# Patient Record
Sex: FEMALE | Race: WHITE | NOT HISPANIC OR LATINO | Employment: FULL TIME | URBAN - METROPOLITAN AREA
[De-identification: names, ages, dates, MRNs, and addresses within clinical notes are randomized per-mention and may not be internally consistent; named-entity substitution may affect disease eponyms.]

---

## 2017-01-21 ENCOUNTER — HOSPITAL ENCOUNTER (OUTPATIENT)
Dept: LAB | Facility: HOSPITAL | Age: 37
Discharge: HOME | End: 2017-01-21
Attending: SURGERY | Admitting: SURGERY

## 2017-01-21 LAB
ANION GAP SERPL CALC-SCNC: 12.8 MMOL/L (ref 10–20)
BUN SERPL-MCNC: 16 MG/DL (ref 8–20)
BUN/CREAT SERPL: 26.7 (ref 5.4–26.2)
CALCIUM SERPL-MCNC: 9.1 MG/DL (ref 8.9–10.3)
CHLORIDE SERPL-SCNC: 102 MMOL/L (ref 101–111)
CONV CO2: 25 MMOL/L (ref 22–32)
CREAT UR-MCNC: 0.6 MG/DL (ref 0.4–1)
GLUCOSE SERPL-MCNC: 215 MG/DL (ref 65–99)
POTASSIUM SERPL-SCNC: 3.8 MMOL/L (ref 3.6–5.1)
SODIUM SERPL-SCNC: 136 MMOL/L (ref 136–144)

## 2017-01-24 ENCOUNTER — HOSPITAL ENCOUNTER (OUTPATIENT)
Dept: OTHER | Facility: HOSPITAL | Age: 37
Setting detail: SPECIMEN
Discharge: HOME | End: 2017-01-24
Attending: SURGERY | Admitting: SURGERY

## 2017-08-18 ENCOUNTER — HOSPITAL ENCOUNTER (OUTPATIENT)
Dept: OTHER | Facility: HOSPITAL | Age: 37
Discharge: HOME | End: 2017-08-18
Attending: SURGERY | Admitting: SURGERY

## 2017-08-18 LAB
ANION GAP SERPL CALC-SCNC: 16 MMOL/L (ref 10–20)
BUN SERPL-MCNC: 11 MG/DL (ref 8–20)
BUN/CREAT SERPL: 18.3 (ref 5.4–26.2)
CALCIUM SERPL-MCNC: 9 MG/DL (ref 8.9–10.3)
CHLORIDE SERPL-SCNC: 101 MMOL/L (ref 101–111)
CONV CO2: 23 MMOL/L (ref 22–32)
CREAT UR-MCNC: 0.6 MG/DL (ref 0.4–1)
GLUCOSE SERPL-MCNC: 192 MG/DL (ref 65–99)
POTASSIUM SERPL-SCNC: 4 MMOL/L (ref 3.6–5.1)
SODIUM SERPL-SCNC: 136 MMOL/L (ref 136–144)

## 2017-08-21 ENCOUNTER — HOSPITAL ENCOUNTER (OUTPATIENT)
Dept: OTHER | Facility: HOSPITAL | Age: 37
Setting detail: SPECIMEN
Discharge: HOME | End: 2017-08-21
Attending: SURGERY | Admitting: SURGERY

## 2018-02-08 ENCOUNTER — HOSPITAL ENCOUNTER (OUTPATIENT)
Dept: MAMMOGRAPHY | Facility: HOSPITAL | Age: 38
Discharge: HOME | End: 2018-02-08

## 2018-02-13 ENCOUNTER — HOSPITAL ENCOUNTER (OUTPATIENT)
Dept: OTHER | Facility: HOSPITAL | Age: 38
Setting detail: SPECIMEN
Discharge: HOME | End: 2018-02-13
Attending: SURGERY | Admitting: SURGERY

## 2018-04-04 ENCOUNTER — HOSPITAL ENCOUNTER (OUTPATIENT)
Dept: FAMILY MEDICINE CLINIC | Facility: CLINIC | Age: 38
Setting detail: SPECIMEN
Discharge: HOME | End: 2018-04-04

## 2018-04-04 LAB
AMPICILLIN SUSC ISLT: NORMAL
BACTERIA ISLT: NORMAL
BACTERIA SPEC AEROBE CULT: NORMAL
GRAM STN SPEC: NORMAL
Lab: NORMAL
MICRO REPORT STATUS: NORMAL
SPECIMEN SOURCE: NORMAL
SUSC METH SPEC: NORMAL
VANCOMYCIN SUSC ISLT: NORMAL

## 2018-05-18 ENCOUNTER — HOSPITAL ENCOUNTER (OUTPATIENT)
Dept: FAMILY MEDICINE CLINIC | Facility: CLINIC | Age: 38
Setting detail: SPECIMEN
Discharge: HOME | End: 2018-05-18
Attending: NURSE PRACTITIONER | Admitting: NURSE PRACTITIONER

## 2018-05-18 LAB
ALBUMIN SERPL-MCNC: 3.5 G/DL (ref 3.5–4.8)
ALBUMIN/GLOB SERPL: 1.1 {RATIO} (ref 1–1.7)
ALP SERPL-CCNC: 81 IU/L (ref 32–91)
ALT SERPL-CCNC: 57 IU/L (ref 14–54)
ANION GAP SERPL CALC-SCNC: 16.6 MMOL/L (ref 10–20)
AST SERPL-CCNC: 40 IU/L (ref 15–41)
BILIRUB SERPL-MCNC: 0.5 MG/DL (ref 0.3–1.2)
BUN SERPL-MCNC: 9 MG/DL (ref 8–20)
BUN/CREAT SERPL: 15 (ref 5.4–26.2)
CALCIUM SERPL-MCNC: 9.4 MG/DL (ref 8.9–10.3)
CHLORIDE SERPL-SCNC: 98 MMOL/L (ref 101–111)
CHOLEST SERPL-MCNC: 390 MG/DL
CONV CO2: 24 MMOL/L (ref 22–32)
CONV LDL CHOLESTEROL DIRECT: ABNORMAL MG/DL (ref 0–100)
CONV TOTAL PROTEIN: 6.7 G/DL (ref 6.1–7.9)
CREAT UR-MCNC: 0.6 MG/DL (ref 0.4–1)
GLOBULIN UR ELPH-MCNC: 3.2 G/DL (ref 2.5–3.8)
GLUCOSE SERPL-MCNC: 235 MG/DL (ref 65–99)
HDLC SERPL-MCNC: ABNORMAL MG/DL
LIPID INTERPRETATION: ABNORMAL
POTASSIUM SERPL-SCNC: 3.6 MMOL/L (ref 3.6–5.1)
SODIUM SERPL-SCNC: 135 MMOL/L (ref 136–144)
TRIGL SERPL-MCNC: ABNORMAL MG/DL
VLDLC SERPL CALC-MCNC: ABNORMAL MG/DL

## 2018-10-06 ENCOUNTER — HOSPITAL ENCOUNTER (OUTPATIENT)
Dept: OTHER | Facility: HOSPITAL | Age: 38
Discharge: HOME | End: 2018-10-06
Attending: ANESTHESIOLOGY | Admitting: ANESTHESIOLOGY

## 2018-10-06 LAB
ANION GAP SERPL CALC-SCNC: 16.2 MMOL/L (ref 10–20)
BASOPHILS # BLD AUTO: 0.1 10*3/UL (ref 0–0.2)
BASOPHILS NFR BLD AUTO: 1 % (ref 0–2)
BUN SERPL-MCNC: 12 MG/DL (ref 8–20)
BUN/CREAT SERPL: 20 (ref 5.4–26.2)
CALCIUM SERPL-MCNC: 8.8 MG/DL (ref 8.9–10.3)
CHLORIDE SERPL-SCNC: 99 MMOL/L (ref 101–111)
CONV CO2: 23 MMOL/L (ref 22–32)
CREAT UR-MCNC: 0.6 MG/DL (ref 0.4–1)
DIFFERENTIAL METHOD BLD: (no result)
EOSINOPHIL # BLD AUTO: 0.3 10*3/UL (ref 0–0.3)
EOSINOPHIL # BLD AUTO: 3 % (ref 0–3)
ERYTHROCYTE [DISTWIDTH] IN BLOOD BY AUTOMATED COUNT: 13.2 % (ref 11.5–14.5)
GLUCOSE SERPL-MCNC: 206 MG/DL (ref 65–99)
HCT VFR BLD AUTO: 44.9 % (ref 35–49)
HGB BLD-MCNC: 15.2 G/DL (ref 12–15)
LYMPHOCYTES # BLD AUTO: 2.9 10*3/UL (ref 0.8–4.8)
LYMPHOCYTES NFR BLD AUTO: 28 % (ref 18–42)
MCH RBC QN AUTO: 30.8 PG (ref 26–32)
MCHC RBC AUTO-ENTMCNC: 33.8 G/DL (ref 32–36)
MCV RBC AUTO: 91.2 FL (ref 80–94)
MONOCYTES # BLD AUTO: 0.9 10*3/UL (ref 0.1–1.3)
MONOCYTES NFR BLD AUTO: 9 % (ref 2–11)
NEUTROPHILS # BLD AUTO: 6.2 10*3/UL (ref 2.3–8.6)
NEUTROPHILS NFR BLD AUTO: 59 % (ref 50–75)
NRBC BLD AUTO-RTO: 0 /100{WBCS}
NRBC/RBC NFR BLD MANUAL: 0 10*3/UL
PLATELET # BLD AUTO: 297 10*3/UL (ref 150–450)
PMV BLD AUTO: 10 FL (ref 7.4–10.4)
POTASSIUM SERPL-SCNC: 4.2 MMOL/L (ref 3.6–5.1)
RBC # BLD AUTO: 4.92 10*6/UL (ref 4–5.4)
SODIUM SERPL-SCNC: 134 MMOL/L (ref 136–144)
WBC # BLD AUTO: 10.4 10*3/UL (ref 4.5–11.5)

## 2018-10-09 ENCOUNTER — HOSPITAL ENCOUNTER (OUTPATIENT)
Dept: OTHER | Facility: HOSPITAL | Age: 38
Setting detail: SPECIMEN
Discharge: HOME | End: 2018-10-09
Attending: SURGERY | Admitting: SURGERY

## 2019-04-27 ENCOUNTER — HOSPITAL ENCOUNTER (OUTPATIENT)
Dept: OTHER | Facility: HOSPITAL | Age: 39
Discharge: HOME | End: 2019-04-27
Attending: SURGERY | Admitting: SURGERY

## 2019-04-27 LAB
ANION GAP SERPL CALC-SCNC: 18.2 MMOL/L (ref 10–20)
BASOPHILS # BLD AUTO: 0.1 10*3/UL (ref 0–0.2)
BASOPHILS NFR BLD AUTO: 1 % (ref 0–2)
BUN SERPL-MCNC: 11 MG/DL (ref 8–20)
BUN/CREAT SERPL: 22 (ref 5.4–26.2)
CALCIUM SERPL-MCNC: 8.4 MG/DL (ref 8.9–10.3)
CHLORIDE SERPL-SCNC: 100 MMOL/L (ref 101–111)
CONV CO2: 21 MMOL/L (ref 22–32)
CREAT UR-MCNC: 0.5 MG/DL (ref 0.4–1)
DIFFERENTIAL METHOD BLD: (no result)
EOSINOPHIL # BLD AUTO: 0.2 10*3/UL (ref 0–0.3)
EOSINOPHIL # BLD AUTO: 2 % (ref 0–3)
ERYTHROCYTE [DISTWIDTH] IN BLOOD BY AUTOMATED COUNT: 13.3 % (ref 11.5–14.5)
GLUCOSE SERPL-MCNC: 239 MG/DL (ref 65–99)
HCT VFR BLD AUTO: 43.7 % (ref 35–49)
HGB BLD-MCNC: 14.6 G/DL (ref 12–15)
LYMPHOCYTES # BLD AUTO: 2.5 10*3/UL (ref 0.8–4.8)
LYMPHOCYTES NFR BLD AUTO: 25 % (ref 18–42)
MCH RBC QN AUTO: 30.9 PG (ref 26–32)
MCHC RBC AUTO-ENTMCNC: 33.3 G/DL (ref 32–36)
MCV RBC AUTO: 92.8 FL (ref 80–94)
MONOCYTES # BLD AUTO: 0.9 10*3/UL (ref 0.1–1.3)
MONOCYTES NFR BLD AUTO: 9 % (ref 2–11)
NEUTROPHILS # BLD AUTO: 6.3 10*3/UL (ref 2.3–8.6)
NEUTROPHILS NFR BLD AUTO: 63 % (ref 50–75)
NRBC BLD AUTO-RTO: 0 /100{WBCS}
NRBC/RBC NFR BLD MANUAL: 0 10*3/UL
PLATELET # BLD AUTO: 249 10*3/UL (ref 150–450)
PMV BLD AUTO: 9.4 FL (ref 7.4–10.4)
POTASSIUM SERPL-SCNC: 4.2 MMOL/L (ref 3.6–5.1)
RBC # BLD AUTO: 4.71 10*6/UL (ref 4–5.4)
SODIUM SERPL-SCNC: 135 MMOL/L (ref 136–144)
WBC # BLD AUTO: 9.9 10*3/UL (ref 4.5–11.5)

## 2019-04-30 ENCOUNTER — HOSPITAL ENCOUNTER (OUTPATIENT)
Dept: OTHER | Facility: HOSPITAL | Age: 39
Setting detail: SPECIMEN
Discharge: HOME | End: 2019-04-30
Attending: SURGERY | Admitting: SURGERY

## 2019-05-10 ENCOUNTER — HOSPITAL ENCOUNTER (OUTPATIENT)
Dept: ULTRASOUND IMAGING | Facility: HOSPITAL | Age: 39
Discharge: HOME | End: 2019-05-10
Attending: SURGERY | Admitting: SURGERY

## 2019-06-18 ENCOUNTER — APPOINTMENT (OUTPATIENT)
Dept: PREADMISSION TESTING | Facility: HOSPITAL | Age: 39
End: 2019-06-18

## 2019-06-18 LAB
ANION GAP SERPL CALCULATED.3IONS-SCNC: 13 MMOL/L (ref 10–20)
BASOPHILS # BLD AUTO: 0.1 10*3/MM3 (ref 0–0.2)
BASOPHILS NFR BLD AUTO: 0.7 % (ref 0–1.5)
BUN BLD-MCNC: 13 MG/DL (ref 8–20)
BUN/CREAT SERPL: 26 (ref 5.4–26.2)
CALCIUM SPEC-SCNC: 8.9 MG/DL (ref 8.9–10.3)
CHLORIDE SERPL-SCNC: 100 MMOL/L (ref 101–111)
CO2 SERPL-SCNC: 20 MMOL/L (ref 22–32)
CREAT BLD-MCNC: 0.5 MG/DL (ref 0.4–1)
DEPRECATED RDW RBC AUTO: 40.7 FL (ref 37–54)
EOSINOPHIL # BLD AUTO: 0.3 10*3/MM3 (ref 0–0.4)
EOSINOPHIL NFR BLD AUTO: 2.5 % (ref 0.3–6.2)
ERYTHROCYTE [DISTWIDTH] IN BLOOD BY AUTOMATED COUNT: 12.7 % (ref 12.3–15.4)
GFR SERPL CREATININE-BSD FRML MDRD: 136 ML/MIN/1.73
GLUCOSE BLD-MCNC: 199 MG/DL (ref 65–99)
HCT VFR BLD AUTO: 44.1 % (ref 34–46.6)
HGB BLD-MCNC: 15 G/DL (ref 12–15.9)
LYMPHOCYTES # BLD AUTO: 2.8 10*3/MM3 (ref 0.7–3.1)
LYMPHOCYTES NFR BLD AUTO: 24 % (ref 19.6–45.3)
MCH RBC QN AUTO: 31 PG (ref 26.6–33)
MCHC RBC AUTO-ENTMCNC: 34 G/DL (ref 31.5–35.7)
MCV RBC AUTO: 91.1 FL (ref 79–97)
MONOCYTES # BLD AUTO: 0.9 10*3/MM3 (ref 0.1–0.9)
MONOCYTES NFR BLD AUTO: 7.6 % (ref 5–12)
NEUTROPHILS # BLD AUTO: 7.5 10*3/MM3 (ref 1.7–7)
NEUTROPHILS NFR BLD AUTO: 65.2 % (ref 42.7–76)
NRBC BLD AUTO-RTO: 0 /100 WBC (ref 0–0.2)
PLATELET # BLD AUTO: 321 10*3/MM3 (ref 140–450)
PMV BLD AUTO: 9.3 FL (ref 6–12)
POTASSIUM BLD-SCNC: 3.8 MMOL/L (ref 3.6–5.1)
RBC # BLD AUTO: 4.84 10*6/MM3 (ref 3.77–5.28)
SODIUM BLD-SCNC: 133 MMOL/L (ref 136–144)
WBC NRBC COR # BLD: 11.6 10*3/MM3 (ref 3.4–10.8)

## 2019-06-18 PROCEDURE — 85025 COMPLETE CBC W/AUTO DIFF WBC: CPT | Performed by: SURGERY

## 2019-06-18 PROCEDURE — 80048 BASIC METABOLIC PNL TOTAL CA: CPT | Performed by: SURGERY

## 2019-06-18 PROCEDURE — 36415 COLL VENOUS BLD VENIPUNCTURE: CPT | Performed by: SURGERY

## 2019-06-18 RX ORDER — HYDROXYZINE HYDROCHLORIDE 25 MG/1
25 TABLET, FILM COATED ORAL EVERY 6 HOURS PRN
COMMUNITY
Start: 2018-05-15 | End: 2020-01-02 | Stop reason: SDUPTHER

## 2019-06-18 RX ORDER — EPINEPHRINE 0.3 MG/.3ML
INJECTION SUBCUTANEOUS
COMMUNITY
Start: 2015-03-02 | End: 2019-10-23

## 2019-06-18 RX ORDER — HYDROCODONE BITARTRATE AND ACETAMINOPHEN 5; 325 MG/1; MG/1
TABLET ORAL
Refills: 0 | COMMUNITY
Start: 2019-05-16 | End: 2019-07-22

## 2019-06-18 RX ORDER — BUDESONIDE AND FORMOTEROL FUMARATE DIHYDRATE 80; 4.5 UG/1; UG/1
AEROSOL RESPIRATORY (INHALATION)
COMMUNITY
Start: 2013-10-08 | End: 2019-10-23

## 2019-06-18 RX ORDER — FUROSEMIDE 40 MG/1
40 TABLET ORAL DAILY
COMMUNITY
Start: 2015-11-25 | End: 2020-01-28

## 2019-06-18 RX ORDER — LISINOPRIL 40 MG/1
TABLET ORAL EVERY 24 HOURS
COMMUNITY
Start: 2018-04-23 | End: 2019-07-22 | Stop reason: ALTCHOICE

## 2019-06-18 RX ORDER — ALBUTEROL SULFATE 2.5 MG/3ML
SOLUTION RESPIRATORY (INHALATION)
COMMUNITY
Start: 2014-03-10 | End: 2019-10-23

## 2019-06-18 RX ORDER — DOXYCYCLINE 100 MG/1
CAPSULE ORAL
Refills: 0 | COMMUNITY
Start: 2019-05-28 | End: 2019-07-22

## 2019-06-18 RX ORDER — ALBUTEROL SULFATE 90 UG/1
AEROSOL, METERED RESPIRATORY (INHALATION)
COMMUNITY
Start: 2017-03-24 | End: 2019-10-23

## 2019-06-19 ENCOUNTER — ANESTHESIA (OUTPATIENT)
Dept: PERIOP | Facility: HOSPITAL | Age: 39
End: 2019-06-19

## 2019-06-19 ENCOUNTER — HOSPITAL ENCOUNTER (OUTPATIENT)
Facility: HOSPITAL | Age: 39
Setting detail: HOSPITAL OUTPATIENT SURGERY
Discharge: HOME | End: 2019-06-19
Attending: SURGERY | Admitting: SURGERY

## 2019-06-19 ENCOUNTER — ANESTHESIA EVENT (OUTPATIENT)
Dept: PERIOP | Facility: HOSPITAL | Age: 39
End: 2019-06-19

## 2019-06-19 VITALS
RESPIRATION RATE: 16 BRPM | HEIGHT: 67 IN | SYSTOLIC BLOOD PRESSURE: 154 MMHG | WEIGHT: 286.38 LBS | TEMPERATURE: 97.6 F | HEART RATE: 88 BPM | BODY MASS INDEX: 44.95 KG/M2 | OXYGEN SATURATION: 97 % | DIASTOLIC BLOOD PRESSURE: 95 MMHG

## 2019-06-19 PROBLEM — L73.2 HIDRADENITIS: Status: ACTIVE | Noted: 2019-06-19

## 2019-06-19 LAB — B-HCG UR QL: NEGATIVE

## 2019-06-19 PROCEDURE — 25010000002 MIDAZOLAM PER 1 MG: Performed by: ANESTHESIOLOGY

## 2019-06-19 PROCEDURE — 81025 URINE PREGNANCY TEST: CPT | Performed by: ANESTHESIOLOGY

## 2019-06-19 PROCEDURE — 25010000002 FENTANYL CITRATE (PF) 100 MCG/2ML SOLUTION: Performed by: ANESTHESIOLOGY

## 2019-06-19 PROCEDURE — 25010000002 PROPOFOL 10 MG/ML EMULSION: Performed by: ANESTHESIOLOGY

## 2019-06-19 PROCEDURE — 25010000002 DEXAMETHASONE PER 1 MG: Performed by: ANESTHESIOLOGY

## 2019-06-19 PROCEDURE — 25010000002 ONDANSETRON PER 1 MG: Performed by: ANESTHESIOLOGY

## 2019-06-19 RX ORDER — PROPOFOL 10 MG/ML
VIAL (ML) INTRAVENOUS AS NEEDED
Status: DISCONTINUED | OUTPATIENT
Start: 2019-06-19 | End: 2019-06-19 | Stop reason: SURG

## 2019-06-19 RX ORDER — PROMETHAZINE HYDROCHLORIDE 25 MG/1
25 SUPPOSITORY RECTAL ONCE AS NEEDED
Status: DISCONTINUED | OUTPATIENT
Start: 2019-06-19 | End: 2019-06-19 | Stop reason: HOSPADM

## 2019-06-19 RX ORDER — OXYCODONE AND ACETAMINOPHEN 7.5; 325 MG/1; MG/1
1 TABLET ORAL EVERY 4 HOURS PRN
Qty: 30 TABLET | Refills: 0 | Status: SHIPPED | OUTPATIENT
Start: 2019-06-19 | End: 2019-07-22

## 2019-06-19 RX ORDER — FENTANYL CITRATE 50 UG/ML
50 INJECTION, SOLUTION INTRAMUSCULAR; INTRAVENOUS
Status: DISCONTINUED | OUTPATIENT
Start: 2019-06-19 | End: 2019-06-19 | Stop reason: HOSPADM

## 2019-06-19 RX ORDER — MEPERIDINE HYDROCHLORIDE 25 MG/ML
12.5 INJECTION INTRAMUSCULAR; INTRAVENOUS; SUBCUTANEOUS
Status: DISCONTINUED | OUTPATIENT
Start: 2019-06-19 | End: 2019-06-19 | Stop reason: HOSPADM

## 2019-06-19 RX ORDER — MIDAZOLAM HYDROCHLORIDE 1 MG/ML
INJECTION INTRAMUSCULAR; INTRAVENOUS AS NEEDED
Status: DISCONTINUED | OUTPATIENT
Start: 2019-06-19 | End: 2019-06-19 | Stop reason: SURG

## 2019-06-19 RX ORDER — CEFAZOLIN SODIUM IN 0.9 % NACL 3 G/100 ML
3 INTRAVENOUS SOLUTION, PIGGYBACK (ML) INTRAVENOUS ONCE
Status: COMPLETED | OUTPATIENT
Start: 2019-06-19 | End: 2019-06-19

## 2019-06-19 RX ORDER — SODIUM CHLORIDE 0.9 % (FLUSH) 0.9 %
3-10 SYRINGE (ML) INJECTION AS NEEDED
Status: DISCONTINUED | OUTPATIENT
Start: 2019-06-19 | End: 2019-06-19 | Stop reason: HOSPADM

## 2019-06-19 RX ORDER — DEXAMETHASONE SODIUM PHOSPHATE 4 MG/ML
INJECTION, SOLUTION INTRA-ARTICULAR; INTRALESIONAL; INTRAMUSCULAR; INTRAVENOUS; SOFT TISSUE AS NEEDED
Status: DISCONTINUED | OUTPATIENT
Start: 2019-06-19 | End: 2019-06-19 | Stop reason: SURG

## 2019-06-19 RX ORDER — ONDANSETRON 2 MG/ML
INJECTION INTRAMUSCULAR; INTRAVENOUS AS NEEDED
Status: DISCONTINUED | OUTPATIENT
Start: 2019-06-19 | End: 2019-06-19 | Stop reason: SURG

## 2019-06-19 RX ORDER — SODIUM CHLORIDE, SODIUM LACTATE, POTASSIUM CHLORIDE, CALCIUM CHLORIDE 600; 310; 30; 20 MG/100ML; MG/100ML; MG/100ML; MG/100ML
100 INJECTION, SOLUTION INTRAVENOUS CONTINUOUS
Status: DISCONTINUED | OUTPATIENT
Start: 2019-06-19 | End: 2019-06-19 | Stop reason: HOSPADM

## 2019-06-19 RX ORDER — PROMETHAZINE HYDROCHLORIDE 25 MG/ML
12.5 INJECTION, SOLUTION INTRAMUSCULAR; INTRAVENOUS ONCE AS NEEDED
Status: DISCONTINUED | OUTPATIENT
Start: 2019-06-19 | End: 2019-06-19 | Stop reason: HOSPADM

## 2019-06-19 RX ORDER — ONDANSETRON 2 MG/ML
4 INJECTION INTRAMUSCULAR; INTRAVENOUS ONCE AS NEEDED
Status: DISCONTINUED | OUTPATIENT
Start: 2019-06-19 | End: 2019-06-19 | Stop reason: HOSPADM

## 2019-06-19 RX ORDER — FENTANYL CITRATE 50 UG/ML
INJECTION, SOLUTION INTRAMUSCULAR; INTRAVENOUS AS NEEDED
Status: DISCONTINUED | OUTPATIENT
Start: 2019-06-19 | End: 2019-06-19 | Stop reason: SURG

## 2019-06-19 RX ORDER — PROMETHAZINE HYDROCHLORIDE 25 MG/1
25 TABLET ORAL ONCE AS NEEDED
Status: DISCONTINUED | OUTPATIENT
Start: 2019-06-19 | End: 2019-06-19 | Stop reason: HOSPADM

## 2019-06-19 RX ORDER — ONDANSETRON 4 MG/1
4 TABLET, FILM COATED ORAL DAILY PRN
Qty: 30 TABLET | Refills: 1 | Status: SHIPPED | OUTPATIENT
Start: 2019-06-19 | End: 2019-07-22

## 2019-06-19 RX ORDER — SODIUM CHLORIDE 0.9 % (FLUSH) 0.9 %
3 SYRINGE (ML) INJECTION EVERY 12 HOURS SCHEDULED
Status: DISCONTINUED | OUTPATIENT
Start: 2019-06-19 | End: 2019-06-19 | Stop reason: HOSPADM

## 2019-06-19 RX ADMIN — MEPERIDINE HYDROCHLORIDE 12.5 MG: 25 INJECTION INTRAMUSCULAR; INTRAVENOUS; SUBCUTANEOUS at 10:55

## 2019-06-19 RX ADMIN — ONDANSETRON 4 MG: 2 INJECTION INTRAMUSCULAR; INTRAVENOUS at 09:17

## 2019-06-19 RX ADMIN — CEFAZOLIN 3 G: 1 INJECTION, POWDER, FOR SOLUTION INTRAMUSCULAR; INTRAVENOUS; PARENTERAL at 09:08

## 2019-06-19 RX ADMIN — FENTANYL CITRATE 50 MCG: 50 INJECTION INTRAMUSCULAR; INTRAVENOUS at 10:41

## 2019-06-19 RX ADMIN — SODIUM CHLORIDE, SODIUM LACTATE, POTASSIUM CHLORIDE, AND CALCIUM CHLORIDE 100 ML/HR: .6; .31; .03; .02 INJECTION, SOLUTION INTRAVENOUS at 07:14

## 2019-06-19 RX ADMIN — FENTANYL CITRATE 50 MCG: 50 INJECTION, SOLUTION INTRAMUSCULAR; INTRAVENOUS at 09:27

## 2019-06-19 RX ADMIN — DEXAMETHASONE SODIUM PHOSPHATE 4 MG: 4 INJECTION, SOLUTION INTRAMUSCULAR; INTRAVENOUS at 09:17

## 2019-06-19 RX ADMIN — FENTANYL CITRATE 50 MCG: 50 INJECTION INTRAMUSCULAR; INTRAVENOUS at 10:16

## 2019-06-19 RX ADMIN — FENTANYL CITRATE 100 MCG: 50 INJECTION, SOLUTION INTRAMUSCULAR; INTRAVENOUS at 09:04

## 2019-06-19 RX ADMIN — PROPOFOL 200 MG: 10 INJECTION, EMULSION INTRAVENOUS at 09:11

## 2019-06-19 RX ADMIN — FENTANYL CITRATE 50 MCG: 50 INJECTION, SOLUTION INTRAMUSCULAR; INTRAVENOUS at 09:31

## 2019-06-19 RX ADMIN — MIDAZOLAM 2 MG: 1 INJECTION INTRAMUSCULAR; INTRAVENOUS at 09:04

## 2019-06-19 RX ADMIN — SODIUM CHLORIDE, SODIUM LACTATE, POTASSIUM CHLORIDE, AND CALCIUM CHLORIDE: .6; .31; .03; .02 INJECTION, SOLUTION INTRAVENOUS at 09:00

## 2019-06-19 NOTE — ANESTHESIA PROCEDURE NOTES
Airway  Urgency: elective    Date/Time: 6/19/2019 9:06 AM  Airway not difficult    General Information and Staff    Patient location during procedure: OR  Anesthesiologist: Calvin Resendez MD    Indications and Patient Condition  Indications for airway management: airway protection    Preoxygenated: yes  MILS maintained throughout  Mask difficulty assessment: 0 - not attempted    Final Airway Details  Final airway type: supraglottic airway      Successful airway: LMA  Size 4    Number of attempts at approach: 1

## 2019-06-19 NOTE — ANESTHESIA POSTPROCEDURE EVALUATION
Patient: Bernadine Arriaga    Procedure Summary     Date:  06/19/19 Room / Location:  [facility] OR 06 / [facility] MAIN OR    Anesthesia Start:  0900 Anesthesia Stop:  0945    Procedure:  DEBRIDEMENT OF RECURRENT HIDRADENITIS AND VAC PLACEMENT RIGHT SIDE (Right Buttocks) Diagnosis:  (CHRONIC RIGHT AXILLARY)    Surgeon:  Alix Freire MD Provider:  Calvin Resendez MD    Anesthesia Type:  general ASA Status:  3          Anesthesia Type: general  Last vitals  BP   149/97 (06/19/19 0607)   Temp   97.1 °F (36.2 °C) (06/19/19 0607)   Pulse   88 (06/19/19 0607)   Resp   13 (06/19/19 0607)     SpO2   96 % (06/19/19 0607)     Post Anesthesia Care and Evaluation    Patient location during evaluation: PACU  Patient participation: complete - patient participated  Level of consciousness: awake  Pain management: adequate  Airway patency: patent  Anesthetic complications: No anesthetic complications  PONV Status: none  Cardiovascular status: acceptable  Respiratory status: acceptable  Hydration status: acceptable    Comments: Patient seen and examined postoperatively; vital signs stable; SpO2 greater than or equal to 90%; cardiopulmonary status stable; nausea/vomiting adequately controlled; pain adequately controlled; no apparent anesthesia complications; patient discharged from anesthesia care when discharge criteria were met

## 2019-06-19 NOTE — ANESTHESIA PREPROCEDURE EVALUATION
Anesthesia Evaluation     Patient summary reviewed and Nursing notes reviewed   NPO Solid Status: > 8 hours  NPO Liquid Status: > 8 hours           Airway   Mallampati: II  TM distance: >3 FB  Neck ROM: full  No difficulty expected  Dental - normal exam     Pulmonary - normal exam   (+) asthma,   Cardiovascular - normal exam    (+) hypertension,       Neuro/Psych- negative ROS  GI/Hepatic/Renal/Endo - negative ROS     Musculoskeletal (-) negative ROS    Abdominal  - normal exam   Substance History - negative use     OB/GYN negative ob/gyn ROS         Other                        Anesthesia Plan    ASA 3     general     intravenous induction   Anesthetic plan, all risks, benefits, and alternatives have been provided, discussed and informed consent has been obtained with: patient.

## 2019-06-19 NOTE — ANESTHESIA PROCEDURE NOTES
Airway  Urgency: elective    Airway not difficult    General Information and Staff    Patient location during procedure: OR  Anesthesiologist: Calvin Resendez MD    Indications and Patient Condition  Indications for airway management: airway protection    Preoxygenated: yes  MILS maintained throughout  Mask difficulty assessment: 0 - not attempted    Final Airway Details  Final airway type: supraglottic airway      Successful airway: LMA  Size 4    Number of attempts at approach: 1

## 2019-06-23 ENCOUNTER — HOSPITAL ENCOUNTER (EMERGENCY)
Facility: HOSPITAL | Age: 39
Discharge: HOME | End: 2019-06-23
Attending: EMERGENCY MEDICINE

## 2019-06-23 VITALS
DIASTOLIC BLOOD PRESSURE: 79 MMHG | BODY MASS INDEX: 45.85 KG/M2 | SYSTOLIC BLOOD PRESSURE: 143 MMHG | OXYGEN SATURATION: 99 % | HEART RATE: 81 BPM | WEIGHT: 292.11 LBS | TEMPERATURE: 98.6 F | HEIGHT: 67 IN | RESPIRATION RATE: 20 BRPM

## 2019-06-23 DIAGNOSIS — L73.2 HIDRADENITIS SUPPURATIVA OF RIGHT AXILLA: Primary | ICD-10-CM

## 2019-06-23 LAB
ANION GAP SERPL CALCULATED.3IONS-SCNC: 15 MMOL/L (ref 10–20)
BASOPHILS # BLD AUTO: 0.1 10*3/MM3 (ref 0–0.2)
BASOPHILS NFR BLD AUTO: 0.9 % (ref 0–1.5)
BUN BLD-MCNC: 7 MG/DL (ref 8–20)
BUN/CREAT SERPL: 14 (ref 5.4–26.2)
CALCIUM SPEC-SCNC: 8.4 MG/DL (ref 8.9–10.3)
CHLORIDE SERPL-SCNC: 100 MMOL/L (ref 101–111)
CO2 SERPL-SCNC: 19 MMOL/L (ref 22–32)
CREAT BLD-MCNC: 0.5 MG/DL (ref 0.4–1)
DEPRECATED RDW RBC AUTO: 42 FL (ref 37–54)
EOSINOPHIL # BLD AUTO: 0.3 10*3/MM3 (ref 0–0.4)
EOSINOPHIL NFR BLD AUTO: 2.2 % (ref 0.3–6.2)
ERYTHROCYTE [DISTWIDTH] IN BLOOD BY AUTOMATED COUNT: 12.8 % (ref 12.3–15.4)
GFR SERPL CREATININE-BSD FRML MDRD: 136 ML/MIN/1.73
GLUCOSE BLD-MCNC: 313 MG/DL (ref 65–99)
HCT VFR BLD AUTO: 42.1 % (ref 34–46.6)
HGB BLD-MCNC: 14.6 G/DL (ref 12–15.9)
LYMPHOCYTES # BLD AUTO: 2.9 10*3/MM3 (ref 0.7–3.1)
LYMPHOCYTES NFR BLD AUTO: 23.4 % (ref 19.6–45.3)
MCH RBC QN AUTO: 32.1 PG (ref 26.6–33)
MCHC RBC AUTO-ENTMCNC: 34.6 G/DL (ref 31.5–35.7)
MCV RBC AUTO: 92.8 FL (ref 79–97)
MONOCYTES # BLD AUTO: 1 10*3/MM3 (ref 0.1–0.9)
MONOCYTES NFR BLD AUTO: 8.3 % (ref 5–12)
NEUTROPHILS # BLD AUTO: 8 10*3/MM3 (ref 1.7–7)
NEUTROPHILS NFR BLD AUTO: 65.2 % (ref 42.7–76)
NRBC BLD AUTO-RTO: 0.1 /100 WBC (ref 0–0.2)
PLATELET # BLD AUTO: 305 10*3/MM3 (ref 140–450)
PMV BLD AUTO: 9.4 FL (ref 6–12)
POTASSIUM BLD-SCNC: 4 MMOL/L (ref 3.6–5.1)
RBC # BLD AUTO: 4.54 10*6/MM3 (ref 3.77–5.28)
SODIUM BLD-SCNC: 134 MMOL/L (ref 136–144)
WBC NRBC COR # BLD: 12.2 10*3/MM3 (ref 3.4–10.8)

## 2019-06-23 PROCEDURE — 87040 BLOOD CULTURE FOR BACTERIA: CPT | Performed by: NURSE PRACTITIONER

## 2019-06-23 PROCEDURE — 25010000003 AMPICILLIN-SULBACTAM PER 1.5 G: Performed by: NURSE PRACTITIONER

## 2019-06-23 PROCEDURE — 87147 CULTURE TYPE IMMUNOLOGIC: CPT | Performed by: NURSE PRACTITIONER

## 2019-06-23 PROCEDURE — 99284 EMERGENCY DEPT VISIT MOD MDM: CPT

## 2019-06-23 PROCEDURE — 87070 CULTURE OTHR SPECIMN AEROBIC: CPT | Performed by: NURSE PRACTITIONER

## 2019-06-23 PROCEDURE — 96365 THER/PROPH/DIAG IV INF INIT: CPT

## 2019-06-23 PROCEDURE — 87205 SMEAR GRAM STAIN: CPT | Performed by: NURSE PRACTITIONER

## 2019-06-23 PROCEDURE — 87186 SC STD MICRODIL/AGAR DIL: CPT | Performed by: NURSE PRACTITIONER

## 2019-06-23 PROCEDURE — 80048 BASIC METABOLIC PNL TOTAL CA: CPT | Performed by: NURSE PRACTITIONER

## 2019-06-23 PROCEDURE — 85025 COMPLETE CBC W/AUTO DIFF WBC: CPT | Performed by: NURSE PRACTITIONER

## 2019-06-23 RX ORDER — SODIUM CHLORIDE 0.9 % (FLUSH) 0.9 %
10 SYRINGE (ML) INJECTION AS NEEDED
Status: DISCONTINUED | OUTPATIENT
Start: 2019-06-23 | End: 2019-06-23 | Stop reason: HOSPADM

## 2019-06-23 RX ADMIN — AMPICILLIN SODIUM AND SULBACTAM SODIUM 3 G: 2; 1 INJECTION, POWDER, FOR SOLUTION INTRAMUSCULAR; INTRAVENOUS at 16:09

## 2019-06-23 RX ADMIN — Medication 10 ML: at 15:57

## 2019-06-25 LAB
BACTERIA SPEC AEROBE CULT: ABNORMAL
GRAM STN SPEC: ABNORMAL

## 2019-06-28 LAB
BACTERIA SPEC AEROBE CULT: NORMAL
BACTERIA SPEC AEROBE CULT: NORMAL

## 2019-07-22 ENCOUNTER — OFFICE VISIT (OUTPATIENT)
Dept: FAMILY MEDICINE CLINIC | Facility: CLINIC | Age: 39
End: 2019-07-22

## 2019-07-22 VITALS
OXYGEN SATURATION: 97 % | TEMPERATURE: 99.1 F | SYSTOLIC BLOOD PRESSURE: 156 MMHG | BODY MASS INDEX: 44.32 KG/M2 | WEIGHT: 283 LBS | DIASTOLIC BLOOD PRESSURE: 104 MMHG | HEART RATE: 95 BPM

## 2019-07-22 DIAGNOSIS — E11.65 TYPE 2 DIABETES MELLITUS WITH HYPERGLYCEMIA, WITHOUT LONG-TERM CURRENT USE OF INSULIN (HCC): Primary | ICD-10-CM

## 2019-07-22 DIAGNOSIS — I10 ESSENTIAL HYPERTENSION: ICD-10-CM

## 2019-07-22 PROBLEM — A49.02 METHICILLIN RESISTANT STAPHYLOCOCCUS AUREUS INFECTION: Status: ACTIVE | Noted: 2019-07-22

## 2019-07-22 PROBLEM — J45.909 ASTHMA: Status: ACTIVE | Noted: 2019-07-22

## 2019-07-22 PROBLEM — G47.33 OBSTRUCTIVE SLEEP APNEA: Status: ACTIVE | Noted: 2019-07-22

## 2019-07-22 PROBLEM — Z83.3 FAMILY HISTORY OF DIABETES MELLITUS: Status: ACTIVE | Noted: 2019-07-22

## 2019-07-22 LAB — HBA1C MFR BLD: 9.1 % (ref 3.5–5.6)

## 2019-07-22 PROCEDURE — 83036 HEMOGLOBIN GLYCOSYLATED A1C: CPT | Performed by: FAMILY MEDICINE

## 2019-07-22 PROCEDURE — 36415 COLL VENOUS BLD VENIPUNCTURE: CPT | Performed by: FAMILY MEDICINE

## 2019-07-22 PROCEDURE — 99213 OFFICE O/P EST LOW 20 MIN: CPT | Performed by: FAMILY MEDICINE

## 2019-07-22 RX ORDER — GLIMEPIRIDE 4 MG/1
4 TABLET ORAL 2 TIMES DAILY WITH MEALS
Qty: 180 TABLET | Refills: 3 | Status: SHIPPED | OUTPATIENT
Start: 2019-07-22 | End: 2019-10-22

## 2019-07-22 RX ORDER — BETAMETHASONE DIPROPIONATE 0.5 MG/G
CREAM TOPICAL
Refills: 0 | COMMUNITY
Start: 2019-07-11 | End: 2019-10-23

## 2019-07-22 RX ORDER — CLOTRIMAZOLE AND BETAMETHASONE DIPROPIONATE 10; .64 MG/G; MG/G
1 CREAM TOPICAL 2 TIMES DAILY
Refills: 1 | COMMUNITY
Start: 2019-06-24 | End: 2019-10-23

## 2019-07-22 RX ORDER — FLUOROMETHOLONE 0.1 %
SUSPENSION, DROPS(FINAL DOSAGE FORM)(ML) OPHTHALMIC (EYE) AS NEEDED
Refills: 1 | COMMUNITY
Start: 2019-06-19 | End: 2019-10-23

## 2019-07-22 RX ORDER — CLINDAMYCIN PHOSPHATE 10 UG/ML
LOTION TOPICAL
Refills: 3 | COMMUNITY
Start: 2019-07-10 | End: 2019-10-23

## 2019-07-22 RX ORDER — BETAMETHASONE DIPROPIONATE 0.5 MG/G
LOTION TOPICAL
Refills: 1 | COMMUNITY
Start: 2019-07-10 | End: 2019-10-23

## 2019-07-22 RX ORDER — LOSARTAN POTASSIUM 100 MG/1
100 TABLET ORAL DAILY
Qty: 90 TABLET | Refills: 3 | Status: SHIPPED | OUTPATIENT
Start: 2019-07-22 | End: 2019-10-24 | Stop reason: HOSPADM

## 2019-07-22 NOTE — PROGRESS NOTES
Subjective   Bernadine Arriaga is a 39 y.o. female.     Comes in for follow up on her bs after a recent hospitalizations for hidradenitis  bs have been significantly elevated  She has been noncompliant with f/u  Had gi issues with the metformin  Not been on medications  Sees Dr. Boyd  bs have been elevated      Diabetes   No MedicAlert identification noted. Hypoglycemia symptoms include sweats. Pertinent negatives for hypoglycemia include no confusion, dizziness, hunger, mood changes, pallor, seizures or speech difficulty. Pertinent negatives for diabetes include no blurred vision, no chest pain, no fatigue, no foot paresthesias, no foot ulcerations, no polydipsia, no polyphagia, no polyuria, no weakness and no weight loss. Pertinent negatives for hypoglycemia complications include no blackouts, no hospitalization, no required assistance and no required glucagon injection. Pertinent negatives for diabetic complications include no CVA, heart disease, nephropathy, peripheral neuropathy or retinopathy. Risk factors for coronary artery disease include family history, hypertension, obesity and sedentary lifestyle. Her weight is fluctuating minimally. She has not had a previous visit with a dietitian. She rarely participates in exercise. She sees a podiatrist.Eye exam is current.        The following portions of the patient's history were reviewed and updated as appropriate: allergies, current medications, past family history, past medical history, past social history, past surgical history and problem list.  Past Medical History:   Diagnosis Date   • Asthma    • Hypertension    • Migraine     history   • Sinus disorder      Past Surgical History:   Procedure Laterality Date   • ADENOIDECTOMY     • APPENDECTOMY     •  SECTION     • CHOLECYSTECTOMY     • INCISION AND DRAINAGE OF WOUND     • KNEE ARTHRODESIS     • TONSILLECTOMY     • WOUND DEBRIDEMENT Right 2019    Procedure: DEBRIDEMENT OF RECURRENT  HIDRADENITIS AND VAC PLACEMENT RIGHT SIDE;  Surgeon: Alix Freire MD;  Location: [facility] MAIN OR;  Service: General     History reviewed. No pertinent family history.  Social History     Socioeconomic History   • Marital status:      Spouse name: Not on file   • Number of children: Not on file   • Years of education: Not on file   • Highest education level: Not on file   Tobacco Use   • Smoking status: Never Smoker   Substance and Sexual Activity   • Alcohol use: No     Frequency: Never   • Drug use: No         Current Outpatient Medications:   •  albuterol (PROVENTIL) (2.5 MG/3ML) 0.083% nebulizer solution, ALBUTEROL SULFATE (2.5 MG/3ML) 0.083% NEBU, Disp: , Rfl:   •  albuterol sulfate HFA (VENTOLIN HFA) 108 (90 Base) MCG/ACT inhaler, VENTOLIN  (90 Base) MCG/ACT AERS, Disp: , Rfl:   •  betamethasone dipropionate (DIPROLENE) 0.05 % cream, , Disp: , Rfl: 0  •  betamethasone dipropionate (DIPROLENE) 0.05 % lotion, APPLY A FEW DROPS TO THE SCALP ONCE DAILY FOR UP TO 2 WEEKS AND THEN AS NEEDED FOR FLARES, Disp: , Rfl: 1  •  budesonide-formoterol (SYMBICORT) 80-4.5 MCG/ACT inhaler, SYMBICORT 80-4.5 MCG/ACT AERO, Disp: , Rfl:   •  clindamycin (CLEOCIN T) 1 % lotion, APPLY THIN LAYER 2 TIMES DAILY TO THE FLARED AREAS, Disp: , Rfl: 3  •  clotrimazole-betamethasone (LOTRISONE) 1-0.05 % cream, Apply 1 application topically to the appropriate area as directed 2 (Two) Times a Day. APPLY TO AFFECTED AREA, Disp: , Rfl: 1  •  EPINEPHrine (EPIPEN 2-ANDREA) 0.3 MG/0.3ML solution auto-injector injection, EPIPEN 2-ANDREA 0.3 MG/0.3ML SOAJ, Disp: , Rfl:   •  fluorometholone (FML) 0.1 % ophthalmic suspension, As Needed., Disp: , Rfl: 1  •  furosemide (LASIX) 40 MG tablet, Daily., Disp: , Rfl:   •  glimepiride (AMARYL) 4 MG tablet, Take 1 tablet by mouth 2 (Two) Times a Day With Meals., Disp: 180 tablet, Rfl: 3  •  hydrOXYzine (ATARAX) 25 MG tablet, Every 6 (Six) Hours., Disp: , Rfl:   •  losartan (COZAAR) 100 MG tablet, Take  1 tablet by mouth Daily., Disp: 90 tablet, Rfl: 3    Review of Systems   Constitutional: Negative for diaphoresis, fatigue, fever, unexpected weight gain and unexpected weight loss.   Eyes: Negative for blurred vision.   Respiratory: Negative for cough, chest tightness and shortness of breath.    Cardiovascular: Negative for chest pain, palpitations and leg swelling.   Gastrointestinal: Negative for nausea and vomiting.   Endocrine: Negative for polydipsia, polyphagia and polyuria.   Skin: Negative for pallor.   Neurological: Negative for dizziness, seizures, syncope, speech difficulty, weakness, headache and confusion.     BP (!) 156/104 (BP Location: Right arm, Patient Position: Sitting, Cuff Size: Adult)   Pulse 95   Temp 99.1 °F (37.3 °C) (Oral)   Wt 128 kg (283 lb)   SpO2 97%   BMI 44.32 kg/m²       Objective   Physical Exam   Constitutional: She appears well-developed and well-nourished. No distress. She is morbidly obese.  HENT:   Head: Normocephalic and atraumatic.   Neck: Neck supple. No JVD present.   Cardiovascular: Normal rate, regular rhythm, normal heart sounds and intact distal pulses. Exam reveals no gallop and no friction rub.   No murmur heard.  Pulmonary/Chest: Effort normal and breath sounds normal. No respiratory distress. She has no wheezes. She has no rales.   Musculoskeletal: She exhibits no edema.   Lymphadenopathy:     She has no cervical adenopathy.   Neurological: She is alert.   Skin: Skin is warm and dry.         Assessment/Plan   Problems Addressed this Visit        Cardiovascular and Mediastinum    Hypertension    Relevant Medications    losartan (COZAAR) 100 MG tablet       Endocrine    Type 2 diabetes mellitus with hyperglycemia (CMS/[facility]) - Primary    Relevant Medications    glimepiride (AMARYL) 4 MG tablet    losartan (COZAAR) 100 MG tablet    Other Relevant Orders    Hemoglobin A1c

## 2019-07-22 NOTE — PATIENT INSTRUCTIONS
Keep working to lose weight through healthy eating and exercise.  You need to avoid fried foods and limit pasta, bread, and sweets.    Start checking your blood sugar routinely  Ok to do blood  Test today

## 2019-07-25 RX ORDER — BLOOD SUGAR DIAGNOSTIC
STRIP MISCELLANEOUS
Qty: 100 EACH | Refills: 0 | Status: SHIPPED | OUTPATIENT
Start: 2019-07-25 | End: 2019-10-23

## 2019-10-22 ENCOUNTER — OFFICE VISIT (OUTPATIENT)
Dept: FAMILY MEDICINE CLINIC | Facility: CLINIC | Age: 39
End: 2019-10-22

## 2019-10-22 ENCOUNTER — TELEPHONE (OUTPATIENT)
Dept: FAMILY MEDICINE CLINIC | Facility: CLINIC | Age: 39
End: 2019-10-22

## 2019-10-22 VITALS
HEIGHT: 67 IN | TEMPERATURE: 98.4 F | HEART RATE: 91 BPM | BODY MASS INDEX: 44.73 KG/M2 | OXYGEN SATURATION: 98 % | WEIGHT: 285 LBS | DIASTOLIC BLOOD PRESSURE: 90 MMHG | SYSTOLIC BLOOD PRESSURE: 162 MMHG

## 2019-10-22 DIAGNOSIS — I10 ESSENTIAL HYPERTENSION: Primary | ICD-10-CM

## 2019-10-22 DIAGNOSIS — E78.2 MIXED HYPERLIPIDEMIA: ICD-10-CM

## 2019-10-22 DIAGNOSIS — E11.65 TYPE 2 DIABETES MELLITUS WITH HYPERGLYCEMIA, WITHOUT LONG-TERM CURRENT USE OF INSULIN (HCC): ICD-10-CM

## 2019-10-22 PROCEDURE — 99213 OFFICE O/P EST LOW 20 MIN: CPT | Performed by: FAMILY MEDICINE

## 2019-10-22 RX ORDER — SPIRONOLACTONE 50 MG/1
50 TABLET, FILM COATED ORAL DAILY
COMMUNITY
End: 2020-01-28

## 2019-10-22 RX ORDER — DOXYCYCLINE HYCLATE 100 MG/1
100 CAPSULE ORAL 2 TIMES DAILY
COMMUNITY
End: 2020-03-03

## 2019-10-22 RX ORDER — METOPROLOL SUCCINATE 50 MG/1
50 TABLET, EXTENDED RELEASE ORAL DAILY
Qty: 90 TABLET | Refills: 3 | Status: SHIPPED | OUTPATIENT
Start: 2019-10-22 | End: 2019-10-24 | Stop reason: HOSPADM

## 2019-10-22 RX ORDER — GLIMEPIRIDE 4 MG/1
2 TABLET ORAL 2 TIMES DAILY WITH MEALS
Qty: 180 TABLET | Refills: 3 | Status: SHIPPED
Start: 2019-10-22 | End: 2020-07-26

## 2019-10-22 NOTE — PROGRESS NOTES
Subjective   Bernadine rAriaga is a 39 y.o. female.     Comes in today for follow-up on her blood pressure and diabetes  Occasions were adjusted in her previous visit  BS running below 200  Has already had her flu shot at work  Just finished her shift prior to arrival at office  Wanting to know if she can try saxenda for weight loss         The following portions of the patient's history were reviewed and updated as appropriate: allergies, current medications, past family history, past medical history, past social history, past surgical history and problem list.  Past Medical History:   Diagnosis Date   • Asthma    • Hypertension    • Migraine     history   • Sinus disorder      Past Surgical History:   Procedure Laterality Date   • ADENOIDECTOMY     • APPENDECTOMY     •  SECTION     • CHOLECYSTECTOMY     • INCISION AND DRAINAGE OF WOUND     • KNEE ARTHRODESIS     • TONSILLECTOMY     • WOUND DEBRIDEMENT Right 2019    Procedure: DEBRIDEMENT OF RECURRENT HIDRADENITIS AND VAC PLACEMENT RIGHT SIDE;  Surgeon: Alix Freire MD;  Location: [facility] OR;  Service: General     No family history on file.  Social History     Socioeconomic History   • Marital status:      Spouse name: Not on file   • Number of children: Not on file   • Years of education: Not on file   • Highest education level: Not on file   Tobacco Use   • Smoking status: Never Smoker   Substance and Sexual Activity   • Alcohol use: No     Frequency: Never   • Drug use: No         Current Outpatient Medications:   •  ACCU-CHEK GAYLE PLUS test strip, USE TO TEST ONCE DAILY, Disp: 100 each, Rfl: 0  •  albuterol (PROVENTIL) (2.5 MG/3ML) 0.083% nebulizer solution, ALBUTEROL SULFATE (2.5 MG/3ML) 0.083% NEBU, Disp: , Rfl:   •  albuterol sulfate HFA (VENTOLIN HFA) 108 (90 Base) MCG/ACT inhaler, VENTOLIN  (90 Base) MCG/ACT AERS, Disp: , Rfl:   •  betamethasone dipropionate (DIPROLENE) 0.05 % cream, , Disp: , Rfl: 0  •   "betamethasone dipropionate (DIPROLENE) 0.05 % lotion, APPLY A FEW DROPS TO THE SCALP ONCE DAILY FOR UP TO 2 WEEKS AND THEN AS NEEDED FOR FLARES, Disp: , Rfl: 1  •  budesonide-formoterol (SYMBICORT) 80-4.5 MCG/ACT inhaler, SYMBICORT 80-4.5 MCG/ACT AERO, Disp: , Rfl:   •  clindamycin (CLEOCIN T) 1 % lotion, APPLY THIN LAYER 2 TIMES DAILY TO THE FLARED AREAS, Disp: , Rfl: 3  •  clotrimazole-betamethasone (LOTRISONE) 1-0.05 % cream, Apply 1 application topically to the appropriate area as directed 2 (Two) Times a Day. APPLY TO AFFECTED AREA, Disp: , Rfl: 1  •  EPINEPHrine (EPIPEN 2-ANDREA) 0.3 MG/0.3ML solution auto-injector injection, EPIPEN 2-ANDREA 0.3 MG/0.3ML SOAJ, Disp: , Rfl:   •  fluorometholone (FML) 0.1 % ophthalmic suspension, As Needed., Disp: , Rfl: 1  •  furosemide (LASIX) 40 MG tablet, Daily., Disp: , Rfl:   •  glimepiride (AMARYL) 4 MG tablet, Take 1 tablet by mouth 2 (Two) Times a Day With Meals., Disp: 180 tablet, Rfl: 3  •  hydrOXYzine (ATARAX) 25 MG tablet, Every 6 (Six) Hours., Disp: , Rfl:   •  losartan (COZAAR) 100 MG tablet, Take 1 tablet by mouth Daily., Disp: 90 tablet, Rfl: 3    Review of Systems   Constitutional: Negative for diaphoresis, fatigue, fever, unexpected weight gain and unexpected weight loss.   Respiratory: Negative for cough, chest tightness and shortness of breath.    Cardiovascular: Negative for chest pain, palpitations and leg swelling.   Gastrointestinal: Negative for nausea and vomiting.   Neurological: Negative for dizziness, syncope and headache.     BP (!) 177/119 (BP Location: Left arm, Patient Position: Sitting, Cuff Size: Adult)   Pulse 91   Temp 98.4 °F (36.9 °C) (Oral)   Ht 170.2 cm (67\")   Wt 129 kg (285 lb)   SpO2 98%   BMI 44.64 kg/m²       Objective   Physical Exam   Constitutional: She appears well-developed and well-nourished. No distress. She is morbidly obese.  HENT:   Head: Normocephalic and atraumatic.   Neck: Neck supple. No JVD present. No thyromegaly " present.   Cardiovascular: Normal rate, regular rhythm, normal heart sounds and intact distal pulses. Exam reveals no gallop and no friction rub.   No murmur heard.  Pulmonary/Chest: Effort normal and breath sounds normal. No respiratory distress. She has no wheezes. She has no rales.   Musculoskeletal: She exhibits no edema.    Bernadine had a diabetic foot exam performed today.   During the foot exam she had a monofilament test not performed.  Vascular Status -  Her right foot exhibits normal foot vasculature  and no edema. Her left foot exhibits normal foot vasculature  and no edema.  Skin Integrity  -  Her right foot skin is intact. She has callous right foot and right heel is dry and cracked.  She has no right foot onychomycosis, no right foot ulcer, no ingrown toenail on right foot, no right foot warmth, no right foot blister and no right foot gangrenous changes.Her left foot skin is intact.She has callous left foot and left heel dry and cracked. She has no left foot onychomycosis, no left foot ulcer, no left ingrown toenail, no left foot warmth, no left foot blister and no left foot gangrenous changes..   Foot Structure and Biomechanics -  Her right foot has no hallux valgus, no pes cavus, no claw toes and no hammer toes present. Her left foot has no hallux valgus, no pes cavus, no claw toes and no hammer toes.  Lymphadenopathy:     She has no cervical adenopathy.   Neurological: She is alert.   Skin: Skin is warm and dry.   Psychiatric: She has a normal mood and affect.   Nursing note and vitals reviewed.        Assessment/Plan   Problems Addressed this Visit        Cardiovascular and Mediastinum    Hyperlipidemia    Relevant Orders    Comprehensive Metabolic Panel    Lipid Panel    Hypertension - Primary    Relevant Medications    spironolactone (ALDACTONE) 50 MG tablet    metoprolol succinate XL (TOPROL XL) 50 MG 24 hr tablet    Other Relevant Orders    Comprehensive Metabolic Panel    Lipid Panel        Endocrine    Type 2 diabetes mellitus with hyperglycemia (CMS/[facility])    Relevant Orders    Comprehensive Metabolic Panel    Hemoglobin A1c    Lipid Panel          Will check A1C and other labs to see if there has been improvement  Counseled on importance of dietary compliance along with exercise for weight loss and potential health risks if she does not  bp is remaining elevated at home as well  Will start metoprolol xl 50  Will also start saxenda   Will see her back in the office in a month  Decrease amaryl to 2mg bid

## 2019-10-23 ENCOUNTER — HOSPITAL ENCOUNTER (OUTPATIENT)
Facility: HOSPITAL | Age: 39
Setting detail: OBSERVATION
Discharge: HOME | End: 2019-10-24
Attending: EMERGENCY MEDICINE | Admitting: INTERNAL MEDICINE

## 2019-10-23 ENCOUNTER — APPOINTMENT (OUTPATIENT)
Dept: GENERAL RADIOLOGY | Facility: HOSPITAL | Age: 39
End: 2019-10-23

## 2019-10-23 ENCOUNTER — APPOINTMENT (OUTPATIENT)
Dept: CT IMAGING | Facility: HOSPITAL | Age: 39
End: 2019-10-23

## 2019-10-23 DIAGNOSIS — I10 ACCELERATED HYPERTENSION: ICD-10-CM

## 2019-10-23 DIAGNOSIS — G44.89 OTHER HEADACHE SYNDROME: ICD-10-CM

## 2019-10-23 DIAGNOSIS — I16.0 HYPERTENSIVE URGENCY: Primary | ICD-10-CM

## 2019-10-23 LAB
ALBUMIN SERPL-MCNC: 3.9 G/DL (ref 3.5–5.2)
ALBUMIN/GLOB SERPL: 1 G/DL
ALP SERPL-CCNC: 91 U/L (ref 39–117)
ALT SERPL W P-5'-P-CCNC: 26 U/L (ref 1–33)
ANION GAP SERPL CALCULATED.3IONS-SCNC: 13 MMOL/L (ref 5–15)
APTT PPP: 21.8 SECONDS (ref 24–31)
AST SERPL-CCNC: 21 U/L (ref 1–32)
BASOPHILS # BLD AUTO: 0.1 10*3/MM3 (ref 0–0.2)
BASOPHILS NFR BLD AUTO: 0.9 % (ref 0–1.5)
BILIRUB SERPL-MCNC: 0.5 MG/DL (ref 0.2–1.2)
BILIRUB UR QL STRIP: NEGATIVE
BUN BLD-MCNC: 10 MG/DL (ref 6–20)
BUN/CREAT SERPL: 18.9 (ref 7–25)
CALCIUM SPEC-SCNC: 9.2 MG/DL (ref 8.6–10.5)
CHLORIDE SERPL-SCNC: 96 MMOL/L (ref 98–107)
CLARITY UR: CLEAR
CO2 SERPL-SCNC: 25 MMOL/L (ref 22–29)
COLOR UR: YELLOW
CREAT BLD-MCNC: 0.53 MG/DL (ref 0.57–1)
DEPRECATED RDW RBC AUTO: 43.3 FL (ref 37–54)
EOSINOPHIL # BLD AUTO: 0.2 10*3/MM3 (ref 0–0.4)
EOSINOPHIL NFR BLD AUTO: 1.5 % (ref 0.3–6.2)
ERYTHROCYTE [DISTWIDTH] IN BLOOD BY AUTOMATED COUNT: 13.5 % (ref 12.3–15.4)
GFR SERPL CREATININE-BSD FRML MDRD: 127 ML/MIN/1.73
GLOBULIN UR ELPH-MCNC: 3.8 GM/DL
GLUCOSE BLD-MCNC: 105 MG/DL (ref 65–99)
GLUCOSE UR STRIP-MCNC: NEGATIVE MG/DL
HCT VFR BLD AUTO: 41 % (ref 34–46.6)
HGB BLD-MCNC: 14.6 G/DL (ref 12–15.9)
HGB UR QL STRIP.AUTO: NEGATIVE
INR PPP: 0.97 (ref 0.9–1.1)
KETONES UR QL STRIP: NEGATIVE
LEUKOCYTE ESTERASE UR QL STRIP.AUTO: NEGATIVE
LYMPHOCYTES # BLD AUTO: 2.5 10*3/MM3 (ref 0.7–3.1)
LYMPHOCYTES NFR BLD AUTO: 19 % (ref 19.6–45.3)
MCH RBC QN AUTO: 32.7 PG (ref 26.6–33)
MCHC RBC AUTO-ENTMCNC: 35.7 G/DL (ref 31.5–35.7)
MCV RBC AUTO: 91.6 FL (ref 79–97)
MONOCYTES # BLD AUTO: 1 10*3/MM3 (ref 0.1–0.9)
MONOCYTES NFR BLD AUTO: 7.6 % (ref 5–12)
NEUTROPHILS # BLD AUTO: 9.4 10*3/MM3 (ref 1.7–7)
NEUTROPHILS NFR BLD AUTO: 71 % (ref 42.7–76)
NITRITE UR QL STRIP: NEGATIVE
NRBC BLD AUTO-RTO: 0.1 /100 WBC (ref 0–0.2)
PH UR STRIP.AUTO: 6 [PH] (ref 5–8)
PLATELET # BLD AUTO: 281 10*3/MM3 (ref 140–450)
PMV BLD AUTO: 8.3 FL (ref 6–12)
POTASSIUM BLD-SCNC: 3.5 MMOL/L (ref 3.5–5.2)
PROT SERPL-MCNC: 7.7 G/DL (ref 6–8.5)
PROT UR QL STRIP: NEGATIVE
PROTHROMBIN TIME: 10.3 SECONDS (ref 9.6–11.7)
RBC # BLD AUTO: 4.47 10*6/MM3 (ref 3.77–5.28)
SODIUM BLD-SCNC: 134 MMOL/L (ref 136–145)
SP GR UR STRIP: 1.01 (ref 1–1.03)
TROPONIN T SERPL-MCNC: <0.01 NG/ML (ref 0–0.03)
TSH SERPL DL<=0.05 MIU/L-ACNC: 3.83 UIU/ML (ref 0.27–4.2)
UROBILINOGEN UR QL STRIP: NORMAL
WBC NRBC COR # BLD: 13.2 10*3/MM3 (ref 3.4–10.8)

## 2019-10-23 PROCEDURE — 85610 PROTHROMBIN TIME: CPT | Performed by: EMERGENCY MEDICINE

## 2019-10-23 PROCEDURE — 25010000002 ENOXAPARIN PER 10 MG: Performed by: NURSE PRACTITIONER

## 2019-10-23 PROCEDURE — 70450 CT HEAD/BRAIN W/O DYE: CPT

## 2019-10-23 PROCEDURE — 85730 THROMBOPLASTIN TIME PARTIAL: CPT | Performed by: EMERGENCY MEDICINE

## 2019-10-23 PROCEDURE — 84484 ASSAY OF TROPONIN QUANT: CPT | Performed by: EMERGENCY MEDICINE

## 2019-10-23 PROCEDURE — 71045 X-RAY EXAM CHEST 1 VIEW: CPT

## 2019-10-23 PROCEDURE — 81003 URINALYSIS AUTO W/O SCOPE: CPT | Performed by: EMERGENCY MEDICINE

## 2019-10-23 PROCEDURE — 99285 EMERGENCY DEPT VISIT HI MDM: CPT

## 2019-10-23 PROCEDURE — 99219 PR INITIAL OBSERVATION CARE/DAY 50 MINUTES: CPT | Performed by: NURSE PRACTITIONER

## 2019-10-23 PROCEDURE — 93005 ELECTROCARDIOGRAM TRACING: CPT | Performed by: EMERGENCY MEDICINE

## 2019-10-23 PROCEDURE — 84443 ASSAY THYROID STIM HORMONE: CPT | Performed by: EMERGENCY MEDICINE

## 2019-10-23 PROCEDURE — 96372 THER/PROPH/DIAG INJ SC/IM: CPT

## 2019-10-23 PROCEDURE — G0378 HOSPITAL OBSERVATION PER HR: HCPCS

## 2019-10-23 PROCEDURE — 80053 COMPREHEN METABOLIC PANEL: CPT | Performed by: EMERGENCY MEDICINE

## 2019-10-23 PROCEDURE — 96376 TX/PRO/DX INJ SAME DRUG ADON: CPT

## 2019-10-23 PROCEDURE — 85025 COMPLETE CBC W/AUTO DIFF WBC: CPT | Performed by: EMERGENCY MEDICINE

## 2019-10-23 PROCEDURE — 96374 THER/PROPH/DIAG INJ IV PUSH: CPT

## 2019-10-23 RX ORDER — LABETALOL HYDROCHLORIDE 5 MG/ML
20 INJECTION, SOLUTION INTRAVENOUS ONCE
Status: COMPLETED | OUTPATIENT
Start: 2019-10-23 | End: 2019-10-23

## 2019-10-23 RX ORDER — LABETALOL HYDROCHLORIDE 5 MG/ML
10 INJECTION, SOLUTION INTRAVENOUS ONCE
Status: DISCONTINUED | OUTPATIENT
Start: 2019-10-23 | End: 2019-10-23

## 2019-10-23 RX ORDER — ALBUTEROL SULFATE 2.5 MG/3ML
2.5 SOLUTION RESPIRATORY (INHALATION) EVERY 6 HOURS PRN
COMMUNITY
End: 2020-03-03

## 2019-10-23 RX ORDER — CLINDAMYCIN PHOSPHATE 10 UG/ML
1 LOTION TOPICAL 2 TIMES DAILY PRN
COMMUNITY

## 2019-10-23 RX ORDER — ACETAMINOPHEN 325 MG/1
1000 TABLET ORAL AS NEEDED
COMMUNITY

## 2019-10-23 RX ORDER — ONDANSETRON 4 MG/1
4 TABLET, FILM COATED ORAL EVERY 6 HOURS PRN
Status: DISCONTINUED | OUTPATIENT
Start: 2019-10-23 | End: 2019-10-24 | Stop reason: HOSPADM

## 2019-10-23 RX ORDER — IBUPROFEN 200 MG
200 TABLET ORAL EVERY 6 HOURS PRN
COMMUNITY
End: 2019-10-24 | Stop reason: HOSPADM

## 2019-10-23 RX ORDER — BETAMETHASONE DIPROPIONATE 0.5 MG/G
1 LOTION TOPICAL 2 TIMES DAILY PRN
COMMUNITY

## 2019-10-23 RX ORDER — ALBUTEROL SULFATE 90 UG/1
2 AEROSOL, METERED RESPIRATORY (INHALATION) EVERY 6 HOURS PRN
COMMUNITY
End: 2020-03-03 | Stop reason: SDUPTHER

## 2019-10-23 RX ORDER — ACETAMINOPHEN 650 MG/1
650 SUPPOSITORY RECTAL EVERY 4 HOURS PRN
Status: DISCONTINUED | OUTPATIENT
Start: 2019-10-23 | End: 2019-10-24 | Stop reason: HOSPADM

## 2019-10-23 RX ORDER — ACETAMINOPHEN 160 MG/5ML
650 SOLUTION ORAL EVERY 4 HOURS PRN
Status: DISCONTINUED | OUTPATIENT
Start: 2019-10-23 | End: 2019-10-24 | Stop reason: HOSPADM

## 2019-10-23 RX ORDER — SODIUM CHLORIDE 0.9 % (FLUSH) 0.9 %
10 SYRINGE (ML) INJECTION EVERY 12 HOURS SCHEDULED
Status: DISCONTINUED | OUTPATIENT
Start: 2019-10-23 | End: 2019-10-24 | Stop reason: HOSPADM

## 2019-10-23 RX ORDER — EPINEPHRINE 0.3 MG/.3ML
0.3 INJECTION SUBCUTANEOUS AS NEEDED
COMMUNITY
End: 2019-11-20 | Stop reason: SDUPTHER

## 2019-10-23 RX ORDER — ONDANSETRON 2 MG/ML
4 INJECTION INTRAMUSCULAR; INTRAVENOUS EVERY 6 HOURS PRN
Status: DISCONTINUED | OUTPATIENT
Start: 2019-10-23 | End: 2019-10-24 | Stop reason: HOSPADM

## 2019-10-23 RX ORDER — BUDESONIDE AND FORMOTEROL FUMARATE DIHYDRATE 80; 4.5 UG/1; UG/1
2 AEROSOL RESPIRATORY (INHALATION)
COMMUNITY
End: 2020-03-03 | Stop reason: SDUPTHER

## 2019-10-23 RX ORDER — ACETAMINOPHEN 325 MG/1
650 TABLET ORAL EVERY 4 HOURS PRN
Status: DISCONTINUED | OUTPATIENT
Start: 2019-10-23 | End: 2019-10-24 | Stop reason: HOSPADM

## 2019-10-23 RX ORDER — SODIUM CHLORIDE 0.9 % (FLUSH) 0.9 %
10 SYRINGE (ML) INJECTION AS NEEDED
Status: DISCONTINUED | OUTPATIENT
Start: 2019-10-23 | End: 2019-10-24 | Stop reason: HOSPADM

## 2019-10-23 RX ORDER — LABETALOL HYDROCHLORIDE 5 MG/ML
20 INJECTION, SOLUTION INTRAVENOUS EVERY 4 HOURS PRN
Status: DISCONTINUED | OUTPATIENT
Start: 2019-10-23 | End: 2019-10-24 | Stop reason: HOSPADM

## 2019-10-23 RX ADMIN — LABETALOL 20 MG/4 ML (5 MG/ML) INTRAVENOUS SYRINGE 20 MG: at 22:12

## 2019-10-23 RX ADMIN — Medication 10 ML: at 22:40

## 2019-10-23 RX ADMIN — ACETAMINOPHEN 650 MG: 325 TABLET ORAL at 22:18

## 2019-10-23 RX ADMIN — ENOXAPARIN SODIUM 40 MG: 40 INJECTION SUBCUTANEOUS at 22:11

## 2019-10-23 RX ADMIN — LABETALOL 20 MG/4 ML (5 MG/ML) INTRAVENOUS SYRINGE 20 MG: at 17:59

## 2019-10-23 NOTE — ED NOTES
Pt presents to the ED with c/o elevated blood pressure with headache. Pt reports bp of 200 systolic at home today.      Delmis Leiva RN  10/23/19 3740

## 2019-10-24 VITALS
WEIGHT: 293 LBS | HEIGHT: 67 IN | RESPIRATION RATE: 18 BRPM | OXYGEN SATURATION: 97 % | HEART RATE: 91 BPM | DIASTOLIC BLOOD PRESSURE: 88 MMHG | BODY MASS INDEX: 45.99 KG/M2 | TEMPERATURE: 99.3 F | SYSTOLIC BLOOD PRESSURE: 159 MMHG

## 2019-10-24 LAB
ANION GAP SERPL CALCULATED.3IONS-SCNC: 17 MMOL/L (ref 5–15)
B PERT DNA SPEC QL NAA+PROBE: NOT DETECTED
BASOPHILS # BLD AUTO: 0 10*3/MM3 (ref 0–0.2)
BASOPHILS NFR BLD AUTO: 0.4 % (ref 0–1.5)
BUN BLD-MCNC: 10 MG/DL (ref 6–20)
BUN/CREAT SERPL: 18.5 (ref 7–25)
C PNEUM DNA NPH QL NAA+NON-PROBE: NOT DETECTED
CALCIUM SPEC-SCNC: 8.7 MG/DL (ref 8.6–10.5)
CHLORIDE SERPL-SCNC: 100 MMOL/L (ref 98–107)
CO2 SERPL-SCNC: 19 MMOL/L (ref 22–29)
CREAT BLD-MCNC: 0.54 MG/DL (ref 0.57–1)
DEPRECATED RDW RBC AUTO: 43.8 FL (ref 37–54)
EOSINOPHIL # BLD AUTO: 0.1 10*3/MM3 (ref 0–0.4)
EOSINOPHIL NFR BLD AUTO: 1.4 % (ref 0.3–6.2)
ERYTHROCYTE [DISTWIDTH] IN BLOOD BY AUTOMATED COUNT: 13.5 % (ref 12.3–15.4)
FLUAV H1 2009 PAND RNA NPH QL NAA+PROBE: NOT DETECTED
FLUAV H1 HA GENE NPH QL NAA+PROBE: NOT DETECTED
FLUAV H3 RNA NPH QL NAA+PROBE: NOT DETECTED
FLUAV SUBTYP SPEC NAA+PROBE: NOT DETECTED
FLUBV RNA ISLT QL NAA+PROBE: NOT DETECTED
GFR SERPL CREATININE-BSD FRML MDRD: 124 ML/MIN/1.73
GLUCOSE BLD-MCNC: 196 MG/DL (ref 65–99)
GLUCOSE BLDC GLUCOMTR-MCNC: 153 MG/DL (ref 70–105)
GLUCOSE BLDC GLUCOMTR-MCNC: 217 MG/DL (ref 70–105)
HADV DNA SPEC NAA+PROBE: NOT DETECTED
HCOV 229E RNA SPEC QL NAA+PROBE: NOT DETECTED
HCOV HKU1 RNA SPEC QL NAA+PROBE: NOT DETECTED
HCOV NL63 RNA SPEC QL NAA+PROBE: NOT DETECTED
HCOV OC43 RNA SPEC QL NAA+PROBE: NOT DETECTED
HCT VFR BLD AUTO: 38.5 % (ref 34–46.6)
HGB BLD-MCNC: 13.3 G/DL (ref 12–15.9)
HMPV RNA NPH QL NAA+NON-PROBE: NOT DETECTED
HPIV1 RNA SPEC QL NAA+PROBE: NOT DETECTED
HPIV2 RNA SPEC QL NAA+PROBE: NOT DETECTED
HPIV3 RNA NPH QL NAA+PROBE: NOT DETECTED
HPIV4 P GENE NPH QL NAA+PROBE: NOT DETECTED
LYMPHOCYTES # BLD AUTO: 2 10*3/MM3 (ref 0.7–3.1)
LYMPHOCYTES NFR BLD AUTO: 20.6 % (ref 19.6–45.3)
M PNEUMO IGG SER IA-ACNC: NOT DETECTED
MCH RBC QN AUTO: 32.1 PG (ref 26.6–33)
MCHC RBC AUTO-ENTMCNC: 34.6 G/DL (ref 31.5–35.7)
MCV RBC AUTO: 92.8 FL (ref 79–97)
MONOCYTES # BLD AUTO: 0.9 10*3/MM3 (ref 0.1–0.9)
MONOCYTES NFR BLD AUTO: 9.1 % (ref 5–12)
NEUTROPHILS # BLD AUTO: 6.8 10*3/MM3 (ref 1.7–7)
NEUTROPHILS NFR BLD AUTO: 68.5 % (ref 42.7–76)
NRBC BLD AUTO-RTO: 0.1 /100 WBC (ref 0–0.2)
PLATELET # BLD AUTO: 266 10*3/MM3 (ref 140–450)
PMV BLD AUTO: 8.7 FL (ref 6–12)
POTASSIUM BLD-SCNC: 4 MMOL/L (ref 3.5–5.2)
RBC # BLD AUTO: 4.15 10*6/MM3 (ref 3.77–5.28)
RHINOVIRUS RNA SPEC NAA+PROBE: DETECTED
RSV RNA NPH QL NAA+NON-PROBE: NOT DETECTED
SODIUM BLD-SCNC: 136 MMOL/L (ref 136–145)
TROPONIN T SERPL-MCNC: <0.01 NG/ML (ref 0–0.03)
WBC NRBC COR # BLD: 10 10*3/MM3 (ref 3.4–10.8)

## 2019-10-24 PROCEDURE — 25010000002 ENOXAPARIN PER 10 MG: Performed by: NURSE PRACTITIONER

## 2019-10-24 PROCEDURE — 80048 BASIC METABOLIC PNL TOTAL CA: CPT | Performed by: NURSE PRACTITIONER

## 2019-10-24 PROCEDURE — 85025 COMPLETE CBC W/AUTO DIFF WBC: CPT | Performed by: NURSE PRACTITIONER

## 2019-10-24 PROCEDURE — 94799 UNLISTED PULMONARY SVC/PX: CPT

## 2019-10-24 PROCEDURE — 0099U HC BIOFIRE FILMARRAY RESP PANEL 1: CPT | Performed by: NURSE PRACTITIONER

## 2019-10-24 PROCEDURE — 96372 THER/PROPH/DIAG INJ SC/IM: CPT

## 2019-10-24 PROCEDURE — G0378 HOSPITAL OBSERVATION PER HR: HCPCS

## 2019-10-24 PROCEDURE — 84484 ASSAY OF TROPONIN QUANT: CPT | Performed by: NURSE PRACTITIONER

## 2019-10-24 PROCEDURE — 99217 PR OBSERVATION CARE DISCHARGE MANAGEMENT: CPT | Performed by: INTERNAL MEDICINE

## 2019-10-24 PROCEDURE — 82962 GLUCOSE BLOOD TEST: CPT

## 2019-10-24 PROCEDURE — 94664 DEMO&/EVAL PT USE INHALER: CPT

## 2019-10-24 RX ORDER — SPIRONOLACTONE 25 MG/1
50 TABLET ORAL DAILY
Status: DISCONTINUED | OUTPATIENT
Start: 2019-10-24 | End: 2019-10-24 | Stop reason: HOSPADM

## 2019-10-24 RX ORDER — METOPROLOL SUCCINATE 50 MG/1
50 TABLET, EXTENDED RELEASE ORAL DAILY
Status: DISCONTINUED | OUTPATIENT
Start: 2019-10-24 | End: 2019-10-24 | Stop reason: HOSPADM

## 2019-10-24 RX ORDER — LOSARTAN POTASSIUM 50 MG/1
100 TABLET ORAL DAILY
Status: DISCONTINUED | OUTPATIENT
Start: 2019-10-24 | End: 2019-10-24 | Stop reason: HOSPADM

## 2019-10-24 RX ORDER — HYDROXYZINE HYDROCHLORIDE 25 MG/1
25 TABLET, FILM COATED ORAL EVERY 6 HOURS PRN
Status: DISCONTINUED | OUTPATIENT
Start: 2019-10-24 | End: 2019-10-24 | Stop reason: HOSPADM

## 2019-10-24 RX ORDER — ALBUTEROL SULFATE 2.5 MG/3ML
2.5 SOLUTION RESPIRATORY (INHALATION) EVERY 6 HOURS PRN
Status: DISCONTINUED | OUTPATIENT
Start: 2019-10-24 | End: 2019-10-24 | Stop reason: HOSPADM

## 2019-10-24 RX ORDER — NEBIVOLOL 10 MG/1
10 TABLET ORAL DAILY
Qty: 30 TABLET | Refills: 1 | Status: SHIPPED | OUTPATIENT
Start: 2019-10-24 | End: 2019-10-30

## 2019-10-24 RX ORDER — CLONIDINE HYDROCHLORIDE 0.1 MG/1
0.1 TABLET ORAL 2 TIMES DAILY
Qty: 30 TABLET | Refills: 0 | Status: SHIPPED | OUTPATIENT
Start: 2019-10-24 | End: 2019-11-14 | Stop reason: SDUPTHER

## 2019-10-24 RX ORDER — OLMESARTAN MEDOXOMIL 40 MG/1
40 TABLET ORAL DAILY
Qty: 30 TABLET | Refills: 0 | Status: SHIPPED | OUTPATIENT
Start: 2019-10-24 | End: 2019-11-14 | Stop reason: SDUPTHER

## 2019-10-24 RX ORDER — DOXYCYCLINE 100 MG/1
100 TABLET ORAL EVERY 12 HOURS SCHEDULED
Status: DISCONTINUED | OUTPATIENT
Start: 2019-10-24 | End: 2019-10-24 | Stop reason: HOSPADM

## 2019-10-24 RX ORDER — AMLODIPINE BESYLATE 5 MG/1
10 TABLET ORAL
Status: DISCONTINUED | OUTPATIENT
Start: 2019-10-24 | End: 2019-10-24 | Stop reason: HOSPADM

## 2019-10-24 RX ORDER — GLIPIZIDE 5 MG/1
10 TABLET ORAL
Status: DISCONTINUED | OUTPATIENT
Start: 2019-10-24 | End: 2019-10-24 | Stop reason: HOSPADM

## 2019-10-24 RX ORDER — BUDESONIDE AND FORMOTEROL FUMARATE DIHYDRATE 80; 4.5 UG/1; UG/1
2 AEROSOL RESPIRATORY (INHALATION)
Status: DISCONTINUED | OUTPATIENT
Start: 2019-10-24 | End: 2019-10-24 | Stop reason: HOSPADM

## 2019-10-24 RX ORDER — FUROSEMIDE 40 MG/1
40 TABLET ORAL DAILY
Status: DISCONTINUED | OUTPATIENT
Start: 2019-10-24 | End: 2019-10-24 | Stop reason: HOSPADM

## 2019-10-24 RX ADMIN — LABETALOL 20 MG/4 ML (5 MG/ML) INTRAVENOUS SYRINGE 20 MG: at 02:30

## 2019-10-24 RX ADMIN — ENOXAPARIN SODIUM 40 MG: 40 INJECTION SUBCUTANEOUS at 09:07

## 2019-10-24 RX ADMIN — GLIPIZIDE 10 MG: 5 TABLET ORAL at 09:06

## 2019-10-24 RX ADMIN — FUROSEMIDE 40 MG: 40 TABLET ORAL at 09:06

## 2019-10-24 RX ADMIN — Medication 10 ML: at 09:06

## 2019-10-24 RX ADMIN — AMLODIPINE BESYLATE 10 MG: 5 TABLET ORAL at 14:32

## 2019-10-24 RX ADMIN — ACETAMINOPHEN 650 MG: 325 TABLET ORAL at 02:30

## 2019-10-24 RX ADMIN — DOXYCYCLINE 100 MG: 100 TABLET, FILM COATED ORAL at 09:06

## 2019-10-24 RX ADMIN — BUDESONIDE AND FORMOTEROL FUMARATE DIHYDRATE 2 PUFF: 80; 4.5 AEROSOL RESPIRATORY (INHALATION) at 08:52

## 2019-10-24 RX ADMIN — METOPROLOL SUCCINATE 50 MG: 50 TABLET, EXTENDED RELEASE ORAL at 09:06

## 2019-10-24 RX ADMIN — SPIRONOLACTONE 50 MG: 25 TABLET, FILM COATED ORAL at 09:07

## 2019-10-24 RX ADMIN — LOSARTAN POTASSIUM 100 MG: 50 TABLET ORAL at 09:06

## 2019-10-24 NOTE — ED PROVIDER NOTES
Subjective   39-year-old female had the onset of headache today and stated she felt bad.  States she had dyspnea on exertion and dizziness.  She reported no vertigo.  She called a nurse to take her blood pressure at work and was found to be 240/120.  The patient states that she has had some recent adjustment on her blood pressure medications and has been started on beta-blockers.  She reports that she has had no chest pain but has had some chest tightness with exertion.  She reports that she has not had nausea or diaphoresis.  Reports no focal neurologic defects or lateralizing neurologic signs.  Reports no known change in urine output            Review of Systems   Constitutional: Positive for chills and fever. Negative for fatigue.   HENT: Negative for sore throat.    Eyes: Negative for visual disturbance.   Respiratory: Positive for chest tightness and shortness of breath.    Cardiovascular: Negative for palpitations and leg swelling.   Gastrointestinal: Negative for abdominal pain.   Hematological: Does not bruise/bleed easily.   All other systems reviewed and are negative.      Past Medical History:   Diagnosis Date   • Asthma    • Hypertension    • Migraine     history   • Sinus disorder        Allergies   Allergen Reactions   • Nifedipine Palpitations   • Tetanus Antitoxin Swelling   • Verapamil Palpitations       Past Surgical History:   Procedure Laterality Date   • ADENOIDECTOMY     • APPENDECTOMY     •  SECTION     • CHOLECYSTECTOMY     • INCISION AND DRAINAGE OF WOUND     • KNEE ARTHRODESIS     • TONSILLECTOMY     • WOUND DEBRIDEMENT Right 2019    Procedure: DEBRIDEMENT OF RECURRENT HIDRADENITIS AND VAC PLACEMENT RIGHT SIDE;  Surgeon: Alix Freire MD;  Location: [facility] MAIN OR;  Service: General       No family history on file.    Social History     Socioeconomic History   • Marital status:      Spouse name: Not on file   • Number of children: Not on file   • Years of education:  Not on file   • Highest education level: Not on file   Tobacco Use   • Smoking status: Never Smoker   Substance and Sexual Activity   • Alcohol use: No     Frequency: Never   • Drug use: No           Objective   Physical Exam  Alert [place] Coma Scale 15   HEENT: Pupils equal and reactive to light. Conjunctivae are not injected. normal tympanic membranes. Oropharynx and nares are normal.  No definite papilledema   Neck: Supple. Midline trachea. No JVD. No goiter.   Chest: Clear and equal breath sounds bilaterally regular rate and rhythm without murmur or rub.  Nontender chest wall no S3 or S4   Abdomen: Positive bowel sounds nontender nondistended. No rebound or peritoneal signs. No CVA tenderness.   Extremities no clubbing cyanosis or edema motor sensory exam is normal the full range of motion is intact.  No lateralizing focal deficits   skin: Warm and dry, no rashes or petechia.   Lymphatic: No regional lymphadenopathy. No calf pain, swelling or Jamel's sign    Procedures           ED Course  ED Course as of Oct 23 2149   Wed Oct 23, 2019   2051 Headache resolved as blood pressure decreased [place] Coma Scale 15 NIH 0  [TH]      ED Course User Index  [TH] Neno Rodrigez MD        Labs Reviewed   COMPREHENSIVE METABOLIC PANEL - Abnormal; Notable for the following components:       Result Value    Glucose 105 (*)     Creatinine 0.53 (*)     Sodium 134 (*)     Chloride 96 (*)     All other components within normal limits    Narrative:     GFR Normal >60  Chronic Kidney Disease <60  Kidney Failure <15   APTT - Abnormal; Notable for the following components:    PTT 21.8 (*)     All other components within normal limits   CBC WITH AUTO DIFFERENTIAL - Abnormal; Notable for the following components:    WBC 13.20 (*)     Lymphocyte % 19.0 (*)     Neutrophils, Absolute 9.40 (*)     Monocytes, Absolute 1.00 (*)     All other components within normal limits   PROTIME-INR - Normal   URINALYSIS W/ CULTURE IF INDICATED -  Normal    Narrative:     Urine microscopic not indicated.   TSH - Normal   TROPONIN (IN-HOUSE) - Normal    Narrative:     Troponin T Reference Range:  <= 0.03 ng/mL-   Negative for AMI  >0.03 ng/mL-     Abnormal for myocardial necrosis.  Clinicians would have to utilize clinical acumen, EKG, Troponin and serial changes to determine if it is an Acute Myocardial Infarction or myocardial injury due to an underlying chronic condition.    CBC AND DIFFERENTIAL    Narrative:     The following orders were created for panel order CBC & Differential.  Procedure                               Abnormality         Status                     ---------                               -----------         ------                     CBC Auto Differential[[id number]]        Abnormal            Final result                 Please view results for these tests on the individual orders.     Medications   labetalol (NORMODYNE,TRANDATE) injection 20 mg (not administered)   labetalol (NORMODYNE,TRANDATE) injection 20 mg (20 mg Intravenous Given 10/23/19 1759)     Ct Head Without Contrast    Result Date: 10/23/2019   1. No acute intracranial findings. 2. Mild chronic paranasal sinus disease with previous surgery.  Electronically Signed By-Jose Kulkarni On:10/23/2019 6:44 PM This report was finalized on [id number] by  Jose Kulkarni, .    Xr Chest 1 View    Result Date: 10/23/2019  No acute process.  Electronically Signed By-Spencer Laguerre On:10/23/2019 6:01 PM This report was finalized on [id number] by  Spencer Laugerre .            MDM  Number of Diagnoses or Management Options     Amount and/or Complexity of Data Reviewed  Clinical lab tests: reviewed  Tests in the radiology section of CPT®: reviewed  Review and summarize past medical records: yes  Independent visualization of images, tracings, or specimens: yes    Risk of Complications, Morbidity, and/or Mortality  Presenting problems: high  Diagnostic procedures: high  Management options:  high  General comments: The case was discussed with the hospitalist practitioner.  The patient is given a second dose of labetalol as of a pressure tried to creep back up again.  The patient continued to feel much better and was agreeable to this plan of treatment        Final diagnoses:   Hypertensive urgency   Other headache syndrome   Accelerated hypertension              Neno Rodrigez MD  10/23/19 1981

## 2019-10-24 NOTE — PROGRESS NOTES
Discharge Planning Assessment   [facility]     Patient Name: Bernadine Arriaga  MRN: [id number]  Today's Date: 10/24/2019    Admit Date: 10/23/2019    Discharge Needs Assessment     Row Name 10/24/19 1605       Living Environment    Lives With  spouse;child(yessy), dependent    Name(s) of Who Lives With Patient  Spouse Alcides    Current Living Arrangements  home/apartment/condo    Primary Care Provided by  self    Family Caregiver if Needed  spouse    Able to Return to Prior Arrangements  yes       Resource/Environmental Concerns    Resource/Environmental Concerns  none       Transition Planning    Patient/Family Anticipates Transition to  home with family    Patient/Family Anticipated Services at Transition  none    Transportation Anticipated  family or friend will provide       Discharge Needs Assessment    Readmission Within the Last 30 Days  no previous admission in last 30 days    Concerns to be Addressed  no discharge needs identified    Equipment Currently Used at Home  nebulizer    Anticipated Changes Related to Illness  none    Equipment Needed After Discharge  none    Discharge Coordination/Progress  Home with family.        Discharge Plan     Row Name 10/24/19 1607       Plan    Plan  Home with family.                Expected Discharge Date and Time     Expected Discharge Date Expected Discharge Time    Oct 24, 2019                      [facility] RN

## 2019-10-24 NOTE — CONSULTS
"Diabetes Education  Assessment/Teaching    Patient Name:  Bernadine Arriaga  YOB: 1980  MRN: [id number]  Admit Date:  10/23/2019      Assessment Date:  10/24/2019    Most Recent Value   General Information    Referral From:  A1c   Height  170.2 cm (67\")   Height Method  Stated   Weight  133 kg (293 lb 3.4 oz)   Weight Method  Standing scale   Pregnancy Assessment   Diabetes History   What type of diabetes do you have?  Type 2   Current DM knowledge  fair   Do you test your blood sugar at home?  yes   Frequency of checks  Once a day at various times before meals and at bedtime   Meter type  Accu-chek Anushka   Who performs the test?  patient   Education Preferences   What areas of diabetes would you like to learn about?  testing my blood sugar at home, diabetes complications   Nutrition Information   Assessment Topics   Problem Solving - Assessment  Needs education   Monitoring - Assessment  Needs education   DM Goals   Problem Solving - Goal  Today   Monitoring - Goal  Today            Most Recent Value   DM Education Needs   Meter  Has own   Meter Type  Accuchek   Frequency of Testing  Daily   Medication  Oral   Reducing Risks  A1C testing [On 7/22/2019 A1C was 9.1%.  A1C info sheet given with discussion on A1C target and healthy blood sugar range.]   Healthy Coping  Appropriate   Discharge Plan  Home   Motivation  Engaged   Teaching Method  Discussion, Handouts   Patient Response  Verbalized understanding [ at bedside and involved in discussion.]            Other Comments:  Patient states she is able to afford her meds and supplies for her diabetes.        Electronically signed by:  Debbie House RN  10/24/19 4:13 PM  "

## 2019-10-24 NOTE — H&P
[facility]            Primary Care Provider:  Berenice Jacobson MD    Patient Care Team:  Berenice Jacobson MD as PCP - General  Berenice Jacobson MD as PCP - Family Medicine    CHIEF COMPLAINT:     Chief Complaint   Patient presents with   • Hypertension     Hypertension    HISTORY OF PRESENT ILLNESS:    39-year-old female presents to the ER with a chief complaint of elevated blood pressure with headache and visual disturbance of brightly colored lights.  He had some dizziness and palpitations with report of irregular heart rate and elevated blood pressure 220/120.  Her blood pressure was checked by coworker and was noted to be elevated.  When the patient arrived at the emergency department her blood pressure was 9/124.  When the patient arrived at the emergency department she was noted to be hypertensive.  Patient denies chest pain but did experience some shortness of breath with exertion.  The patient has had upper respiratory symptoms to include runny nose with sinus drainage and increased cough.  She has been taking over-the-counter Tylenol and ibuprofen but denies taking any decongestants.  He did have recent course of steroids.          Past Medical History:   Diagnosis Date   • Asthma    • Hypertension    • Migraine     history   • Sinus disorder        Past Surgical History:   Procedure Laterality Date   • ADENOIDECTOMY     • APPENDECTOMY     •  SECTION     • CHOLECYSTECTOMY     • INCISION AND DRAINAGE OF WOUND     • KNEE ARTHRODESIS     • TONSILLECTOMY     • WOUND DEBRIDEMENT Right 2019    Procedure: DEBRIDEMENT OF RECURRENT HIDRADENITIS AND VAC PLACEMENT RIGHT SIDE;  Surgeon: Alix Freier MD;  Location: [facility];  Service: General       No family history on file.    Social History     Tobacco Use   • Smoking status: Never Smoker   Substance Use Topics   • Alcohol use: No     Frequency: Never   • Drug use: No  "        (Not in a hospital admission)    Allergies:  Nifedipine; Tetanus antitoxin; and Verapamil    Immunization History   Administered Date(s) Administered   • Flu Vaccine Intradermal Quad 18-64YR 10/14/2015   • Pneumococcal Polysaccharide (PPSV23) 10/14/2015           REVIEW OF SYSTEMS:     Review of Systems   HENT: Positive for congestion. Negative for ear pain and sore throat.    Cardiovascular: Positive for dyspnea on exertion. Negative for chest pain.   Respiratory: Positive for cough and shortness of breath. Negative for sputum production and wheezing.    Gastrointestinal: Negative.    Genitourinary: Negative for dysuria.   Neurological: Negative.    All other systems reviewed and are negative.      Vital Signs  Temp:  [98.3 °F (36.8 °C)] 98.3 °F (36.8 °C)  Heart Rate:  [83-92] 92  Resp:  [17] 17  BP: (130-184)/() 142/88    Flowsheet Rows      First Filed Value   Admission Height  170.2 cm (67\") Documented at 10/23/2019 1705   Admission Weight  133 kg (294 lb 1.5 oz) Documented at 10/23/2019 1705           Physical Exam:    Physical Exam   Constitutional: She is oriented to person, place, and time. She appears well-developed. No distress.   HENT:   Head: Normocephalic and atraumatic.   Eyes: EOM are normal. Pupils are equal, round, and reactive to light. No scleral icterus.   Neck: Tracheal deviation present.   Cardiovascular: Normal rate, regular rhythm, normal heart sounds and intact distal pulses.   No murmur heard.  Pulmonary/Chest: Effort normal and breath sounds normal. No respiratory distress.   Abdominal: Soft. Bowel sounds are normal.   Musculoskeletal: Normal range of motion. She exhibits no edema.   Neurological: She is alert and oriented to person, place, and time.   Skin: Skin is warm and dry. Capillary refill takes less than 2 seconds. She is not diaphoretic.   Psychiatric: She has a normal mood and affect. Her behavior is normal. Judgment and thought content normal.   Vitals " reviewed.      Emotional Behavior:    Cooperative    Debilities:  None   Age appropriate      Results Review:      I reviewed the patient's new clinical results.    Lab Results (most recent)     Procedure Component Value Units Date/Time    Urinalysis With Culture If Indicated - Urine, Clean Catch [[id number]]  (Normal) Collected:  10/23/19 1822    Specimen:  Urine, Clean Catch Updated:  10/23/19 1835     Color, UA Yellow     Appearance, UA Clear     pH, UA 6.0     Specific Gravity, UA 1.009     Glucose, UA Negative     Ketones, UA Negative     Bilirubin, UA Negative     Blood, UA Negative     Protein, UA Negative     Leuk Esterase, UA Negative     Nitrite, UA Negative     Urobilinogen, UA 0.2 E.U./dL    Narrative:       Urine microscopic not indicated.    Protime-INR [[id number]]  (Normal) Collected:  10/23/19 1816    Specimen:  Blood Updated:  10/23/19 1832     Protime 10.3 Seconds      INR 0.97    aPTT [[id number]]  (Abnormal) Collected:  10/23/19 1816    Specimen:  Blood Updated:  10/23/19 1832     PTT 21.8 seconds     TSH [[id number]]  (Normal) Collected:  10/23/19 1753    Specimen:  Blood Updated:  10/23/19 1831     TSH 3.830 uIU/mL     Troponin [[id number]]  (Normal) Collected:  10/23/19 1753    Specimen:  Blood Updated:  10/23/19 1831     Troponin T <0.010 ng/mL     Narrative:       Troponin T Reference Range:  <= 0.03 ng/mL-   Negative for AMI  >0.03 ng/mL-     Abnormal for myocardial necrosis.  Clinicians would have to utilize clinical acumen, EKG, Troponin and serial changes to determine if it is an Acute Myocardial Infarction or myocardial injury due to an underlying chronic condition.     Comprehensive Metabolic Panel [[id number]]  (Abnormal) Collected:  10/23/19 1753    Specimen:  Blood Updated:  10/23/19 1825     Glucose 105 mg/dL      BUN 10 mg/dL      Creatinine 0.53 mg/dL      Sodium 134 mmol/L      Potassium 3.5 mmol/L      Chloride 96 mmol/L      CO2 25.0 mmol/L      Calcium 9.2 mg/dL      Total Protein  7.7 g/dL      Albumin 3.90 g/dL      ALT (SGPT) 26 U/L      AST (SGOT) 21 U/L      Alkaline Phosphatase 91 U/L      Total Bilirubin 0.5 mg/dL      eGFR Non African Amer 127 mL/min/1.73      Globulin 3.8 gm/dL      A/G Ratio 1.0 g/dL      BUN/Creatinine Ratio 18.9     Anion Gap 13.0 mmol/L     Narrative:       GFR Normal >60  Chronic Kidney Disease <60  Kidney Failure <15    CBC & Differential [[id number]] Collected:  10/23/19 1753    Specimen:  Blood Updated:  10/23/19 1759    Narrative:       The following orders were created for panel order CBC & Differential.  Procedure                               Abnormality         Status                     ---------                               -----------         ------                     CBC Auto Differential[[id number]]        Abnormal            Final result                 Please view results for these tests on the individual orders.    CBC Auto Differential [[id number]]  (Abnormal) Collected:  10/23/19 1753    Specimen:  Blood Updated:  10/23/19 1759     WBC 13.20 10*3/mm3      RBC 4.47 10*6/mm3      Hemoglobin 14.6 g/dL      Hematocrit 41.0 %      MCV 91.6 fL      MCH 32.7 pg      MCHC 35.7 g/dL      RDW 13.5 %      RDW-SD 43.3 fl      MPV 8.3 fL      Platelets 281 10*3/mm3      Neutrophil % 71.0 %      Lymphocyte % 19.0 %      Monocyte % 7.6 %      Eosinophil % 1.5 %      Basophil % 0.9 %      Neutrophils, Absolute 9.40 10*3/mm3      Lymphocytes, Absolute 2.50 10*3/mm3      Monocytes, Absolute 1.00 10*3/mm3      Eosinophils, Absolute 0.20 10*3/mm3      Basophils, Absolute 0.10 10*3/mm3      nRBC 0.1 /100 WBC           Imaging Results (most recent)     Procedure Component Value Units Date/Time    CT Head Without Contrast [[id number]] Collected:  10/23/19 1843     Updated:  10/23/19 1846    Narrative:          DATE OF EXAM:  10/23/2019 6:33 PM     PROCEDURE:   CT HEAD WO CONTRAST-     INDICATIONS:   Headaches, hypertension, blurred vision, dizziness, migraines.      COMPARISON:  No Comparisons Available     TECHNIQUE:   Routine transaxial cuts were obtained through the head without the  administration of contrast. Automated exposure control and iterative  reconstruction methods were used.      FINDINGS:  The cerebral sulci, fissures, ventricles, and basal cisterns are within  range of normal. There is no acute hemorrhage, midline shift, or  suspicious extra-axial fluid collections. The orbital contents are  normal. There is mild mucosal thickening in the paranasal sinuses. The  patient has had previous paranasal sinus surgery. The mastoid sinuses  are clear. The calvarium is unremarkable.        Impression:          1. No acute intracranial findings.  2. Mild chronic paranasal sinus disease with previous surgery.     Electronically Signed By-Jose Kulkarni On:10/23/2019 6:44 PM  This report was finalized on [id number] by  Jose Kulkarni, .    XR Chest 1 View [[id number]] Collected:  10/23/19 1800     Updated:  10/23/19 1803    Narrative:          DATE OF EXAM:   10/23/2019 5:45 PM     PROCEDURE:   XR CHEST 1 VW-     INDICATIONS:   HTN     COMPARISON:  04/27/2019     TECHNIQUE:   [Portable chest radiograph]     FINDINGS:    The cardiomediastinal silhouette is within normal limits. The lungs are  clear. There is no pneumothorax, focal consolidation, or large pleural  effusion. Osseous structures grossly intact.        Impression:       No acute process.      Electronically Signed By-Spencer Laguerre On:10/23/2019 6:01 PM  This report was finalized on [id number] by  Spencer Laguerre .        reviewed    ECG/EMG Results (most recent)     Procedure Component Value Units Date/Time    ECG 12 Lead [[id number]] Collected:  10/23/19 2140     Updated:  10/23/19 2140    Narrative:       HEART RATE= 88  bpm  RR Interval= 680  ms  MI Interval= 153  ms  P Horizontal Axis= 3  deg  P Front Axis= 22  deg  QRSD Interval= 86  ms  QT Interval= 363  ms  QRS Axis= 26  deg  T Wave Axis= 38  deg  - NORMAL ECG  -  Sinus rhythm  When compared with ECG of 27-Apr-2019 9:17:19,  No significant change  Electronically Signed By:   Date and Time of Study: 2019-10-23 21:40:19        reviewed        CT Head Without Contrast  Narrative:    DATE OF EXAM:  10/23/2019 6:33 PM     PROCEDURE:   CT HEAD WO CONTRAST-     INDICATIONS:   Headaches, hypertension, blurred vision, dizziness, migraines.     COMPARISON:  No Comparisons Available     TECHNIQUE:   Routine transaxial cuts were obtained through the head without the  administration of contrast. Automated exposure control and iterative  reconstruction methods were used.      FINDINGS:  The cerebral sulci, fissures, ventricles, and basal cisterns are within  range of normal. There is no acute hemorrhage, midline shift, or  suspicious extra-axial fluid collections. The orbital contents are  normal. There is mild mucosal thickening in the paranasal sinuses. The  patient has had previous paranasal sinus surgery. The mastoid sinuses  are clear. The calvarium is unremarkable.      Impression:    1. No acute intracranial findings.  2. Mild chronic paranasal sinus disease with previous surgery.     Electronically Signed By-Jose Kulkarni On:10/23/2019 6:44 PM  This report was finalized on [id number] by  Jose Kulkarni, .  XR Chest 1 View  Narrative:    DATE OF EXAM:   10/23/2019 5:45 PM     PROCEDURE:   XR CHEST 1 VW-     INDICATIONS:   HTN     COMPARISON:  04/27/2019     TECHNIQUE:   [Portable chest radiograph]     FINDINGS:    The cardiomediastinal silhouette is within normal limits. The lungs are  clear. There is no pneumothorax, focal consolidation, or large pleural  effusion. Osseous structures grossly intact.      Impression: No acute process.      Electronically Signed By-Spencer Laguerre On:10/23/2019 6:01 PM  This report was finalized on [id number] by  Spencer Laguerre, .      Active Hospital Problems    Diagnosis  POA   • Hypertensive urgency [I16.0]  Yes      Resolved Hospital Problems   No  resolved problems to display.         Assessment/Plan       Hypertensive urgency    Hypertension, chronic, uncontrolled with acute elevation, hypertensive crisis:  Continue metoprolol, spironolactone, losartan, Lasix    --TSH 3.830    Leukocytosis, moderate, WBC 13.2, uncertain etiology: Repeat CBC    Hyponatremia, mild, sodium 134--uncertain etiology: Repeat BMP    Asthma with upper respiratory symptoms: Continue albuterol, Symbicort; check viral panel    Rosacea--hold steroid cream, clindamycin; continue doxycycline    Diabetes type 2, chronic, moderately controlled: Continue    Anxiety, chronic: Hold Atarax    Chronic pain: Hold ibuprofen    DVT prophylaxis--Lovenox    Disposition    Accelerated hypertension will likely be able to evaluate treat and stabilize 23-hour observation timeframe    I discussed the patients findings and my recommendations with patient.     YESENIA Ventura  10/23/19  10:05 PM

## 2019-10-24 NOTE — DISCHARGE SUMMARY
Date of Admission: 10/23/2019    Date of Discharge:  10/24/2019    Length of stay:  LOS: 0 days     Discharge Diagnosis:    Hypertensive urgency     Hypertension, chronic, uncontrolled with acute elevation, hypertensive crisis:  dc metoprolol & losartan, continue Lasix, spironolactone,  Add bystolic and benicar.   -Clonidine 0.1 bid prn    --TSH 3.830     Leukocytosis, moderate, WBC 13.2, uncertain etiology: Repeat CBC     Hyponatremia, mild, sodium 134--uncertain etiology: Repeat BMP     Asthma with upper respiratory symptoms: Continue albuterol, Symbicort; check viral panel     Rosacea--hold steroid cream, clindamycin; continue doxycycline     Diabetes type 2, chronic, moderately controlled: Continue     Anxiety, chronic: Hold Atarax     Chronic pain: dc ibuprofen    Obesity with BMI 45.9: Lifestyle modification and weight loss    obstructed sleep apnea: needs further testing: He was unable to wear a mask in the past    Presenting Problem/History of Present Illness    39-year-old female presents to the ER with a chief complaint of elevated blood pressure with headache and visual disturbance of brightly colored lights.  He had some dizziness and palpitations with report of irregular heart rate and elevated blood pressure 220/120.  Her blood pressure was checked by coworker and was noted to be elevated.  When the patient arrived at the emergency department her blood pressure was 9/124.  When the patient arrived at the emergency department she was noted to be hypertensive.  Patient denies chest pain but did experience some shortness of breath with exertion.  The patient has had upper respiratory symptoms to include runny nose with sinus drainage and increased cough.  She has been taking over-the-counter Tylenol and ibuprofen but denies taking any decongestants.  He did have recent course of steroids.    Active Hospital Problems    Diagnosis  POA   • **Hypertensive urgency [I16.0]  Yes      Resolved Hospital  Problems   No resolved problems to display.        Hospital Course  Patient was admitted to the medical floor, she was started on IV labetalol, patient viral panel came back positive for rhinovirus.  We saw The patient 10/24/2019 doing better today blood pressure down to 120/80.  Discharge patient home, start Benicar and Bystolic.  Clonidine as needed.  Needs further testing for sleep apnea, advised patient lifestyle changes and weight loss    Past Medical History:     Past Medical History:   Diagnosis Date   • Asthma    • Hypertension    • Migraine     history   • Sinus disorder        Past Surgical History:     Past Surgical History:   Procedure Laterality Date   • ADENOIDECTOMY     • APPENDECTOMY     •  SECTION     • CHOLECYSTECTOMY     • INCISION AND DRAINAGE OF WOUND     • KNEE ARTHRODESIS     • TONSILLECTOMY     • WOUND DEBRIDEMENT Right 2019    Procedure: DEBRIDEMENT OF RECURRENT HIDRADENITIS AND VAC PLACEMENT RIGHT SIDE;  Surgeon: Alix Freire MD;  Location: [facility];  Service: General       Social History:   Social History     Socioeconomic History   • Marital status:      Spouse name: Not on file   • Number of children: Not on file   • Years of education: Not on file   • Highest education level: Not on file   Tobacco Use   • Smoking status: Never Smoker   Substance and Sexual Activity   • Alcohol use: No     Frequency: Never   • Drug use: No   • Sexual activity: Defer       Procedures Performed         Consults:   Consults     Date and Time Order Name Status Description    10/23/2019 2152 Hospitalist (on-call MD unless specified) Completed           Pertinent Test Results:     Lab Results (most recent)     Procedure Component Value Units Date/Time    Basic Metabolic Panel [[id number]]  (Abnormal) Collected:  10/24/19 1410    Specimen:  Blood Updated:  10/24/19 1518     Glucose 196 mg/dL      BUN 10 mg/dL      Creatinine 0.54 mg/dL      Sodium 136 mmol/L      Potassium 4.0  mmol/L      Chloride 100 mmol/L      CO2 19.0 mmol/L      Calcium 8.7 mg/dL      eGFR Non African Amer 124 mL/min/1.73      BUN/Creatinine Ratio 18.5     Anion Gap 17.0 mmol/L     Narrative:       GFR Normal >60  Chronic Kidney Disease <60  Kidney Failure <15    Troponin [[id number]]  (Normal) Collected:  10/24/19 1410    Specimen:  Blood Updated:  10/24/19 1502     Troponin T <0.010 ng/mL     Narrative:       Troponin T Reference Range:  <= 0.03 ng/mL-   Negative for AMI  >0.03 ng/mL-     Abnormal for myocardial necrosis.  Clinicians would have to utilize clinical acumen, EKG, Troponin and serial changes to determine if it is an Acute Myocardial Infarction or myocardial injury due to an underlying chronic condition.     POC Glucose Once [[id number]]  (Abnormal) Collected:  10/24/19 1125    Specimen:  Blood Updated:  10/24/19 1133     Glucose 153 mg/dL      Comment: Serial Number: [id number]:  [id number]       CBC Auto Differential [[id number]]  (Normal) Collected:  10/24/19 0759    Specimen:  Blood Updated:  10/24/19 0915     WBC 10.00 10*3/mm3      RBC 4.15 10*6/mm3      Hemoglobin 13.3 g/dL      Hematocrit 38.5 %      MCV 92.8 fL      MCH 32.1 pg      MCHC 34.6 g/dL      RDW 13.5 %      RDW-SD 43.8 fl      MPV 8.7 fL      Platelets 266 10*3/mm3      Neutrophil % 68.5 %      Lymphocyte % 20.6 %      Monocyte % 9.1 %      Eosinophil % 1.4 %      Basophil % 0.4 %      Neutrophils, Absolute 6.80 10*3/mm3      Lymphocytes, Absolute 2.00 10*3/mm3      Monocytes, Absolute 0.90 10*3/mm3      Eosinophils, Absolute 0.10 10*3/mm3      Basophils, Absolute 0.00 10*3/mm3      nRBC 0.1 /100 WBC     Respiratory Panel, PCR - Swab, Nasopharynx [[id number]]  (Abnormal) Collected:  10/24/19 0640    Specimen:  Swab from Nasopharynx Updated:  10/24/19 0915     ADENOVIRUS, PCR Not Detected     Coronavirus 229E Not Detected     Coronavirus HKU1 Not Detected     Coronavirus NL63 Not Detected     Coronavirus OC43 Not Detected     Human  Metapneumovirus Not Detected     Human Rhinovirus/Enterovirus Detected     Influenza B PCR Not Detected     Parainfluenza Virus 1 Not Detected     Parainfluenza Virus 2 Not Detected     Parainfluenza Virus 3 Not Detected     Parainfluenza Virus 4 Not Detected     Bordetella pertussis pcr Not Detected     Influenza A H1 2009 PCR Not Detected     Chlamydophila pneumoniae PCR Not Detected     Mycoplasma pneumo by PCR Not Detected     Influenza A PCR Not Detected     Influenza A H3 Not Detected     Influenza A H1 Not Detected     RSV, PCR Not Detected    POC Glucose Once [[id number]]  (Abnormal) Collected:  10/24/19 0809    Specimen:  Blood Updated:  10/24/19 0810     Glucose 217 mg/dL      Comment: Serial Number: [id number]:  [id number]       Urinalysis With Culture If Indicated - Urine, Clean Catch [[id number]]  (Normal) Collected:  10/23/19 1822    Specimen:  Urine, Clean Catch Updated:  10/23/19 1835     Color, UA Yellow     Appearance, UA Clear     pH, UA 6.0     Specific Gravity, UA 1.009     Glucose, UA Negative     Ketones, UA Negative     Bilirubin, UA Negative     Blood, UA Negative     Protein, UA Negative     Leuk Esterase, UA Negative     Nitrite, UA Negative     Urobilinogen, UA 0.2 E.U./dL    Narrative:       Urine microscopic not indicated.    Protime-INR [[id number]]  (Normal) Collected:  10/23/19 1816    Specimen:  Blood Updated:  10/23/19 1832     Protime 10.3 Seconds      INR 0.97    aPTT [[id number]]  (Abnormal) Collected:  10/23/19 1816    Specimen:  Blood Updated:  10/23/19 1832     PTT 21.8 seconds     TSH [[id number]]  (Normal) Collected:  10/23/19 1753    Specimen:  Blood Updated:  10/23/19 1831     TSH 3.830 uIU/mL     Troponin [[id number]]  (Normal) Collected:  10/23/19 1753    Specimen:  Blood Updated:  10/23/19 1831     Troponin T <0.010 ng/mL     Narrative:       Troponin T Reference Range:  <= 0.03 ng/mL-   Negative for AMI  >0.03 ng/mL-     Abnormal for myocardial necrosis.  Clinicians  would have to utilize clinical acumen, EKG, Troponin and serial changes to determine if it is an Acute Myocardial Infarction or myocardial injury due to an underlying chronic condition.     Comprehensive Metabolic Panel [[id number]]  (Abnormal) Collected:  10/23/19 1753    Specimen:  Blood Updated:  10/23/19 1825     Glucose 105 mg/dL      BUN 10 mg/dL      Creatinine 0.53 mg/dL      Sodium 134 mmol/L      Potassium 3.5 mmol/L      Chloride 96 mmol/L      CO2 25.0 mmol/L      Calcium 9.2 mg/dL      Total Protein 7.7 g/dL      Albumin 3.90 g/dL      ALT (SGPT) 26 U/L      AST (SGOT) 21 U/L      Alkaline Phosphatase 91 U/L      Total Bilirubin 0.5 mg/dL      eGFR Non African Amer 127 mL/min/1.73      Globulin 3.8 gm/dL      A/G Ratio 1.0 g/dL      BUN/Creatinine Ratio 18.9     Anion Gap 13.0 mmol/L     Narrative:       GFR Normal >60  Chronic Kidney Disease <60  Kidney Failure <15    CBC & Differential [[id number]] Collected:  10/23/19 1753    Specimen:  Blood Updated:  10/23/19 1759    Narrative:       The following orders were created for panel order CBC & Differential.  Procedure                               Abnormality         Status                     ---------                               -----------         ------                     CBC Auto Differential[[id number]]        Abnormal            Final result                 Please view results for these tests on the individual orders.    CBC Auto Differential [[id number]]  (Abnormal) Collected:  10/23/19 1753    Specimen:  Blood Updated:  10/23/19 1759     WBC 13.20 10*3/mm3      RBC 4.47 10*6/mm3      Hemoglobin 14.6 g/dL      Hematocrit 41.0 %      MCV 91.6 fL      MCH 32.7 pg      MCHC 35.7 g/dL      RDW 13.5 %      RDW-SD 43.3 fl      MPV 8.3 fL      Platelets 281 10*3/mm3      Neutrophil % 71.0 %      Lymphocyte % 19.0 %      Monocyte % 7.6 %      Eosinophil % 1.5 %      Basophil % 0.9 %      Neutrophils, Absolute 9.40 10*3/mm3      Lymphocytes, Absolute 2.50  10*3/mm3      Monocytes, Absolute 1.00 10*3/mm3      Eosinophils, Absolute 0.20 10*3/mm3      Basophils, Absolute 0.10 10*3/mm3      nRBC 0.1 /100 WBC                 Imaging Results (all)     Procedure Component Value Units Date/Time    CT Head Without Contrast [[id number]] Collected:  10/23/19 1843     Updated:  10/23/19 1846    Narrative:          DATE OF EXAM:  10/23/2019 6:33 PM     PROCEDURE:   CT HEAD WO CONTRAST-     INDICATIONS:   Headaches, hypertension, blurred vision, dizziness, migraines.     COMPARISON:  No Comparisons Available     TECHNIQUE:   Routine transaxial cuts were obtained through the head without the  administration of contrast. Automated exposure control and iterative  reconstruction methods were used.      FINDINGS:  The cerebral sulci, fissures, ventricles, and basal cisterns are within  range of normal. There is no acute hemorrhage, midline shift, or  suspicious extra-axial fluid collections. The orbital contents are  normal. There is mild mucosal thickening in the paranasal sinuses. The  patient has had previous paranasal sinus surgery. The mastoid sinuses  are clear. The calvarium is unremarkable.        Impression:          1. No acute intracranial findings.  2. Mild chronic paranasal sinus disease with previous surgery.     Electronically Signed By-Jose Kulkarni On:10/23/2019 6:44 PM  This report was finalized on [id number] by  Jose Kulkarni, .    XR Chest 1 View [[id number]] Collected:  10/23/19 1800     Updated:  10/23/19 1803    Narrative:          DATE OF EXAM:   10/23/2019 5:45 PM     PROCEDURE:   XR CHEST 1 VW-     INDICATIONS:   HTN     COMPARISON:  04/27/2019     TECHNIQUE:   [Portable chest radiograph]     FINDINGS:    The cardiomediastinal silhouette is within normal limits. The lungs are  clear. There is no pneumothorax, focal consolidation, or large pleural  effusion. Osseous structures grossly intact.        Impression:       No acute process.      Electronically Signed  By-Spencer Laguerre On:10/23/2019 6:01 PM  This report was finalized on [id number] by  Spencer Laguerre, .          ECG/EMG Results (most recent)     Procedure Component Value Units Date/Time    ECG 12 Lead [[id number]] Collected:  10/23/19 2140     Updated:  10/24/19 0759    Narrative:       HEART RATE= 88  bpm  RR Interval= 680  ms  TN Interval= 153  ms  P Horizontal Axis= 3  deg  P Front Axis= 22  deg  QRSD Interval= 86  ms  QT Interval= 363  ms  QRS Axis= 26  deg  T Wave Axis= 38  deg  - NORMAL ECG -  Sinus rhythm  When compared with ECG of 27-Apr-2019 9:17:19,  No significant change  Electronically Signed By: Neno Rodrigez (Akshat) 24-Oct-2019 07:59:20  Date and Time of Study: 2019-10-23 21:40:19          Condition on Discharge:  stable    Vital Signs  Temp:  [97.8 °F (36.6 °C)-99.3 °F (37.4 °C)] 99.3 °F (37.4 °C)  Heart Rate:  [75-92] 91  Resp:  [16-18] 18  BP: (118-184)/() 159/88    Physical Exam:     General Appearance:    Alert, cooperative, in no acute distress   Head:    Normocephalic, without obvious abnormality, atraumatic   Eyes:            Lids and lashes normal, conjunctivae and sclerae normal, no   icterus, no pallor, corneas clear, PERRLA   Neck:   No adenopathy, supple, trachea midline, no thyromegaly, no   carotid bruit, no JVD   Back:     No kyphosis present, no scoliosis present, no skin lesions,      erythema or scars, no tenderness to percussion or                   palpation,   range of motion normal   Lungs:     Clear to auscultation,respirations regular, even and                  unlabored    Heart:    Regular rhythm and normal rate, normal S1 and S2, no            murmur, no gallop, no rub, no click   Chest Wall:    No abnormalities observed   Abdomen:     Normal bowel sounds, no masses, no organomegaly, soft        non-tender, non-distended, no guarding, no rebound                tenderness   Extremities:   Moves all extremities well, no edema, no cyanosis, no             redness   Skin:    No bleeding, bruising or rash   Neurologic:   Cranial nerves 2 - 12 grossly intact, sensation intact, DTR       present and equal bilaterally       Discharge Disposition  Home or Self Care    Discharge Medications     Discharge Medications      New Medications      Instructions Start Date   cloNIDine 0.1 MG tablet  Commonly known as:  CATAPRES   0.1 mg, Oral, 2 Times Daily, Prn for bp > 160/90      nebivolol 10 MG tablet  Commonly known as:  BYSTOLIC   10 mg, Oral, Daily      olmesartan 40 MG tablet  Commonly known as:  BENICAR   40 mg, Oral, Daily         Continue These Medications      Instructions Start Date   acetaminophen 325 MG tablet  Commonly known as:  TYLENOL   325 mg, Oral, Every 6 Hours PRN      albuterol (2.5 MG/3ML) 0.083% nebulizer solution  Commonly known as:  PROVENTIL   2.5 mg, Nebulization, Every 6 Hours PRN      albuterol sulfate  (90 Base) MCG/ACT inhaler  Commonly known as:  PROVENTIL HFA;VENTOLIN HFA;PROAIR HFA   2 puffs, Inhalation, Every 6 Hours PRN      betamethasone dipropionate 0.05 % lotion  Commonly known as:  DIPROLENE   1 application, Topical, 2 Times Daily PRN      budesonide-formoterol 80-4.5 MCG/ACT inhaler  Commonly known as:  SYMBICORT   2 puffs, Inhalation, 2 Times Daily - RT      clindamycin 1 % lotion  Commonly known as:  CLEOCIN T   1 application, Topical, 2 Times Daily PRN      doxycycline 100 MG capsule  Commonly known as:  VIBRAMYCIN   100 mg, Oral, 2 Times Daily      EPINEPHrine 0.3 MG/0.3ML solution auto-injector injection  Commonly known as:  EPIPEN   0.3 mg, As Needed      furosemide 40 MG tablet  Commonly known as:  LASIX   40 mg, Oral, Daily      glimepiride 4 MG tablet  Commonly known as:  AMARYL   2 mg, Oral, 2 Times Daily With Meals      hydrOXYzine 25 MG tablet  Commonly known as:  ATARAX   25 mg, Oral, Every 6 Hours PRN      Liraglutide -Weight Management 18 MG/3ML solution pen-injector   0.6 mg, Subcutaneous, Daily, Inject 0.6mg sq daily qam for 7days;  then 1.2mg sq qam for 7d; then 1.8mg sq qam for 7d; then 2.4mg sq qam for 7 d; then 3.0mg sq qam       spironolactone 50 MG tablet  Commonly known as:  ALDACTONE   50 mg, Oral, Daily         Stop These Medications    ibuprofen 200 MG tablet  Commonly known as:  ADVIL,MOTRIN     losartan 100 MG tablet  Commonly known as:  COZAAR     metoprolol succinate XL 50 MG 24 hr tablet  Commonly known as:  TOPROL XL            Discharge Diet:     Activity at Discharge:     Follow-up Appointments  Future Appointments   Date Time Provider Department Center   11/20/2019  7:45 AM Berenice Jacobson MD [facility] None         Test Results Pending at Discharge       Risk for Readmission (LACE) Score: 4 (10/24/2019  7:41 AM)          Tavares Duque MD  10/24/19  3:23 PM    Time: Discharge 35 min

## 2019-10-24 NOTE — PLAN OF CARE
Problem: Patient Care Overview  Goal: Plan of Care Review  Outcome: Ongoing (interventions implemented as appropriate)   10/24/19 1521   Coping/Psychosocial   Plan of Care Reviewed With patient   OTHER   Outcome Summary Patient is sleeping in bed with family at bedside     Goal: Individualization and Mutuality  Outcome: Ongoing (interventions implemented as appropriate)    Goal: Discharge Needs Assessment  Outcome: Ongoing (interventions implemented as appropriate)    Goal: Interprofessional Rounds/Family Conf  Outcome: Ongoing (interventions implemented as appropriate)      Problem: Hypertensive Disease/Crisis (Arterial) (Adult)  Goal: Signs and Symptoms of Listed Potential Problems Will be Absent, Minimized or Managed (Hypertensive Disease/Crisis)  Outcome: Ongoing (interventions implemented as appropriate)      Problem: Diabetes, Type 2 (Adult)  Goal: Signs and Symptoms of Listed Potential Problems Will be Absent, Minimized or Managed (Diabetes, Type 2)  Outcome: Ongoing (interventions implemented as appropriate)

## 2019-10-25 ENCOUNTER — TELEPHONE (OUTPATIENT)
Dept: FAMILY MEDICINE CLINIC | Facility: CLINIC | Age: 39
End: 2019-10-25

## 2019-10-27 RX ORDER — BLOOD SUGAR DIAGNOSTIC
STRIP MISCELLANEOUS
Qty: 100 EACH | Refills: 3 | Status: SHIPPED | OUTPATIENT
Start: 2019-10-27 | End: 2019-12-13

## 2019-10-30 ENCOUNTER — OFFICE VISIT (OUTPATIENT)
Dept: FAMILY MEDICINE CLINIC | Facility: CLINIC | Age: 39
End: 2019-10-30

## 2019-10-30 VITALS
TEMPERATURE: 98.7 F | DIASTOLIC BLOOD PRESSURE: 82 MMHG | HEART RATE: 86 BPM | WEIGHT: 285 LBS | BODY MASS INDEX: 44.73 KG/M2 | SYSTOLIC BLOOD PRESSURE: 124 MMHG | HEIGHT: 67 IN | OXYGEN SATURATION: 96 %

## 2019-10-30 DIAGNOSIS — I10 ESSENTIAL HYPERTENSION: Primary | ICD-10-CM

## 2019-10-30 PROCEDURE — 99213 OFFICE O/P EST LOW 20 MIN: CPT | Performed by: FAMILY MEDICINE

## 2019-10-30 RX ORDER — METOPROLOL SUCCINATE 50 MG/1
50 TABLET, EXTENDED RELEASE ORAL DAILY
Qty: 90 TABLET | Refills: 3
Start: 2019-10-30 | End: 2019-12-13

## 2019-10-30 NOTE — PROGRESS NOTES
Subjective   Bernadine Arriaga is a 39 y.o. female.     She comes in today for follow-up after she was admitted to the hospital with headache and accelerated hypertension  EKG was okay  she had a mild leukocytosis that resolved  She was sent home on Spironolactone, Benicar 40, Bystolic 10 mg, clonidine 0.1 mg as needed  She was told to stop the losartan and metoprolol  The bystolic was too expensive so she resumed the metoprolol  She has been taking the clonidine bid  She has been trying to make better food choices  She feels better          The following portions of the patient's history were reviewed and updated as appropriate: allergies, current medications, past family history, past medical history, past social history, past surgical history and problem list.  Past Medical History:   Diagnosis Date   • Asthma    • Hypertension    • Migraine     history   • Sinus disorder      Past Surgical History:   Procedure Laterality Date   • ADENOIDECTOMY     • APPENDECTOMY     •  SECTION     • CHOLECYSTECTOMY     • INCISION AND DRAINAGE OF WOUND     • KNEE ARTHRODESIS     • TONSILLECTOMY     • WOUND DEBRIDEMENT Right 2019    Procedure: DEBRIDEMENT OF RECURRENT HIDRADENITIS AND VAC PLACEMENT RIGHT SIDE;  Surgeon: Alix Freire MD;  Location: [facility] OR;  Service: General     History reviewed. No pertinent family history.  Social History     Socioeconomic History   • Marital status:      Spouse name: Not on file   • Number of children: Not on file   • Years of education: Not on file   • Highest education level: Not on file   Tobacco Use   • Smoking status: Never Smoker   Substance and Sexual Activity   • Alcohol use: No     Frequency: Never   • Drug use: No   • Sexual activity: Defer         Current Outpatient Medications:   •  ACCU-CHEK GAYLE PLUS test strip, USE TO TEST ONCE A DAY, Disp: 100 each, Rfl: 3  •  acetaminophen (TYLENOL) 325 MG tablet, Take 325 mg by mouth Every 6 (Six) Hours As  Needed for Mild Pain ., Disp: , Rfl:   •  albuterol (PROVENTIL) (2.5 MG/3ML) 0.083% nebulizer solution, Take 2.5 mg by nebulization Every 6 (Six) Hours As Needed for Wheezing., Disp: , Rfl:   •  albuterol sulfate  (90 Base) MCG/ACT inhaler, Inhale 2 puffs Every 6 (Six) Hours As Needed for Wheezing., Disp: , Rfl:   •  betamethasone dipropionate (DIPROLENE) 0.05 % lotion, Apply 1 application topically to the appropriate area as directed 2 (Two) Times a Day As Needed., Disp: , Rfl:   •  budesonide-formoterol (SYMBICORT) 80-4.5 MCG/ACT inhaler, Inhale 2 puffs 2 (Two) Times a Day., Disp: , Rfl:   •  clindamycin (CLEOCIN T) 1 % lotion, Apply 1 application topically to the appropriate area as directed 2 (Two) Times a Day As Needed., Disp: , Rfl:   •  cloNIDine (CATAPRES) 0.1 MG tablet, Take 1 tablet by mouth 2 (Two) Times a Day. Prn for bp > 160/90, Disp: 30 tablet, Rfl: 0  •  doxycycline (VIBRAMYCIN) 100 MG capsule, Take 100 mg by mouth 2 (Two) Times a Day., Disp: , Rfl:   •  EPINEPHrine (EPIPEN) 0.3 MG/0.3ML solution auto-injector injection, 0.3 mg As Needed., Disp: , Rfl:   •  furosemide (LASIX) 40 MG tablet, Take 40 mg by mouth Daily., Disp: , Rfl:   •  glimepiride (AMARYL) 4 MG tablet, Take 0.5 tablets by mouth 2 (Two) Times a Day With Meals., Disp: 180 tablet, Rfl: 3  •  hydrOXYzine (ATARAX) 25 MG tablet, Take 25 mg by mouth Every 6 (Six) Hours As Needed., Disp: , Rfl:   •  Liraglutide -Weight Management 18 MG/3ML solution pen-injector, Inject 0.6 mg under the skin into the appropriate area as directed Daily. Inject 0.6mg sq daily qam for 7days; then 1.2mg sq qam for 7d; then 1.8mg sq qam for 7d; then 2.4mg sq qam for 7 d; then 3.0mg sq qam, Disp: , Rfl:   •  metoprolol succinate XL (TOPROL XL) 50 MG 24 hr tablet, Take 1 tablet by mouth Daily., Disp: 90 tablet, Rfl: 3  •  olmesartan (BENICAR) 40 MG tablet, Take 1 tablet by mouth Daily for 30 days., Disp: 30 tablet, Rfl: 0  •  spironolactone (ALDACTONE) 50 MG  "tablet, Take 50 mg by mouth Daily., Disp: , Rfl:     Review of Systems   Constitutional: Negative for diaphoresis, fatigue, fever, unexpected weight gain and unexpected weight loss.   Respiratory: Negative for cough, chest tightness and shortness of breath.    Cardiovascular: Negative for chest pain, palpitations and leg swelling.   Gastrointestinal: Negative for nausea and vomiting.   Neurological: Positive for headache. Negative for dizziness and syncope.     /82 (BP Location: Left arm, Patient Position: Sitting, Cuff Size: Adult)   Pulse 86   Temp 98.7 °F (37.1 °C) (Oral)   Ht 170.2 cm (67\")   Wt 129 kg (285 lb)   SpO2 96%   BMI 44.64 kg/m²       Objective   Physical Exam   Constitutional: Vital signs are normal. She appears well-developed and well-nourished. No distress. She is morbidly obese.  HENT:   Head: Normocephalic and atraumatic.   Neck: Neck supple. No JVD present. No thyromegaly present.   Cardiovascular: Normal rate, regular rhythm, normal heart sounds and intact distal pulses. Exam reveals no gallop and no friction rub.   No murmur heard.  Pulmonary/Chest: Effort normal and breath sounds normal. No respiratory distress. She has no wheezes. She has no rales.   Musculoskeletal: She exhibits no edema.   Lymphadenopathy:     She has no cervical adenopathy.   Neurological: She is alert. No cranial nerve deficit.   Skin: Skin is warm and dry.   Psychiatric: She has a normal mood and affect.   Nursing note and vitals reviewed.        Assessment/Plan   Problems Addressed this Visit        Cardiovascular and Mediastinum    Hypertension - Primary    Relevant Medications    metoprolol succinate XL (TOPROL XL) 50 MG 24 hr tablet          Improved control  She will continue current medications and was counseled on the need for lifestyle modifications  She has a f/u appt scheduled for next month       "

## 2019-10-31 NOTE — PROGRESS NOTES
Subjective   Bernadine Arriaga is a 39 y.o. female.     History of Present Illness     {Common H&P Review Areas:28635}  Past Medical History:   Diagnosis Date   • Asthma    • Hypertension    • Migraine     history   • Sinus disorder      Past Surgical History:   Procedure Laterality Date   • ADENOIDECTOMY     • APPENDECTOMY     •  SECTION     • CHOLECYSTECTOMY     • INCISION AND DRAINAGE OF WOUND     • KNEE ARTHRODESIS     • TONSILLECTOMY     • WOUND DEBRIDEMENT Right 2019    Procedure: DEBRIDEMENT OF RECURRENT HIDRADENITIS AND VAC PLACEMENT RIGHT SIDE;  Surgeon: Alix Freire MD;  Location: [facility] MAIN OR;  Service: General     No family history on file.  Social History     Socioeconomic History   • Marital status:      Spouse name: Not on file   • Number of children: Not on file   • Years of education: Not on file   • Highest education level: Not on file   Tobacco Use   • Smoking status: Never Smoker   Substance and Sexual Activity   • Alcohol use: No     Frequency: Never   • Drug use: No   • Sexual activity: Defer         Current Outpatient Medications:   •  ACCU-CHEK GAYLE PLUS test strip, USE TO TEST ONCE A DAY, Disp: 100 each, Rfl: 3  •  acetaminophen (TYLENOL) 325 MG tablet, Take 325 mg by mouth Every 6 (Six) Hours As Needed for Mild Pain ., Disp: , Rfl:   •  albuterol (PROVENTIL) (2.5 MG/3ML) 0.083% nebulizer solution, Take 2.5 mg by nebulization Every 6 (Six) Hours As Needed for Wheezing., Disp: , Rfl:   •  albuterol sulfate  (90 Base) MCG/ACT inhaler, Inhale 2 puffs Every 6 (Six) Hours As Needed for Wheezing., Disp: , Rfl:   •  betamethasone dipropionate (DIPROLENE) 0.05 % lotion, Apply 1 application topically to the appropriate area as directed 2 (Two) Times a Day As Needed., Disp: , Rfl:   •  budesonide-formoterol (SYMBICORT) 80-4.5 MCG/ACT inhaler, Inhale 2 puffs 2 (Two) Times a Day., Disp: , Rfl:   •  clindamycin (CLEOCIN T) 1 % lotion, Apply 1 application topically  "to the appropriate area as directed 2 (Two) Times a Day As Needed., Disp: , Rfl:   •  cloNIDine (CATAPRES) 0.1 MG tablet, Take 1 tablet by mouth 2 (Two) Times a Day. Prn for bp > 160/90, Disp: 30 tablet, Rfl: 0  •  doxycycline (VIBRAMYCIN) 100 MG capsule, Take 100 mg by mouth 2 (Two) Times a Day., Disp: , Rfl:   •  EPINEPHrine (EPIPEN) 0.3 MG/0.3ML solution auto-injector injection, 0.3 mg As Needed., Disp: , Rfl:   •  furosemide (LASIX) 40 MG tablet, Take 40 mg by mouth Daily., Disp: , Rfl:   •  glimepiride (AMARYL) 4 MG tablet, Take 0.5 tablets by mouth 2 (Two) Times a Day With Meals., Disp: 180 tablet, Rfl: 3  •  hydrOXYzine (ATARAX) 25 MG tablet, Take 25 mg by mouth Every 6 (Six) Hours As Needed., Disp: , Rfl:   •  Liraglutide -Weight Management 18 MG/3ML solution pen-injector, Inject 0.6 mg under the skin into the appropriate area as directed Daily. Inject 0.6mg sq daily qam for 7days; then 1.2mg sq qam for 7d; then 1.8mg sq qam for 7d; then 2.4mg sq qam for 7 d; then 3.0mg sq qam, Disp: , Rfl:   •  metoprolol succinate XL (TOPROL XL) 50 MG 24 hr tablet, Take 1 tablet by mouth Daily., Disp: 90 tablet, Rfl: 3  •  olmesartan (BENICAR) 40 MG tablet, Take 1 tablet by mouth Daily for 30 days., Disp: 30 tablet, Rfl: 0  •  spironolactone (ALDACTONE) 50 MG tablet, Take 50 mg by mouth Daily., Disp: , Rfl:     Review of Systems  /82 (BP Location: Left arm, Patient Position: Sitting, Cuff Size: Adult)   Pulse 86   Temp 98.7 °F (37.1 °C) (Oral)   Ht 170.2 cm (67\")   Wt 129 kg (285 lb)   SpO2 96%   BMI 44.64 kg/m²       Objective   Physical Exam      Assessment/Plan   {Assess/PlanSmartLinks:29699}           "

## 2019-10-31 NOTE — PROGRESS NOTES
Subjective   Bernadine Arriaga is a 39 y.o. female.     History of Present Illness     The following portions of the patient's history were reviewed and updated as appropriate: allergies, current medications, past family history, past medical history, past social history, past surgical history and problem list.  Past Medical History:   Diagnosis Date   • Asthma    • Hypertension    • Migraine     history   • Sinus disorder      Past Surgical History:   Procedure Laterality Date   • ADENOIDECTOMY     • APPENDECTOMY     •  SECTION     • CHOLECYSTECTOMY     • INCISION AND DRAINAGE OF WOUND     • KNEE ARTHRODESIS     • TONSILLECTOMY     • WOUND DEBRIDEMENT Right 2019    Procedure: DEBRIDEMENT OF RECURRENT HIDRADENITIS AND VAC PLACEMENT RIGHT SIDE;  Surgeon: Alix Freire MD;  [facility];  Service: General     History reviewed. No pertinent family history.  Social History     Socioeconomic History   • Marital status:      Spouse name: Not on file   • Number of children: Not on file   • Years of education: Not on file   • Highest education level: Not on file   Tobacco Use   • Smoking status: Never Smoker   Substance and Sexual Activity   • Alcohol use: No     Frequency: Never   • Drug use: No   • Sexual activity: Defer         Current Outpatient Medications:   •  ACCU-CHEK GAYLE PLUS test strip, USE TO TEST ONCE A DAY, Disp: 100 each, Rfl: 3  •  acetaminophen (TYLENOL) 325 MG tablet, Take 325 mg by mouth Every 6 (Six) Hours As Needed for Mild Pain ., Disp: , Rfl:   •  albuterol (PROVENTIL) (2.5 MG/3ML) 0.083% nebulizer solution, Take 2.5 mg by nebulization Every 6 (Six) Hours As Needed for Wheezing., Disp: , Rfl:   •  albuterol sulfate  (90 Base) MCG/ACT inhaler, Inhale 2 puffs Every 6 (Six) Hours As Needed for Wheezing., Disp: , Rfl:   •  betamethasone dipropionate (DIPROLENE) 0.05 % lotion, Apply 1 application topically to the appropriate area as directed 2 (Two) Times a  "Day As Needed., Disp: , Rfl:   •  budesonide-formoterol (SYMBICORT) 80-4.5 MCG/ACT inhaler, Inhale 2 puffs 2 (Two) Times a Day., Disp: , Rfl:   •  clindamycin (CLEOCIN T) 1 % lotion, Apply 1 application topically to the appropriate area as directed 2 (Two) Times a Day As Needed., Disp: , Rfl:   •  cloNIDine (CATAPRES) 0.1 MG tablet, Take 1 tablet by mouth 2 (Two) Times a Day. Prn for bp > 160/90, Disp: 30 tablet, Rfl: 0  •  doxycycline (VIBRAMYCIN) 100 MG capsule, Take 100 mg by mouth 2 (Two) Times a Day., Disp: , Rfl:   •  EPINEPHrine (EPIPEN) 0.3 MG/0.3ML solution auto-injector injection, 0.3 mg As Needed., Disp: , Rfl:   •  furosemide (LASIX) 40 MG tablet, Take 40 mg by mouth Daily., Disp: , Rfl:   •  glimepiride (AMARYL) 4 MG tablet, Take 0.5 tablets by mouth 2 (Two) Times a Day With Meals., Disp: 180 tablet, Rfl: 3  •  hydrOXYzine (ATARAX) 25 MG tablet, Take 25 mg by mouth Every 6 (Six) Hours As Needed., Disp: , Rfl:   •  Liraglutide -Weight Management 18 MG/3ML solution pen-injector, Inject 0.6 mg under the skin into the appropriate area as directed Daily. Inject 0.6mg sq daily qam for 7days; then 1.2mg sq qam for 7d; then 1.8mg sq qam for 7d; then 2.4mg sq qam for 7 d; then 3.0mg sq qam, Disp: , Rfl:   •  metoprolol succinate XL (TOPROL XL) 50 MG 24 hr tablet, Take 1 tablet by mouth Daily., Disp: 90 tablet, Rfl: 3  •  olmesartan (BENICAR) 40 MG tablet, Take 1 tablet by mouth Daily for 30 days., Disp: 30 tablet, Rfl: 0  •  spironolactone (ALDACTONE) 50 MG tablet, Take 50 mg by mouth Daily., Disp: , Rfl:     Review of Systems  /82 (BP Location: Left arm, Patient Position: Sitting, Cuff Size: Adult)   Pulse 86   Temp 98.7 °F (37.1 °C) (Oral)   Ht 170.2 cm (67\")   Wt 129 kg (285 lb)   SpO2 96%   BMI 44.64 kg/m²       Objective   Physical Exam      Assessment/Plan   Problems Addressed this Visit        Cardiovascular and Mediastinum    Hypertension - Primary    Relevant Medications    metoprolol " succinate XL (TOPROL XL) 50 MG 24 hr tablet

## 2019-11-15 ENCOUNTER — TELEPHONE (OUTPATIENT)
Dept: FAMILY MEDICINE CLINIC | Facility: CLINIC | Age: 39
End: 2019-11-15

## 2019-11-15 RX ORDER — CLONIDINE HYDROCHLORIDE 0.1 MG/1
TABLET ORAL
Qty: 180 TABLET | Refills: 0 | Status: SHIPPED | OUTPATIENT
Start: 2019-11-15 | End: 2019-11-15 | Stop reason: ALTCHOICE

## 2019-11-15 RX ORDER — OLMESARTAN MEDOXOMIL 40 MG/1
TABLET ORAL
Qty: 90 TABLET | Refills: 0 | Status: SHIPPED | OUTPATIENT
Start: 2019-11-15 | End: 2020-03-01

## 2019-11-15 RX ORDER — CLONIDINE HYDROCHLORIDE 0.1 MG/1
TABLET ORAL
Qty: 90 TABLET | Refills: 0 | Status: SHIPPED | OUTPATIENT
Start: 2019-11-15 | End: 2019-11-15 | Stop reason: SDUPTHER

## 2019-11-15 RX ORDER — CLONIDINE 0.2 MG/24H
1 PATCH, EXTENDED RELEASE TRANSDERMAL WEEKLY
Qty: 13 PATCH | Refills: 1 | Status: SHIPPED | OUTPATIENT
Start: 2019-11-15 | End: 2020-06-07

## 2019-11-20 ENCOUNTER — OFFICE VISIT (OUTPATIENT)
Dept: FAMILY MEDICINE CLINIC | Facility: CLINIC | Age: 39
End: 2019-11-20

## 2019-11-20 VITALS
HEIGHT: 67 IN | SYSTOLIC BLOOD PRESSURE: 152 MMHG | BODY MASS INDEX: 44.95 KG/M2 | WEIGHT: 286.4 LBS | HEART RATE: 79 BPM | OXYGEN SATURATION: 98 % | TEMPERATURE: 98.5 F | DIASTOLIC BLOOD PRESSURE: 96 MMHG

## 2019-11-20 DIAGNOSIS — I10 ESSENTIAL HYPERTENSION: Primary | ICD-10-CM

## 2019-11-20 PROCEDURE — 99213 OFFICE O/P EST LOW 20 MIN: CPT | Performed by: FAMILY MEDICINE

## 2019-11-20 RX ORDER — EPINEPHRINE 0.3 MG/.3ML
0.3 INJECTION SUBCUTANEOUS AS NEEDED
Qty: 1 EACH | Refills: 11 | Status: SHIPPED | OUTPATIENT
Start: 2019-11-20 | End: 2021-01-14 | Stop reason: SDUPTHER

## 2019-11-20 NOTE — PATIENT INSTRUCTIONS
Keep working to lose weight through healthy eating and exercise.  Continue current medications  Limit salt/caffeine  Stress reduction

## 2019-11-20 NOTE — PROGRESS NOTES
Subjective   Bernadine Arriaga is a 39 y.o. female.     She is here for follow-up on her blood pressure  She also needs refills on her EpiPen  Feels well   bp had been running 130sys until  when it started to creep upward  This is when her daughter took her driving test, passed and will get her 's license this week  Feels well otherwise         The following portions of the patient's history were reviewed and updated as appropriate: allergies, current medications, past family history, past medical history, past social history, past surgical history and problem list.  Past Medical History:   Diagnosis Date   • Asthma    • Hypertension    • Migraine     history   • Sinus disorder      Past Surgical History:   Procedure Laterality Date   • ADENOIDECTOMY     • APPENDECTOMY     •  SECTION     • CHOLECYSTECTOMY     • INCISION AND DRAINAGE OF WOUND     • KNEE ARTHRODESIS     • TONSILLECTOMY     • WOUND DEBRIDEMENT Right 2019    Procedure: DEBRIDEMENT OF RECURRENT HIDRADENITIS AND VAC PLACEMENT RIGHT SIDE;  Surgeon: Alix Freire MD;  Location: [facility];  Service: General     History reviewed. No pertinent family history.  Social History     Socioeconomic History   • Marital status:      Spouse name: Not on file   • Number of children: Not on file   • Years of education: Not on file   • Highest education level: Not on file   Tobacco Use   • Smoking status: Never Smoker   Substance and Sexual Activity   • Alcohol use: No     Frequency: Never   • Drug use: No   • Sexual activity: Defer         Current Outpatient Medications:   •  ACCU-CHEK GAYLE PLUS test strip, USE TO TEST ONCE A DAY, Disp: 100 each, Rfl: 3  •  acetaminophen (TYLENOL) 325 MG tablet, Take 325 mg by mouth Every 6 (Six) Hours As Needed for Mild Pain ., Disp: , Rfl:   •  albuterol (PROVENTIL) (2.5 MG/3ML) 0.083% nebulizer solution, Take 2.5 mg by nebulization Every 6 (Six) Hours As Needed for Wheezing., Disp: , Rfl:    •  albuterol sulfate  (90 Base) MCG/ACT inhaler, Inhale 2 puffs Every 6 (Six) Hours As Needed for Wheezing., Disp: , Rfl:   •  betamethasone dipropionate (DIPROLENE) 0.05 % lotion, Apply 1 application topically to the appropriate area as directed 2 (Two) Times a Day As Needed., Disp: , Rfl:   •  budesonide-formoterol (SYMBICORT) 80-4.5 MCG/ACT inhaler, Inhale 2 puffs 2 (Two) Times a Day., Disp: , Rfl:   •  clindamycin (CLEOCIN T) 1 % lotion, Apply 1 application topically to the appropriate area as directed 2 (Two) Times a Day As Needed., Disp: , Rfl:   •  cloNIDine (CATAPRES-TTS-2) 0.2 MG/24HR patch, Place 1 patch on the skin as directed by provider 1 (One) Time Per Week., Disp: 13 patch, Rfl: 1  •  doxycycline (VIBRAMYCIN) 100 MG capsule, Take 100 mg by mouth 2 (Two) Times a Day., Disp: , Rfl:   •  EPINEPHrine (EPIPEN) 0.3 MG/0.3ML solution auto-injector injection, Inject 0.3 mL into the appropriate muscle as directed by prescriber As Needed (allergic reaction)., Disp: 1 each, Rfl: 11  •  furosemide (LASIX) 40 MG tablet, Take 40 mg by mouth Daily., Disp: , Rfl:   •  glimepiride (AMARYL) 4 MG tablet, Take 0.5 tablets by mouth 2 (Two) Times a Day With Meals., Disp: 180 tablet, Rfl: 3  •  hydrOXYzine (ATARAX) 25 MG tablet, Take 25 mg by mouth Every 6 (Six) Hours As Needed., Disp: , Rfl:   •  Liraglutide -Weight Management 18 MG/3ML solution pen-injector, Inject 0.6 mg under the skin into the appropriate area as directed Daily. Inject 0.6mg sq daily qam for 7days; then 1.2mg sq qam for 7d; then 1.8mg sq qam for 7d; then 2.4mg sq qam for 7 d; then 3.0mg sq qam, Disp: , Rfl:   •  metoprolol succinate XL (TOPROL XL) 50 MG 24 hr tablet, Take 1 tablet by mouth Daily., Disp: 90 tablet, Rfl: 3  •  olmesartan (BENICAR) 40 MG tablet, TAKE 1 TABLET BY MOUTH DAILY, Disp: 90 tablet, Rfl: 0  •  spironolactone (ALDACTONE) 50 MG tablet, Take 50 mg by mouth Daily., Disp: , Rfl:     Review of Systems   Constitutional: Negative  "for diaphoresis, fatigue, fever, unexpected weight gain and unexpected weight loss.   Respiratory: Negative for cough, chest tightness and shortness of breath.    Cardiovascular: Negative for chest pain, palpitations and leg swelling.   Neurological: Negative for dizziness, syncope and headache.     /96   Pulse 79   Temp 98.5 °F (36.9 °C) (Oral)   Ht 170.2 cm (67\")   Wt 130 kg (286 lb 6.4 oz)   SpO2 98%   BMI 44.86 kg/m²       Objective   Physical Exam   Constitutional: She appears well-developed and well-nourished. No distress. She is morbidly obese.  HENT:   Head: Normocephalic and atraumatic.   Neck: Neck supple.   Cardiovascular: Normal rate, regular rhythm, normal heart sounds and intact distal pulses. Exam reveals no gallop and no friction rub.   No murmur heard.  Pulmonary/Chest: Effort normal and breath sounds normal. No respiratory distress. She has no wheezes. She has no rales.   Musculoskeletal: She exhibits no edema.   Lymphadenopathy:     She has no cervical adenopathy.   Neurological: She is alert.   Skin: Skin is warm and dry.   Nursing note and vitals reviewed.        Assessment/Plan   Problems Addressed this Visit        Cardiovascular and Mediastinum    Hypertension - Primary    Relevant Medications    EPINEPHrine (EPIPEN) 0.3 MG/0.3ML solution auto-injector injection          She was having better control until her daughter took the driving test  Will leave meds where they are  She has access to machines and will check bp routinely  Will call if bp remains elevated and does not return to the lower levels  Will see her back in 3 mo  Counseled on need for lifestyle modifications       "

## 2019-12-02 RX ORDER — LIRAGLUTIDE 6 MG/ML
INJECTION, SOLUTION SUBCUTANEOUS
Qty: 9 ML | Refills: 0 | OUTPATIENT
Start: 2019-12-02

## 2019-12-10 ENCOUNTER — TELEPHONE (OUTPATIENT)
Dept: FAMILY MEDICINE CLINIC | Facility: CLINIC | Age: 39
End: 2019-12-10

## 2019-12-10 NOTE — TELEPHONE ENCOUNTER
Samuel has denied  the clonidine 0.2 mg / day patch 12/84  due to pt needs to try and fail : guanfacine tablet and  Methyldopa tablet  ?

## 2019-12-13 ENCOUNTER — HOSPITAL ENCOUNTER (OUTPATIENT)
Facility: HOSPITAL | Age: 39
Setting detail: OBSERVATION
Discharge: HOME | End: 2019-12-14
Attending: INTERNAL MEDICINE | Admitting: INTERNAL MEDICINE

## 2019-12-13 ENCOUNTER — APPOINTMENT (OUTPATIENT)
Dept: GENERAL RADIOLOGY | Facility: HOSPITAL | Age: 39
End: 2019-12-13

## 2019-12-13 ENCOUNTER — APPOINTMENT (OUTPATIENT)
Dept: CT IMAGING | Facility: HOSPITAL | Age: 39
End: 2019-12-13

## 2019-12-13 DIAGNOSIS — R79.89 ELEVATED D-DIMER: ICD-10-CM

## 2019-12-13 DIAGNOSIS — R06.00 DYSPNEA, UNSPECIFIED TYPE: ICD-10-CM

## 2019-12-13 DIAGNOSIS — R07.9 CHEST PAIN, UNSPECIFIED TYPE: Primary | ICD-10-CM

## 2019-12-13 DIAGNOSIS — D72.829 LEUKOCYTOSIS, UNSPECIFIED TYPE: ICD-10-CM

## 2019-12-13 DIAGNOSIS — R00.0 TACHYCARDIA: ICD-10-CM

## 2019-12-13 DIAGNOSIS — R94.31 EKG ABNORMALITIES: ICD-10-CM

## 2019-12-13 LAB
ALBUMIN SERPL-MCNC: 4.4 G/DL (ref 3.5–5.2)
ALBUMIN/GLOB SERPL: 1.2 G/DL
ALP SERPL-CCNC: 90 U/L (ref 39–117)
ALT SERPL W P-5'-P-CCNC: 19 U/L (ref 1–33)
ANION GAP SERPL CALCULATED.3IONS-SCNC: 16 MMOL/L (ref 5–15)
AST SERPL-CCNC: 14 U/L (ref 1–32)
BASOPHILS # BLD AUTO: 0.1 10*3/MM3 (ref 0–0.2)
BASOPHILS NFR BLD AUTO: 0.6 % (ref 0–1.5)
BILIRUB SERPL-MCNC: 0.6 MG/DL (ref 0.2–1.2)
BILIRUB UR QL STRIP: NEGATIVE
BUN BLD-MCNC: 14 MG/DL (ref 6–20)
BUN/CREAT SERPL: 21.2 (ref 7–25)
CALCIUM SPEC-SCNC: 9 MG/DL (ref 8.6–10.5)
CHLORIDE SERPL-SCNC: 99 MMOL/L (ref 98–107)
CLARITY UR: CLEAR
CO2 SERPL-SCNC: 22 MMOL/L (ref 22–29)
COLOR UR: YELLOW
CREAT BLD-MCNC: 0.66 MG/DL (ref 0.57–1)
D DIMER PPP FEU-MCNC: 0.82 MCGFEU/ML (ref 0.17–0.59)
DEPRECATED RDW RBC AUTO: 40.7 FL (ref 37–54)
EOSINOPHIL # BLD AUTO: 0.1 10*3/MM3 (ref 0–0.4)
EOSINOPHIL NFR BLD AUTO: 0.8 % (ref 0.3–6.2)
ERYTHROCYTE [DISTWIDTH] IN BLOOD BY AUTOMATED COUNT: 12.6 % (ref 12.3–15.4)
GFR SERPL CREATININE-BSD FRML MDRD: 99 ML/MIN/1.73
GLOBULIN UR ELPH-MCNC: 3.6 GM/DL
GLUCOSE BLD-MCNC: 183 MG/DL (ref 65–99)
GLUCOSE UR STRIP-MCNC: ABNORMAL MG/DL
HCT VFR BLD AUTO: 40.8 % (ref 34–46.6)
HGB BLD-MCNC: 13.9 G/DL (ref 12–15.9)
HGB UR QL STRIP.AUTO: NEGATIVE
HOLD SPECIMEN: NORMAL
HOLD SPECIMEN: NORMAL
INR PPP: 1.02 (ref 0.9–1.1)
KETONES UR QL STRIP: NEGATIVE
LEUKOCYTE ESTERASE UR QL STRIP.AUTO: NEGATIVE
LYMPHOCYTES # BLD AUTO: 2.5 10*3/MM3 (ref 0.7–3.1)
LYMPHOCYTES NFR BLD AUTO: 12.9 % (ref 19.6–45.3)
MAGNESIUM SERPL-MCNC: 1.8 MG/DL (ref 1.6–2.6)
MCH RBC QN AUTO: 31 PG (ref 26.6–33)
MCHC RBC AUTO-ENTMCNC: 34 G/DL (ref 31.5–35.7)
MCV RBC AUTO: 91 FL (ref 79–97)
MONOCYTES # BLD AUTO: 1.1 10*3/MM3 (ref 0.1–0.9)
MONOCYTES NFR BLD AUTO: 5.9 % (ref 5–12)
NEUTROPHILS # BLD AUTO: 15.2 10*3/MM3 (ref 1.7–7)
NEUTROPHILS NFR BLD AUTO: 79.8 % (ref 42.7–76)
NITRITE UR QL STRIP: NEGATIVE
NRBC BLD AUTO-RTO: 0.1 /100 WBC (ref 0–0.2)
NT-PROBNP SERPL-MCNC: 22 PG/ML (ref 5–450)
PH UR STRIP.AUTO: <=5 [PH] (ref 5–8)
PLATELET # BLD AUTO: 334 10*3/MM3 (ref 140–450)
PMV BLD AUTO: 8.7 FL (ref 6–12)
POTASSIUM BLD-SCNC: 4.2 MMOL/L (ref 3.5–5.2)
PROT SERPL-MCNC: 8 G/DL (ref 6–8.5)
PROT UR QL STRIP: NEGATIVE
PROTHROMBIN TIME: 10.6 SECONDS (ref 9.6–11.7)
RBC # BLD AUTO: 4.49 10*6/MM3 (ref 3.77–5.28)
SODIUM BLD-SCNC: 137 MMOL/L (ref 136–145)
SP GR UR STRIP: 1.08 (ref 1–1.03)
TROPONIN T SERPL-MCNC: <0.01 NG/ML (ref 0–0.03)
TSH SERPL DL<=0.05 MIU/L-ACNC: 2.05 UIU/ML (ref 0.27–4.2)
UROBILINOGEN UR QL STRIP: ABNORMAL
WBC NRBC COR # BLD: 19 10*3/MM3 (ref 3.4–10.8)
WHOLE BLOOD HOLD SPECIMEN: NORMAL
WHOLE BLOOD HOLD SPECIMEN: NORMAL

## 2019-12-13 PROCEDURE — 85025 COMPLETE CBC W/AUTO DIFF WBC: CPT | Performed by: NURSE PRACTITIONER

## 2019-12-13 PROCEDURE — G0378 HOSPITAL OBSERVATION PER HR: HCPCS

## 2019-12-13 PROCEDURE — 81003 URINALYSIS AUTO W/O SCOPE: CPT | Performed by: NURSE PRACTITIONER

## 2019-12-13 PROCEDURE — 25010000002 KETOROLAC TROMETHAMINE PER 15 MG: Performed by: NURSE PRACTITIONER

## 2019-12-13 PROCEDURE — 93005 ELECTROCARDIOGRAM TRACING: CPT | Performed by: EMERGENCY MEDICINE

## 2019-12-13 PROCEDURE — 80053 COMPREHEN METABOLIC PANEL: CPT | Performed by: NURSE PRACTITIONER

## 2019-12-13 PROCEDURE — 93005 ELECTROCARDIOGRAM TRACING: CPT | Performed by: INTERNAL MEDICINE

## 2019-12-13 PROCEDURE — 0 IOPAMIDOL PER 1 ML: Performed by: NURSE PRACTITIONER

## 2019-12-13 PROCEDURE — 71045 X-RAY EXAM CHEST 1 VIEW: CPT

## 2019-12-13 PROCEDURE — 99219 PR INITIAL OBSERVATION CARE/DAY 50 MINUTES: CPT | Performed by: NURSE PRACTITIONER

## 2019-12-13 PROCEDURE — 83735 ASSAY OF MAGNESIUM: CPT | Performed by: NURSE PRACTITIONER

## 2019-12-13 PROCEDURE — 85379 FIBRIN DEGRADATION QUANT: CPT | Performed by: NURSE PRACTITIONER

## 2019-12-13 PROCEDURE — 85610 PROTHROMBIN TIME: CPT | Performed by: NURSE PRACTITIONER

## 2019-12-13 PROCEDURE — 96374 THER/PROPH/DIAG INJ IV PUSH: CPT

## 2019-12-13 PROCEDURE — 83880 ASSAY OF NATRIURETIC PEPTIDE: CPT | Performed by: NURSE PRACTITIONER

## 2019-12-13 PROCEDURE — 99285 EMERGENCY DEPT VISIT HI MDM: CPT

## 2019-12-13 PROCEDURE — 84436 ASSAY OF TOTAL THYROXINE: CPT | Performed by: NURSE PRACTITIONER

## 2019-12-13 PROCEDURE — 84484 ASSAY OF TROPONIN QUANT: CPT | Performed by: NURSE PRACTITIONER

## 2019-12-13 PROCEDURE — 84443 ASSAY THYROID STIM HORMONE: CPT | Performed by: NURSE PRACTITIONER

## 2019-12-13 PROCEDURE — 71275 CT ANGIOGRAPHY CHEST: CPT

## 2019-12-13 RX ORDER — NICOTINE POLACRILEX 4 MG
15 LOZENGE BUCCAL
Status: DISCONTINUED | OUTPATIENT
Start: 2019-12-13 | End: 2019-12-14 | Stop reason: HOSPADM

## 2019-12-13 RX ORDER — SODIUM CHLORIDE 0.9 % (FLUSH) 0.9 %
10 SYRINGE (ML) INJECTION EVERY 12 HOURS SCHEDULED
Status: DISCONTINUED | OUTPATIENT
Start: 2019-12-13 | End: 2019-12-14 | Stop reason: HOSPADM

## 2019-12-13 RX ORDER — CHOLECALCIFEROL (VITAMIN D3) 125 MCG
5 CAPSULE ORAL NIGHTLY PRN
Status: DISCONTINUED | OUTPATIENT
Start: 2019-12-13 | End: 2019-12-14 | Stop reason: HOSPADM

## 2019-12-13 RX ORDER — ALUMINA, MAGNESIA, AND SIMETHICONE 2400; 2400; 240 MG/30ML; MG/30ML; MG/30ML
15 SUSPENSION ORAL EVERY 6 HOURS PRN
Status: DISCONTINUED | OUTPATIENT
Start: 2019-12-13 | End: 2019-12-14 | Stop reason: HOSPADM

## 2019-12-13 RX ORDER — ACETAMINOPHEN 160 MG/5ML
650 SOLUTION ORAL EVERY 4 HOURS PRN
Status: DISCONTINUED | OUTPATIENT
Start: 2019-12-13 | End: 2019-12-14 | Stop reason: HOSPADM

## 2019-12-13 RX ORDER — NITROGLYCERIN 0.4 MG/1
0.4 TABLET SUBLINGUAL
Status: DISCONTINUED | OUTPATIENT
Start: 2019-12-13 | End: 2019-12-14 | Stop reason: HOSPADM

## 2019-12-13 RX ORDER — ASPIRIN 81 MG/1
324 TABLET, CHEWABLE ORAL ONCE
Status: COMPLETED | OUTPATIENT
Start: 2019-12-13 | End: 2019-12-13

## 2019-12-13 RX ORDER — ACETAMINOPHEN 325 MG/1
650 TABLET ORAL EVERY 4 HOURS PRN
Status: DISCONTINUED | OUTPATIENT
Start: 2019-12-13 | End: 2019-12-14 | Stop reason: HOSPADM

## 2019-12-13 RX ORDER — DEXTROSE MONOHYDRATE 25 G/50ML
25 INJECTION, SOLUTION INTRAVENOUS
Status: DISCONTINUED | OUTPATIENT
Start: 2019-12-13 | End: 2019-12-14 | Stop reason: HOSPADM

## 2019-12-13 RX ORDER — KETOROLAC TROMETHAMINE 15 MG/ML
15 INJECTION, SOLUTION INTRAMUSCULAR; INTRAVENOUS ONCE
Status: COMPLETED | OUTPATIENT
Start: 2019-12-13 | End: 2019-12-13

## 2019-12-13 RX ORDER — SODIUM CHLORIDE 0.9 % (FLUSH) 0.9 %
10 SYRINGE (ML) INJECTION AS NEEDED
Status: DISCONTINUED | OUTPATIENT
Start: 2019-12-13 | End: 2019-12-14 | Stop reason: HOSPADM

## 2019-12-13 RX ORDER — ACETAMINOPHEN 650 MG/1
650 SUPPOSITORY RECTAL EVERY 4 HOURS PRN
Status: DISCONTINUED | OUTPATIENT
Start: 2019-12-13 | End: 2019-12-14 | Stop reason: HOSPADM

## 2019-12-13 RX ORDER — IBUPROFEN 200 MG
200 TABLET ORAL EVERY 6 HOURS PRN
COMMUNITY
End: 2021-05-24

## 2019-12-13 RX ORDER — ONDANSETRON 4 MG/1
4 TABLET, FILM COATED ORAL EVERY 6 HOURS PRN
Status: DISCONTINUED | OUTPATIENT
Start: 2019-12-13 | End: 2019-12-14 | Stop reason: HOSPADM

## 2019-12-13 RX ORDER — ONDANSETRON 2 MG/ML
4 INJECTION INTRAMUSCULAR; INTRAVENOUS EVERY 6 HOURS PRN
Status: DISCONTINUED | OUTPATIENT
Start: 2019-12-13 | End: 2019-12-14 | Stop reason: HOSPADM

## 2019-12-13 RX ORDER — BISACODYL 5 MG/1
5 TABLET, DELAYED RELEASE ORAL DAILY PRN
Status: DISCONTINUED | OUTPATIENT
Start: 2019-12-13 | End: 2019-12-14 | Stop reason: HOSPADM

## 2019-12-13 RX ORDER — METOPROLOL SUCCINATE 50 MG/1
50 TABLET, EXTENDED RELEASE ORAL 2 TIMES DAILY
COMMUNITY
End: 2020-01-02 | Stop reason: SDUPTHER

## 2019-12-13 RX ADMIN — IOPAMIDOL 100 ML: 755 INJECTION, SOLUTION INTRAVENOUS at 22:10

## 2019-12-13 RX ADMIN — KETOROLAC TROMETHAMINE 15 MG: 15 INJECTION, SOLUTION INTRAMUSCULAR; INTRAVENOUS at 23:01

## 2019-12-13 RX ADMIN — ASPIRIN 81 MG 324 MG: 81 TABLET ORAL at 20:39

## 2019-12-14 ENCOUNTER — APPOINTMENT (OUTPATIENT)
Dept: NUCLEAR MEDICINE | Facility: HOSPITAL | Age: 39
End: 2019-12-14

## 2019-12-14 ENCOUNTER — APPOINTMENT (OUTPATIENT)
Dept: CARDIOLOGY | Facility: HOSPITAL | Age: 39
End: 2019-12-14

## 2019-12-14 VITALS
HEIGHT: 67 IN | SYSTOLIC BLOOD PRESSURE: 147 MMHG | WEIGHT: 281.75 LBS | DIASTOLIC BLOOD PRESSURE: 85 MMHG | RESPIRATION RATE: 16 BRPM | OXYGEN SATURATION: 98 % | TEMPERATURE: 98.6 F | BODY MASS INDEX: 44.22 KG/M2 | HEART RATE: 94 BPM

## 2019-12-14 PROBLEM — Z83.3 FAMILY HISTORY OF DIABETES MELLITUS: Status: RESOLVED | Noted: 2019-07-22 | Resolved: 2019-12-14

## 2019-12-14 PROBLEM — A49.02 METHICILLIN RESISTANT STAPHYLOCOCCUS AUREUS INFECTION: Status: RESOLVED | Noted: 2019-07-22 | Resolved: 2019-12-14

## 2019-12-14 PROBLEM — I16.0 HYPERTENSIVE URGENCY: Status: RESOLVED | Noted: 2019-10-23 | Resolved: 2019-12-14

## 2019-12-14 PROBLEM — L73.2 HIDRADENITIS: Status: RESOLVED | Noted: 2019-06-19 | Resolved: 2019-12-14

## 2019-12-14 PROBLEM — G47.33 OBSTRUCTIVE SLEEP APNEA: Status: RESOLVED | Noted: 2019-07-22 | Resolved: 2019-12-14

## 2019-12-14 LAB
ANION GAP SERPL CALCULATED.3IONS-SCNC: 14 MMOL/L (ref 5–15)
BASOPHILS # BLD AUTO: 0.1 10*3/MM3 (ref 0–0.2)
BASOPHILS NFR BLD AUTO: 0.5 % (ref 0–1.5)
BH CV ECHO MEAS - ACS: 1.9 CM
BH CV ECHO MEAS - AO MAX PG (FULL): 13.9 MMHG
BH CV ECHO MEAS - AO MAX PG: 17.2 MMHG
BH CV ECHO MEAS - AO MEAN PG (FULL): 9.2 MMHG
BH CV ECHO MEAS - AO MEAN PG: 11.3 MMHG
BH CV ECHO MEAS - AO ROOT AREA (BSA CORRECTED): 1.2
BH CV ECHO MEAS - AO ROOT AREA: 6.5 CM^2
BH CV ECHO MEAS - AO ROOT DIAM: 2.9 CM
BH CV ECHO MEAS - AO V2 MAX: 207.3 CM/SEC
BH CV ECHO MEAS - AO V2 MEAN: 163.5 CM/SEC
BH CV ECHO MEAS - AO V2 VTI: 46.8 CM
BH CV ECHO MEAS - ASC AORTA: 3.5 CM
BH CV ECHO MEAS - AVA(I,A): 1.4 CM^2
BH CV ECHO MEAS - AVA(I,D): 1.4 CM^2
BH CV ECHO MEAS - AVA(V,A): 1.6 CM^2
BH CV ECHO MEAS - AVA(V,D): 1.6 CM^2
BH CV ECHO MEAS - BSA(HAYCOCK): 2.5 M^2
BH CV ECHO MEAS - BSA: 2.3 M^2
BH CV ECHO MEAS - BZI_BMI: 44 KILOGRAMS/M^2
BH CV ECHO MEAS - BZI_METRIC_HEIGHT: 170.2 CM
BH CV ECHO MEAS - BZI_METRIC_WEIGHT: 127.5 KG
BH CV ECHO MEAS - EDV(CUBED): 124.9 ML
BH CV ECHO MEAS - EDV(MOD-SP2): 87.1 ML
BH CV ECHO MEAS - EDV(MOD-SP4): 113.8 ML
BH CV ECHO MEAS - EDV(TEICH): 118.2 ML
BH CV ECHO MEAS - EF(CUBED): 65.5 %
BH CV ECHO MEAS - EF(MOD-BP): 74 %
BH CV ECHO MEAS - EF(MOD-SP2): 75 %
BH CV ECHO MEAS - EF(MOD-SP4): 74.5 %
BH CV ECHO MEAS - EF(TEICH): 56.8 %
BH CV ECHO MEAS - ESV(CUBED): 43.1 ML
BH CV ECHO MEAS - ESV(MOD-SP2): 21.8 ML
BH CV ECHO MEAS - ESV(MOD-SP4): 29 ML
BH CV ECHO MEAS - ESV(TEICH): 51 ML
BH CV ECHO MEAS - FS: 29.9 %
BH CV ECHO MEAS - IVS/LVPW: 0.99
BH CV ECHO MEAS - IVSD: 1.1 CM
BH CV ECHO MEAS - LA DIMENSION(2D): 3.9 CM
BH CV ECHO MEAS - LV DIASTOLIC VOL/BSA (35-75): 48.7 ML/M^2
BH CV ECHO MEAS - LV MASS(C)D: 208.1 GRAMS
BH CV ECHO MEAS - LV MASS(C)DI: 89 GRAMS/M^2
BH CV ECHO MEAS - LV MAX PG: 3.3 MMHG
BH CV ECHO MEAS - LV MEAN PG: 2.1 MMHG
BH CV ECHO MEAS - LV SYSTOLIC VOL/BSA (12-30): 12.4 ML/M^2
BH CV ECHO MEAS - LV V1 MAX: 90.2 CM/SEC
BH CV ECHO MEAS - LV V1 MEAN: 70.7 CM/SEC
BH CV ECHO MEAS - LV V1 VTI: 18.6 CM
BH CV ECHO MEAS - LVIDD: 5 CM
BH CV ECHO MEAS - LVIDS: 3.5 CM
BH CV ECHO MEAS - LVOT AREA: 3.6 CM^2
BH CV ECHO MEAS - LVOT DIAM: 2.1 CM
BH CV ECHO MEAS - LVPWD: 1.1 CM
BH CV ECHO MEAS - MV A MAX VEL: 76.2 CM/SEC
BH CV ECHO MEAS - MV DEC SLOPE: 602.2 CM/SEC^2
BH CV ECHO MEAS - MV DEC TIME: 0.17 SEC
BH CV ECHO MEAS - MV E MAX VEL: 100.1 CM/SEC
BH CV ECHO MEAS - MV E/A: 1.3
BH CV ECHO MEAS - MV MAX PG: 4.7 MMHG
BH CV ECHO MEAS - MV MEAN PG: 1.9 MMHG
BH CV ECHO MEAS - MV V2 MAX: 108.8 CM/SEC
BH CV ECHO MEAS - MV V2 MEAN: 61.5 CM/SEC
BH CV ECHO MEAS - MV V2 VTI: 27.2 CM
BH CV ECHO MEAS - MVA(VTI): 2.4 CM^2
BH CV ECHO MEAS - PA ACC TIME: 0.07 SEC
BH CV ECHO MEAS - PA MAX PG (FULL): 2.2 MMHG
BH CV ECHO MEAS - PA MAX PG: 6 MMHG
BH CV ECHO MEAS - PA PR(ACCEL): 45.8 MMHG
BH CV ECHO MEAS - PA V2 MAX: 122.7 CM/SEC
BH CV ECHO MEAS - PULM A REVS VEL: 22.3 CM/SEC
BH CV ECHO MEAS - PULM DIAS VEL: 47.9 CM/SEC
BH CV ECHO MEAS - PULM S/D: 1.3
BH CV ECHO MEAS - PULM SYS VEL: 62.7 CM/SEC
BH CV ECHO MEAS - RAP SYSTOLE: 3 MMHG
BH CV ECHO MEAS - RV MAX PG: 3.8 MMHG
BH CV ECHO MEAS - RV MEAN PG: 1.9 MMHG
BH CV ECHO MEAS - RV V1 MAX: 97.5 CM/SEC
BH CV ECHO MEAS - RV V1 MEAN: 66.1 CM/SEC
BH CV ECHO MEAS - RV V1 VTI: 19.2 CM
BH CV ECHO MEAS - RVDD: 2.8 CM
BH CV ECHO MEAS - RVSP: 20.5 MMHG
BH CV ECHO MEAS - SI(AO): 129.4 ML/M^2
BH CV ECHO MEAS - SI(CUBED): 35 ML/M^2
BH CV ECHO MEAS - SI(LVOT): 28.4 ML/M^2
BH CV ECHO MEAS - SI(MOD-SP2): 28 ML/M^2
BH CV ECHO MEAS - SI(MOD-SP4): 36.3 ML/M^2
BH CV ECHO MEAS - SI(TEICH): 28.7 ML/M^2
BH CV ECHO MEAS - SV(AO): 302.3 ML
BH CV ECHO MEAS - SV(CUBED): 81.9 ML
BH CV ECHO MEAS - SV(LVOT): 66.4 ML
BH CV ECHO MEAS - SV(MOD-SP2): 65.4 ML
BH CV ECHO MEAS - SV(MOD-SP4): 84.8 ML
BH CV ECHO MEAS - SV(TEICH): 67.1 ML
BH CV ECHO MEAS - TR MAX VEL: 208.8 CM/SEC
BUN BLD-MCNC: 15 MG/DL (ref 6–20)
BUN/CREAT SERPL: 24.2 (ref 7–25)
CALCIUM SPEC-SCNC: 8.5 MG/DL (ref 8.6–10.5)
CHLORIDE SERPL-SCNC: 100 MMOL/L (ref 98–107)
CHOLEST SERPL-MCNC: 195 MG/DL (ref 0–200)
CO2 SERPL-SCNC: 22 MMOL/L (ref 22–29)
CREAT BLD-MCNC: 0.62 MG/DL (ref 0.57–1)
DEPRECATED RDW RBC AUTO: 42.9 FL (ref 37–54)
EOSINOPHIL # BLD AUTO: 0.2 10*3/MM3 (ref 0–0.4)
EOSINOPHIL NFR BLD AUTO: 1.6 % (ref 0.3–6.2)
ERYTHROCYTE [DISTWIDTH] IN BLOOD BY AUTOMATED COUNT: 13.2 % (ref 12.3–15.4)
GFR SERPL CREATININE-BSD FRML MDRD: 106 ML/MIN/1.73
GLUCOSE BLD-MCNC: 131 MG/DL (ref 65–99)
GLUCOSE BLDC GLUCOMTR-MCNC: 107 MG/DL (ref 70–105)
GLUCOSE BLDC GLUCOMTR-MCNC: 125 MG/DL (ref 70–105)
GLUCOSE BLDC GLUCOMTR-MCNC: 140 MG/DL (ref 70–105)
HCT VFR BLD AUTO: 42.5 % (ref 34–46.6)
HDLC SERPL-MCNC: 44 MG/DL (ref 40–60)
HGB BLD-MCNC: 14.2 G/DL (ref 12–15.9)
LDLC SERPL CALC-MCNC: 111 MG/DL (ref 0–100)
LDLC/HDLC SERPL: 2.51 {RATIO}
LYMPHOCYTES # BLD AUTO: 2.8 10*3/MM3 (ref 0.7–3.1)
LYMPHOCYTES NFR BLD AUTO: 20.6 % (ref 19.6–45.3)
MAXIMAL PREDICTED HEART RATE: 179 BPM
MCH RBC QN AUTO: 30.9 PG (ref 26.6–33)
MCHC RBC AUTO-ENTMCNC: 33.5 G/DL (ref 31.5–35.7)
MCV RBC AUTO: 92.3 FL (ref 79–97)
MONOCYTES # BLD AUTO: 1.3 10*3/MM3 (ref 0.1–0.9)
MONOCYTES NFR BLD AUTO: 9.5 % (ref 5–12)
NEUTROPHILS # BLD AUTO: 9.2 10*3/MM3 (ref 1.7–7)
NEUTROPHILS NFR BLD AUTO: 67.8 % (ref 42.7–76)
NRBC BLD AUTO-RTO: 0.1 /100 WBC (ref 0–0.2)
PLATELET # BLD AUTO: 325 10*3/MM3 (ref 140–450)
PMV BLD AUTO: 8.8 FL (ref 6–12)
POTASSIUM BLD-SCNC: 3.7 MMOL/L (ref 3.5–5.2)
RBC # BLD AUTO: 4.61 10*6/MM3 (ref 3.77–5.28)
SODIUM BLD-SCNC: 136 MMOL/L (ref 136–145)
STRESS TARGET HR: 152 BPM
T4 SERPL-MCNC: 10.5 MCG/DL (ref 4.5–11.7)
TRIGL SERPL-MCNC: 202 MG/DL (ref 0–150)
TROPONIN T SERPL-MCNC: <0.01 NG/ML (ref 0–0.03)
VLDLC SERPL-MCNC: 40.4 MG/DL
WBC NRBC COR # BLD: 13.6 10*3/MM3 (ref 3.4–10.8)

## 2019-12-14 PROCEDURE — 94640 AIRWAY INHALATION TREATMENT: CPT

## 2019-12-14 PROCEDURE — 82962 GLUCOSE BLOOD TEST: CPT

## 2019-12-14 PROCEDURE — 80061 LIPID PANEL: CPT | Performed by: NURSE PRACTITIONER

## 2019-12-14 PROCEDURE — 0 TECHNETIUM SESTAMIBI: Performed by: INTERNAL MEDICINE

## 2019-12-14 PROCEDURE — G0378 HOSPITAL OBSERVATION PER HR: HCPCS

## 2019-12-14 PROCEDURE — 83036 HEMOGLOBIN GLYCOSYLATED A1C: CPT | Performed by: NURSE PRACTITIONER

## 2019-12-14 PROCEDURE — 84484 ASSAY OF TROPONIN QUANT: CPT | Performed by: NURSE PRACTITIONER

## 2019-12-14 PROCEDURE — 25010000002 SULFUR HEXAFLUORIDE MICROSPH 60.7-25 MG RECONSTITUTED SUSPENSION: Performed by: INTERNAL MEDICINE

## 2019-12-14 PROCEDURE — 93306 TTE W/DOPPLER COMPLETE: CPT

## 2019-12-14 PROCEDURE — 93017 CV STRESS TEST TRACING ONLY: CPT

## 2019-12-14 PROCEDURE — 93016 CV STRESS TEST SUPVJ ONLY: CPT | Performed by: NURSE PRACTITIONER

## 2019-12-14 PROCEDURE — 80048 BASIC METABOLIC PNL TOTAL CA: CPT | Performed by: NURSE PRACTITIONER

## 2019-12-14 PROCEDURE — 99217 PR OBSERVATION CARE DISCHARGE MANAGEMENT: CPT | Performed by: INTERNAL MEDICINE

## 2019-12-14 PROCEDURE — 78452 HT MUSCLE IMAGE SPECT MULT: CPT

## 2019-12-14 PROCEDURE — 93306 TTE W/DOPPLER COMPLETE: CPT | Performed by: INTERNAL MEDICINE

## 2019-12-14 PROCEDURE — A9500 TC99M SESTAMIBI: HCPCS | Performed by: INTERNAL MEDICINE

## 2019-12-14 PROCEDURE — 85025 COMPLETE CBC W/AUTO DIFF WBC: CPT | Performed by: NURSE PRACTITIONER

## 2019-12-14 RX ORDER — FUROSEMIDE 40 MG/1
40 TABLET ORAL DAILY
Status: DISCONTINUED | OUTPATIENT
Start: 2019-12-14 | End: 2019-12-14 | Stop reason: HOSPADM

## 2019-12-14 RX ORDER — METOPROLOL SUCCINATE 50 MG/1
50 TABLET, EXTENDED RELEASE ORAL 2 TIMES DAILY
Status: DISCONTINUED | OUTPATIENT
Start: 2019-12-14 | End: 2019-12-14 | Stop reason: HOSPADM

## 2019-12-14 RX ORDER — LOSARTAN POTASSIUM 50 MG/1
100 TABLET ORAL
Status: DISCONTINUED | OUTPATIENT
Start: 2019-12-14 | End: 2019-12-14 | Stop reason: HOSPADM

## 2019-12-14 RX ORDER — HYDROXYZINE HYDROCHLORIDE 25 MG/1
25 TABLET, FILM COATED ORAL EVERY 6 HOURS PRN
Status: DISCONTINUED | OUTPATIENT
Start: 2019-12-14 | End: 2019-12-14 | Stop reason: HOSPADM

## 2019-12-14 RX ORDER — SPIRONOLACTONE 25 MG/1
50 TABLET ORAL DAILY
Status: DISCONTINUED | OUTPATIENT
Start: 2019-12-14 | End: 2019-12-14 | Stop reason: HOSPADM

## 2019-12-14 RX ORDER — BUDESONIDE AND FORMOTEROL FUMARATE DIHYDRATE 80; 4.5 UG/1; UG/1
2 AEROSOL RESPIRATORY (INHALATION)
Status: DISCONTINUED | OUTPATIENT
Start: 2019-12-14 | End: 2019-12-14 | Stop reason: HOSPADM

## 2019-12-14 RX ADMIN — FUROSEMIDE 40 MG: 40 TABLET ORAL at 08:54

## 2019-12-14 RX ADMIN — TECHNETIUM TC 99M SESTAMIBI 1 DOSE: 1 INJECTION INTRAVENOUS at 07:40

## 2019-12-14 RX ADMIN — BUDESONIDE AND FORMOTEROL FUMARATE DIHYDRATE 2 PUFF: 80; 4.5 AEROSOL RESPIRATORY (INHALATION) at 07:08

## 2019-12-14 RX ADMIN — Medication 10 ML: at 08:58

## 2019-12-14 RX ADMIN — METOPROLOL SUCCINATE 50 MG: 50 TABLET, EXTENDED RELEASE ORAL at 01:28

## 2019-12-14 RX ADMIN — Medication 10 ML: at 00:48

## 2019-12-14 RX ADMIN — SPIRONOLACTONE 50 MG: 25 TABLET, FILM COATED ORAL at 08:53

## 2019-12-14 RX ADMIN — TECHNETIUM TC 99M SESTAMIBI 1 DOSE: 1 INJECTION INTRAVENOUS at 10:00

## 2019-12-14 RX ADMIN — SULFUR HEXAFLUORIDE 3 ML: KIT at 11:00

## 2019-12-14 NOTE — ED NOTES
Patient reports generally not feeling well today. C/o nausea. While driving home aprox. 30 minutes prior to arrival patient reports right sided CP that radiated to right arm and in-between shoulder blades. SOA with exertion.      Gwendolyn Rodrigez RN  12/13/19 2032

## 2019-12-14 NOTE — ED PROVIDER NOTES
"Subjective   39-year-old female presents with complaint of right upper chest pain that radiates down her right arm and back through her shoulder blade.  She also reports that she feels that her heart rate is increased and she has a \"flushed\" sensation.  She also complains of shortness of air with exertion.  Denies any cough denies travel denies lower extremity edema.  She does report that this started 30 minutes prior to arrival while she was driving and she was diaphoretic and nauseated.  She describes it as constant \"achiness\".  Denies any alleviating factors.  She did not take aspirin today.  She has no first-degree relatives with CAD but her grandfather did have an MI at the age of 35.  She also reports that she was admitted in October for hypertensive urgency.    1. Location: Right upper chest  2. Quality: Achiness  3. Severity: Moderate  4. Worsening factors: Exertion  5. Alleviating factors: Denies  6. Onset: 30 minutes prior to arrival  7. Radiation: Right shoulder blade  8. Frequency: Constant periods of intensity  9. Co-morbidities: Hypertensive urgency, hypertension, asthma, sinus disease, migraines  10. Source: Patient            Review of Systems   Constitutional: Positive for diaphoresis. Negative for activity change, fatigue and fever.   Respiratory: Positive for shortness of breath. Negative for cough, chest tightness and wheezing.    Cardiovascular: Positive for chest pain. Negative for palpitations and leg swelling.   Gastrointestinal: Negative for abdominal pain, nausea and vomiting.   Skin: Negative for color change, pallor and rash.   Neurological: Negative for dizziness, syncope, weakness and numbness.   Hematological: Does not bruise/bleed easily.   All other systems reviewed and are negative.      Past Medical History:   Diagnosis Date   • Asthma    • Hypertension    • Migraine     history   • Sinus disorder        Allergies   Allergen Reactions   • Nifedipine Palpitations   • Tetanus " Antitoxin Swelling   • Verapamil Palpitations       Past Surgical History:   Procedure Laterality Date   • ADENOIDECTOMY     • APPENDECTOMY     •  SECTION     • CHOLECYSTECTOMY     • INCISION AND DRAINAGE OF WOUND     • KNEE ARTHRODESIS     • TONSILLECTOMY     • WOUND DEBRIDEMENT Right 2019    Procedure: DEBRIDEMENT OF RECURRENT HIDRADENITIS AND VAC PLACEMENT RIGHT SIDE;  Surgeon: Alix Freire MD;  Location: [facility] MAIN OR;  Service: General       No family history on file.    Social History     Socioeconomic History   • Marital status:      Spouse name: Not on file   • Number of children: Not on file   • Years of education: Not on file   • Highest education level: Not on file   Tobacco Use   • Smoking status: Never Smoker   Substance and Sexual Activity   • Alcohol use: No     Frequency: Never   • Drug use: No   • Sexual activity: Defer           Objective   Physical Exam   Constitutional: She is oriented to person, place, and time. She appears well-developed and well-nourished. No distress.   HENT:   Head: Normocephalic and atraumatic.   Eyes: Pupils are equal, round, and reactive to light. Conjunctivae and EOM are normal.   Neck: Normal range of motion. Neck supple. No JVD present. No tracheal deviation present. No thyromegaly present.   Cardiovascular: Regular rhythm, S1 normal, S2 normal, normal heart sounds, intact distal pulses and normal pulses. Tachycardia present. PMI is not displaced. Exam reveals no gallop and no friction rub.   No murmur heard.  Pulses:       Radial pulses are 2+ on the right side, and 2+ on the left side.        Popliteal pulses are 2+ on the right side, and 2+ on the left side.        Dorsalis pedis pulses are 2+ on the right side, and 2+ on the left side.        Posterior tibial pulses are 2+ on the right side, and 2+ on the left side.       Pulmonary/Chest: Effort normal and breath sounds normal. No tachypnea. No respiratory distress. She has no decreased  breath sounds. She has no wheezes. She has no rhonchi. She has no rales. She exhibits no tenderness.   Abdominal: Soft. Bowel sounds are normal. She exhibits no distension and no mass. There is no tenderness. There is no rebound and no guarding. No hernia.   Musculoskeletal: Normal range of motion.        Right lower leg: She exhibits edema.        Left lower leg: She exhibits edema.   Trace edema   Neurological: She is alert and oriented to person, place, and time.   Skin: Skin is warm and dry. Capillary refill takes less than 2 seconds.   Psychiatric: Her behavior is normal. Judgment and thought content normal. Her mood appears anxious.   Nursing note and vitals reviewed.      Procedures           ED Course  ED Course as of Dec 13 2311   Fri Dec 13, 2019   2027 Sinus tachycardia with a rate of 102.  Patient noted to have mild ST elevation in leads V4,5,6 that differs from her previous EKG on 10/23/2019.  Interpreted by Dr. Rodrigez and reviewed by me.   ECG 12 Lead [AL]   2103 Awaiting lab results.     [AL]   2144 Awaiting patient to go to CT.     [AL]      ED Course User Index  [AL] Breanna Orozco, NP      Xr Chest 1 View    Result Date: 12/13/2019  No active disease  Electronically Signed ByRamandeep Hargrove On:12/13/2019 8:51 PM This report was finalized on [id number] by  Mekhi Hargrove, .    Ct Chest Pulmonary Embolism    Result Date: 12/13/2019  No pulmonary embolism. No active disease in the lungs.  Electronically Signed ByRamandeep Hargrove On:12/13/2019 10:31 PM This report was finalized on [id number] by  Mekhi Hargrove, .    Medications   sodium chloride 0.9 % flush 10 mL (has no administration in time range)   aspirin chewable tablet 324 mg (324 mg Oral Given 12/13/19 2039)   iopamidol (ISOVUE-370) 76 % injection 100 mL (100 mL Intravenous Given 12/13/19 2210)   ketorolac (TORADOL) injection 15 mg (15 mg Intravenous Given 12/13/19 2301)     Labs Reviewed   COMPREHENSIVE METABOLIC PANEL - Abnormal; Notable for the  following components:       Result Value    Glucose 183 (*)     Anion Gap 16.0 (*)     All other components within normal limits    Narrative:     GFR Normal >60  Chronic Kidney Disease <60  Kidney Failure <15     CBC WITH AUTO DIFFERENTIAL - Abnormal; Notable for the following components:    WBC 19.00 (*)     Neutrophil % 79.8 (*)     Lymphocyte % 12.9 (*)     Neutrophils, Absolute 15.20 (*)     Monocytes, Absolute 1.10 (*)     All other components within normal limits   D-DIMER, QUANTITATIVE - Abnormal; Notable for the following components:    D-Dimer, Quantitative 0.82 (*)     All other components within normal limits    Narrative:     Reference Range  --------------------------------------------------------------------     < 0.50   Negative Predictive Value  0.50-0.59   Indeterminate    >= 0.60   Probable VTE             A very low percentage of patients with DVT may yield D-Dimer results   below the cut-off of 0.50 MCGFEU/mL.  This is known to be more   prevalent in patients with distal DVT.             Results of this test should always be interpreted in conjunction with   the patient's medical history, clinical presentation and other   findings.  Clinical diagnosis should not be based on the result of   INNOVANCE D-Dimer alone.   TROPONIN (IN-HOUSE) - Normal    Narrative:     Troponin T Reference Range:  <= 0.03 ng/mL-   Negative for AMI  >0.03 ng/mL-     Abnormal for myocardial necrosis.  Clinicians would have to utilize clinical acumen, EKG, Troponin and serial changes to determine if it is an Acute Myocardial Infarction or myocardial injury due to an underlying chronic condition.    PROTIME-INR - Normal   BNP (IN-HOUSE) - Normal    Narrative:     Among patients with dyspnea, NT-proBNP is highly sensitive for the detection of acute congestive heart failure. In addition NT-proBNP of <300 pg/ml effectively rules out acute congestive heart failure with 99% negative predictive value.     TSH - Normal    MAGNESIUM - Normal   RAINBOW DRAW    Narrative:     The following orders were created for panel order [place] Draw.  Procedure                               Abnormality         Status                     ---------                               -----------         ------                     Light Blue Top[[id number]]                                   Final result               Green Top (Gel)[[id number]]                                  Final result               Lavender Top[[id number]]                                     Final result               Gold Top - SST[[id number]]                                   Final result                 Please view results for these tests on the individual orders.   T4   URINALYSIS W/ CULTURE IF INDICATED   CBC AND DIFFERENTIAL    Narrative:     The following orders were created for panel order CBC & Differential.  Procedure                               Abnormality         Status                     ---------                               -----------         ------                     CBC Auto Differential[[id number]]        Abnormal            Final result                 Please view results for these tests on the individual orders.   LIGHT BLUE TOP   GREEN TOP   LAVENDER TOP   GOLD TOP - SST                     No data recorded                        MDM  Number of Diagnoses or Management Options  Chest pain, unspecified type:   Dyspnea, unspecified type:   EKG abnormalities:   Elevated d-dimer:   Tachycardia:   Diagnosis management comments: Chart Review: 11/20/19 patient was seen by PCP for f/u on HTN. She reports her SBP has been in the 130s.   10/23/19 patient was admitted for hypertensive urgency. She was discharged home on metoprolol, losartan, Lasix, spironolactone, bystolic and benicar.   Comorbidity: Hypertensive urgency, hypertension, asthma, sinus disease, migraines  Imaging: Was interpreted by physician and reviewed by myself: Xr Chest 1 View    Result Date:  "12/13/2019  No active disease  Electronically Signed ByRamandeep Hargrove On:12/13/2019 8:51 PM This report was finalized on [id number] by  Mekhi Hargrove, .    Ct Chest Pulmonary Embolism    Result Date: 12/13/2019  No pulmonary embolism. No active disease in the lungs.  Electronically Signed ByRamandeep Hargrove On:12/13/2019 10:31 PM This report was finalized on [id number] by  Mekhi Hargrove, .  Disposition/Treatment: Discussed results with patient, verbalized understanding.  Agreeable with plan of care.    Patient undressed and placed in gown for exam. 39-year-old female presents with complaint of right upper chest pain that radiates down her right arm and back through her shoulder blade.  She also reports that she feels that her heart rate is increased and she has a \"flushed\" sensation.  She also complains of shortness of air with exertion.  Denies any cough denies travel denies lower extremity edema.  She does report that this started 30 minutes prior to arrival while she was driving and she was diaphoretic and nauseated.  She describes it as constant \"achiness\".  Denies any alleviating factors.  She did not take aspirin today.  She has no first-degree relatives with CAD but her grandfather did have an MI at the age of 35.  She also reports that she was admitted in October for hypertensive urgency.  IV established and labs obtained.  Chest pain protocol initiated.  Patient's chest x-ray was read as no acute cardiopulmonary abnormality.  Her CT showed some right lower lobe atelectasis, otherwise no acute process.  Patient's d-dimer was elevated.  She is also noted to have leukocytosis.  Patient was previously admitted in October for hypertensive urgency and did not have a cardiac work-up done.  She reports she is never been seen by cardiology.  Given these findings in addition to her tachycardia and EKG changes, patient will be admitted for observation and consult with cardiology.  Hospitalist paged for admission.  Spoke " with GERTRUDE Cazares who accepted admission on behalf of Dr. Khanna.           Amount and/or Complexity of Data Reviewed  Clinical lab tests: reviewed  Tests in the radiology section of CPT®: reviewed  Tests in the medicine section of CPT®: reviewed    Patient Progress  Patient progress: stable      Final diagnoses:   Chest pain, unspecified type   Dyspnea, unspecified type   Tachycardia   EKG abnormalities   Elevated d-dimer              Breanna Orozco NP  12/13/19 3903       Breanna Orozco NP  12/13/19 0220

## 2019-12-14 NOTE — H&P
[facility] Medicine Services      Patient Name: Bernadine Arriaga  : 1980  MRN: [id number]  Primary Care Physician: Berenice Jacobson MD  Date of admission: 2019    Patient Care Team:  Berenice Jacobson MD as PCP - General  Berenice Jacobson MD as PCP - Family Medicine          Subjective   History Present Illness     Chief Complaint:   Chief Complaint   Patient presents with   • Chest Pain     chest, shoulder, x 30 mins       Ms. Arriaga is a 39 y.o. morbidly obese female with a history of hypertension, diabetes type 2, asthma and hyperlipidemia, presented to the [facility] ED on 2019 with a complaint of chest pain.  Patient states she had chest pain this evening on the right side of her chest radiating to her back, rated at a 9 out of 10, stated it was stabbing, she became short of breath, diaphoretic and had nausea.  Patient states chest pain lasted for about 30 minutes and then subsiding.  Patient denies palpitations, dizziness, lightheadedness and vomiting.    In the ED, EKG sinus tach, chest x-ray done active disease, d-dimer 0.82, CT chest PE protocol negative for PE.  Troponin <0.01, BUN 15, creatinine 0.62, potassium 3.7, WBC 19, hemoglobin 13.9 and hematocrit 40.8.  Patient received Toradol, and aspirin.  Patient will be admitted for further evaluation.           Review of Systems   Constitution: Positive for diaphoresis.   HENT: Negative.    Eyes: Negative.    Cardiovascular: Positive for chest pain and dyspnea on exertion.   Respiratory: Positive for shortness of breath.    Endocrine: Negative.    Hematologic/Lymphatic: Negative.    Skin: Negative.    Musculoskeletal: Negative.    Gastrointestinal: Positive for nausea.   Genitourinary: Negative.    Neurological: Positive for weakness.   Psychiatric/Behavioral: Negative.    Allergic/Immunologic: Negative.    All other systems reviewed and are  negative.          Personal History     Past Medical History:   Past Medical History:   Diagnosis Date   • Asthma    • Hypertension    • Migraine     history   • Sinus disorder        Surgical History:      Past Surgical History:   Procedure Laterality Date   • ADENOIDECTOMY     • APPENDECTOMY     •  SECTION     • CHOLECYSTECTOMY     • INCISION AND DRAINAGE OF WOUND     • KNEE ARTHRODESIS     • TONSILLECTOMY     • WOUND DEBRIDEMENT Right 2019    Procedure: DEBRIDEMENT OF RECURRENT HIDRADENITIS AND VAC PLACEMENT RIGHT SIDE;  Surgeon: Alix Freire MD;  Location: [facility] OR;  Service: General           Family History: family history is not on file. Otherwise pertinent FHx was reviewed and unremarkable.     Social History:  reports that she has never smoked. She does not have any smokeless tobacco history on file. She reports that she does not drink alcohol or use drugs.      Medications:  Prior to Admission medications    Medication Sig Start Date End Date Taking? Authorizing Provider   ACCU-CHEK GAYLE PLUS test strip USE TO TEST ONCE A DAY 10/27/19   Berenice Jacobson MD   acetaminophen (TYLENOL) 325 MG tablet Take 325 mg by mouth Every 6 (Six) Hours As Needed for Mild Pain .    ProviderSuresh MD   albuterol (PROVENTIL) (2.5 MG/3ML) 0.083% nebulizer solution Take 2.5 mg by nebulization Every 6 (Six) Hours As Needed for Wheezing.    ProviderSuresh MD   albuterol sulfate  (90 Base) MCG/ACT inhaler Inhale 2 puffs Every 6 (Six) Hours As Needed for Wheezing.    Suresh Fraga MD   betamethasone dipropionate (DIPROLENE) 0.05 % lotion Apply 1 application topically to the appropriate area as directed 2 (Two) Times a Day As Needed.    Suresh Fraga MD   budesonide-formoterol (SYMBICORT) 80-4.5 MCG/ACT inhaler Inhale 2 puffs 2 (Two) Times a Day.    Suresh Fraga MD   clindamycin (CLEOCIN T) 1 % lotion Apply 1 application topically to the appropriate area  as directed 2 (Two) Times a Day As Needed.    ProviderSuresh MD   cloNIDine (CATAPRES-TTS-2) 0.2 MG/24HR patch Place 1 patch on the skin as directed by provider 1 (One) Time Per Week. 11/15/19   Berenice Jacobson MD   doxycycline (VIBRAMYCIN) 100 MG capsule Take 100 mg by mouth 2 (Two) Times a Day.    Suresh Fraga MD   EPINEPHrine (EPIPEN) 0.3 MG/0.3ML solution auto-injector injection Inject 0.3 mL into the appropriate muscle as directed by prescriber As Needed (allergic reaction). 11/20/19   Berenice Jacobson MD   furosemide (LASIX) 40 MG tablet Take 40 mg by mouth Daily. 11/25/15   Suresh Fraga MD   glimepiride (AMARYL) 4 MG tablet Take 0.5 tablets by mouth 2 (Two) Times a Day With Meals. 10/22/19   Berenice Jacobson MD   hydrOXYzine (ATARAX) 25 MG tablet Take 25 mg by mouth Every 6 (Six) Hours As Needed. 5/15/18   Suresh Fraga MD   Liraglutide -Weight Management (SAXENDA) 18 MG/3ML solution pen-injector Inject 3 mg under the skin into the appropriate area as directed Every Morning. 12/2/19   Berenice Jacobson MD   metoprolol succinate XL (TOPROL XL) 50 MG 24 hr tablet Take 1 tablet by mouth Daily. 10/30/19   Berenice Jacobson MD   olmesartan (BENICAR) 40 MG tablet TAKE 1 TABLET BY MOUTH DAILY 11/15/19   Berenice Jacobson MD   spironolactone (ALDACTONE) 50 MG tablet Take 50 mg by mouth Daily.    ProviderSuresh MD       Allergies:    Allergies   Allergen Reactions   • Nifedipine Palpitations   • Tetanus Antitoxin Swelling   • Verapamil Palpitations       Objective   Objective     Vital Signs  Temp:  [98.7 °F (37.1 °C)] 98.7 °F (37.1 °C)  Heart Rate:  [] 100  Resp:  [20] 20  BP: (136)/(90) 136/90  SpO2:  [95 %-99 %] 97 %  on   ;   Device (Oxygen Therapy): room air  Body mass index is 44.58 kg/m².    Physical Exam   Constitutional: She is oriented to person, place, and time. She appears well-developed and  well-nourished. No distress.   HENT:   Head: Normocephalic and atraumatic.   Eyes: Pupils are equal, round, and reactive to light. Conjunctivae and EOM are normal.   Neck: Normal range of motion. Neck supple.   Cardiovascular: Normal rate, regular rhythm, normal heart sounds and intact distal pulses.   Pulmonary/Chest: Effort normal and breath sounds normal.   Abdominal: Soft. Bowel sounds are normal.   Musculoskeletal: Normal range of motion. She exhibits no edema.   Neurological: She is alert and oriented to person, place, and time.   Skin: Skin is warm and dry. She is not diaphoretic.       Results Review:  I have personally reviewed most recent cardiac tracings, lab results and radiology images and interpretations and agree with findings.    Results from last 7 days   Lab Units 12/13/19 2032   WBC 10*3/mm3 19.00*   HEMOGLOBIN g/dL 13.9   HEMATOCRIT % 40.8   PLATELETS 10*3/mm3 334   INR  1.02     Results from last 7 days   Lab Units 12/13/19 2032   SODIUM mmol/L 137   POTASSIUM mmol/L 4.2   CHLORIDE mmol/L 99   CO2 mmol/L 22.0   BUN mg/dL 14   CREATININE mg/dL 0.66   GLUCOSE mg/dL 183*   CALCIUM mg/dL 9.0   ALT (SGPT) U/L 19   AST (SGOT) U/L 14   TROPONIN T ng/mL <0.010   PROBNP pg/mL 22.0     Estimated Creatinine Clearance: 156.9 mL/min (by C-G formula based on SCr of 0.66 mg/dL).  Brief Urine Lab Results  (Last result in the past 365 days)      Color   Clarity   Blood   Leuk Est   Nitrite   Protein   CREAT   Urine HCG        10/23/19 1822 Yellow Clear Negative Negative Negative Negative               Microbiology Results (last 10 days)     ** No results found for the last 240 hours. **          ECG/EMG Results (most recent)     Procedure Component Value Units Date/Time    ECG 12 Lead [[id number]] Collected:  12/13/19 2027     Updated:  12/13/19 2029    Narrative:       HEART RATE= 102  bpm  RR Interval= 588  ms  MA Interval= 153  ms  P Horizontal Axis= 23  deg  P Front Axis= 18  deg  QRSD Interval= 93  ms  QT  Interval= 339  ms  QRS Axis= 27  deg  T Wave Axis= 33  deg  - OTHERWISE NORMAL ECG -  Sinus tachycardia  Electronically Signed By:   Date and Time of Study: 2019-12-13 20:27:51                    Xr Chest 1 View    Result Date: 12/13/2019  No active disease  Electronically Signed By-Mekhi Hargrove On:12/13/2019 8:51 PM This report was finalized on [id number] by  Mekhi Hargrove, .    Ct Chest Pulmonary Embolism    Result Date: 12/13/2019  No pulmonary embolism. No active disease in the lungs.  Electronically Signed By-Mekhi Hargrove On:12/13/2019 10:31 PM This report was finalized on [id number] by  Mekhi Hargrove, .        Estimated Creatinine Clearance: 156.9 mL/min (by C-G formula based on SCr of 0.66 mg/dL).    Assessment/Plan   Assessment/Plan       Active Hospital Problems    Diagnosis  POA   • **Chest pain [R07.9]  Yes     Priority: High   • Hypertension [I10]  Yes     Priority: High   • Asthma [J45.909]  Yes     Priority: Medium   • Type 2 diabetes mellitus with hyperglycemia (CMS/HCC) [E11.65]  Yes     Priority: Medium   • Hyperlipidemia [E78.5]  Yes     Priority: Medium      Resolved Hospital Problems   No resolved problems to display.       Active Hospital Problems:  No notes have been filed under this hospital service.  Service: Hospitalist    Chest pain:  -Initial troponin less than 0.01, serial troponins  -Echo in the a.m.  -Stress test as long as troponins are negative  -Check lipids    Hypertension:  -Continue patient's Lasix, Toprol-XL, Benicar, Aldactone  -Clonidine patch once a week  -Monitor kidney function    Diabetes type 2:  -Accu-Cheks before meals and at bedtime  -Cover with sliding scale insulin  -Check A1c    Hyperlipidemia:  -Patient currently not on statin  -Check lipids    Asthma:  -Continue patient's Symbicort  -DuoNebs PRN          VTE Prophylaxis - SCDs.    CODE STATUS:    Code Status and Medical Interventions:   Ordered at: 12/13/19 1332     Level Of Support Discussed With:    Patient      Code Status:    CPR     Medical Interventions (Level of Support Prior to Arrest):    Full       Admission Status:  I believe this patient meets observation criteria.      I discussed the patients findings and my recommendations with patient.        Electronically signed by GERTRUDE King, 12/13/19, 11:15 PM.  [facility] Hospitalist Team

## 2019-12-14 NOTE — DISCHARGE SUMMARY
[facility]   DISCHARGE SUMMARY    Patient Name: Bernadine Arriaga  : 1980  MRN: [id number]    Date of Admission: 2019  Date of Discharge:  2019    Primary Care Physician: Berenice Jacobson MD    Consults     Date and Time Order Name Status Description    2019 2259 Hospitalist (on-call MD unless specified) Completed           Hospital Course     Presenting Problem:   Tachycardia [R00.0]  EKG abnormalities [R94.31]  Elevated d-dimer [R79.89]  Leukocytosis, unspecified type [D72.829]  Dyspnea, unspecified type [R06.00]  Chest pain, unspecified type [R07.9]    Active Hospital Problems:  No notes have been filed under this hospital service.  Service: Hospitalist      Resolved Hospital Problems:  No notes have been filed under this hospital service.  Service: Hospitalist        Hospital Course:  Bernadine Arriaga is a 39 y.o. female  With PMH of DM, asthma, HLD, HTN who is presenting with CP. Given the multiple risk factors, pt underwent stress test which was neg for reversible ischemia. Echo showed normal EF. No VHD. Currently remains free of CP. 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 Denies sx of acid reflux, belching or burping. Reports she was overworking lately but denies any stress. No sign of CHF.  Etiology remains unclear. Non smoker. Leukocytosis could be related to stress/reactive.    Stable for discharge home today      Discharge Follow Up Recommendations for labs/diagnostics:       Day of Discharge     HPI:   39 y.o. morbidly obese female with a history of hypertension, diabetes type 2, asthma and hyperlipidemia, presented to the [facility] ED on 2019 with a complaint of chest pain.  Patient states she had chest pain this evening on the right side of her chest radiating to her back, rated at a 9 out of 10, stated it was stabbing, she became short of breath, diaphoretic and had nausea.  Patient states chest  pain lasted for about 30 minutes and then subsiding.  Patient denies palpitations, dizziness, lightheadedness and vomiting.     In the ED, EKG sinus tach, chest x-ray done active disease, d-dimer 0.82, CT chest PE protocol negative for PE.  Troponin <0.01, BUN 15, creatinine 0.62, potassium 3.7, WBC 19, hemoglobin 13.9 and hematocrit 40.8.  Patient received Toradol, and aspirin.  Patient will be admitted for further evaluation.    Vital Signs:   Temp:  [98.4 °F (36.9 °C)-98.7 °F (37.1 °C)] 98.6 °F (37 °C)  Heart Rate:  [] 94  Resp:  [14-20] 16  BP: (104-162)/(68-90) 147/85     Physical Exam:  Constitutional: She is oriented to person, place, and time. She appears well-developed and well-nourished. No distress.   HENT:   Head: Normocephalic and atraumatic.   Eyes: Pupils are equal, round, and reactive to light. Conjunctivae and EOM are normal.   Neck: Normal range of motion. Neck supple.   Cardiovascular: Normal rate, regular rhythm, normal heart sounds and intact distal pulses.   Pulmonary/Chest: Effort normal and breath sounds normal.   Abdominal: Soft. Bowel sounds are normal.   Musculoskeletal: Normal range of motion. She exhibits no edema.   Neurological: She is alert and oriented to person, place, and time.   Skin: Skin is warm and dry. She is not diaphoretic.     Pertinent  and/or Most Recent Results     Results from last 7 days   Lab Units 12/14/19  0109 12/13/19 2032   WBC 10*3/mm3 13.60* 19.00*   HEMOGLOBIN g/dL 14.2 13.9   HEMATOCRIT % 42.5 40.8   PLATELETS 10*3/mm3 325 334   SODIUM mmol/L 136 137   POTASSIUM mmol/L 3.7 4.2   CHLORIDE mmol/L 100 99   CO2 mmol/L 22.0 22.0   BUN mg/dL 15 14   CREATININE mg/dL 0.62 0.66   GLUCOSE mg/dL 131* 183*   CALCIUM mg/dL 8.5* 9.0     Results from last 7 days   Lab Units 12/13/19 2032   BILIRUBIN mg/dL 0.6   ALK PHOS U/L 90   ALT (SGPT) U/L 19   AST (SGOT) U/L 14   PROTIME Seconds 10.6   INR  1.02     Results from last 7 days   Lab Units 12/14/19  5702    CHOLESTEROL mg/dL 195   TRIGLYCERIDES mg/dL 202*   HDL CHOL mg/dL 44     Results from last 7 days   Lab Units 12/14/19  1227 12/14/19  0638 12/14/19  0109 12/13/19 2032   TSH uIU/mL  --   --   --  2.050   PROBNP pg/mL  --   --   --  22.0   TROPONIN T ng/mL <0.010 <0.010 <0.010 <0.010       Brief Urine Lab Results  (Last result in the past 365 days)      Color   Clarity   Blood   Leuk Est   Nitrite   Protein   CREAT   Urine HCG        12/13/19 2308 Yellow Clear Negative Negative Negative Negative               Microbiology Results Abnormal     None          Xr Chest 1 View    Result Date: 12/13/2019  Impression: No active disease  Electronically Signed ByRamandeep Hargrove On:12/13/2019 8:51 PM This report was finalized on [id number] by  Mekhi Hargrove, .    Ct Chest Pulmonary Embolism    Result Date: 12/13/2019  Impression: No pulmonary embolism. No active disease in the lungs.  Electronically Signed ByRamandeep Hargrove On:12/13/2019 10:31 PM This report was finalized on [id number] by  Mekhi Hargrove, .                Results for orders placed during the hospital encounter of 12/13/19   Transthoracic Echo Complete With Contrast if Necessary Per Protocol    Narrative Normal LV size and contractility EF of 60 to 65%  Normal RV size  Normal atrial size  Aortic valve, mitral valve, tricuspid valve appears structurally normal,   mild mitral regurgitation seen.  No pericardial effusion seen.  Proximal aorta appears normal in size.        Order Current Status    Hemoglobin A1c In process        Discharge Details        Discharge Medications      Changes to Medications      Instructions Start Date   cloNIDine 0.2 MG/24HR patch  Commonly known as:  CATAPRES-TTS-2  What changed:  additional instructions   1 patch, Transdermal, Weekly         Continue These Medications      Instructions Start Date   acetaminophen 325 MG tablet  Commonly known as:  TYLENOL   325 mg, Oral, Every 6 Hours PRN      albuterol (2.5 MG/3ML) 0.083% nebulizer  solution  Commonly known as:  PROVENTIL   2.5 mg, Nebulization, Every 6 Hours PRN      albuterol sulfate  (90 Base) MCG/ACT inhaler  Commonly known as:  PROVENTIL HFA;VENTOLIN HFA;PROAIR HFA   2 puffs, Inhalation, Every 6 Hours PRN      betamethasone dipropionate 0.05 % lotion  Commonly known as:  DIPROLENE   1 application, Topical, 2 Times Daily PRN      budesonide-formoterol 80-4.5 MCG/ACT inhaler  Commonly known as:  SYMBICORT   2 puffs, Inhalation, 2 Times Daily - RT      clindamycin 1 % lotion  Commonly known as:  CLEOCIN T   1 application, Topical, 2 Times Daily PRN      doxycycline 100 MG capsule  Commonly known as:  VIBRAMYCIN   100 mg, Oral, 2 Times Daily      EPINEPHrine 0.3 MG/0.3ML solution auto-injector injection  Commonly known as:  EPIPEN   0.3 mg, Intramuscular, As Needed      furosemide 40 MG tablet  Commonly known as:  LASIX   40 mg, Oral, Daily      glimepiride 4 MG tablet  Commonly known as:  AMARYL   2 mg, Oral, 2 Times Daily With Meals      hydrOXYzine 25 MG tablet  Commonly known as:  ATARAX   25 mg, Oral, Every 6 Hours PRN      ibuprofen 200 MG tablet  Commonly known as:  ADVIL,MOTRIN   200 mg, Oral, Every 6 Hours PRN      Liraglutide -Weight Management 18 MG/3ML solution pen-injector  Commonly known as:  SAXENDA   3 mg, Subcutaneous, Every Morning      metoprolol succinate XL 50 MG 24 hr tablet  Commonly known as:  TOPROL-XL   50 mg, Oral, 2 Times Daily      olmesartan 40 MG tablet  Commonly known as:  BENICAR   TAKE 1 TABLET BY MOUTH DAILY      spironolactone 50 MG tablet  Commonly known as:  ALDACTONE   50 mg, Oral, Daily             Allergies   Allergen Reactions   • Nifedipine Palpitations   • Tetanus Antitoxin Swelling   • Verapamil Palpitations         Discharge Disposition:  Home or Self Care    Diet:  Hospital:  Diet Order   Procedures   • Diet Cardiac; Healthy Heart   diabetic      Discharge Activity: as tolerated        CODE STATUS:    Code Status and Medical Interventions:    Ordered at: 12/13/19 9697     Level Of Support Discussed With:    Patient     Code Status:    CPR     Medical Interventions (Level of Support Prior to Arrest):    Full         Future Appointments   Date Time Provider Department Center   3/3/2020  7:45 AM Berenice Jacobson MD [facility] None           Time spent on Discharge including face to face service:  35 minutes    Electronically signed by Sanam Cabral MD, 12/14/19, 3:53 PM.

## 2019-12-15 LAB
BH CV NUCLEAR PRIOR STUDY: 3
BH CV STRESS BP STAGE 1: NORMAL
BH CV STRESS BP STAGE 2: NORMAL
BH CV STRESS DURATION MIN STAGE 1: 3
BH CV STRESS DURATION MIN STAGE 2: 1
BH CV STRESS DURATION SEC STAGE 1: 0
BH CV STRESS DURATION SEC STAGE 2: 0
BH CV STRESS GRADE STAGE 1: 10
BH CV STRESS GRADE STAGE 2: 12
BH CV STRESS HR STAGE 1: 142
BH CV STRESS HR STAGE 2: 150
BH CV STRESS METS STAGE 1: 5
BH CV STRESS METS STAGE 2: 7.5
BH CV STRESS PROTOCOL 1: NORMAL
BH CV STRESS RECOVERY BP: NORMAL MMHG
BH CV STRESS RECOVERY HR: 95 BPM
BH CV STRESS SPEED STAGE 1: 1.7
BH CV STRESS SPEED STAGE 2: 2.5
BH CV STRESS STAGE 1: 1
BH CV STRESS STAGE 2: 2
LV EF NUC BP: 63 %
MAXIMAL PREDICTED HEART RATE: 179 BPM
PERCENT MAX PREDICTED HR: 83.8 %
STRESS BASELINE BP: NORMAL MMHG
STRESS BASELINE HR: 87 BPM
STRESS PERCENT HR: 99 %
STRESS POST EXERCISE DUR MIN: 4 MIN
STRESS POST EXERCISE DUR SEC: 0 SEC
STRESS POST PEAK BP: NORMAL MMHG
STRESS POST PEAK HR: 150 BPM
STRESS TARGET HR: 152 BPM

## 2019-12-15 PROCEDURE — 78452 HT MUSCLE IMAGE SPECT MULT: CPT | Performed by: INTERNAL MEDICINE

## 2019-12-15 PROCEDURE — 93018 CV STRESS TEST I&R ONLY: CPT | Performed by: INTERNAL MEDICINE

## 2019-12-16 LAB — HBA1C MFR BLD: 6.3 % (ref 3.5–5.6)

## 2019-12-16 NOTE — PROGRESS NOTES
Case Management Discharge Note      Final Note: Unable to speak to patient prior to dc to perform dc needs assessment/discuss dc plan.      Final Discharge Disposition Code: 01 - home or self-care

## 2020-01-02 RX ORDER — METOPROLOL SUCCINATE 50 MG/1
50 TABLET, EXTENDED RELEASE ORAL 2 TIMES DAILY
Qty: 180 TABLET | Refills: 1 | Status: SHIPPED | OUTPATIENT
Start: 2020-01-02 | End: 2020-07-12

## 2020-01-02 RX ORDER — HYDROXYZINE HYDROCHLORIDE 25 MG/1
25 TABLET, FILM COATED ORAL EVERY 6 HOURS PRN
Qty: 30 TABLET | Refills: 0 | Status: SHIPPED | OUTPATIENT
Start: 2020-01-02 | End: 2020-03-03 | Stop reason: SDUPTHER

## 2020-01-28 ENCOUNTER — OFFICE VISIT (OUTPATIENT)
Dept: FAMILY MEDICINE CLINIC | Facility: CLINIC | Age: 40
End: 2020-01-28

## 2020-01-28 VITALS
DIASTOLIC BLOOD PRESSURE: 83 MMHG | SYSTOLIC BLOOD PRESSURE: 137 MMHG | WEIGHT: 286 LBS | TEMPERATURE: 98 F | BODY MASS INDEX: 44.79 KG/M2 | HEART RATE: 106 BPM | OXYGEN SATURATION: 97 %

## 2020-01-28 DIAGNOSIS — I10 ESSENTIAL HYPERTENSION: Primary | ICD-10-CM

## 2020-01-28 DIAGNOSIS — R09.89 LABILE BLOOD PRESSURE: ICD-10-CM

## 2020-01-28 DIAGNOSIS — E11.65 TYPE 2 DIABETES MELLITUS WITH HYPERGLYCEMIA, WITHOUT LONG-TERM CURRENT USE OF INSULIN (HCC): ICD-10-CM

## 2020-01-28 PROBLEM — R07.9 CHEST PAIN: Status: RESOLVED | Noted: 2019-12-13 | Resolved: 2020-01-28

## 2020-01-28 PROCEDURE — 99214 OFFICE O/P EST MOD 30 MIN: CPT | Performed by: PHYSICIAN ASSISTANT

## 2020-01-28 RX ORDER — FUROSEMIDE 40 MG/1
20 TABLET ORAL 2 TIMES DAILY
Start: 2020-01-28 | End: 2021-01-11 | Stop reason: SDUPTHER

## 2020-01-28 RX ORDER — SPIRONOLACTONE 50 MG/1
50 TABLET, FILM COATED ORAL EVERY EVENING
Start: 2020-01-28

## 2020-01-28 RX ORDER — DOXYCYCLINE 100 MG/1
CAPSULE ORAL
COMMUNITY
Start: 2020-01-12 | End: 2020-01-28

## 2020-01-28 NOTE — PROGRESS NOTES
Subjective  Benradine Arriaga is a 39 y.o. female     History of Present Illness  Patient is a 39-year-old white female complaining of blood pressure fluctuations for the past 2 to 3 days.  She states she first noticed problems with her blood pressure when she began not feeling well, becoming mildly lightheaded and dizzy, mildly nauseated.  She denies new medications, herbs, over-the-counter medicines.  She denies chest pain and shortness of breath.  She reports her blood pressures at home have been as high as 200/115 and is low was 105/60.  She is diabetic but reports her fasting blood sugars between 80 and 100.  She admits to having a renal artery ultrasound many years ago, but nothing recently.    The following portions of the patient's history were reviewed and updated as appropriate: allergies, current medications, past family history, past medical history, past social history, past surgical history and problem list.    Review of Systems   Constitutional: Negative for fever.   Respiratory: Negative for shortness of breath.    Cardiovascular: Negative for chest pain.   Gastrointestinal: Negative for abdominal pain.   Endocrine: Negative for polydipsia, polyphagia and polyuria.   Genitourinary: Negative for dysuria and frequency.   Neurological: Positive for dizziness (mild) and light-headedness (mild). Negative for tremors, seizures, syncope, facial asymmetry, speech difficulty, weakness, numbness, headache, memory problem and confusion.       Objective  Physical Exam   Constitutional: She is oriented to person, place, and time. She appears well-developed and well-nourished.   HENT:   Head: Normocephalic and atraumatic.   Cardiovascular: Normal rate, regular rhythm and normal heart sounds.   Pulmonary/Chest: Effort normal and breath sounds normal.   Neurological: She is alert and oriented to person, place, and time.   Psychiatric: She has a normal mood and affect. Her behavior is normal.       Vitals:     01/28/20 1111   BP: 137/83   BP Location: Right arm   Patient Position: Sitting   Cuff Size: Adult   Pulse: 106   Temp: 98 °F (36.7 °C)   TempSrc: Oral   SpO2: 97%   Weight: 130 kg (286 lb)     Body mass index is 44.79 kg/m².    PHQ-9 Total Score:        Assessment/Plan  Diagnoses and all orders for this visit:    1. Essential hypertension (Primary)  Comments:  Due to blood pressure fluctuations, I recommended we split her medications into twice daily dosing, I recommended she take the Lasix 20 mg twice daily, metoprolol XL 50 mg twice daily, olmesartan 40 mg in the morning, and spironolactone 50 mg in the evening.  She was seen in the emergency room in December 2019 for chest pain, laboratory testing at that time was reviewed by me and discussed with the patient.  Medications were reviewed and reconciled.  She should call with blood pressure log in 1 week to see if evening out her medication doses will even out her blood pressures.  If not, I recommend further investigation with renal artery ultrasound, laboratory testing, possible referral to nephrology.  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    2. Type 2 diabetes mellitus with hyperglycemia, without long-term current use of insulin (CMS/[facility])  Comments:  Stable, patient to continue current medications.    3.  Labile Blood Pressure  Patient to monitor blood pressure and call with log in 1 week or sooner if needed.    Other orders  -     spironolactone (ALDACTONE) 50 MG tablet; Take 1 tablet by mouth Every Evening.  -     furosemide (LASIX) 40 MG tablet; Take 0.5 tablets by mouth 2 (Two) Times a Day.

## 2020-02-06 ENCOUNTER — TELEPHONE (OUTPATIENT)
Dept: FAMILY MEDICINE CLINIC | Facility: CLINIC | Age: 40
End: 2020-02-06

## 2020-02-06 DIAGNOSIS — Z01.84 IMMUNITY STATUS TESTING: Primary | ICD-10-CM

## 2020-02-06 NOTE — TELEPHONE ENCOUNTER
----- Message from Gricel Mejia MA sent at 2/6/2020 10:21 AM EST -----  Regarding: FW: Non-Urgent Medical Question  Contact: [phone]      ----- Message -----  From: Bernadine Arriaga  Sent: 2/5/2020   8:43 PM EST  To: Ол[facility]  Subject: Non-Urgent Medical Question                      Hi could I get an order for titers for hepatitis B, MMR, and chicken pox due to starting a new job.     Could I also get a note for reaction to tetanus injection, last one was 2005 I believe.    Thanks

## 2020-03-01 RX ORDER — OLMESARTAN MEDOXOMIL 40 MG/1
TABLET ORAL
Qty: 90 TABLET | Refills: 3 | Status: SHIPPED | OUTPATIENT
Start: 2020-03-01 | End: 2021-01-15 | Stop reason: SDUPTHER

## 2020-03-03 ENCOUNTER — OFFICE VISIT (OUTPATIENT)
Dept: FAMILY MEDICINE CLINIC | Facility: CLINIC | Age: 40
End: 2020-03-03

## 2020-03-03 VITALS
SYSTOLIC BLOOD PRESSURE: 136 MMHG | BODY MASS INDEX: 45.99 KG/M2 | DIASTOLIC BLOOD PRESSURE: 78 MMHG | HEIGHT: 67 IN | WEIGHT: 293 LBS | OXYGEN SATURATION: 97 % | HEART RATE: 80 BPM

## 2020-03-03 DIAGNOSIS — E66.01 MORBID OBESITY WITH BMI OF 45.0-49.9, ADULT (HCC): ICD-10-CM

## 2020-03-03 DIAGNOSIS — E78.2 MIXED HYPERLIPIDEMIA: Primary | ICD-10-CM

## 2020-03-03 DIAGNOSIS — J45.20 MILD INTERMITTENT ASTHMA WITHOUT COMPLICATION: ICD-10-CM

## 2020-03-03 DIAGNOSIS — Z23 NEED FOR VACCINATION: ICD-10-CM

## 2020-03-03 DIAGNOSIS — I10 ESSENTIAL HYPERTENSION: ICD-10-CM

## 2020-03-03 DIAGNOSIS — E11.65 TYPE 2 DIABETES MELLITUS WITH HYPERGLYCEMIA, WITHOUT LONG-TERM CURRENT USE OF INSULIN (HCC): ICD-10-CM

## 2020-03-03 PROCEDURE — 90471 IMMUNIZATION ADMIN: CPT | Performed by: FAMILY MEDICINE

## 2020-03-03 PROCEDURE — 99214 OFFICE O/P EST MOD 30 MIN: CPT | Performed by: FAMILY MEDICINE

## 2020-03-03 PROCEDURE — 90746 HEPB VACCINE 3 DOSE ADULT IM: CPT | Performed by: FAMILY MEDICINE

## 2020-03-03 RX ORDER — ALBUTEROL SULFATE 90 UG/1
2 AEROSOL, METERED RESPIRATORY (INHALATION) EVERY 6 HOURS PRN
Qty: 3 INHALER | Refills: 3 | Status: SHIPPED | OUTPATIENT
Start: 2020-03-03 | End: 2021-01-14 | Stop reason: SDUPTHER

## 2020-03-03 RX ORDER — DOXYCYCLINE 100 MG/1
1 CAPSULE ORAL 2 TIMES DAILY
COMMUNITY
Start: 2020-02-25

## 2020-03-03 RX ORDER — BUDESONIDE AND FORMOTEROL FUMARATE DIHYDRATE 80; 4.5 UG/1; UG/1
2 AEROSOL RESPIRATORY (INHALATION)
Qty: 3 INHALER | Refills: 3 | Status: SHIPPED | OUTPATIENT
Start: 2020-03-03 | End: 2021-01-14 | Stop reason: SDUPTHER

## 2020-03-03 RX ORDER — HYDROXYZINE HYDROCHLORIDE 25 MG/1
25 TABLET, FILM COATED ORAL EVERY 6 HOURS PRN
Qty: 30 TABLET | Refills: 1 | Status: SHIPPED | OUTPATIENT
Start: 2020-03-03 | End: 2021-01-14 | Stop reason: SDUPTHER

## 2020-03-03 NOTE — PROGRESS NOTES
Subjective   Bernadine Arriaga is a 39 y.o. female.     Here for follow-up on her blood pressure, cholesterol, and diabetes  Started new job yest with less stress  BS running 110-250  Last saw eye doctor in Nov  Dr. [place] in [place]  Sees gyn for pap smears  saxenda not covered  Using amaryl 4mg bid  Last labs in Dec  Reviewed with pt  Feels well  She does need refills on hydroxyzine and her inhalers  Recent labs show that her hep B is no longer immune so she needs an updated vaccine    Hypertension   This is a chronic problem. The current episode started more than 1 month ago. The problem has been waxing and waning since onset. The problem is resistant. Associated symptoms include blurred vision, headaches, malaise/fatigue, neck pain, palpitations and peripheral edema. Pertinent negatives include no anxiety, chest pain, orthopnea, PND, shortness of breath or sweats. There are no associated agents to hypertension. Risk factors for coronary artery disease include diabetes mellitus, family history, obesity and sedentary lifestyle. Compliance problems include diet and exercise.         The following portions of the patient's history were reviewed and updated as appropriate: allergies, current medications, past family history, past medical history, past social history, past surgical history and problem list.  Past Medical History:   Diagnosis Date   • Allergic    • Asthma    • History of medical problems     Hidradenitis   • Hypertension    • Migraine     history   • Pneumonia    • Sinus disorder      Past Surgical History:   Procedure Laterality Date   • ADENOIDECTOMY     • APPENDECTOMY     •  SECTION     • CHOLECYSTECTOMY     • INCISION AND DRAINAGE OF WOUND     • KNEE ARTHRODESIS     • TONSILLECTOMY     • WOUND DEBRIDEMENT Right 2019    Procedure: DEBRIDEMENT OF RECURRENT HIDRADENITIS AND VAC PLACEMENT RIGHT SIDE;  Surgeon: Alix Freire MD;  Location: [facility] MAIN OR;  Service: General     Family  History   Problem Relation Age of Onset   • Hypertension Mother    • Cancer Mother    • Diabetes Mother    • Hyperlipidemia Mother    • Hypertension Father    • Hyperlipidemia Father    • Heart disease Maternal Grandfather    • Cancer Brother      Social History     Socioeconomic History   • Marital status:      Spouse name: Not on file   • Number of children: Not on file   • Years of education: Not on file   • Highest education level: Not on file   Tobacco Use   • Smoking status: Never Smoker   • Smokeless tobacco: Never Used   Substance and Sexual Activity   • Alcohol use: No     Frequency: Never   • Drug use: No   • Sexual activity: Yes     Partners: Male     Birth control/protection: IUD         Current Outpatient Medications:   •  acetaminophen (TYLENOL) 325 MG tablet, Take 325 mg by mouth Every 6 (Six) Hours As Needed for Mild Pain ., Disp: , Rfl:   •  albuterol sulfate  (90 Base) MCG/ACT inhaler, Inhale 2 puffs Every 6 (Six) Hours As Needed for Wheezing., Disp: 3 inhaler, Rfl: 3  •  betamethasone dipropionate (DIPROLENE) 0.05 % lotion, Apply 1 application topically to the appropriate area as directed 2 (Two) Times a Day As Needed., Disp: , Rfl:   •  budesonide-formoterol (SYMBICORT) 80-4.5 MCG/ACT inhaler, Inhale 2 puffs 2 (Two) Times a Day., Disp: 3 inhaler, Rfl: 3  •  clindamycin (CLEOCIN T) 1 % lotion, Apply 1 application topically to the appropriate area as directed 2 (Two) Times a Day As Needed., Disp: , Rfl:   •  cloNIDine (CATAPRES-TTS-2) 0.2 MG/24HR patch, Place 1 patch on the skin as directed by provider 1 (One) Time Per Week. (Patient taking differently: Place 1 patch on the skin as directed by provider 1 (One) Time Per Week. Wed), Disp: 13 patch, Rfl: 1  •  doxycycline (MONODOX) 100 MG capsule, Take 1 capsule by mouth 2 (Two) Times a Day., Disp: , Rfl:   •  EPINEPHrine (EPIPEN) 0.3 MG/0.3ML solution auto-injector injection, Inject 0.3 mL into the appropriate muscle as directed by  "prescriber As Needed (allergic reaction)., Disp: 1 each, Rfl: 11  •  furosemide (LASIX) 40 MG tablet, Take 0.5 tablets by mouth 2 (Two) Times a Day., Disp: , Rfl:   •  glimepiride (AMARYL) 4 MG tablet, Take 0.5 tablets by mouth 2 (Two) Times a Day With Meals., Disp: 180 tablet, Rfl: 3  •  hydrOXYzine (ATARAX) 25 MG tablet, Take 1 tablet by mouth Every 6 (Six) Hours As Needed for Itching or Anxiety., Disp: 30 tablet, Rfl: 1  •  ibuprofen (ADVIL,MOTRIN) 200 MG tablet, Take 200 mg by mouth Every 6 (Six) Hours As Needed for Mild Pain ., Disp: , Rfl:   •  metoprolol succinate XL (TOPROL-XL) 50 MG 24 hr tablet, Take 1 tablet by mouth 2 (Two) Times a Day., Disp: 180 tablet, Rfl: 1  •  olmesartan (BENICAR) 40 MG tablet, TAKE 1 TABLET BY MOUTH DAILY, Disp: 90 tablet, Rfl: 3  •  spironolactone (ALDACTONE) 50 MG tablet, Take 1 tablet by mouth Every Evening., Disp: , Rfl:   •  Dulaglutide (TRULICITY) 1.5 MG/0.5ML solution pen-injector, Inject 1.5 mg under the skin into the appropriate area as directed 1 (One) Time Per Week., Disp: 4 pen, Rfl: 11    Review of Systems   Constitutional: Positive for malaise/fatigue. Negative for diaphoresis, fatigue, fever, unexpected weight gain and unexpected weight loss.   Eyes: Positive for blurred vision.   Respiratory: Negative for cough, chest tightness and shortness of breath.    Cardiovascular: Positive for palpitations. Negative for chest pain, orthopnea, leg swelling and PND.   Gastrointestinal: Negative for nausea and vomiting.   Endocrine: Negative.    Musculoskeletal: Positive for neck pain.   Neurological: Negative for dizziness, syncope, numbness and headache.     /78 (BP Location: Left arm, Patient Position: Sitting, Cuff Size: Large Adult)   Pulse 80   Ht 170.2 cm (67\")   Wt 134 kg (296 lb)   SpO2 97%   BMI 46.36 kg/m²       Objective   Physical Exam   Constitutional: She appears well-developed and well-nourished. No distress. She is morbidly obese.  HENT:   Head: " Normocephalic and atraumatic.   Neck: Neck supple. No JVD present. No thyromegaly present.   Cardiovascular: Normal rate, regular rhythm, normal heart sounds and intact distal pulses. Exam reveals no gallop and no friction rub.   No murmur heard.  Pulmonary/Chest: Effort normal and breath sounds normal. No respiratory distress. She has no wheezes. She has no rales.   Musculoskeletal: She exhibits no edema.    Bernadine had a diabetic foot exam performed today.   During the foot exam she had a monofilament test not performed.  Vascular Status -  Her right foot exhibits normal foot vasculature  and no edema. Her left foot exhibits normal foot vasculature  and no edema.  Skin Integrity  -  Her right foot skin is intact. She has callous right foot.  She has no right foot onychomycosis, no right foot ulcer, no ingrown toenail on right foot, right heel is not dry and cracked, no right foot warmth, no right foot blister and no right foot gangrenous changes.Her left foot skin is intact.She has callous left foot. She has no left foot onychomycosis, no left foot ulcer, no left ingrown toenail, no left heel dry and cracked, no left foot warmth, no left foot blister and no left foot gangrenous changes..   Foot Structure and Biomechanics -  Her right foot has no hallux valgus, no pes cavus, no claw toes and no hammer toes present. Her left foot has no hallux valgus, no pes cavus, no claw toes and no hammer toes.  Lymphadenopathy:     She has no cervical adenopathy.   Neurological: She is alert.   Skin: Skin is warm and dry.   Psychiatric: She has a normal mood and affect.   Nursing note and vitals reviewed.      Lab Results   Component Value Date    CHOL 195 12/14/2019    TRIG 202 (H) 12/14/2019    HDL 44 12/14/2019     (H) 12/14/2019     Lab Results   Component Value Date    GLUCOSE 131 (H) 12/14/2019    BUN 15 12/14/2019    CREATININE 0.62 12/14/2019    EGFRIFNONA 106 12/14/2019    BCR 24.2 12/14/2019    K 3.7 12/14/2019     CO2 22.0 12/14/2019    CALCIUM 8.5 (L) 12/14/2019    ALBUMIN 4.40 12/13/2019    LABIL2 1.1 05/18/2018    AST 14 12/13/2019    ALT 19 12/13/2019     Lab Results   Component Value Date    HGBA1C 6.3 (H) 12/14/2019         Assessment/Plan   Problems Addressed this Visit        Cardiovascular and Mediastinum    Hyperlipidemia - Primary    Hypertension       Respiratory    Asthma    Relevant Medications    albuterol sulfate  (90 Base) MCG/ACT inhaler    budesonide-formoterol (SYMBICORT) 80-4.5 MCG/ACT inhaler       Digestive    Morbid obesity with BMI of 45.0-49.9, adult (CMS/HCC)       Endocrine    Type 2 diabetes mellitus with hyperglycemia (CMS/HCC)    Relevant Medications    Dulaglutide (TRULICITY) 1.5 MG/0.5ML solution pen-injector      Other Visit Diagnoses     Need for vaccination        Relevant Orders    Hepatitis B Vaccine Adult IM (Completed)          She was counseled on the need for weight loss and dietary compliance  Will start trulicity - gave her samples and explained use  rx sent to pharmacy  Will repeat labs in 3 months when she comes in for follow up and cpe  rx refills sent  Encouraged stress reduction  bp is much better and stable on current meds  Dietary instruction given for lipids  Will update hep B vaccine       Answers for HPI/ROS submitted by the patient on 2/25/2020   Hypertension  What is the primary reason for your visit?: High Blood Pressure

## 2020-03-03 NOTE — PATIENT INSTRUCTIONS
Keep working to lose weight through healthy eating and exercise.  No fried foods and limit pasta, bread, and sweets.  If blood sugars start dropping below 100 decrease the dose of the glimepride to 1/2 pill twice a day  See your gyn  trulicity - start with the .75mg dose for the first 2 weeks then the 1.5  Then fill the rx  One shot per week

## 2020-05-18 ENCOUNTER — OFFICE VISIT (OUTPATIENT)
Dept: FAMILY MEDICINE CLINIC | Facility: CLINIC | Age: 40
End: 2020-05-18

## 2020-05-18 VITALS
BODY MASS INDEX: 45.99 KG/M2 | TEMPERATURE: 98.2 F | OXYGEN SATURATION: 98 % | HEIGHT: 67 IN | DIASTOLIC BLOOD PRESSURE: 109 MMHG | HEART RATE: 90 BPM | SYSTOLIC BLOOD PRESSURE: 152 MMHG | WEIGHT: 293 LBS

## 2020-05-18 DIAGNOSIS — L73.2 HIDRADENITIS SUPPURATIVA: Primary | ICD-10-CM

## 2020-05-18 PROCEDURE — 99213 OFFICE O/P EST LOW 20 MIN: CPT | Performed by: NURSE PRACTITIONER

## 2020-05-18 RX ORDER — GUSELKUMAB 100 MG/ML
1 INJECTION SUBCUTANEOUS
COMMUNITY
Start: 2020-05-05 | End: 2021-09-28

## 2020-05-18 RX ORDER — SULFAMETHOXAZOLE AND TRIMETHOPRIM 800; 160 MG/1; MG/1
1 TABLET ORAL 2 TIMES DAILY
Qty: 20 TABLET | Refills: 0 | Status: SHIPPED | OUTPATIENT
Start: 2020-05-18 | End: 2020-05-28

## 2020-05-18 NOTE — PROGRESS NOTES
Subjective   Bernadine Arriaga is a 40 y.o. female.       HPI   Pt. Is here today with concerns of a flare up with hidradenitis suppurativa under right arm.  She noticed the flare starting about a week ago.  She has contacted her dermatologist but cannot be seen for a couple of weeks.  She states the area has become more red and tender the past few days.  No open area or drainage.  No fevers.  She has been taking doxycycline 100 mg bid from her dermatologist for at least the past 6 months.  She does have hx. of MRSA infection.     The following portions of the patient's history were reviewed and updated as appropriate: allergies, current medications, past family history, past medical history, past social history, past surgical history and problem list.    Review of Systems   Constitutional: Negative for activity change, appetite change, chills, diaphoresis, fatigue, fever, unexpected weight gain and unexpected weight loss.   Respiratory: Negative for cough, chest tightness, shortness of breath and wheezing.    Cardiovascular: Negative for chest pain, palpitations and leg swelling.   Gastrointestinal: Negative for abdominal pain, diarrhea, nausea, vomiting and indigestion.   Musculoskeletal: Negative for arthralgias and myalgias.   Skin: Positive for skin lesions (right axilla).   Neurological: Negative for dizziness, light-headedness, headache and confusion.   Hematological: Negative for adenopathy.   Psychiatric/Behavioral: Negative for depressed mood. The patient is not nervous/anxious.        Objective   Physical Exam   Constitutional: She is oriented to person, place, and time. She appears well-developed and well-nourished. No distress.   Cardiovascular: Normal rate, regular rhythm, normal heart sounds and intact distal pulses.   No murmur heard.  Pulmonary/Chest: Effort normal and breath sounds normal. No respiratory distress. She has no wheezes. She exhibits no tenderness.   Neurological: She is alert and  oriented to person, place, and time.   Skin: Skin is warm and dry. Capillary refill takes less than 2 seconds.        Psychiatric: She has a normal mood and affect.   Vitals reviewed.        Assessment/Plan   Bernadine was seen today for abscess.    Diagnoses and all orders for this visit:    Hidradenitis suppurativa  Comments:  Given Bactrim (r/t hx. of MRSA)  heat/warm compresses several times daily  Follwo up with Derm.   Reviewed warnign S/S and when to call.   Orders:  -     sulfamethoxazole-trimethoprim (Bactrim DS) 800-160 MG per tablet; Take 1 tablet by mouth 2 (Two) Times a Day for 10 days.

## 2020-05-18 NOTE — PATIENT INSTRUCTIONS
Hidradenitis Suppurativa  Hidradenitis suppurativa is a long-term (chronic) skin disease. It is similar to a severe form of acne, but it affects areas of the body where acne would be unusual, especially areas of the body where skin rubs against skin and becomes moist. These include:  · Underarms.  · Groin.  · Genital area.  · Buttocks.  · Upper thighs.  · Breasts.  Hidradenitis suppurativa may start out as small lumps or pimples caused by blocked sweat glands or hair follicles. Pimples may develop into deep sores that break open (rupture) and drain pus. Over time, affected areas of skin may thicken and become scarred. This condition is rare and does not spread from person to person (non-contagious).  What are the causes?  The exact cause of this condition is not known. It may be related to:  · Male and female hormones.  · An overactive disease-fighting system (immune system). The immune system may over-react to blocked hair follicles or sweat glands and cause swelling and pus-filled sores.  What increases the risk?  You are more likely to develop this condition if you:  · Are female.  · Are 11-55 years old.  · Have a family history of hidradenitis suppurativa.  · Have a personal history of acne.  · Are overweight.  · Smoke.  · Take the medicine lithium.  What are the signs or symptoms?  The first symptoms are usually painful bumps in the skin, similar to pimples. The condition may get worse over time (progress), or it may only cause mild symptoms. If the disease progresses, symptoms may include:  · Skin bumps getting bigger and growing deeper into the skin.  · Bumps rupturing and draining pus.  · Itchy, infected skin.  · Skin getting thicker and scarred.  · Tunnels under the skin (fistulas) where pus drains from a bump.  · Pain during daily activities, such as pain during walking if your groin area is affected.  · Emotional problems, such as stress or depression. This condition may affect your appearance and your  ability or willingness to wear certain clothes or do certain activities.  How is this diagnosed?  This condition is diagnosed by a health care provider who specializes in skin diseases (dermatologist). You may be diagnosed based on:  · Your symptoms and medical history.  · A physical exam.  · Testing a pus sample for infection.  · Blood tests.  How is this treated?  Your treatment will depend on how severe your symptoms are. The same treatment will not work for everybody with this condition. You may need to try several treatments to find what works best for you. Treatment may include:  · Cleaning and bandaging (dressing) your wounds as needed.  · Lifestyle changes, such as new skin care routines.  · Taking medicines, such as:  ? Antibiotics.  ? Acne medicines.  ? Medicines to reduce the activity of the immune system.  ? A diabetes medicine (metformin).  ? Birth control pills, for women.  ? Steroids to reduce swelling and pain.  · Working with a mental health care provider, if you experience emotional distress due to this condition.  If you have severe symptoms that do not get better with medicine, you may need surgery. Surgery may involve:  · Using a laser to clear the skin and remove hair follicles.  · Opening and draining deep sores.  · Removing the areas of skin that are diseased and scarred.  Follow these instructions at home:  Medicines    · Take over-the-counter and prescription medicines only as told by your health care provider.  · If you were prescribed an antibiotic medicine, take it as told by your health care provider. Do not stop taking the antibiotic even if your condition improves.  Skin care  · If you have open wounds, cover them with a clean dressing as told by your health care provider. Keep wounds clean by washing them gently with soap and water when you bathe.  · Do not shave the areas where you get hidradenitis suppurativa.  · Do not wear deodorant.  · Wear loose-fitting clothes.  · Try to avoid  getting overheated or sweaty. If you get sweaty or wet, change into clean, dry clothes as soon as you can.  · To help relieve pain and itchiness, cover sore areas with a warm, clean washcloth (warm compress) for 5-10 minutes as often as needed.  · If told by your health care provider, take a bleach bath twice a week:  ? Fill your bathtub USP with water.  ? Pour in ½ cup of unscented household bleach.  ? Soak in the tub for 5-10 minutes.  ? Only soak from the neck down. Avoid water on your face and hair.  ? Shower to rinse off the bleach from your skin.  General instructions  · Learn as much as you can about your disease so that you have an active role in your treatment. Work closely with your health care provider to find treatments that work for you.  · If you are overweight, work with your health care provider to lose weight as recommended.  · Do not use any products that contain nicotine or tobacco, such as cigarettes and e-cigarettes. If you need help quitting, ask your health care provider.  · If you struggle with living with this condition, talk with your health care provider or work with a mental health care provider as recommended.  · Keep all follow-up visits as told by your health care provider. This is important.  Where to find more information  · Hidradenitis Suppurativa [employer].: https://www.hs-foundation.org/  Contact a health care provider if you have:  · A flare-up of hidradenitis suppurativa.  · A fever or chills.  · Trouble controlling your symptoms at home.  · Trouble doing your daily activities because of your symptoms.  · Trouble dealing with emotional problems related to your condition.  Summary  · Hidradenitis suppurativa is a long-term (chronic) skin disease. It is similar to a severe form of acne, but it affects areas of the body where acne would be unusual.  · The first symptoms are usually painful bumps in the skin, similar to pimples. The condition may get worse over time  (progress), or it may only cause mild symptoms.  · If you have open wounds, cover them with a clean dressing as told by your health care provider. Keep wounds clean by washing them gently with soap and water when you bathe.  · Besides skin care, treatment may include medicines, laser treatment, and surgery.  This information is not intended to replace advice given to you by your health care provider. Make sure you discuss any questions you have with your health care provider.  Document Released: 08/01/2005 Document Revised: 12/26/2018 Document Reviewed: 12/26/2018  [employer] Patient Education © 2020 [employer]

## 2020-06-07 RX ORDER — CLONIDINE 0.2 MG/24H
PATCH, EXTENDED RELEASE TRANSDERMAL
Qty: 13 PATCH | Refills: 1 | Status: SHIPPED | OUTPATIENT
Start: 2020-06-07 | End: 2020-06-09

## 2020-06-09 ENCOUNTER — OFFICE VISIT (OUTPATIENT)
Dept: FAMILY MEDICINE CLINIC | Facility: CLINIC | Age: 40
End: 2020-06-09

## 2020-06-09 ENCOUNTER — LAB (OUTPATIENT)
Dept: FAMILY MEDICINE CLINIC | Facility: CLINIC | Age: 40
End: 2020-06-09

## 2020-06-09 VITALS
DIASTOLIC BLOOD PRESSURE: 101 MMHG | WEIGHT: 293 LBS | BODY MASS INDEX: 45.99 KG/M2 | HEART RATE: 94 BPM | SYSTOLIC BLOOD PRESSURE: 155 MMHG | TEMPERATURE: 98.8 F | HEIGHT: 67 IN | OXYGEN SATURATION: 97 %

## 2020-06-09 DIAGNOSIS — I10 ESSENTIAL HYPERTENSION: Primary | ICD-10-CM

## 2020-06-09 DIAGNOSIS — I10 ESSENTIAL HYPERTENSION: ICD-10-CM

## 2020-06-09 DIAGNOSIS — Z01.84 IMMUNITY STATUS TESTING: ICD-10-CM

## 2020-06-09 DIAGNOSIS — E78.2 MIXED HYPERLIPIDEMIA: ICD-10-CM

## 2020-06-09 DIAGNOSIS — Z11.59 NEED FOR HEPATITIS C SCREENING TEST: ICD-10-CM

## 2020-06-09 DIAGNOSIS — E11.65 TYPE 2 DIABETES MELLITUS WITH HYPERGLYCEMIA, WITHOUT LONG-TERM CURRENT USE OF INSULIN (HCC): ICD-10-CM

## 2020-06-09 LAB
ALBUMIN SERPL-MCNC: 4.4 G/DL (ref 3.5–5.2)
ALBUMIN UR-MCNC: 2.2 MG/DL
ALBUMIN/GLOB SERPL: 1.5 G/DL
ALP SERPL-CCNC: 52 U/L (ref 39–117)
ALT SERPL W P-5'-P-CCNC: 42 U/L (ref 1–33)
ANION GAP SERPL CALCULATED.3IONS-SCNC: 13.3 MMOL/L (ref 5–15)
ARTICHOKE IGE QN: 133 MG/DL (ref 0–100)
AST SERPL-CCNC: 32 U/L (ref 1–32)
BILIRUB SERPL-MCNC: 0.3 MG/DL (ref 0.2–1.2)
BUN BLD-MCNC: 9 MG/DL (ref 6–20)
BUN/CREAT SERPL: 22.5 (ref 7–25)
CALCIUM SPEC-SCNC: 7.4 MG/DL (ref 8.6–10.5)
CHLORIDE SERPL-SCNC: 100 MMOL/L (ref 98–107)
CHOLEST SERPL-MCNC: 215 MG/DL (ref 0–200)
CO2 SERPL-SCNC: 22.7 MMOL/L (ref 22–29)
CREAT BLD-MCNC: 0.4 MG/DL (ref 0.57–1)
GFR SERPL CREATININE-BSD FRML MDRD: >150 ML/MIN/1.73
GLOBULIN UR ELPH-MCNC: 2.9 GM/DL
GLUCOSE BLD-MCNC: 152 MG/DL (ref 65–99)
HBA1C MFR BLD: 7.5 % (ref 3.5–5.6)
HCV AB SER DONR QL: NORMAL
HDLC SERPL-MCNC: 39 MG/DL (ref 40–60)
LDLC SERPL CALC-MCNC: ABNORMAL MG/DL
LDLC/HDLC SERPL: ABNORMAL {RATIO}
POTASSIUM BLD-SCNC: 3.8 MMOL/L (ref 3.5–5.2)
PROT SERPL-MCNC: 7.3 G/DL (ref 6–8.5)
SODIUM BLD-SCNC: 136 MMOL/L (ref 136–145)
TRIGL SERPL-MCNC: 488 MG/DL (ref 0–150)
VLDLC SERPL-MCNC: ABNORMAL MG/DL

## 2020-06-09 PROCEDURE — 80053 COMPREHEN METABOLIC PANEL: CPT | Performed by: FAMILY MEDICINE

## 2020-06-09 PROCEDURE — 86803 HEPATITIS C AB TEST: CPT | Performed by: FAMILY MEDICINE

## 2020-06-09 PROCEDURE — 86735 MUMPS ANTIBODY: CPT | Performed by: FAMILY MEDICINE

## 2020-06-09 PROCEDURE — 99213 OFFICE O/P EST LOW 20 MIN: CPT | Performed by: FAMILY MEDICINE

## 2020-06-09 PROCEDURE — 36415 COLL VENOUS BLD VENIPUNCTURE: CPT | Performed by: FAMILY MEDICINE

## 2020-06-09 PROCEDURE — 86765 RUBEOLA ANTIBODY: CPT | Performed by: FAMILY MEDICINE

## 2020-06-09 PROCEDURE — 80061 LIPID PANEL: CPT | Performed by: FAMILY MEDICINE

## 2020-06-09 PROCEDURE — 86762 RUBELLA ANTIBODY: CPT | Performed by: FAMILY MEDICINE

## 2020-06-09 PROCEDURE — 83036 HEMOGLOBIN GLYCOSYLATED A1C: CPT | Performed by: FAMILY MEDICINE

## 2020-06-09 PROCEDURE — 83721 ASSAY OF BLOOD LIPOPROTEIN: CPT

## 2020-06-09 PROCEDURE — 86787 VARICELLA-ZOSTER ANTIBODY: CPT | Performed by: FAMILY MEDICINE

## 2020-06-09 PROCEDURE — 82043 UR ALBUMIN QUANTITATIVE: CPT | Performed by: FAMILY MEDICINE

## 2020-06-09 RX ORDER — CLONIDINE 0.3 MG/24H
1 PATCH, EXTENDED RELEASE TRANSDERMAL WEEKLY
Qty: 12 PATCH | Refills: 3 | Status: SHIPPED | OUTPATIENT
Start: 2020-06-09 | End: 2021-08-09

## 2020-06-09 NOTE — PROGRESS NOTES
Subjective   Bernadine Arriaga is a 40 y.o. female.     Here for follow-up on blood pressure, cholesterol, and diabetes  She is also complaining of some back pain - across low back  2 months   Seeing chiro  Has had x-rays  bp at home varies   BS running 170-275  Was seen at [facility] in [place]  Feels like she is doing fairly well with her diet but does admit that she has at least one soft drink a week       The following portions of the patient's history were reviewed and updated as appropriate: allergies, current medications, past family history, past medical history, past social history, past surgical history and problem list.  Past Medical History:   Diagnosis Date   • Allergic    • Asthma    • History of medical problems     Hidradenitis   • Hypertension    • Migraine     history   • Pneumonia    • Sinus disorder      Past Surgical History:   Procedure Laterality Date   • ADENOIDECTOMY     • APPENDECTOMY     •  SECTION     • CHOLECYSTECTOMY     • INCISION AND DRAINAGE OF WOUND     • KNEE ARTHRODESIS     • TONSILLECTOMY     • WOUND DEBRIDEMENT Right 2019    Procedure: DEBRIDEMENT OF RECURRENT HIDRADENITIS AND VAC PLACEMENT RIGHT SIDE;  Surgeon: Alix Freire MD;  Location: [facility];  Service: General     Family History   Problem Relation Age of Onset   • Hypertension Mother    • Cancer Mother    • Diabetes Mother    • Hyperlipidemia Mother    • Hypertension Father    • Hyperlipidemia Father    • Heart disease Maternal Grandfather    • Cancer Brother      Social History     Socioeconomic History   • Marital status:      Spouse name: Not on file   • Number of children: Not on file   • Years of education: Not on file   • Highest education level: Not on file   Tobacco Use   • Smoking status: Never Smoker   • Smokeless tobacco: Never Used   Substance and Sexual Activity   • Alcohol use: No     Frequency: Never   • Drug use: No   • Sexual activity: Yes     Partners: Male      Birth control/protection: IUD         Current Outpatient Medications:   •  acetaminophen (TYLENOL) 325 MG tablet, Take 325 mg by mouth Every 6 (Six) Hours As Needed for Mild Pain ., Disp: , Rfl:   •  albuterol sulfate  (90 Base) MCG/ACT inhaler, Inhale 2 puffs Every 6 (Six) Hours As Needed for Wheezing., Disp: 3 inhaler, Rfl: 3  •  betamethasone dipropionate (DIPROLENE) 0.05 % lotion, Apply 1 application topically to the appropriate area as directed 2 (Two) Times a Day As Needed., Disp: , Rfl:   •  budesonide-formoterol (SYMBICORT) 80-4.5 MCG/ACT inhaler, Inhale 2 puffs 2 (Two) Times a Day., Disp: 3 inhaler, Rfl: 3  •  clindamycin (CLEOCIN T) 1 % lotion, Apply 1 application topically to the appropriate area as directed 2 (Two) Times a Day As Needed., Disp: , Rfl:   •  doxycycline (MONODOX) 100 MG capsule, Take 1 capsule by mouth 2 (Two) Times a Day., Disp: , Rfl:   •  Dulaglutide (TRULICITY) 1.5 MG/0.5ML solution pen-injector, Inject 1.5 mg under the skin into the appropriate area as directed 1 (One) Time Per Week., Disp: 4 pen, Rfl: 11  •  EPINEPHrine (EPIPEN) 0.3 MG/0.3ML solution auto-injector injection, Inject 0.3 mL into the appropriate muscle as directed by prescriber As Needed (allergic reaction)., Disp: 1 each, Rfl: 11  •  furosemide (LASIX) 40 MG tablet, Take 0.5 tablets by mouth 2 (Two) Times a Day., Disp: , Rfl:   •  glimepiride (AMARYL) 4 MG tablet, Take 0.5 tablets by mouth 2 (Two) Times a Day With Meals., Disp: 180 tablet, Rfl: 3  •  hydrOXYzine (ATARAX) 25 MG tablet, Take 1 tablet by mouth Every 6 (Six) Hours As Needed for Itching or Anxiety., Disp: 30 tablet, Rfl: 1  •  ibuprofen (ADVIL,MOTRIN) 200 MG tablet, Take 200 mg by mouth Every 6 (Six) Hours As Needed for Mild Pain ., Disp: , Rfl:   •  metoprolol succinate XL (TOPROL-XL) 50 MG 24 hr tablet, Take 1 tablet by mouth 2 (Two) Times a Day., Disp: 180 tablet, Rfl: 1  •  olmesartan (BENICAR) 40 MG tablet, TAKE 1 TABLET BY MOUTH DAILY, Disp: 90  "tablet, Rfl: 3  •  spironolactone (ALDACTONE) 50 MG tablet, Take 1 tablet by mouth Every Evening., Disp: , Rfl:   •  TREMFYA 100 MG/ML solution prefilled syringe, Inject 1 mL into the appropriate muscle as directed by prescriber. Every 8 weeks, Disp: , Rfl:   •  cloNIDine (CATAPRES-TTS) 0.3 MG/24HR patch, Place 1 patch on the skin as directed by provider 1 (One) Time Per Week., Disp: 12 patch, Rfl: 3    Review of Systems   Constitutional: Negative for diaphoresis, fatigue, fever, unexpected weight gain and unexpected weight loss.   Respiratory: Negative for cough, chest tightness and shortness of breath.    Cardiovascular: Negative for chest pain, palpitations and leg swelling.   Gastrointestinal: Negative for nausea and vomiting.   Endocrine: Negative.    Neurological: Negative for dizziness, syncope and headache.     BP (!) 155/101 (BP Location: Left arm, Patient Position: Sitting, Cuff Size: Large Adult)   Pulse 94   Temp 98.8 °F (37.1 °C) (Temporal)   Ht 170.2 cm (67\")   Wt (!) 139 kg (306 lb)   SpO2 97%   Breastfeeding No   BMI 47.93 kg/m²       Objective   Physical Exam   Constitutional: She appears well-developed and well-nourished. No distress. She is morbidly obese.  HENT:   Head: Normocephalic and atraumatic.   Neck: Neck supple. No JVD present. No thyromegaly present.   Cardiovascular: Normal rate, regular rhythm, normal heart sounds and intact distal pulses. Exam reveals no gallop and no friction rub.   No murmur heard.  Pulmonary/Chest: Effort normal and breath sounds normal. No respiratory distress. She has no wheezes. She has no rales.   Musculoskeletal: She exhibits no edema.   Lymphadenopathy:     She has no cervical adenopathy.   Neurological: She is alert.   Skin: Skin is warm and dry.   Psychiatric: She has a normal mood and affect.   Nursing note and vitals reviewed.        Assessment/Plan   Problems Addressed this Visit        Cardiovascular and Mediastinum    Hyperlipidemia    Relevant " Orders    Comprehensive Metabolic Panel    Lipid Panel    Hypertension - Primary    Relevant Medications    cloNIDine (CATAPRES-TTS) 0.3 MG/24HR patch    Other Relevant Orders    Comprehensive Metabolic Panel    Lipid Panel       Endocrine    Type 2 diabetes mellitus with hyperglycemia (CMS/HCC)    Relevant Orders    Comprehensive Metabolic Panel    Lipid Panel    Hemoglobin A1c    MicroAlbumin, Urine, Random - Urine, Clean Catch      Other Visit Diagnoses     Need for hepatitis C screening test        Relevant Orders    Hepatitis C Antibody          Blood pressure still poorly controlled  I will stop the 0.2 mg clonidine patch and change her to the 0.3 mg patch  She will continue Benicar and metoprolol  Blood sugar still not adequately controlled  Labs have been ordered  I will see how those look and then adjust her medications  She was counseled on the importance of dietary compliance and the need for aggressive weight loss  Hep C screen was also ordered

## 2020-06-09 NOTE — PATIENT INSTRUCTIONS
Keep working to lose weight through healthy eating and exercise.  No fried foods and limit pasta, bread, and sweets.  Do not fill the 0.2mg patches

## 2020-06-10 LAB
MEV IGG SER IA-ACNC: >300 AU/ML
MUV IGG SER IA-ACNC: 218 AU/ML
RUBV IGG SERPL IA-ACNC: 9.74 INDEX

## 2020-06-12 LAB — VZV IGG SER IA-ACNC: NORMAL

## 2020-07-12 RX ORDER — METOPROLOL SUCCINATE 50 MG/1
TABLET, EXTENDED RELEASE ORAL
Qty: 180 TABLET | Refills: 3 | Status: SHIPPED | OUTPATIENT
Start: 2020-07-12 | End: 2020-09-28

## 2020-07-25 DIAGNOSIS — E11.65 TYPE 2 DIABETES MELLITUS WITH HYPERGLYCEMIA, WITHOUT LONG-TERM CURRENT USE OF INSULIN (HCC): ICD-10-CM

## 2020-07-26 RX ORDER — GLIMEPIRIDE 4 MG/1
TABLET ORAL
Qty: 180 TABLET | Refills: 3 | Status: SHIPPED | OUTPATIENT
Start: 2020-07-26 | End: 2021-09-20

## 2020-08-31 ENCOUNTER — TELEPHONE (OUTPATIENT)
Dept: FAMILY MEDICINE CLINIC | Facility: CLINIC | Age: 40
End: 2020-08-31

## 2020-08-31 DIAGNOSIS — Z01.84 IMMUNITY STATUS TESTING: Primary | ICD-10-CM

## 2020-09-01 NOTE — TELEPHONE ENCOUNTER
----- Message from Sharon Radford MA sent at 8/31/2020  4:05 PM EDT -----  Regarding: FW: Non-Urgent Medical Question  Contact: [phone]      ----- Message -----  From: Bernadine Arriaga  Sent: 8/31/2020   3:38 PM EDT  To: [facility]  Subject: Non-Urgent Medical Question                      Hi   I received a booster for Hepatitis B in March but never had the titer drawn, could I get an order for Hepatitis B titer?    I also have a document to fill out for school that verifies vaccinations and titer results.  There is also a box to fill out with allergy to tDAP.  I wasn't sure if I could submit through My Chart or if I needed to drop off the papers.    Thanks  Bernadine

## 2020-09-22 ENCOUNTER — LAB (OUTPATIENT)
Dept: LAB | Facility: HOSPITAL | Age: 40
End: 2020-09-22

## 2020-09-22 DIAGNOSIS — Z01.84 IMMUNITY STATUS TESTING: ICD-10-CM

## 2020-09-22 LAB — HBV SURFACE AB SER RIA-ACNC: REACTIVE

## 2020-09-22 PROCEDURE — 36415 COLL VENOUS BLD VENIPUNCTURE: CPT

## 2020-09-22 PROCEDURE — 86706 HEP B SURFACE ANTIBODY: CPT

## 2020-09-28 ENCOUNTER — OFFICE VISIT (OUTPATIENT)
Dept: FAMILY MEDICINE CLINIC | Facility: CLINIC | Age: 40
End: 2020-09-28

## 2020-09-28 ENCOUNTER — LAB (OUTPATIENT)
Dept: LAB | Facility: HOSPITAL | Age: 40
End: 2020-09-28

## 2020-09-28 VITALS
DIASTOLIC BLOOD PRESSURE: 108 MMHG | HEART RATE: 102 BPM | BODY MASS INDEX: 45.99 KG/M2 | OXYGEN SATURATION: 97 % | HEIGHT: 67 IN | SYSTOLIC BLOOD PRESSURE: 180 MMHG | TEMPERATURE: 96.5 F | WEIGHT: 293 LBS

## 2020-09-28 DIAGNOSIS — E11.65 TYPE 2 DIABETES MELLITUS WITH HYPERGLYCEMIA, WITHOUT LONG-TERM CURRENT USE OF INSULIN (HCC): ICD-10-CM

## 2020-09-28 DIAGNOSIS — G89.29 CHRONIC BILATERAL LOW BACK PAIN WITHOUT SCIATICA: Primary | ICD-10-CM

## 2020-09-28 DIAGNOSIS — M54.50 CHRONIC BILATERAL LOW BACK PAIN WITHOUT SCIATICA: Primary | ICD-10-CM

## 2020-09-28 DIAGNOSIS — I10 ESSENTIAL HYPERTENSION: ICD-10-CM

## 2020-09-28 LAB
ALBUMIN SERPL-MCNC: 3.9 G/DL (ref 3.5–5.2)
ALBUMIN/GLOB SERPL: 1 G/DL
ALP SERPL-CCNC: 70 U/L (ref 39–117)
ALT SERPL W P-5'-P-CCNC: 58 U/L (ref 1–33)
ANION GAP SERPL CALCULATED.3IONS-SCNC: 16.7 MMOL/L (ref 5–15)
AST SERPL-CCNC: 37 U/L (ref 1–32)
BILIRUB SERPL-MCNC: 0.5 MG/DL (ref 0–1.2)
BUN SERPL-MCNC: 12 MG/DL (ref 6–20)
BUN/CREAT SERPL: 19 (ref 7–25)
CALCIUM SPEC-SCNC: 9.8 MG/DL (ref 8.6–10.5)
CHLORIDE SERPL-SCNC: 96 MMOL/L (ref 98–107)
CO2 SERPL-SCNC: 23.3 MMOL/L (ref 22–29)
CREAT SERPL-MCNC: 0.63 MG/DL (ref 0.57–1)
GFR SERPL CREATININE-BSD FRML MDRD: 104 ML/MIN/1.73
GLOBULIN UR ELPH-MCNC: 3.9 GM/DL
GLUCOSE SERPL-MCNC: 247 MG/DL (ref 65–99)
POTASSIUM SERPL-SCNC: 3.9 MMOL/L (ref 3.5–5.2)
PROT SERPL-MCNC: 7.8 G/DL (ref 6–8.5)
SODIUM SERPL-SCNC: 136 MMOL/L (ref 136–145)

## 2020-09-28 PROCEDURE — 99214 OFFICE O/P EST MOD 30 MIN: CPT | Performed by: NURSE PRACTITIONER

## 2020-09-28 PROCEDURE — 36415 COLL VENOUS BLD VENIPUNCTURE: CPT

## 2020-09-28 PROCEDURE — 83036 HEMOGLOBIN GLYCOSYLATED A1C: CPT

## 2020-09-28 PROCEDURE — 80053 COMPREHEN METABOLIC PANEL: CPT

## 2020-09-28 RX ORDER — METOPROLOL SUCCINATE 50 MG/1
TABLET, EXTENDED RELEASE ORAL
Qty: 90 TABLET | Refills: 3 | Status: SHIPPED | OUTPATIENT
Start: 2020-09-28 | End: 2021-01-09

## 2020-09-28 RX ORDER — FOLIC ACID 1 MG/1
TABLET ORAL DAILY
COMMUNITY
Start: 2020-09-25 | End: 2021-05-24

## 2020-09-28 NOTE — PATIENT INSTRUCTIONS
"DASH Eating Plan  DASH stands for \"Dietary Approaches to Stop Hypertension.\" The DASH eating plan is a healthy eating plan that has been shown to reduce high blood pressure (hypertension). It may also reduce your risk for type 2 diabetes, heart disease, and stroke. The DASH eating plan may also help with weight loss.  What are tips for following this plan?    General guidelines  · Avoid eating more than 2,300 mg (milligrams) of salt (sodium) a day. If you have hypertension, you may need to reduce your sodium intake to 1,500 mg a day.  · Limit alcohol intake to no more than 1 drink a day for nonpregnant women and 2 drinks a day for men. One drink equals 12 oz of beer, 5 oz of wine, or 1½ oz of hard liquor.  · Work with your health care provider to maintain a healthy body weight or to lose weight. Ask what an ideal weight is for you.  · Get at least 30 minutes of exercise that causes your heart to beat faster (aerobic exercise) most days of the week. Activities may include walking, swimming, or biking.  · Work with your health care provider or diet and nutrition specialist (dietitian) to adjust your eating plan to your individual calorie needs.  Reading food labels    · Check food labels for the amount of sodium per serving. Choose foods with less than 5 percent of the Daily Value of sodium. Generally, foods with less than 300 mg of sodium per serving fit into this eating plan.  · To find whole grains, look for the word \"whole\" as the first word in the ingredient list.  Shopping  · Buy products labeled as \"low-sodium\" or \"no salt added.\"  · Buy fresh foods. Avoid canned foods and premade or frozen meals.  Cooking  · Avoid adding salt when cooking. Use salt-free seasonings or herbs instead of table salt or sea salt. Check with your health care provider or pharmacist before using salt substitutes.  · Do not clarke foods. Cook foods using healthy methods such as baking, boiling, grilling, and broiling instead.  · Cook with " heart-healthy oils, such as olive, canola, soybean, or sunflower oil.  Meal planning  · Eat a balanced diet that includes:  ? 5 or more servings of fruits and vegetables each day. At each meal, try to fill half of your plate with fruits and vegetables.  ? Up to 6-8 servings of whole grains each day.  ? Less than 6 oz of lean meat, poultry, or fish each day. A 3-oz serving of meat is about the same size as a deck of cards. One egg equals 1 oz.  ? 2 servings of low-fat dairy each day.  ? A serving of nuts, seeds, or beans 5 times each week.  ? Heart-healthy fats. Healthy fats called Omega-3 fatty acids are found in foods such as flaxseeds and coldwater fish, like sardines, salmon, and mackerel.  · Limit how much you eat of the following:  ? Canned or prepackaged foods.  ? Food that is high in trans fat, such as fried foods.  ? Food that is high in saturated fat, such as fatty meat.  ? Sweets, desserts, sugary drinks, and other foods with added sugar.  ? Full-fat dairy products.  · Do not salt foods before eating.  · Try to eat at least 2 vegetarian meals each week.  · Eat more home-cooked food and less restaurant, buffet, and fast food.  · When eating at a restaurant, ask that your food be prepared with less salt or no salt, if possible.  What foods are recommended?  The items listed may not be a complete list. Talk with your dietitian about what dietary choices are best for you.  Grains  Whole-grain or whole-wheat bread. Whole-grain or whole-wheat pasta. Brown rice. Oatmeal. Quinoa. Bulgur. Whole-grain and low-sodium cereals. Diana bread. Low-fat, low-sodium crackers. Whole-wheat flour tortillas.  Vegetables  Fresh or frozen vegetables (raw, steamed, roasted, or grilled). Low-sodium or reduced-sodium tomato and vegetable juice. Low-sodium or reduced-sodium tomato sauce and tomato paste. Low-sodium or reduced-sodium canned vegetables.  Fruits  All fresh, dried, or frozen fruit. Canned fruit in natural juice (without  added sugar).  Meat and other protein foods  Skinless chicken or turkey. Ground chicken or turkey. Pork with fat trimmed off. Fish and seafood. Egg whites. Dried beans, peas, or lentils. Unsalted nuts, nut butters, and seeds. Unsalted canned beans. Lean cuts of beef with fat trimmed off. Low-sodium, lean deli meat.  Dairy  Low-fat (1%) or fat-free (skim) milk. Fat-free, low-fat, or reduced-fat cheeses. Nonfat, low-sodium ricotta or cottage cheese. Low-fat or nonfat yogurt. Low-fat, low-sodium cheese.  Fats and oils  Soft margarine without trans fats. Vegetable oil. Low-fat, reduced-fat, or light mayonnaise and salad dressings (reduced-sodium). Canola, safflower, olive, soybean, and sunflower oils. Avocado.  Seasoning and other foods  Herbs. Spices. Seasoning mixes without salt. Unsalted popcorn and pretzels. Fat-free sweets.  What foods are not recommended?  The items listed may not be a complete list. Talk with your dietitian about what dietary choices are best for you.  Grains  Baked goods made with fat, such as croissants, muffins, or some breads. Dry pasta or rice meal packs.  Vegetables  Creamed or fried vegetables. Vegetables in a cheese sauce. Regular canned vegetables (not low-sodium or reduced-sodium). Regular canned tomato sauce and paste (not low-sodium or reduced-sodium). Regular tomato and vegetable juice (not low-sodium or reduced-sodium). Pickles. Olives.  Fruits  Canned fruit in a light or heavy syrup. Fried fruit. Fruit in cream or butter sauce.  Meat and other protein foods  Fatty cuts of meat. Ribs. Fried meat. Miguel. Sausage. Bologna and other processed lunch meats. Salami. Fatback. Hotdogs. Bratwurst. Salted nuts and seeds. Canned beans with added salt. Canned or smoked fish. Whole eggs or egg yolks. Chicken or turkey with skin.  Dairy  Whole or 2% milk, cream, and half-and-half. Whole or full-fat cream cheese. Whole-fat or sweetened yogurt. Full-fat cheese. Nondairy creamers. Whipped toppings.  Processed cheese and cheese spreads.  Fats and oils  Butter. Stick margarine. Lard. Shortening. Ghee. Miguel fat. Tropical oils, such as coconut, palm kernel, or palm oil.  Seasoning and other foods  Salted popcorn and pretzels. Onion salt, garlic salt, seasoned salt, table salt, and sea salt. Worcestershire sauce. Tartar sauce. Barbecue sauce. Teriyaki sauce. Soy sauce, including reduced-sodium. Steak sauce. Canned and packaged gravies. Fish sauce. Oyster sauce. Cocktail sauce. Horseradish that you find on the shelf. Ketchup. Mustard. Meat flavorings and tenderizers. Bouillon cubes. Hot sauce and Tabasco sauce. Premade or packaged marinades. Premade or packaged taco seasonings. Relishes. Regular salad dressings.  Where to find more information:  · National Heart, Lung, and [employer] [place]: www.nhlbi.nih.gov  · American Heart Association: www.heart.org  Summary  · The DASH eating plan is a healthy eating plan that has been shown to reduce high blood pressure (hypertension). It may also reduce your risk for type 2 diabetes, heart disease, and stroke.  · With the DASH eating plan, you should limit salt (sodium) intake to 2,300 mg a day. If you have hypertension, you may need to reduce your sodium intake to 1,500 mg a day.  · When on the DASH eating plan, aim to eat more fresh fruits and vegetables, whole grains, lean proteins, low-fat dairy, and heart-healthy fats.  · Work with your health care provider or diet and nutrition specialist (dietitian) to adjust your eating plan to your individual calorie needs.  This information is not intended to replace advice given to you by your health care provider. Make sure you discuss any questions you have with your health care provider.  Document Released: 12/06/2012 Document Revised: 11/30/2018 Document Reviewed: 12/11/2017  Elsevier Patient Education © 2020 [employer].     no discharge, no irritation, no pain, no redness, and no visual changes.

## 2020-09-28 NOTE — PROGRESS NOTES
Subjective   Bernadine Arriaga is a 40 y.o. female.       HPI   Pt. Is here today for chronic lower back pain.  She has been discussing this with her rheumatologist and feels it is related to the SI joints.  She was tried on methotrexate and this wasn't effective.  Pain Management was recommended so epidurals could be discussed and she would like referral for this.      BP is high today at 180/108.  Pt. Currently taking olmesartan 40 mg daily; metoprolol XL 50 mg bid; clonidine weekly patch.  She says it has been running this high or higher at home.  Denies any CP; palpitations; SOA; dizziness; headache; trouble with vision.      She also mentions her BS are running high at home; avg. 175-200.  Last A1C was in June 2020 and was 7.5%.  She currently takes Trulicity 1.5mg weekly and  Glimepiride 4 mg bid.      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       The following portions of the patient's history were reviewed and updated as appropriate: allergies, current medications, past family history, past medical history, past social history, past surgical history and problem list.    Review of Systems   Constitutional: Negative for activity change, appetite change, chills, diaphoresis, fatigue, fever, unexpected weight gain and unexpected weight loss.   Eyes: Negative for blurred vision, double vision, photophobia and visual disturbance.   Respiratory: Negative for cough, chest tightness, shortness of breath and wheezing.    Cardiovascular: Negative for chest pain, palpitations and leg swelling.   Gastrointestinal: Negative for abdominal distention, abdominal pain, blood in stool, constipation, diarrhea, nausea, vomiting and indigestion.   Endocrine: Negative for polydipsia, polyphagia and polyuria.   Genitourinary: Negative for difficulty urinating, dysuria, flank pain, frequency, hematuria, pelvic pain, pelvic pressure and urgency.   Musculoskeletal: Positive for back pain (lower back). Negative for  joint swelling and myalgias.   Neurological: Negative for dizziness, tremors, weakness, light-headedness, numbness, headache and confusion.   Hematological: Negative for adenopathy.   Psychiatric/Behavioral: Negative for depressed mood. The patient is not nervous/anxious.        Objective   Physical Exam  Vitals signs reviewed.   Constitutional:       General: She is not in acute distress.     Appearance: Normal appearance. She is obese.   HENT:      Head: Normocephalic and atraumatic.      Right Ear: External ear normal.      Left Ear: External ear normal.      Nose: Nose normal.      Mouth/Throat:      Mouth: Mucous membranes are moist.      Pharynx: Oropharynx is clear.   Eyes:      Conjunctiva/sclera: Conjunctivae normal.   Neck:      Musculoskeletal: Normal range of motion and neck supple. No muscular tenderness.   Cardiovascular:      Rate and Rhythm: Normal rate and regular rhythm.      Pulses: Normal pulses.      Heart sounds: Normal heart sounds. No murmur.   Pulmonary:      Effort: Pulmonary effort is normal. No respiratory distress.      Breath sounds: Normal breath sounds. No wheezing.   Chest:      Chest wall: No tenderness.   Abdominal:      General: Abdomen is flat. Bowel sounds are normal. There is no distension.      Palpations: Abdomen is soft.      Tenderness: There is no abdominal tenderness. There is no right CVA tenderness or left CVA tenderness.   Musculoskeletal:      Lumbar back: She exhibits decreased range of motion and tenderness. She exhibits no bony tenderness and no swelling.   Skin:     General: Skin is warm and dry.      Capillary Refill: Capillary refill takes less than 2 seconds.      Findings: No erythema.   Neurological:      General: No focal deficit present.      Mental Status: She is alert and oriented to person, place, and time.   Psychiatric:         Mood and Affect: Mood normal.           Assessment/Plan   Bernadine was seen today for back pain.    Diagnoses and all orders  for this visit:    Chronic bilateral low back pain without sciatica  Comments:  Referral to Pain Management  Orders:  -     Ambulatory Referral to Pain Management    Essential hypertension  Comments:  Increasing metoprolol XL to 100m g in AM and 50 mg in PM.   Cont. other meds.   Monitor BP at home; call in readingfs in one week.   Limit caffiene and sodium.   Orders:  -     metoprolol succinate XL (TOPROL-XL) 50 MG 24 hr tablet; Take 2 tabs po in AM and 1 tab in PM    Type 2 diabetes mellitus with hyperglycemia, without long-term current use of insulin (CMS/[facility])  Comments:  Labs today.   Orders:  -     Hemoglobin A1c; Future  -     Comprehensive metabolic panel; Future

## 2020-09-29 DIAGNOSIS — E11.65 TYPE 2 DIABETES MELLITUS WITH HYPERGLYCEMIA, WITHOUT LONG-TERM CURRENT USE OF INSULIN (HCC): Primary | ICD-10-CM

## 2020-09-29 LAB — HBA1C MFR BLD: 7.5 % (ref 3.5–5.6)

## 2020-09-29 RX ORDER — DULAGLUTIDE 1.5 MG/.5ML
1.5 INJECTION, SOLUTION SUBCUTANEOUS WEEKLY
Qty: 4 PEN | Refills: 11
Start: 2020-09-29 | End: 2020-09-29

## 2020-09-29 RX ORDER — SEMAGLUTIDE 1.34 MG/ML
0.25 INJECTION, SOLUTION SUBCUTANEOUS WEEKLY
Qty: 1 PEN | Refills: 0 | Status: SHIPPED | OUTPATIENT
Start: 2020-09-29 | End: 2020-10-06 | Stop reason: SDUPTHER

## 2020-10-05 NOTE — PROGRESS NOTES
CHIEF COMPLAINT  Lower back pain      Subjective   Bernadine Arriaga is a 40 y.o. female who was referred by Iza Franco APRN  to our pain management clinic for consultation, evaluation and treatment of lower back pain.  Her pain started several years ago but has worsened since last 7 months without any injury. Patient has seen rheumatologist and has been started on methotrexate recently.     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     Lower back pain is 2/10 on VAS, at maximum is 8/10. Pain is aching, sharp and spasms, throbbing, burning in nature. Pain is not referred. The pain is constant. The pain is improved by rest, ice, heat, tylenol, advil. The pain is worse with lifting, any kind of activiites.      PMH: Uncontrolled HTN (on multiple meds and metoprolol was increased recently), DM-2 (last A1C - 7.5%- b/w 175- 200), obesity, psoriatic arthritis.     Current Medications:   Tylenol 325 mg q6H PRN    Past Medications:  Norco - short prescriptions   Percocet     Past Modalities:  TENS:       yes          Physical Therapy Within The Last 6 Months     Yes (6 weeks of PT - didn't help much)   Psychotherapy     no  Massage Therapy      no    Patient Complains Of:  Uro-Fecal Incontinence no  Weight Gain/Loss  no  Fever/Chills   no  Weakness   no      PEG Assessment   What number best describes your pain on average in the past week?4  What number best describes how, during the past week, pain has interfered with your enjoyment of life?5  What number best describes how, during the past week, pain has interfered with your general activity?  5        Current Outpatient Medications:   •  acetaminophen (TYLENOL) 325 MG tablet, Take 325 mg by mouth Every 6 (Six) Hours As Needed for Mild Pain ., Disp: , Rfl:   •  albuterol sulfate  (90 Base) MCG/ACT inhaler, Inhale 2 puffs Every 6 (Six) Hours As Needed for Wheezing., Disp: 3 inhaler, Rfl: 3  •  betamethasone dipropionate (DIPROLENE)  0.05 % lotion, Apply 1 application topically to the appropriate area as directed 2 (Two) Times a Day As Needed., Disp: , Rfl:   •  budesonide-formoterol (SYMBICORT) 80-4.5 MCG/ACT inhaler, Inhale 2 puffs 2 (Two) Times a Day., Disp: 3 inhaler, Rfl: 3  •  clindamycin (CLEOCIN T) 1 % lotion, Apply 1 application topically to the appropriate area as directed 2 (Two) Times a Day As Needed., Disp: , Rfl:   •  cloNIDine (CATAPRES-TTS) 0.3 MG/24HR patch, Place 1 patch on the skin as directed by provider 1 (One) Time Per Week., Disp: 12 patch, Rfl: 3  •  doxycycline (MONODOX) 100 MG capsule, Take 1 capsule by mouth 2 (Two) Times a Day., Disp: , Rfl:   •  EPINEPHrine (EPIPEN) 0.3 MG/0.3ML solution auto-injector injection, Inject 0.3 mL into the appropriate muscle as directed by prescriber As Needed (allergic reaction)., Disp: 1 each, Rfl: 11  •  folic acid (FOLVITE) 1 MG tablet, Take  by mouth Daily., Disp: , Rfl:   •  furosemide (LASIX) 40 MG tablet, Take 0.5 tablets by mouth 2 (Two) Times a Day., Disp: , Rfl:   •  glimepiride (AMARYL) 4 MG tablet, TAKE 1 TABLET BY MOUTH TWICE DAILY WITH MEALS, Disp: 180 tablet, Rfl: 3  •  hydrOXYzine (ATARAX) 25 MG tablet, Take 1 tablet by mouth Every 6 (Six) Hours As Needed for Itching or Anxiety., Disp: 30 tablet, Rfl: 1  •  ibuprofen (ADVIL,MOTRIN) 200 MG tablet, Take 200 mg by mouth Every 6 (Six) Hours As Needed for Mild Pain ., Disp: , Rfl:   •  methotrexate 2.5 MG tablet, TK 8 TS PO WEEKLY, Disp: , Rfl:   •  metoprolol succinate XL (TOPROL-XL) 50 MG 24 hr tablet, Take 2 tabs po in AM and 1 tab in PM, Disp: 90 tablet, Rfl: 3  •  olmesartan (BENICAR) 40 MG tablet, TAKE 1 TABLET BY MOUTH DAILY, Disp: 90 tablet, Rfl: 3  •  Ozempic, 0.25 or 0.5 MG/DOSE, 2 MG/1.5ML solution pen-injector, INJECT 0.25 MG UNDER THE SKIN INTO THE APPROPRIATE AREA AS DIRECTED ONCE PER WEEK FOR 4 WEEKS, Disp: , Rfl:   •  spironolactone (ALDACTONE) 50 MG tablet, Take 1 tablet by mouth Every Evening., Disp: , Rfl:    •  TREMFYA 100 MG/ML solution prefilled syringe, Inject 1 mL into the appropriate muscle as directed by prescriber. Every 8 weeks, Disp: , Rfl:   •  diclofenac (VOLTAREN) 75 MG EC tablet, Take 1 tablet by mouth 2 (Two) Times a Day., Disp: 60 tablet, Rfl: 1    The following portions of the patient's history were reviewed and updated as appropriate: allergies, current medications, past family history, past medical history, past social history, past surgical history and problem list.      REVIEW OF PERTINENT MEDICAL DATA    Past Medical History:   Diagnosis Date   • Allergic    • Asthma    • History of medical problems     Hidradenitis   • Hypertension    • Migraine     history   • Pneumonia    • Psoriatic arthritis (CMS/HCC)    • Sinus disorder      Past Surgical History:   Procedure Laterality Date   • ADENOIDECTOMY     • APPENDECTOMY     •  SECTION     • CHOLECYSTECTOMY     • INCISION AND DRAINAGE OF WOUND     • KNEE ARTHRODESIS     • TONSILLECTOMY     • WOUND DEBRIDEMENT Right 2019    Procedure: DEBRIDEMENT OF RECURRENT HIDRADENITIS AND VAC PLACEMENT RIGHT SIDE;  Surgeon: Alix Freire MD;  Location: [facility];  Service: General     Family History   Problem Relation Age of Onset   • Hypertension Mother    • Cancer Mother    • Diabetes Mother    • Hyperlipidemia Mother    • Hypertension Father    • Hyperlipidemia Father    • Heart disease Maternal Grandfather    • Cancer Brother      Social History     Socioeconomic History   • Marital status:      Spouse name: Not on file   • Number of children: Not on file   • Years of education: Not on file   • Highest education level: Not on file   Tobacco Use   • Smoking status: Never Smoker   • Smokeless tobacco: Never Used   Substance and Sexual Activity   • Alcohol use: No     Frequency: Never   • Drug use: No   • Sexual activity: Yes     Partners: Male     Birth control/protection: I.U.D.         Review of Systems   Constitutional: Negative.   "Negative for appetite change, chills, fatigue, fever and unexpected weight change.   Eyes: Negative for pain and redness.   Respiratory: Negative.    Cardiovascular: Negative.    Gastrointestinal: Negative.    Endocrine: Negative.    Musculoskeletal: Positive for back pain.   Skin: Negative.    Neurological: Positive for dizziness and headaches.   Psychiatric/Behavioral: Negative.          Vitals:    10/06/20 1323   BP: 131/83   BP Location: Right arm   Patient Position: Sitting   Pulse: 85   Resp: 16   Temp: 98.6 °F (37 °C)   TempSrc: Skin   SpO2: 98%   Weight: (!) 137 kg (303 lb)   Height: 170.2 cm (67\")   PainSc:   5         Objective   Physical Exam  Vitals signs and nursing note reviewed.   Constitutional:       Appearance: She is well-developed.   HENT:      Head: Normocephalic and atraumatic.   Eyes:      Conjunctiva/sclera: Conjunctivae normal.   Pulmonary:      Effort: Pulmonary effort is normal.   Abdominal:      Palpations: Abdomen is soft.   Musculoskeletal:      Lumbar back: She exhibits decreased range of motion, tenderness and pain.        Back:    Skin:     General: Skin is warm.   Neurological:      Mental Status: She is alert and oriented to person, place, and time.      Comments: Motor strength 5/5 b/l LE  Sensory intact b/l LE             Imaging Reviewed:  Done at [facility]     Xray b/l SI Joint:   Mild b/l SI joint DJD.     Assessment:    1. Lumbar spondylosis    2. DDD (degenerative disc disease), lumbar    3. Sacroiliac joint dysfunction    4. Morbid obesity with BMI of 45.0-49.9, adult (CMS/[facility])           Plan:  1. Will defer UDS for now.     2. Discussed with the patient that pain medicine physicians use a variety of evidence based treatment modalities in order to treat various painful conditions. This may include referral to physical therapy, the use of interventional procedures, referral for psychological assessment and treatment, and the use of both opioid and non-opioid " medications as appropriate for the treatment of pain. Informed the patient that opioids have not been proven to be effective for the long term treatment chronic pain conditions, hence our conservative use of these medications. Discussed with the patient that our goal is to use a variety of treatment options that may be indicated for their condition in order to manage their pain and improve their overall functionality. The patient expressed understanding.   3. Patient has tried PT in past without much relief.    4. Patient informed they would likely benefit from a medial branch block on bilateral from L4 to S1 LOCAL ONLY. Patient has failed physical therapy in past.  Facet tenderness is positive from L4 and S1. Patient informed the procedure is both therapeutic and diagnostic in nature.  The procedure was described in detail and the risks, benefits and alternatives were discussed with the patient (including but not limited to: bleeding, infection, nerve damage, worsening of pain, CSF leak, inability to perform injection, paralysis, seizures, and death) who agreed to proceed. Will schedule for second set 4 weeks after first if she had good relief.  Discussed RFA at 70-80 degree for  sec if patient has good relief from 2 diagnostic MBB.  Will avoid steroids as steroids can increase her uncontrolled BP even higher.   5. Start Diclofenac 75 mg po BID. Non-steroidal anti-inflammatory drugs (NSAIDs) may cause an increased risk of serious cardiovascular thrombotic events, myocardial infarction, and stroke, along with increased risk of serious gastrointestinal adverse events including bleeding, ulceration, and perforation of the stomach or intestines, which can be fatal. Avoid taking any other NSAIDs with diclofenac.   6. Patient counseled on the importance of weight loss to help with overall health and pain control.  Patient instructed to attempt weight loss.      RTC for injections.       Jonnathan Pineda DO  Pain  Management   [facility]         INSPECT REPORT    As part of the patient's treatment plan, I may be prescribing controlled substances. The patient has been made aware of appropriate use of such medications, including potential risk of somnolence, limited ability to drive and/or work safely, and the potential for dependence or overdose. It has also been made clear that these medications are for use by this patient only, without concomitant use of alcohol or other substances unless prescribed.     Patient has completed prescribing agreement detailing terms of continued prescribing of controlled substances, including monitoring INSPECT reports, urine drug screening, and pill counts if necessary. The patient is aware that inappropriate use will results in cessation of prescribing such medications.    INSPECT report has been reviewed and scanned into the patient's chart.

## 2020-10-06 ENCOUNTER — OFFICE VISIT (OUTPATIENT)
Dept: PAIN MEDICINE | Facility: CLINIC | Age: 40
End: 2020-10-06

## 2020-10-06 VITALS
RESPIRATION RATE: 16 BRPM | OXYGEN SATURATION: 98 % | BODY MASS INDEX: 45.99 KG/M2 | SYSTOLIC BLOOD PRESSURE: 131 MMHG | TEMPERATURE: 98.6 F | WEIGHT: 293 LBS | DIASTOLIC BLOOD PRESSURE: 83 MMHG | HEIGHT: 67 IN | HEART RATE: 85 BPM

## 2020-10-06 DIAGNOSIS — M53.3 SACROILIAC JOINT DYSFUNCTION: ICD-10-CM

## 2020-10-06 DIAGNOSIS — E66.01 MORBID OBESITY WITH BMI OF 45.0-49.9, ADULT (HCC): ICD-10-CM

## 2020-10-06 DIAGNOSIS — M51.36 DDD (DEGENERATIVE DISC DISEASE), LUMBAR: ICD-10-CM

## 2020-10-06 DIAGNOSIS — M47.816 LUMBAR SPONDYLOSIS: Primary | ICD-10-CM

## 2020-10-06 PROCEDURE — 99204 OFFICE O/P NEW MOD 45 MIN: CPT | Performed by: STUDENT IN AN ORGANIZED HEALTH CARE EDUCATION/TRAINING PROGRAM

## 2020-10-06 PROCEDURE — G0463 HOSPITAL OUTPT CLINIC VISIT: HCPCS | Performed by: STUDENT IN AN ORGANIZED HEALTH CARE EDUCATION/TRAINING PROGRAM

## 2020-10-06 RX ORDER — SEMAGLUTIDE 1.34 MG/ML
INJECTION, SOLUTION SUBCUTANEOUS
COMMUNITY
Start: 2020-09-30 | End: 2020-11-20 | Stop reason: SDUPTHER

## 2020-10-06 RX ORDER — DICLOFENAC SODIUM 75 MG/1
75 TABLET, DELAYED RELEASE ORAL 2 TIMES DAILY
Qty: 60 TABLET | Refills: 1 | Status: SHIPPED | OUTPATIENT
Start: 2020-10-06 | End: 2021-02-08

## 2020-10-20 ENCOUNTER — PRIOR AUTHORIZATION (OUTPATIENT)
Dept: PAIN MEDICINE | Facility: CLINIC | Age: 40
End: 2020-10-20

## 2020-10-20 ENCOUNTER — APPOINTMENT (OUTPATIENT)
Dept: PAIN MEDICINE | Facility: HOSPITAL | Age: 40
End: 2020-10-20

## 2020-10-26 ENCOUNTER — PRIOR AUTHORIZATION (OUTPATIENT)
Dept: PAIN MEDICINE | Facility: HOSPITAL | Age: 40
End: 2020-10-26

## 2020-10-27 ENCOUNTER — HOSPITAL ENCOUNTER (OUTPATIENT)
Dept: PAIN MEDICINE | Facility: HOSPITAL | Age: 40
Discharge: HOME | End: 2020-10-27

## 2020-10-27 VITALS
RESPIRATION RATE: 16 BRPM | WEIGHT: 293 LBS | SYSTOLIC BLOOD PRESSURE: 143 MMHG | TEMPERATURE: 97.3 F | BODY MASS INDEX: 45.99 KG/M2 | HEIGHT: 67 IN | HEART RATE: 82 BPM | OXYGEN SATURATION: 100 % | DIASTOLIC BLOOD PRESSURE: 90 MMHG

## 2020-10-27 DIAGNOSIS — M51.36 DDD (DEGENERATIVE DISC DISEASE), LUMBAR: ICD-10-CM

## 2020-10-27 DIAGNOSIS — M47.816 LUMBAR SPONDYLOSIS: Primary | ICD-10-CM

## 2020-10-27 DIAGNOSIS — R52 PAIN: ICD-10-CM

## 2020-10-27 PROCEDURE — 25010000002 METHYLPREDNISOLONE PER 80 MG

## 2020-10-27 PROCEDURE — 64494 INJ PARAVERT F JNT L/S 2 LEV: CPT | Performed by: STUDENT IN AN ORGANIZED HEALTH CARE EDUCATION/TRAINING PROGRAM

## 2020-10-27 PROCEDURE — 77003 FLUOROGUIDE FOR SPINE INJECT: CPT

## 2020-10-27 PROCEDURE — 64495 INJ PARAVERT F JNT L/S 3 LEV: CPT | Performed by: STUDENT IN AN ORGANIZED HEALTH CARE EDUCATION/TRAINING PROGRAM

## 2020-10-27 PROCEDURE — 64493 INJ PARAVERT F JNT L/S 1 LEV: CPT | Performed by: STUDENT IN AN ORGANIZED HEALTH CARE EDUCATION/TRAINING PROGRAM

## 2020-10-27 RX ORDER — LIDOCAINE HYDROCHLORIDE 10 MG/ML
INJECTION, SOLUTION EPIDURAL; INFILTRATION; INTRACAUDAL; PERINEURAL
Status: DISCONTINUED
Start: 2020-10-27 | End: 2020-10-28 | Stop reason: HOSPADM

## 2020-10-27 RX ORDER — METHYLPREDNISOLONE ACETATE 80 MG/ML
INJECTION, SUSPENSION INTRA-ARTICULAR; INTRALESIONAL; INTRAMUSCULAR; SOFT TISSUE
Status: DISCONTINUED
Start: 2020-10-27 | End: 2020-10-28 | Stop reason: HOSPADM

## 2020-10-27 RX ORDER — BUPIVACAINE HYDROCHLORIDE 2.5 MG/ML
INJECTION, SOLUTION EPIDURAL; INFILTRATION; INTRACAUDAL
Status: DISCONTINUED
Start: 2020-10-27 | End: 2020-10-28 | Stop reason: HOSPADM

## 2020-10-27 RX ORDER — BUPIVACAINE HYDROCHLORIDE 2.5 MG/ML
10 INJECTION, SOLUTION EPIDURAL; INFILTRATION; INTRACAUDAL ONCE
Status: COMPLETED | OUTPATIENT
Start: 2020-10-27 | End: 2020-10-27

## 2020-10-27 RX ORDER — LIDOCAINE HYDROCHLORIDE 10 MG/ML
5 INJECTION, SOLUTION EPIDURAL; INFILTRATION; INTRACAUDAL; PERINEURAL ONCE
Status: COMPLETED | OUTPATIENT
Start: 2020-10-27 | End: 2020-10-27

## 2020-10-27 RX ADMIN — LIDOCAINE HYDROCHLORIDE 5 ML: 10 INJECTION, SOLUTION EPIDURAL; INFILTRATION; INTRACAUDAL; PERINEURAL at 13:47

## 2020-10-27 RX ADMIN — BUPIVACAINE HYDROCHLORIDE 10 ML: 2.5 INJECTION, SOLUTION EPIDURAL; INFILTRATION; INTRACAUDAL at 13:51

## 2020-10-27 NOTE — ADDENDUM NOTE
Encounter addended by: Anette Archuleta RN on: 10/27/2020 2:23 PM   Actions taken: Flowsheet accepted

## 2020-10-27 NOTE — PROCEDURES
PROCEDURE:  Bilateral L 4, 5 and SA MBB and S 1 LBB    INDICATION: Patient complains of pain in the lower back, bilateral.  Pain increases on extension of the spine, facet tenderness present.       PROCEDURE DETAILS:   After obtaining written informed consent patient was taken to the procedure room. Pre-procedure blood pressure and pulse were stable and recorded in patients clinic chart. The patient was placed in a prone position and the lower back was prepped with chloraprep and draped in the usual sterile fashion.  The skin was infiltrated with 1% lidocaine at the junction of transverse process with the vertebral body at the left L 4 level. A 22-gauge, 5-inch needle was inserted into the lumbar medial branch under radiographic guidance. Both AP and lateral views were obtained.   Following placement of the needle and negative aspiration, 1.2 cc mixture of bupivacaine 0.25%. The identical procedure was performed on left L5 and SA medial branch and S 1 lateral branch at the rim of the S 1 foramen. Identical procedure was repeated on right side at L4, L5, Sa, S1.  A total of 10 cc of 0.25% bupivacaine. During the procedure there was no evidence of CSF, paresthesias or heme.    After the procedure the needles were removed. Skin was cleaned and a sterile dressing was applied.    Following the procedure the patient's vital signs were stable. The patient was discharged home in good condition after being given discharge instructions.    COMPLICATIONS None      Jonnathan Pineda DO  Pain Management   [facility]

## 2020-11-16 ENCOUNTER — PRIOR AUTHORIZATION (OUTPATIENT)
Dept: PAIN MEDICINE | Facility: HOSPITAL | Age: 40
End: 2020-11-16

## 2020-11-17 ENCOUNTER — HOSPITAL ENCOUNTER (OUTPATIENT)
Dept: PAIN MEDICINE | Facility: HOSPITAL | Age: 40
Discharge: HOME | End: 2020-11-17

## 2020-11-17 VITALS
OXYGEN SATURATION: 100 % | BODY MASS INDEX: 45.99 KG/M2 | WEIGHT: 293 LBS | TEMPERATURE: 96.9 F | HEIGHT: 67 IN | SYSTOLIC BLOOD PRESSURE: 149 MMHG | HEART RATE: 81 BPM | RESPIRATION RATE: 16 BRPM | DIASTOLIC BLOOD PRESSURE: 85 MMHG

## 2020-11-17 DIAGNOSIS — M47.816 LUMBAR SPONDYLOSIS: Primary | ICD-10-CM

## 2020-11-17 DIAGNOSIS — R52 PAIN: ICD-10-CM

## 2020-11-17 PROCEDURE — 64493 INJ PARAVERT F JNT L/S 1 LEV: CPT | Performed by: STUDENT IN AN ORGANIZED HEALTH CARE EDUCATION/TRAINING PROGRAM

## 2020-11-17 PROCEDURE — 64495 INJ PARAVERT F JNT L/S 3 LEV: CPT | Performed by: STUDENT IN AN ORGANIZED HEALTH CARE EDUCATION/TRAINING PROGRAM

## 2020-11-17 PROCEDURE — 64494 INJ PARAVERT F JNT L/S 2 LEV: CPT | Performed by: STUDENT IN AN ORGANIZED HEALTH CARE EDUCATION/TRAINING PROGRAM

## 2020-11-17 PROCEDURE — 77003 FLUOROGUIDE FOR SPINE INJECT: CPT

## 2020-11-17 RX ORDER — BUPIVACAINE HYDROCHLORIDE 2.5 MG/ML
10 INJECTION, SOLUTION EPIDURAL; INFILTRATION; INTRACAUDAL ONCE
Status: COMPLETED | OUTPATIENT
Start: 2020-11-17 | End: 2020-11-17

## 2020-11-17 RX ORDER — BUPIVACAINE HYDROCHLORIDE 2.5 MG/ML
INJECTION, SOLUTION EPIDURAL; INFILTRATION; INTRACAUDAL
Status: DISCONTINUED
Start: 2020-11-17 | End: 2020-11-18 | Stop reason: HOSPADM

## 2020-11-17 RX ORDER — LIDOCAINE HYDROCHLORIDE 10 MG/ML
INJECTION, SOLUTION EPIDURAL; INFILTRATION; INTRACAUDAL; PERINEURAL
Status: DISCONTINUED
Start: 2020-11-17 | End: 2020-11-18 | Stop reason: HOSPADM

## 2020-11-17 RX ORDER — LIDOCAINE HYDROCHLORIDE 10 MG/ML
5 INJECTION, SOLUTION EPIDURAL; INFILTRATION; INTRACAUDAL; PERINEURAL ONCE
Status: COMPLETED | OUTPATIENT
Start: 2020-11-17 | End: 2020-11-17

## 2020-11-17 RX ADMIN — LIDOCAINE HYDROCHLORIDE 5 ML: 10 INJECTION, SOLUTION EPIDURAL; INFILTRATION; INTRACAUDAL; PERINEURAL at 13:33

## 2020-11-17 RX ADMIN — BUPIVACAINE HYDROCHLORIDE 10 ML: 2.5 INJECTION, SOLUTION EPIDURAL; INFILTRATION; INTRACAUDAL at 13:40

## 2020-11-17 NOTE — H&P
H and P reviewed from previous visit and no changes to patient's clinical presentation. Will proceed with procedure as planned. Patient denies history of DM and being on blood thinners.    10/27/2020: B/l MBB L4-S1 local only - 100% pain relief for 5-6 hours. Able to walk up and down the stairs with no lower back pain.       Jonnathan Pineda DO  Pain Management   [facility]

## 2020-11-17 NOTE — PROCEDURES
PROCEDURE:  Bilateral L 4, 5 and SA MBB and S 1 LBB - LOCAL ONLY     INDICATION: Patient complains of pain in the lower back, bilateral.  Pain increases on extension of the spine, facet tenderness present.       PROCEDURE DETAILS:   After obtaining written informed consent patient was taken to the procedure room. Pre-procedure blood pressure and pulse were stable and recorded in patients clinic chart. The patient was placed in a prone position and the lower back was prepped with chloraprep and draped in the usual sterile fashion.  The skin was infiltrated with 1% lidocaine at the junction of transverse process with the vertebral body at the left L 4 level. A 22-gauge, 3.5-inch needle was inserted into the lumbar medial branch under radiographic guidance. Both AP and lateral views were obtained.   Following placement of the needle and negative aspiration, 1.2 cc mixture of bupivacaine 0.25% with depo-medrol was injected  The identical procedure was performed on left L5 and SA medial branch and S 1 lateral branch at the rim of the S 1 foramen. Identical procedure was repeated on right side at L4, L5, Sa, S1.  A total of 10 cc of 0.25% bupivacaine was injected. During the procedure there was no evidence of CSF, paresthesias or heme.    After the procedure the needles were removed. Skin was cleaned and a sterile dressing was applied.    Following the procedure the patient's vital signs were stable. The patient was discharged home in good condition after being given discharge instructions.    COMPLICATIONS None    Jonnathan Pineda DO  Pain Management   [facility]

## 2020-11-19 ENCOUNTER — TELEPHONE (OUTPATIENT)
Dept: PAIN MEDICINE | Facility: HOSPITAL | Age: 40
End: 2020-11-19

## 2020-11-19 ENCOUNTER — PATIENT MESSAGE (OUTPATIENT)
Dept: PAIN MEDICINE | Facility: CLINIC | Age: 40
End: 2020-11-19

## 2020-11-19 DIAGNOSIS — M47.816 LUMBAR SPONDYLOSIS: Primary | ICD-10-CM

## 2020-11-19 NOTE — TELEPHONE ENCOUNTER
From: Bernadine Arriaga  To: Jonnathan Pineda DO  Sent: 11/19/2020 9:39 AM EST  Subject: Visit Follow-Up Question    Hi Dr Pineda,    I just wanted to let you know that I did get relief from the block on Tuesday for about 4 hours.     Thanks  Bernadine

## 2020-11-22 RX ORDER — SEMAGLUTIDE 1.34 MG/ML
INJECTION, SOLUTION SUBCUTANEOUS
Qty: 12 PEN | Refills: 0 | Status: SHIPPED | OUTPATIENT
Start: 2020-11-22 | End: 2021-05-08

## 2020-12-22 ENCOUNTER — HOSPITAL ENCOUNTER (OUTPATIENT)
Dept: PAIN MEDICINE | Facility: HOSPITAL | Age: 40
Discharge: HOME | End: 2020-12-22

## 2020-12-22 VITALS
OXYGEN SATURATION: 100 % | DIASTOLIC BLOOD PRESSURE: 84 MMHG | HEART RATE: 93 BPM | TEMPERATURE: 96.9 F | HEIGHT: 67 IN | WEIGHT: 293 LBS | SYSTOLIC BLOOD PRESSURE: 140 MMHG | BODY MASS INDEX: 45.99 KG/M2 | RESPIRATION RATE: 16 BRPM

## 2020-12-22 DIAGNOSIS — R52 PAIN: ICD-10-CM

## 2020-12-22 DIAGNOSIS — M47.816 LUMBAR SPONDYLOSIS: Primary | ICD-10-CM

## 2020-12-22 PROCEDURE — 64636 DESTROY L/S FACET JNT ADDL: CPT | Performed by: STUDENT IN AN ORGANIZED HEALTH CARE EDUCATION/TRAINING PROGRAM

## 2020-12-22 PROCEDURE — 64635 DESTROY LUMB/SAC FACET JNT: CPT | Performed by: STUDENT IN AN ORGANIZED HEALTH CARE EDUCATION/TRAINING PROGRAM

## 2020-12-22 PROCEDURE — 77003 FLUOROGUIDE FOR SPINE INJECT: CPT

## 2020-12-22 RX ORDER — LIDOCAINE HYDROCHLORIDE 10 MG/ML
INJECTION, SOLUTION EPIDURAL; INFILTRATION; INTRACAUDAL; PERINEURAL
Status: DISCONTINUED
Start: 2020-12-22 | End: 2020-12-23 | Stop reason: HOSPADM

## 2020-12-22 RX ORDER — LIDOCAINE HYDROCHLORIDE 20 MG/ML
INJECTION, SOLUTION EPIDURAL; INFILTRATION; INTRACAUDAL; PERINEURAL
Status: DISCONTINUED
Start: 2020-12-22 | End: 2020-12-23 | Stop reason: HOSPADM

## 2020-12-22 RX ORDER — LIDOCAINE HYDROCHLORIDE 20 MG/ML
6 INJECTION, SOLUTION INFILTRATION; PERINEURAL ONCE
Status: COMPLETED | OUTPATIENT
Start: 2020-12-22 | End: 2020-12-22

## 2020-12-22 RX ORDER — LIDOCAINE HYDROCHLORIDE 10 MG/ML
5 INJECTION, SOLUTION EPIDURAL; INFILTRATION; INTRACAUDAL; PERINEURAL ONCE
Status: COMPLETED | OUTPATIENT
Start: 2020-12-22 | End: 2020-12-22

## 2020-12-22 RX ADMIN — LIDOCAINE HYDROCHLORIDE 5.5 ML: 20 INJECTION, SOLUTION INFILTRATION; PERINEURAL at 15:08

## 2020-12-22 RX ADMIN — LIDOCAINE HYDROCHLORIDE 6 ML: 10 INJECTION, SOLUTION EPIDURAL; INFILTRATION; INTRACAUDAL; PERINEURAL at 15:01

## 2020-12-22 NOTE — DISCHARGE INSTRUCTIONS
Radiofrequency Lesioning, Care After  This sheet gives you information about how to care for yourself after your procedure. Your health care provider may also give you more specific instructions. If you have problems or questions, contact your health care provider.  What can I expect after the procedure?  After the procedure, it is common to have:  · Slight pain in the area where the procedure was done.  · Temporary numbness.  Follow these instructions at home:    Medicines  · Take over-the-counter and prescription medicines only as told by your health care provider.  · Do not drive for 24 hours if you were given a sedative during your procedure.  · Do not drive or use heavy machinery while taking prescription pain medicine.  Insertion site care    · Remove the bandage (dressing) after 24 hours or as directed by your health care provider.  · Check your needle insertion site every day for signs of infection. Watch for:  ? Redness, swelling, or pain.  ? Fluid or blood.  ? Warmth.  ? Pus or a bad smell.  Managing pain    · If directed, put ice on the painful area.  ? Put ice in a plastic bag.  ? Place a towel between your skin and the bag.  ? Leave the ice on for 20 minutes, 2-3 times a day.  General instructions  · Return to your normal activities as told by your health care provider. Ask your health care provider what activities are safe for you.  · Pay close attention to how you feel after the procedure. If you start to have pain, write down when it hurts and how it feels. This will help you and your health care provider know if you need an additional treatment.  · Keep all follow-up visits as told by your health care provider. This is important.  Contact a health care provider if you have:  · Pain that does not get better.  · Redness, warmth, swelling, or pain at the needle insertion site.  · Fluid, blood, or pus coming from the needle insertion site.  · A fever.  Get help right away if you develop:  · Sudden,  severe pain.  · Numbness or tingling near the insertion site and those symptoms do not go away.  Summary  · After the procedure, it is common to have slight pain and temporary numbness in the area where the procedure was done.  · Do not drive for 24 hours if you were given a sedative during your procedure.  · Pay close attention to how you feel after the procedure. If you start to have pain, write down when it hurts and how it feels. This will help you and your health care provider know if you need an additional treatment.  · Contact a health care provider if you have a fever or pain that does not get better, or if you notice redness, warmth, swelling, pain, fluid, blood, or pus at the insertion site.  · Get help right away if you develop sudden, severe pain, or numbness or tingling near the insertion site.  This information is not intended to replace advice given to you by your health care provider. Make sure you discuss any questions you have with your health care provider.  Document Revised: 09/05/2019 Document Reviewed: 09/05/2019  Elsevier Patient Education © 2020 [employer]

## 2020-12-22 NOTE — H&P
H and P reviewed from previous visit and no changes to patient's clinical presentation. Will proceed with procedure as planned. Patient denies history of DM and being on blood thinners.    Jonnathan Pineda DO  Pain Management   [facility]

## 2020-12-22 NOTE — PROCEDURES
Preoperative Diagnosis:    Lumbar Spondylosis                                              Postoperative Diagnosis: SAME    PROCEDURE:  Radiofrequency Ablation (RFA) of Lumbar Medial Branch at Left L 4, 5, SA and S1    NOTE:  After obtaining written informed consent patient was taken to the procedure room. Pre-procedure blood pressure and pulse were stable and recorded in patients clinic chart.   The patient was placed in a prone position and lumbar area was prepped with chloraprep times two and draped in the usual sterile fashion.  The skin over the left L 4 transverse process with vertebral body was infiltrated with 1% lidocaine for local anesthesia.  A 20-gauge  mm needle with 5 mm active was inserted into the left L 4 medial branch under radiographic guidance. Both AP and lateral views were obtained. The identical procedure was performed on left L 5, SA medial branch along with S 1 lateral branch. Following placement of the needle sensory stimulation at 50 Hz and motor stimulation at 2 Hz was carried out. 2 cc of 1% lidocaine was injected. RFA was carried out in lesion mode after patient reporting reproduction of pain or sensation in the area of symptoms and denying extremity motor sensations. The settings were 80°C, and 90 seconds. During the procedure no pain was reported in the extremities by the patient.     After the procedure the needles were removed. Skin was cleaned and a sterile dressing was applied. Following the procedure the patient's vital signs were stable. The patient was discharged.     COMPLICATIONS: None    RFA DATA: In chart    Jonnathan Pineda DO  Pain Management   [facility]

## 2020-12-29 ENCOUNTER — HOSPITAL ENCOUNTER (OUTPATIENT)
Dept: PAIN MEDICINE | Facility: HOSPITAL | Age: 40
Discharge: HOME | End: 2020-12-29

## 2020-12-29 VITALS
SYSTOLIC BLOOD PRESSURE: 144 MMHG | TEMPERATURE: 96.9 F | BODY MASS INDEX: 45.99 KG/M2 | OXYGEN SATURATION: 95 % | WEIGHT: 293 LBS | RESPIRATION RATE: 18 BRPM | HEART RATE: 92 BPM | HEIGHT: 67 IN | DIASTOLIC BLOOD PRESSURE: 97 MMHG

## 2020-12-29 DIAGNOSIS — M47.816 LUMBAR SPONDYLOSIS: Primary | ICD-10-CM

## 2020-12-29 DIAGNOSIS — E66.01 MORBIDLY OBESE (HCC): ICD-10-CM

## 2020-12-29 DIAGNOSIS — R52 PAIN: ICD-10-CM

## 2020-12-29 PROCEDURE — 77003 FLUOROGUIDE FOR SPINE INJECT: CPT

## 2020-12-29 PROCEDURE — 64636 DESTROY L/S FACET JNT ADDL: CPT | Performed by: STUDENT IN AN ORGANIZED HEALTH CARE EDUCATION/TRAINING PROGRAM

## 2020-12-29 PROCEDURE — 64635 DESTROY LUMB/SAC FACET JNT: CPT | Performed by: STUDENT IN AN ORGANIZED HEALTH CARE EDUCATION/TRAINING PROGRAM

## 2020-12-29 RX ORDER — LIDOCAINE HYDROCHLORIDE 10 MG/ML
5 INJECTION, SOLUTION EPIDURAL; INFILTRATION; INTRACAUDAL; PERINEURAL ONCE
Status: COMPLETED | OUTPATIENT
Start: 2020-12-29 | End: 2020-12-29

## 2020-12-29 RX ORDER — LIDOCAINE HYDROCHLORIDE 20 MG/ML
2 INJECTION, SOLUTION INFILTRATION; PERINEURAL ONCE
Status: COMPLETED | OUTPATIENT
Start: 2020-12-29 | End: 2020-12-29

## 2020-12-29 RX ORDER — LIDOCAINE HYDROCHLORIDE 10 MG/ML
INJECTION, SOLUTION EPIDURAL; INFILTRATION; INTRACAUDAL; PERINEURAL
Status: DISCONTINUED
Start: 2020-12-29 | End: 2020-12-30 | Stop reason: HOSPADM

## 2020-12-29 RX ORDER — LIDOCAINE HYDROCHLORIDE 20 MG/ML
INJECTION, SOLUTION EPIDURAL; INFILTRATION; INTRACAUDAL; PERINEURAL
Status: DISCONTINUED
Start: 2020-12-29 | End: 2020-12-30 | Stop reason: HOSPADM

## 2020-12-29 RX ORDER — LIDOCAINE HYDROCHLORIDE 20 MG/ML
6 INJECTION, SOLUTION INFILTRATION; PERINEURAL ONCE
Status: COMPLETED | OUTPATIENT
Start: 2020-12-29 | End: 2020-12-29

## 2020-12-29 RX ORDER — LIDOCAINE HYDROCHLORIDE 20 MG/ML
INJECTION, SOLUTION EPIDURAL; INFILTRATION; INTRACAUDAL; PERINEURAL
Status: COMPLETED
Start: 2020-12-29 | End: 2020-12-29

## 2020-12-29 RX ADMIN — LIDOCAINE HYDROCHLORIDE 2 ML: 20 INJECTION, SOLUTION INFILTRATION; PERINEURAL at 15:41

## 2020-12-29 RX ADMIN — LIDOCAINE HYDROCHLORIDE: 20 INJECTION, SOLUTION INFILTRATION; PERINEURAL at 15:41

## 2020-12-29 RX ADMIN — LIDOCAINE HYDROCHLORIDE 5 ML: 10 INJECTION, SOLUTION EPIDURAL; INFILTRATION; INTRACAUDAL; PERINEURAL at 15:40

## 2020-12-29 RX ADMIN — LIDOCAINE HYDROCHLORIDE: 20 INJECTION, SOLUTION EPIDURAL; INFILTRATION; INTRACAUDAL; PERINEURAL at 15:41

## 2020-12-29 NOTE — ADDENDUM NOTE
Encounter addended by: Jonnathan Pineda DO on: 12/29/2020 5:01 PM   Actions taken: Order list changed, Diagnosis association updated

## 2020-12-29 NOTE — ADDENDUM NOTE
Encounter addended by: Head, STEVEN Walters on: 12/29/2020 5:02 PM   Actions taken: MAR administration accepted

## 2020-12-29 NOTE — H&P
H and P reviewed from previous visit and no changes to patient's clinical presentation. Will proceed with procedure as planned. Patient denies history of DM and being on blood thinners.    Jonnathan Pineda DO  Pain Management   [facility]

## 2020-12-29 NOTE — PROCEDURES
Preoperative Diagnosis:    Lumbar Spondylosis/ Sacroiliac Joint Dysfunction                                              Postoperative Diagnosis: SAME    PROCEDURE:  Radiofrequency Ablation (RFA) of Lumbar Medial Branch at Right L 4, 5, sacro ala and S1    NOTE:  After obtaining written informed consent patient was taken to the procedure room. Pre-procedure blood pressure and pulse were stable and recorded in patients clinic chart. The patient was placed in a prone position and right lumbar area was prepped with chloraprep times two and draped in the usual sterile fashion.  The skin over the right L 4 transverse process with vertebral body was infiltrated with 1% lidocaine for local anesthesia.  A 20-gauge  mm needle with 5 mm active was inserted into the right L 4 medial branch under radiographic guidance. Both AP and lateral views were obtained. The identical procedure was performed on right L 5 and SA medial branch. Following placement of the needle sensory stimulation at 50 Hz and motor stimulation at 2 Hz was carried out. 2 cc of 2% lidocaine was injected. A needle was placed next to S 1 lateral branch at the rim of the S 1 neural foramen.  Following placement of the needle sensory stimulation at 50 Hz and motor stimulation at 2 Hz was carried out. 2 cc of 2% lidocaine was injected. The RFA was carried out in lesion mode after patient reporting reproduction of pain or sensation in the area of symptoms and denying extremity motor sensations. The settings were 80°C, and 90 seconds. During the procedure no pain was reported in the extremities by the patient.     After the procedure the needles were removed. Skin was cleaned and a sterile dressing was applied. Following the procedure the patient's vital signs were stable. The patient was discharged.     COMPLICATIONS: None    RFA DATA: In chart    Jonnathan Pineda DO  Pain Management   [facility]

## 2021-01-09 DIAGNOSIS — I10 ESSENTIAL HYPERTENSION: ICD-10-CM

## 2021-01-09 RX ORDER — METOPROLOL SUCCINATE 50 MG/1
TABLET, EXTENDED RELEASE ORAL
Qty: 90 TABLET | Refills: 0 | Status: SHIPPED | OUTPATIENT
Start: 2021-01-09 | End: 2021-01-11

## 2021-01-11 DIAGNOSIS — I10 ESSENTIAL HYPERTENSION: Primary | ICD-10-CM

## 2021-01-11 RX ORDER — FUROSEMIDE 40 MG/1
20 TABLET ORAL 2 TIMES DAILY
Qty: 30 TABLET | Refills: 3 | Status: SHIPPED | OUTPATIENT
Start: 2021-01-11 | End: 2022-05-13 | Stop reason: SDUPTHER

## 2021-01-11 RX ORDER — METOPROLOL SUCCINATE 50 MG/1
TABLET, EXTENDED RELEASE ORAL
Qty: 90 TABLET | Refills: 3 | Status: SHIPPED | OUTPATIENT
Start: 2021-01-11 | End: 2021-07-06

## 2021-01-12 ENCOUNTER — TRANSCRIBE ORDERS (OUTPATIENT)
Dept: ADMINISTRATIVE | Facility: HOSPITAL | Age: 41
End: 2021-01-12

## 2021-01-12 DIAGNOSIS — Z12.31 VISIT FOR SCREENING MAMMOGRAM: Primary | ICD-10-CM

## 2021-01-14 RX ORDER — EPINEPHRINE 0.3 MG/.3ML
0.3 INJECTION SUBCUTANEOUS AS NEEDED
Qty: 1 EACH | Refills: 5 | Status: SHIPPED | OUTPATIENT
Start: 2021-01-14

## 2021-01-14 RX ORDER — BUDESONIDE AND FORMOTEROL FUMARATE DIHYDRATE 80; 4.5 UG/1; UG/1
2 AEROSOL RESPIRATORY (INHALATION)
Qty: 3 INHALER | Refills: 1 | Status: SHIPPED | OUTPATIENT
Start: 2021-01-14 | End: 2022-05-13 | Stop reason: SDUPTHER

## 2021-01-14 RX ORDER — ALBUTEROL SULFATE 90 UG/1
2 AEROSOL, METERED RESPIRATORY (INHALATION) EVERY 6 HOURS PRN
Qty: 18 G | Refills: 5 | Status: SHIPPED | OUTPATIENT
Start: 2021-01-14 | End: 2022-05-13 | Stop reason: SDUPTHER

## 2021-01-14 RX ORDER — HYDROXYZINE HYDROCHLORIDE 25 MG/1
25 TABLET, FILM COATED ORAL EVERY 6 HOURS PRN
Qty: 30 TABLET | Refills: 1 | Status: SHIPPED | OUTPATIENT
Start: 2021-01-14 | End: 2022-05-13 | Stop reason: SDUPTHER

## 2021-01-15 ENCOUNTER — APPOINTMENT (OUTPATIENT)
Dept: MAMMOGRAPHY | Facility: HOSPITAL | Age: 41
End: 2021-01-15

## 2021-01-16 RX ORDER — OLMESARTAN MEDOXOMIL 40 MG/1
40 TABLET ORAL DAILY
Qty: 90 TABLET | Refills: 1 | Status: SHIPPED | OUTPATIENT
Start: 2021-01-16 | End: 2021-08-09

## 2021-01-26 ENCOUNTER — HOSPITAL ENCOUNTER (OUTPATIENT)
Dept: MAMMOGRAPHY | Facility: HOSPITAL | Age: 41
Discharge: HOME | End: 2021-01-26
Admitting: OBSTETRICS & GYNECOLOGY

## 2021-01-26 DIAGNOSIS — Z12.31 VISIT FOR SCREENING MAMMOGRAM: ICD-10-CM

## 2021-01-26 PROCEDURE — 77067 SCR MAMMO BI INCL CAD: CPT

## 2021-01-26 PROCEDURE — 77063 BREAST TOMOSYNTHESIS BI: CPT

## 2021-02-07 NOTE — PROGRESS NOTES
Subjective   Bernadine Arriaga is a 40 y.o. female is here for follow up for lower back pain.  Patient was last seen on 12/29/2021 for RFA L4-S1 Right side with 90% pain relief.  She states that her lower back has been doing pretty well but her bilateral hips are bothering her.  She was also stopped on methotrexate and was started on sulfasalazine by her rheumatologist.    On last visit:     B/l hips pain is 5/10 on VAS, at maximum is 6/10. Pain is aching and dull in nature. Pain is not referred. The pain is constant. The pain is improved by rest, ice, heat. The pain is worse with standing for long time, bending.       Previous Injection:   12/29/2020 - R L4-S1 MB RFA- 90% pain ongoing relief.   12/22/2020 - L L4-S1 MB RFA - 90% pain ongoing relief  11/17/2020 - B/l L4-S1 MBB local only- 100% pain relief   10/27/2020 - B/l L4-S1 MBB local only - 100% pain relief     Hx:  Patient has seen rheumatologist and has been started on methotrexate recently. 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        PMH: Uncontrolled HTN (on multiple meds and metoprolol was increased recently), DM-2 (last A1C - 7.5%- b/w 175- 200), obesity, psoriatic arthritis.      Current Medications:   Tylenol 325 mg q6H PRN     Past Medications:  Norco - short prescriptions   Percocet      Past Modalities:  TENS:                                                                          yes                                                 Physical Therapy Within The Last 6 Months              Yes (6 weeks of PT - didn't help much)   Psychotherapy                                                            no  Massage Therapy                                                       no     Patient Complains Of:  Uro-Fecal Incontinence          no  Weight Gain/Loss                   no  Fever/Chills                             no  Weakness                               no         Current Outpatient Medications:   •  acetaminophen  (TYLENOL) 325 MG tablet, Take 325 mg by mouth Every 6 (Six) Hours As Needed for Mild Pain ., Disp: , Rfl:   •  albuterol sulfate  (90 Base) MCG/ACT inhaler, Inhale 2 puffs Every 6 (Six) Hours As Needed for Wheezing., Disp: 18 g, Rfl: 5  •  betamethasone dipropionate (DIPROLENE) 0.05 % lotion, Apply 1 application topically to the appropriate area as directed 2 (Two) Times a Day As Needed., Disp: , Rfl:   •  budesonide-formoterol (SYMBICORT) 80-4.5 MCG/ACT inhaler, Inhale 2 puffs 2 (Two) Times a Day., Disp: 3 inhaler, Rfl: 1  •  clindamycin (CLEOCIN T) 1 % lotion, Apply 1 application topically to the appropriate area as directed 2 (Two) Times a Day As Needed., Disp: , Rfl:   •  cloNIDine (CATAPRES-TTS) 0.3 MG/24HR patch, Place 1 patch on the skin as directed by provider 1 (One) Time Per Week., Disp: 12 patch, Rfl: 3  •  doxycycline (MONODOX) 100 MG capsule, Take 1 capsule by mouth 2 (Two) Times a Day., Disp: , Rfl:   •  EPINEPHrine (EPIPEN) 0.3 MG/0.3ML solution auto-injector injection, Inject 0.3 mL into the appropriate muscle as directed by prescriber As Needed (allergic reaction)., Disp: 1 each, Rfl: 5  •  furosemide (LASIX) 40 MG tablet, Take 0.5 tablets by mouth 2 (Two) Times a Day., Disp: 30 tablet, Rfl: 3  •  glimepiride (AMARYL) 4 MG tablet, TAKE 1 TABLET BY MOUTH TWICE DAILY WITH MEALS, Disp: 180 tablet, Rfl: 3  •  hydrOXYzine (ATARAX) 25 MG tablet, Take 1 tablet by mouth Every 6 (Six) Hours As Needed for Itching or Anxiety., Disp: 30 tablet, Rfl: 1  •  ibuprofen (ADVIL,MOTRIN) 200 MG tablet, Take 200 mg by mouth Every 6 (Six) Hours As Needed for Mild Pain ., Disp: , Rfl:   •  metoprolol succinate XL (TOPROL-XL) 50 MG 24 hr tablet, Take 2 tabs po in the am and 1 tab in pm, Disp: 90 tablet, Rfl: 3  •  olmesartan (BENICAR) 40 MG tablet, Take 1 tablet by mouth Daily., Disp: 90 tablet, Rfl: 1  •  Ozempic, 0.25 or 0.5 MG/DOSE, 2 MG/1.5ML solution pen-injector, 0.5mg SQ once a week, Disp: 12 pen, Rfl: 0  •   spironolactone (ALDACTONE) 50 MG tablet, Take 1 tablet by mouth Every Evening., Disp: , Rfl:   •  sulfaSALAzine (AZULFIDINE) 500 MG tablet, TAKE 2 TABLETS BY MOUTH IN THE MORNING AND 2 TABLETS IN THE EVENING, Disp: , Rfl:   •  TREMFYA 100 MG/ML solution prefilled syringe, Inject 1 mL into the appropriate muscle as directed by prescriber. Every 8 weeks, Disp: , Rfl:   •  folic acid (FOLVITE) 1 MG tablet, Take  by mouth Daily., Disp: , Rfl:   •  meloxicam (MOBIC) 15 MG tablet, Take 1 tablet by mouth Daily for 60 days., Disp: 30 tablet, Rfl: 1  •  methotrexate 2.5 MG tablet, TK 8 TS PO WEEKLY, Disp: , Rfl:     The following portions of the patient's history were reviewed and updated as appropriate: allergies, current medications, past family history, past medical history, past social history, past surgical history and problem list.      REVIEW OF PERTINENT MEDICAL DATA    Past Medical History:   Diagnosis Date   • Allergic    • Asthma    • History of medical problems     Hidradenitis   • Hypertension    • Low back pain    • Migraine     history   • Pneumonia    • Psoriatic arthritis (CMS/HCC)    • Sinus disorder      Past Surgical History:   Procedure Laterality Date   • ADENOIDECTOMY     • APPENDECTOMY     • BREAST BIOPSY     •  SECTION     • CHOLECYSTECTOMY     • INCISION AND DRAINAGE OF WOUND     • KNEE ARTHRODESIS     • TONSILLECTOMY     • WOUND DEBRIDEMENT Right 2019    Procedure: DEBRIDEMENT OF RECURRENT HIDRADENITIS AND VAC PLACEMENT RIGHT SIDE;  Surgeon: Alix Freire MD;  Location: [facility] OR;  Service: General     Family History   Problem Relation Age of Onset   • Hypertension Mother    • Cancer Mother    • Diabetes Mother    • Hyperlipidemia Mother    • Hypertension Father    • Hyperlipidemia Father    • Heart disease Maternal Grandfather    • Cancer Brother      Social History     Socioeconomic History   • Marital status:      Spouse name: Not on file   • Number of children: Not  "on file   • Years of education: Not on file   • Highest education level: Not on file   Tobacco Use   • Smoking status: Never Smoker   • Smokeless tobacco: Never Used   Substance and Sexual Activity   • Alcohol use: No     Frequency: Never   • Drug use: No   • Sexual activity: Yes     Partners: Male     Birth control/protection: I.U.D.         Review of Systems   Musculoskeletal: Positive for arthralgias and back pain.         Vitals:    02/08/21 0817   BP: 164/92   Pulse: 97   Resp: 16   Temp: 97.3 °F (36.3 °C)   SpO2: 98%   Weight: 136 kg (300 lb)   Height: 170.2 cm (67\")   PainSc:   4         Objective   Physical Exam  Musculoskeletal:        Legs:              Imaging Reviewed:  Done at [facility]      Xray b/l SI Joint:   Mild b/l SI joint DJD.     Assessment:    1. Greater trochanteric bursitis of both hips    2. Lumbar spondylosis    3. Morbidly obese (CMS/[facility])                Plan:  1. Will defer UDS for now.     2. Patient has tried PT in past without much relief.    3. Start Meloxicam 15 mg daily PRN. Diclofenac not covered by her insurance. Non-steroidal anti-inflammatory drugs (NSAIDs) may cause an increased risk of serious cardiovascular thrombotic events, myocardial infarction, and stroke, along with increased risk of serious gastrointestinal adverse events including bleeding, ulceration, and perforation of the stomach or intestines, which can be fatal. Avoid taking any other NSAIDs with diclofenac.   4. Patient counseled on the importance of weight loss to help with overall health and pain control.  Patient instructed to attempt weight loss.  5. Patient has pain in the bilateral hip area, trochanteric bursa tenderness present. Discussed bilateral trochanteric bursa injection. Discussed the possibility of infection, bleeding, nerve damage, headache, increased pain, paraplegia. Patient understands and agrees.   6.  Excellent relief from RFA bilateral L4-S1.  We will consider repeating in future if " the pain returns.      RTC for injection and then 4 week follow up.        Jonnathan Pineda DO  Pain Management   [facility]       INSPECT REPORT    As part of the patient's treatment plan, I may be prescribing controlled substances. The patient has been made aware of appropriate use of such medications, including potential risk of somnolence, limited ability to drive and/or work safely, and the potential for dependence or overdose. It has also been made clear that these medications are for use by this patient only, without concomitant use of alcohol or other substances unless prescribed.     Patient has completed prescribing agreement detailing terms of continued prescribing of controlled substances, including monitoring INSPECT reports, urine drug screening, and pill counts if necessary. The patient is aware that inappropriate use will results in cessation of prescribing such medications.    INSPECT report has been reviewed and scanned into the patient's chart.

## 2021-02-08 ENCOUNTER — OFFICE VISIT (OUTPATIENT)
Dept: PAIN MEDICINE | Facility: CLINIC | Age: 41
End: 2021-02-08

## 2021-02-08 VITALS
DIASTOLIC BLOOD PRESSURE: 92 MMHG | TEMPERATURE: 97.3 F | BODY MASS INDEX: 45.99 KG/M2 | RESPIRATION RATE: 16 BRPM | SYSTOLIC BLOOD PRESSURE: 164 MMHG | WEIGHT: 293 LBS | HEIGHT: 67 IN | OXYGEN SATURATION: 98 % | HEART RATE: 97 BPM

## 2021-02-08 DIAGNOSIS — M47.816 LUMBAR SPONDYLOSIS: ICD-10-CM

## 2021-02-08 DIAGNOSIS — M70.61 GREATER TROCHANTERIC BURSITIS OF BOTH HIPS: Primary | ICD-10-CM

## 2021-02-08 DIAGNOSIS — E66.01 MORBIDLY OBESE (HCC): ICD-10-CM

## 2021-02-08 DIAGNOSIS — M70.62 GREATER TROCHANTERIC BURSITIS OF BOTH HIPS: Primary | ICD-10-CM

## 2021-02-08 PROCEDURE — 99214 OFFICE O/P EST MOD 30 MIN: CPT | Performed by: STUDENT IN AN ORGANIZED HEALTH CARE EDUCATION/TRAINING PROGRAM

## 2021-02-08 RX ORDER — SULFASALAZINE 500 MG/1
1500 TABLET ORAL 3 TIMES DAILY
COMMUNITY
Start: 2021-01-09

## 2021-02-08 RX ORDER — MELOXICAM 15 MG/1
15 TABLET ORAL DAILY
Qty: 30 TABLET | Refills: 1 | Status: SHIPPED | OUTPATIENT
Start: 2021-02-08 | End: 2021-04-05

## 2021-02-15 ENCOUNTER — TELEPHONE (OUTPATIENT)
Dept: PAIN MEDICINE | Facility: CLINIC | Age: 41
End: 2021-02-15

## 2021-02-15 NOTE — TELEPHONE ENCOUNTER
Caller: PROMISE LAY    Relationship to patient: SELF    Best call back number:     Patient is needing: WANTS TO RESCHEDULE PROCEDURE THAT IS SCHEDULED FOR 2/16/2021 AT 2PM.  PLEASE CONTACT PATIENT

## 2021-02-16 ENCOUNTER — APPOINTMENT (OUTPATIENT)
Dept: PAIN MEDICINE | Facility: HOSPITAL | Age: 41
End: 2021-02-16

## 2021-03-09 ENCOUNTER — HOSPITAL ENCOUNTER (OUTPATIENT)
Dept: PAIN MEDICINE | Facility: HOSPITAL | Age: 41
Discharge: HOME | End: 2021-03-09
Admitting: STUDENT IN AN ORGANIZED HEALTH CARE EDUCATION/TRAINING PROGRAM

## 2021-03-09 VITALS
SYSTOLIC BLOOD PRESSURE: 157 MMHG | BODY MASS INDEX: 45.99 KG/M2 | TEMPERATURE: 98.2 F | RESPIRATION RATE: 16 BRPM | HEART RATE: 76 BPM | DIASTOLIC BLOOD PRESSURE: 91 MMHG | OXYGEN SATURATION: 98 % | HEIGHT: 67 IN | WEIGHT: 293 LBS

## 2021-03-09 DIAGNOSIS — M70.61 GREATER TROCHANTERIC BURSITIS OF BOTH HIPS: Primary | ICD-10-CM

## 2021-03-09 DIAGNOSIS — M70.62 GREATER TROCHANTERIC BURSITIS OF BOTH HIPS: Primary | ICD-10-CM

## 2021-03-09 PROCEDURE — 20610 DRAIN/INJ JOINT/BURSA W/O US: CPT | Performed by: STUDENT IN AN ORGANIZED HEALTH CARE EDUCATION/TRAINING PROGRAM

## 2021-03-09 PROCEDURE — 25010000002 METHYLPREDNISOLONE PER 40 MG

## 2021-03-09 RX ORDER — BUPIVACAINE HYDROCHLORIDE 2.5 MG/ML
10 INJECTION, SOLUTION EPIDURAL; INFILTRATION; INTRACAUDAL ONCE
Status: DISCONTINUED | OUTPATIENT
Start: 2021-03-09 | End: 2021-03-09 | Stop reason: SDUPTHER

## 2021-03-09 RX ORDER — METHYLPREDNISOLONE ACETATE 40 MG/ML
40 INJECTION, SUSPENSION INTRA-ARTICULAR; INTRALESIONAL; INTRAMUSCULAR; SOFT TISSUE ONCE
Status: COMPLETED | OUTPATIENT
Start: 2021-03-09 | End: 2021-03-09

## 2021-03-09 RX ORDER — BUPIVACAINE HYDROCHLORIDE 2.5 MG/ML
10 INJECTION, SOLUTION EPIDURAL; INFILTRATION; INTRACAUDAL ONCE
Status: COMPLETED | OUTPATIENT
Start: 2021-03-09 | End: 2021-03-09

## 2021-03-09 RX ORDER — METHYLPREDNISOLONE ACETATE 40 MG/ML
40 INJECTION, SUSPENSION INTRA-ARTICULAR; INTRALESIONAL; INTRAMUSCULAR; SOFT TISSUE ONCE
Status: DISCONTINUED | OUTPATIENT
Start: 2021-03-09 | End: 2021-03-09 | Stop reason: SDUPTHER

## 2021-03-09 RX ORDER — METHYLPREDNISOLONE ACETATE 40 MG/ML
INJECTION, SUSPENSION INTRA-ARTICULAR; INTRALESIONAL; INTRAMUSCULAR; SOFT TISSUE
Status: DISCONTINUED
Start: 2021-03-09 | End: 2021-03-10 | Stop reason: HOSPADM

## 2021-03-09 RX ORDER — BUPIVACAINE HYDROCHLORIDE 2.5 MG/ML
INJECTION, SOLUTION EPIDURAL; INFILTRATION; INTRACAUDAL
Status: DISCONTINUED
Start: 2021-03-09 | End: 2021-03-10 | Stop reason: HOSPADM

## 2021-03-09 RX ADMIN — BUPIVACAINE HYDROCHLORIDE 9 ML: 2.5 INJECTION, SOLUTION EPIDURAL; INFILTRATION; INTRACAUDAL at 14:10

## 2021-03-09 RX ADMIN — METHYLPREDNISOLONE ACETATE 40 MG: 40 INJECTION, SUSPENSION INTRA-ARTICULAR; INTRALESIONAL; INTRAMUSCULAR; SOFT TISSUE at 14:10

## 2021-03-09 RX ADMIN — METHYLPREDNISOLONE ACETATE 40 MG: 40 INJECTION, SUSPENSION INTRA-ARTICULAR; INTRALESIONAL; INTRAMUSCULAR; SOFT TISSUE at 14:15

## 2021-03-09 RX ADMIN — BUPIVACAINE HYDROCHLORIDE 9 ML: 2.5 INJECTION, SOLUTION EPIDURAL; INFILTRATION; INTRACAUDAL at 14:15

## 2021-03-09 NOTE — H&P
H and P reviewed from previous visit and no changes to patient's clinical presentation. Will proceed with procedure as planned. Patient denies being on blood thinners. She is DM and glucose this morning is 112.     Jonnathan Pineda DO  Pain Management   [facility]

## 2021-03-09 NOTE — PROCEDURES
PROCEDURE:  Bilateral Trochanteric Bursa Injection     PROCEDURE IN DETAIL:  Patient was placed in the lateral decubitus position with right side up. Two areas of maximum tenderness over the trochanteric bursa was identified, marked and then the area was prepped with antiseptic solution and draped in sterile fashion.  22 gauge 3.5 inch spinal needle was inserted through the skin until the needle touched the trochanteric bursa on the right side.  Then, after negative aspiration, 5 ml mixture of 0.25% Marcaine with 40 mg of Depo-medrol was injected. The needle was then removed.  Repeat procedure was performed on the second area of maximum tenderness on right side. Patient was turned on opposite side with left side up in lateral decubitus position. Entire procedure was repeated on left side with injection to two areas of maximum tenderness. Total 10  ml mixture of 0.25% Marcaine with 40 mg Depo-Medrol was injected on left side.     The patient tolerated the procedure well and a band-Aid was applied and the patient was taken to the recovery room in stable condition      Jonnathan Pineda DO  Pain Management   [facility]

## 2021-03-15 ENCOUNTER — APPOINTMENT (OUTPATIENT)
Dept: PAIN MEDICINE | Facility: CLINIC | Age: 41
End: 2021-03-15

## 2021-04-04 DIAGNOSIS — M70.62 GREATER TROCHANTERIC BURSITIS OF BOTH HIPS: ICD-10-CM

## 2021-04-04 DIAGNOSIS — M70.61 GREATER TROCHANTERIC BURSITIS OF BOTH HIPS: ICD-10-CM

## 2021-04-05 RX ORDER — MELOXICAM 15 MG/1
15 TABLET ORAL DAILY
Qty: 30 TABLET | Refills: 1 | Status: SHIPPED | OUTPATIENT
Start: 2021-04-05 | End: 2021-06-22

## 2021-04-26 ENCOUNTER — OFFICE VISIT (OUTPATIENT)
Dept: PAIN MEDICINE | Facility: CLINIC | Age: 41
End: 2021-04-26

## 2021-04-26 VITALS
WEIGHT: 293 LBS | BODY MASS INDEX: 45.99 KG/M2 | SYSTOLIC BLOOD PRESSURE: 166 MMHG | HEART RATE: 88 BPM | DIASTOLIC BLOOD PRESSURE: 99 MMHG | HEIGHT: 67 IN | OXYGEN SATURATION: 99 % | RESPIRATION RATE: 16 BRPM | TEMPERATURE: 97.1 F

## 2021-04-26 DIAGNOSIS — M79.18 MYOFASCIAL PAIN SYNDROME: ICD-10-CM

## 2021-04-26 DIAGNOSIS — M54.2 NECK PAIN: Primary | ICD-10-CM

## 2021-04-26 DIAGNOSIS — M25.512 ACUTE PAIN OF LEFT SHOULDER: ICD-10-CM

## 2021-04-26 PROCEDURE — 99214 OFFICE O/P EST MOD 30 MIN: CPT | Performed by: STUDENT IN AN ORGANIZED HEALTH CARE EDUCATION/TRAINING PROGRAM

## 2021-04-26 RX ORDER — METHOCARBAMOL 750 MG/1
750 TABLET, FILM COATED ORAL 3 TIMES DAILY
Qty: 90 TABLET | Refills: 0 | Status: SHIPPED | OUTPATIENT
Start: 2021-04-26 | End: 2021-07-06

## 2021-04-26 NOTE — PROGRESS NOTES
Subjective   Bernadine Arriaga is a 40 y.o. female is here for follow up for lower back pain. She is s/p b/l greater troch bursa injection on 3/9/2021 with 100% pain relief.   Her main complaint is new left neck and left shoulder pain. It started about 1 week ago without injury and hasn't improved despite ice, heat, nsaids. It started all of a sudden one evening week ago.     On last visit:     Neck and left shoulder pain is 8/10 on VAS, at maximum is 10/10. Pain is tightness, soreness in nature. Pain is referred intermittently to left arm burning and numbness. The pain is constant. The pain is improved by TENS, heat, ice. The pain is worse with turning neck and using shoulder. Complaints of weakness with left hand , but denies any new bowel or bladder incontinence.     Her lower        Previous Injection:   3/9/2021 - b/l greater troch bursa injection - 100% ongoing pain relief.   12/22; 12/29/2020 - R L4-S1 MB RFA- 90% pain ongoing relief.   10/27; 11/172020 - B/l L4-S1 MBB local only - 100% pain relief     Hx:  Patient has seen rheumatologist and has been started on methotrexate recently. 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        PMH: Uncontrolled HTN (on multiple meds and metoprolol was increased recently), DM-2 (last A1C - 7.5%- b/w 175- 200), obesity, psoriatic arthritis.      Current Medications:   Tylenol 325 mg q6H PRN     Past Medications:  Norco - short prescriptions   Percocet      Past Modalities:  TENS:                                                                          yes                                                 Physical Therapy Within The Last 6 Months              Yes (6 weeks of PT - didn't help much)   Psychotherapy                                                            no  Massage Therapy                                                       no     Patient Complains Of:  Uro-Fecal Incontinence          no  Weight Gain/Loss                    no  Fever/Chills                             no  Weakness                               no         Current Outpatient Medications:   •  acetaminophen (TYLENOL) 325 MG tablet, Take 325 mg by mouth Every 6 (Six) Hours As Needed for Mild Pain ., Disp: , Rfl:   •  albuterol sulfate  (90 Base) MCG/ACT inhaler, Inhale 2 puffs Every 6 (Six) Hours As Needed for Wheezing., Disp: 18 g, Rfl: 5  •  betamethasone dipropionate (DIPROLENE) 0.05 % lotion, Apply 1 application topically to the appropriate area as directed 2 (Two) Times a Day As Needed., Disp: , Rfl:   •  budesonide-formoterol (SYMBICORT) 80-4.5 MCG/ACT inhaler, Inhale 2 puffs 2 (Two) Times a Day., Disp: 3 inhaler, Rfl: 1  •  clindamycin (CLEOCIN T) 1 % lotion, Apply 1 application topically to the appropriate area as directed 2 (Two) Times a Day As Needed., Disp: , Rfl:   •  cloNIDine (CATAPRES-TTS) 0.3 MG/24HR patch, Place 1 patch on the skin as directed by provider 1 (One) Time Per Week., Disp: 12 patch, Rfl: 3  •  doxycycline (MONODOX) 100 MG capsule, Take 1 capsule by mouth 2 (Two) Times a Day., Disp: , Rfl:   •  EPINEPHrine (EPIPEN) 0.3 MG/0.3ML solution auto-injector injection, Inject 0.3 mL into the appropriate muscle as directed by prescriber As Needed (allergic reaction)., Disp: 1 each, Rfl: 5  •  furosemide (LASIX) 40 MG tablet, Take 0.5 tablets by mouth 2 (Two) Times a Day., Disp: 30 tablet, Rfl: 3  •  glimepiride (AMARYL) 4 MG tablet, TAKE 1 TABLET BY MOUTH TWICE DAILY WITH MEALS, Disp: 180 tablet, Rfl: 3  •  hydrOXYzine (ATARAX) 25 MG tablet, Take 1 tablet by mouth Every 6 (Six) Hours As Needed for Itching or Anxiety., Disp: 30 tablet, Rfl: 1  •  ibuprofen (ADVIL,MOTRIN) 200 MG tablet, Take 200 mg by mouth Every 6 (Six) Hours As Needed for Mild Pain ., Disp: , Rfl:   •  meloxicam (MOBIC) 15 MG tablet, TAKE 1 TABLET BY MOUTH DAILY FOR 60 DAYS., Disp: 30 tablet, Rfl: 1  •  metoprolol succinate XL (TOPROL-XL) 50 MG 24 hr tablet, Take 2 tabs po in the  am and 1 tab in pm, Disp: 90 tablet, Rfl: 3  •  olmesartan (BENICAR) 40 MG tablet, Take 1 tablet by mouth Daily., Disp: 90 tablet, Rfl: 1  •  Ozempic, 0.25 or 0.5 MG/DOSE, 2 MG/1.5ML solution pen-injector, 0.5mg SQ once a week, Disp: 12 pen, Rfl: 0  •  spironolactone (ALDACTONE) 50 MG tablet, Take 1 tablet by mouth Every Evening., Disp: , Rfl:   •  sulfaSALAzine (AZULFIDINE) 500 MG tablet, TAKE 2 TABLETS BY MOUTH IN THE MORNING AND 2 TABLETS IN THE EVENING, Disp: , Rfl:   •  TREMFYA 100 MG/ML solution prefilled syringe, Inject 1 mL into the appropriate muscle as directed by prescriber. Every 8 weeks, Disp: , Rfl:   •  folic acid (FOLVITE) 1 MG tablet, Take  by mouth Daily., Disp: , Rfl:   •  methocarbamol (ROBAXIN) 750 MG tablet, Take 1 tablet by mouth 3 (Three) Times a Day for 30 days., Disp: 90 tablet, Rfl: 0  •  methotrexate 2.5 MG tablet, TK 8 TS PO WEEKLY, Disp: , Rfl:     The following portions of the patient's history were reviewed and updated as appropriate: allergies, current medications, past family history, past medical history, past social history, past surgical history and problem list.      REVIEW OF PERTINENT MEDICAL DATA    Past Medical History:   Diagnosis Date   • Allergic    • Asthma    • History of medical problems     Hidradenitis   • Hypertension    • Low back pain    • Migraine     history   • Pneumonia    • Psoriatic arthritis (CMS/HCC)    • Sinus disorder      Past Surgical History:   Procedure Laterality Date   • ADENOIDECTOMY     • APPENDECTOMY     • BREAST BIOPSY     •  SECTION     • CHOLECYSTECTOMY     • INCISION AND DRAINAGE OF WOUND     • KNEE ARTHRODESIS     • TONSILLECTOMY     • WOUND DEBRIDEMENT Right 2019    Procedure: DEBRIDEMENT OF RECURRENT HIDRADENITIS AND VAC PLACEMENT RIGHT SIDE;  Surgeon: Alix Freire MD;  Location: [facility] MAIN OR;  Service: General     Family History   Problem Relation Age of Onset   • Hypertension Mother    • Cancer Mother    • Diabetes  "Mother    • Hyperlipidemia Mother    • Hypertension Father    • Hyperlipidemia Father    • Heart disease Maternal Grandfather    • Cancer Brother      Social History     Socioeconomic History   • Marital status:      Spouse name: Not on file   • Number of children: Not on file   • Years of education: Not on file   • Highest education level: Not on file   Tobacco Use   • Smoking status: Never Smoker   • Smokeless tobacco: Never Used   Vaping Use   • Vaping Use: Never used   Substance and Sexual Activity   • Alcohol use: No   • Drug use: No   • Sexual activity: Yes     Partners: Male     Birth control/protection: I.U.D.         Review of Systems   Musculoskeletal: Positive for arthralgias, neck pain and neck stiffness.         Vitals:    04/26/21 0804   BP: 166/99   Pulse: 88   Resp: 16   Temp: 97.1 °F (36.2 °C)   SpO2: 99%   Weight: 136 kg (300 lb)   Height: 170.2 cm (67\")   PainSc:   8         Objective   Physical Exam  Musculoskeletal:        Back:    Neurological:      Deep Tendon Reflexes:      Reflex Scores:       Tricep reflexes are 2+ on the right side and 2+ on the left side.       Bicep reflexes are 2+ on the right side and 2+ on the left side.       Brachioradialis reflexes are 2+ on the right side and 2+ on the left side.     Comments: Motor strength 5/5 b/l LE  Sensory intact b/l LE  - Mild weakness in left hand.   -Rankin's negative              Imaging Reviewed:  Done at [facility]      Xray b/l SI Joint:   Mild b/l SI joint DJD.     Assessment:    1. Neck pain    2. Acute pain of left shoulder    3. Myofascial pain syndrome                Plan:  1. Will defer UDS for now.     2. Patient has tried PT in past without much relief.    3. Continue Meloxicam 15 mg daily PRN. Diclofenac not covered by her insurance. Non-steroidal anti-inflammatory drugs (NSAIDs) may cause an increased risk of serious cardiovascular thrombotic events, myocardial infarction, and stroke, along with increased risk " of serious gastrointestinal adverse events including bleeding, ulceration, and perforation of the stomach or intestines, which can be fatal. Avoid taking any other NSAIDs with diclofenac.   4. Start Robaxin 750 mg TID PRN. Side effects discussed including but not limited to drowsiness, dizziness, lightheadedness, stomach upset, nausea/vomiting or blurred vision may occur.  5. Will order cervical and left shoulder x-ray to evaluate acute pain at [facility] as per patient request.  6. Patient has tenderness in the trapezius bilateral. Discussed TPI. Discussed the possibility of infection, bleeding, nerve damage, headache, increased pain, seizures. Patient understands and agrees. Will schedule for procedure.       RTC for injection and then 3 week follow up.        Jonnathan Pineda DO  Pain Management   [facility]       INSPECT REPORT    As part of the patient's treatment plan, I may be prescribing controlled substances. The patient has been made aware of appropriate use of such medications, including potential risk of somnolence, limited ability to drive and/or work safely, and the potential for dependence or overdose. It has also been made clear that these medications are for use by this patient only, without concomitant use of alcohol or other substances unless prescribed.     Patient has completed prescribing agreement detailing terms of continued prescribing of controlled substances, including monitoring INSPECT reports, urine drug screening, and pill counts if necessary. The patient is aware that inappropriate use will results in cessation of prescribing such medications.    INSPECT report has been reviewed and scanned into the patient's chart.

## 2021-05-08 RX ORDER — SEMAGLUTIDE 1.34 MG/ML
INJECTION, SOLUTION SUBCUTANEOUS
Qty: 12 PEN | Refills: 0 | Status: SHIPPED | OUTPATIENT
Start: 2021-05-08 | End: 2021-08-09

## 2021-05-17 ENCOUNTER — TELEPHONE (OUTPATIENT)
Dept: PAIN MEDICINE | Facility: CLINIC | Age: 41
End: 2021-05-17

## 2021-05-17 NOTE — TELEPHONE ENCOUNTER
Caller: PROMISE LAY    Relationship to patient: SELF    Best call back number: [phone]    Patient is needing: TO CANCEL HER PROCEDURE ON 05.18.21. SHE WILL CALL BACK TO RESCHEDULE WHEN SHE CAN.

## 2021-05-18 ENCOUNTER — APPOINTMENT (OUTPATIENT)
Dept: PAIN MEDICINE | Facility: HOSPITAL | Age: 41
End: 2021-05-18

## 2021-05-24 ENCOUNTER — OFFICE VISIT (OUTPATIENT)
Dept: FAMILY MEDICINE CLINIC | Facility: CLINIC | Age: 41
End: 2021-05-24

## 2021-05-24 ENCOUNTER — LAB (OUTPATIENT)
Dept: FAMILY MEDICINE CLINIC | Facility: CLINIC | Age: 41
End: 2021-05-24

## 2021-05-24 VITALS
OXYGEN SATURATION: 96 % | HEIGHT: 67 IN | WEIGHT: 293 LBS | SYSTOLIC BLOOD PRESSURE: 138 MMHG | HEART RATE: 92 BPM | BODY MASS INDEX: 45.99 KG/M2 | DIASTOLIC BLOOD PRESSURE: 92 MMHG

## 2021-05-24 DIAGNOSIS — E78.2 MIXED HYPERLIPIDEMIA: ICD-10-CM

## 2021-05-24 DIAGNOSIS — E11.65 TYPE 2 DIABETES MELLITUS WITH HYPERGLYCEMIA, WITHOUT LONG-TERM CURRENT USE OF INSULIN (HCC): ICD-10-CM

## 2021-05-24 DIAGNOSIS — I10 ESSENTIAL HYPERTENSION: ICD-10-CM

## 2021-05-24 DIAGNOSIS — L40.50 PSORIATIC ARTHRITIS (HCC): ICD-10-CM

## 2021-05-24 DIAGNOSIS — I10 ESSENTIAL HYPERTENSION: Primary | ICD-10-CM

## 2021-05-24 LAB
ALBUMIN SERPL-MCNC: 4.2 G/DL (ref 3.5–5.2)
ALBUMIN/GLOB SERPL: 1.4 G/DL
ALP SERPL-CCNC: 64 U/L (ref 39–117)
ALT SERPL W P-5'-P-CCNC: 36 U/L (ref 1–33)
ANION GAP SERPL CALCULATED.3IONS-SCNC: 13.4 MMOL/L (ref 5–15)
AST SERPL-CCNC: 27 U/L (ref 1–32)
BASOPHILS # BLD AUTO: 0.04 10*3/MM3 (ref 0–0.2)
BASOPHILS NFR BLD AUTO: 0.3 % (ref 0–1.5)
BILIRUB SERPL-MCNC: 0.4 MG/DL (ref 0–1.2)
BUN SERPL-MCNC: 12 MG/DL (ref 6–20)
BUN/CREAT SERPL: 22.6 (ref 7–25)
CALCIUM SPEC-SCNC: 9.3 MG/DL (ref 8.6–10.5)
CHLORIDE SERPL-SCNC: 98 MMOL/L (ref 98–107)
CHOLEST SERPL-MCNC: 215 MG/DL (ref 0–200)
CO2 SERPL-SCNC: 23.6 MMOL/L (ref 22–29)
CREAT SERPL-MCNC: 0.53 MG/DL (ref 0.57–1)
CRP SERPL-MCNC: 0.99 MG/DL (ref 0–0.5)
DEPRECATED RDW RBC AUTO: 42.6 FL (ref 37–54)
EOSINOPHIL # BLD AUTO: 0 10*3/MM3 (ref 0–0.4)
EOSINOPHIL NFR BLD AUTO: 0 % (ref 0.3–6.2)
ERYTHROCYTE [DISTWIDTH] IN BLOOD BY AUTOMATED COUNT: 12.7 % (ref 12.3–15.4)
ERYTHROCYTE [SEDIMENTATION RATE] IN BLOOD: 50 MM/HR (ref 0–20)
GFR SERPL CREATININE-BSD FRML MDRD: 126 ML/MIN/1.73
GLOBULIN UR ELPH-MCNC: 3 GM/DL
GLUCOSE SERPL-MCNC: 127 MG/DL (ref 65–99)
HBA1C MFR BLD: 6.5 % (ref 3.5–5.6)
HCT VFR BLD AUTO: 43 % (ref 34–46.6)
HDLC SERPL-MCNC: 54 MG/DL (ref 40–60)
HGB BLD-MCNC: 15.1 G/DL (ref 12–15.9)
IMM GRANULOCYTES # BLD AUTO: 0.07 10*3/MM3 (ref 0–0.05)
IMM GRANULOCYTES NFR BLD AUTO: 0.6 % (ref 0–0.5)
LDLC SERPL CALC-MCNC: 121 MG/DL (ref 0–100)
LDLC/HDLC SERPL: 2.13 {RATIO}
LYMPHOCYTES # BLD AUTO: 2.88 10*3/MM3 (ref 0.7–3.1)
LYMPHOCYTES NFR BLD AUTO: 23.5 % (ref 19.6–45.3)
MCH RBC QN AUTO: 32.7 PG (ref 26.6–33)
MCHC RBC AUTO-ENTMCNC: 35.1 G/DL (ref 31.5–35.7)
MCV RBC AUTO: 93.1 FL (ref 79–97)
MONOCYTES # BLD AUTO: 0.94 10*3/MM3 (ref 0.1–0.9)
MONOCYTES NFR BLD AUTO: 7.7 % (ref 5–12)
NEUTROPHILS NFR BLD AUTO: 67.9 % (ref 42.7–76)
NEUTROPHILS NFR BLD AUTO: 8.33 10*3/MM3 (ref 1.7–7)
NRBC BLD AUTO-RTO: 0 /100 WBC (ref 0–0.2)
PLATELET # BLD AUTO: 348 10*3/MM3 (ref 140–450)
PMV BLD AUTO: 10.5 FL (ref 6–12)
POTASSIUM SERPL-SCNC: 4.4 MMOL/L (ref 3.5–5.2)
PROT SERPL-MCNC: 7.2 G/DL (ref 6–8.5)
RBC # BLD AUTO: 4.62 10*6/MM3 (ref 3.77–5.28)
SODIUM SERPL-SCNC: 135 MMOL/L (ref 136–145)
TRIGL SERPL-MCNC: 231 MG/DL (ref 0–150)
VLDLC SERPL-MCNC: 40 MG/DL (ref 5–40)
WBC # BLD AUTO: 12.26 10*3/MM3 (ref 3.4–10.8)

## 2021-05-24 PROCEDURE — 83036 HEMOGLOBIN GLYCOSYLATED A1C: CPT | Performed by: NURSE PRACTITIONER

## 2021-05-24 PROCEDURE — 99214 OFFICE O/P EST MOD 30 MIN: CPT | Performed by: NURSE PRACTITIONER

## 2021-05-24 PROCEDURE — 80061 LIPID PANEL: CPT | Performed by: NURSE PRACTITIONER

## 2021-05-24 PROCEDURE — 36415 COLL VENOUS BLD VENIPUNCTURE: CPT

## 2021-05-24 PROCEDURE — 85025 COMPLETE CBC W/AUTO DIFF WBC: CPT | Performed by: NURSE PRACTITIONER

## 2021-05-24 PROCEDURE — 80053 COMPREHEN METABOLIC PANEL: CPT | Performed by: NURSE PRACTITIONER

## 2021-05-24 PROCEDURE — 85652 RBC SED RATE AUTOMATED: CPT | Performed by: NURSE PRACTITIONER

## 2021-05-24 PROCEDURE — 86140 C-REACTIVE PROTEIN: CPT | Performed by: NURSE PRACTITIONER

## 2021-05-24 NOTE — PROGRESS NOTES
Subjective   Bernadine Arriaga is a 41 y.o. female.       HPI   Pt. Is here today for follow up.   1) HTN- currently on metoprolol XL 50 mg 2 in am 1 in pm; clonidine 0.3 mg patch weekly; olmesartan 40 mg daily. BP is stable. Denies any Cp; palpitations; SOA; dizziness; headache; trouble with vision.  Readings at home run 120-190/; reports higher numbers seen with arthritis flares.   2) T2DM - currently on Ozempic 0.5 mg weekly; glimepiride 4 mg bid. BS at home .  Denies any polyuria, polydipsia.  Denies any skin lesions or sores that are not healing.  Eye exam to be updated.   3) Hyperlipdiemia - currently not on medication.   4) Psoriatic arthritis - followed by Rheumatology - Dr. Case - needs labs updated for upcoming appt.  Can fax results to [phone].      The following portions of the patient's history were reviewed and updated as appropriate: allergies, current medications, past family history, past medical history, past social history, past surgical history and problem list.    Review of Systems   Constitutional: Negative for activity change, appetite change, chills, diaphoresis, fatigue, fever, unexpected weight gain and unexpected weight loss.   Eyes: Negative for blurred vision, double vision, photophobia and visual disturbance.   Respiratory: Negative for cough, chest tightness, shortness of breath and wheezing.    Cardiovascular: Negative for chest pain, palpitations and leg swelling.   Gastrointestinal: Negative for abdominal distention, abdominal pain, blood in stool, constipation, diarrhea, nausea, vomiting and indigestion.   Endocrine: Negative for polydipsia, polyphagia and polyuria.   Genitourinary: Negative for decreased urine volume, dysuria, flank pain, frequency, hematuria and urgency.   Musculoskeletal: Negative for arthralgias, back pain and myalgias.   Skin: Negative for rash and skin lesions.   Neurological: Negative for dizziness, weakness, light-headedness, headache  and confusion.   Psychiatric/Behavioral: Negative for depressed mood. The patient is not nervous/anxious.        Objective   Physical Exam  Vitals reviewed.   Constitutional:       General: She is not in acute distress.     Appearance: Normal appearance. She is obese.   Cardiovascular:      Rate and Rhythm: Normal rate and regular rhythm.      Pulses: Normal pulses.      Heart sounds: Normal heart sounds. No murmur heard.     Pulmonary:      Effort: Pulmonary effort is normal. No respiratory distress.      Breath sounds: Normal breath sounds. No wheezing.   Chest:      Chest wall: No tenderness.   Abdominal:      General: Abdomen is flat. Bowel sounds are normal. There is no distension.      Palpations: Abdomen is soft.      Tenderness: There is no abdominal tenderness. There is no right CVA tenderness or left CVA tenderness.   Musculoskeletal:      Cervical back: Normal range of motion and neck supple. No tenderness.      Right lower leg: No edema.      Left lower leg: No edema.   Skin:     General: Skin is warm and dry.      Findings: No erythema.   Neurological:      General: No focal deficit present.      Mental Status: She is alert and oriented to person, place, and time.   Psychiatric:         Mood and Affect: Mood normal.           Assessment/Plan   Diagnoses and all orders for this visit:    1. Essential hypertension (Primary)  Comments:  Stable.   Cont. current medication.  Labs ordered.     Orders:  -     Comprehensive metabolic panel; Future  -     Lipid panel; Future  -     Hemoglobin A1c; Future    2. Type 2 diabetes mellitus with hyperglycemia, without long-term current use of insulin (CMS/[facility])  Comments:  Stable.   Cont. current medication.  Labs ordered.   Update diab eye exam.   Handout in AVS.  Orders:  -     Comprehensive metabolic panel; Future  -     Lipid panel; Future  -     Hemoglobin A1c; Future    3. Mixed hyperlipidemia  Comments:  Stable.   Cont. current medication.  Labs ordered.    Orders:  -     Comprehensive metabolic panel; Future  -     Lipid panel; Future  -     Hemoglobin A1c; Future    4. Psoriatic arthritis (CMS/[facility])  Comments:  Stable.   Cont. current medication.  Labs ordered.   Orders:  -     CBC w AUTO Differential; Future  -     Sedimentation Rate  -     C-reactive protein; Future

## 2021-06-21 DIAGNOSIS — M70.62 GREATER TROCHANTERIC BURSITIS OF BOTH HIPS: ICD-10-CM

## 2021-06-21 DIAGNOSIS — M70.61 GREATER TROCHANTERIC BURSITIS OF BOTH HIPS: ICD-10-CM

## 2021-06-22 RX ORDER — MELOXICAM 15 MG/1
15 TABLET ORAL DAILY
Qty: 30 TABLET | Refills: 1 | Status: SHIPPED | OUTPATIENT
Start: 2021-06-22 | End: 2021-09-07

## 2021-07-05 DIAGNOSIS — M54.2 NECK PAIN: ICD-10-CM

## 2021-07-05 DIAGNOSIS — M25.512 ACUTE PAIN OF LEFT SHOULDER: ICD-10-CM

## 2021-07-06 DIAGNOSIS — I10 ESSENTIAL HYPERTENSION: ICD-10-CM

## 2021-07-06 RX ORDER — METHOCARBAMOL 750 MG/1
750 TABLET, FILM COATED ORAL 3 TIMES DAILY PRN
Qty: 90 TABLET | Refills: 1 | Status: SHIPPED | OUTPATIENT
Start: 2021-07-06 | End: 2021-09-04

## 2021-07-06 RX ORDER — METOPROLOL SUCCINATE 50 MG/1
TABLET, EXTENDED RELEASE ORAL
Qty: 90 TABLET | Refills: 3 | Status: SHIPPED | OUTPATIENT
Start: 2021-07-06 | End: 2021-10-02

## 2021-08-09 RX ORDER — OLMESARTAN MEDOXOMIL 40 MG/1
TABLET ORAL
Qty: 90 TABLET | Refills: 1 | Status: SHIPPED | OUTPATIENT
Start: 2021-08-09 | End: 2022-02-15

## 2021-08-09 RX ORDER — SEMAGLUTIDE 1.34 MG/ML
INJECTION, SOLUTION SUBCUTANEOUS
Qty: 4.5 PEN | Refills: 1 | Status: SHIPPED | OUTPATIENT
Start: 2021-08-09 | End: 2022-02-25 | Stop reason: SDUPTHER

## 2021-08-09 RX ORDER — CLONIDINE 0.3 MG/24H
PATCH, EXTENDED RELEASE TRANSDERMAL
Qty: 8 PATCH | Refills: 1 | Status: SHIPPED | OUTPATIENT
Start: 2021-08-09 | End: 2021-11-30

## 2021-09-05 DIAGNOSIS — M70.61 GREATER TROCHANTERIC BURSITIS OF BOTH HIPS: ICD-10-CM

## 2021-09-05 DIAGNOSIS — M70.62 GREATER TROCHANTERIC BURSITIS OF BOTH HIPS: ICD-10-CM

## 2021-09-07 RX ORDER — MELOXICAM 15 MG/1
15 TABLET ORAL DAILY
Qty: 30 TABLET | Refills: 1 | Status: SHIPPED | OUTPATIENT
Start: 2021-09-07 | End: 2021-11-30

## 2021-09-20 DIAGNOSIS — E11.65 TYPE 2 DIABETES MELLITUS WITH HYPERGLYCEMIA, WITHOUT LONG-TERM CURRENT USE OF INSULIN (HCC): ICD-10-CM

## 2021-09-20 RX ORDER — GLIMEPIRIDE 4 MG/1
TABLET ORAL
Qty: 180 TABLET | Refills: 2 | Status: SHIPPED | OUTPATIENT
Start: 2021-09-20 | End: 2022-05-13 | Stop reason: SDUPTHER

## 2021-09-28 ENCOUNTER — OFFICE VISIT (OUTPATIENT)
Dept: FAMILY MEDICINE CLINIC | Facility: CLINIC | Age: 41
End: 2021-09-28

## 2021-09-28 VITALS
HEIGHT: 67 IN | WEIGHT: 293 LBS | SYSTOLIC BLOOD PRESSURE: 160 MMHG | OXYGEN SATURATION: 98 % | HEART RATE: 92 BPM | BODY MASS INDEX: 45.99 KG/M2 | DIASTOLIC BLOOD PRESSURE: 99 MMHG

## 2021-09-28 DIAGNOSIS — I10 ESSENTIAL HYPERTENSION: ICD-10-CM

## 2021-09-28 DIAGNOSIS — L73.2 HIDRADENITIS SUPPURATIVA: ICD-10-CM

## 2021-09-28 DIAGNOSIS — R04.0 FREQUENT NOSEBLEEDS: Primary | ICD-10-CM

## 2021-09-28 PROCEDURE — 99214 OFFICE O/P EST MOD 30 MIN: CPT | Performed by: NURSE PRACTITIONER

## 2021-09-28 RX ORDER — METHOCARBAMOL 750 MG/1
1 TABLET, FILM COATED ORAL
COMMUNITY
End: 2021-12-08

## 2021-09-28 RX ORDER — MEDROXYPROGESTERONE ACETATE 150 MG/ML
50 INJECTION, SUSPENSION INTRAMUSCULAR WEEKLY
COMMUNITY
Start: 2021-09-20 | End: 2022-08-30

## 2021-09-28 RX ORDER — CLINDAMYCIN HYDROCHLORIDE 150 MG/1
CAPSULE ORAL
COMMUNITY
Start: 2021-09-24 | End: 2021-12-08

## 2021-09-28 NOTE — PROGRESS NOTES
Subjective   Bernadine Arriaga is a 41 y.o. female.       HPI   Pt. Is here today with concern of frequent nosebleeds. Happening once or twice a week for past 1-2 months; able to stop them within a few minutes.  Denies any dizziness or headache.  Does take Xyzal daily for allergies.    BP is 160/99 today; currently taking olmesartan 40 mg daily; clonidine 0.3 mg patch weekly. She does report she just took her BP meds right before coming to this appt.  Per pt BP has been running normal at home.  Denies any CP; palpitations; SOA.    [place] [facility]last week for HS outbreak on right hip; given Clindamycin on Friday.  Also on doxycycline.  Area is open and draining; tender to touch.  No fevers.  Seeing Dr. Centeno tomorrow for I & D (general surgery).           The following portions of the patient's history were reviewed and updated as appropriate: allergies, current medications, past family history, past medical history, past social history, past surgical history and problem list.    Review of Systems   Constitutional: Negative for activity change, appetite change, chills, diaphoresis, fatigue, fever, unexpected weight gain and unexpected weight loss.   HENT: Positive for nosebleeds. Negative for congestion, ear discharge, ear pain, mouth sores, postnasal drip, rhinorrhea, sinus pressure, sneezing, sore throat, swollen glands and trouble swallowing.    Eyes: Negative for visual disturbance.   Respiratory: Negative for cough, chest tightness, shortness of breath and wheezing.    Cardiovascular: Negative for chest pain, palpitations and leg swelling.   Gastrointestinal: Negative for diarrhea, nausea and vomiting.   Skin: Positive for skin lesions (right hip).   Neurological: Negative for dizziness, weakness and headache.   Psychiatric/Behavioral: Negative for depressed mood. The patient is not nervous/anxious.        Objective   Physical Exam  Vitals reviewed.   Constitutional:       General: She is not in  acute distress.     Appearance: Normal appearance. She is obese.   HENT:      Head: Normocephalic and atraumatic.      Right Ear: Hearing, tympanic membrane, ear canal and external ear normal.      Left Ear: Hearing, tympanic membrane, ear canal and external ear normal.      Nose:      Right Nostril: No foreign body, epistaxis, septal hematoma or occlusion.      Left Nostril: No foreign body, epistaxis, septal hematoma or occlusion.      Right Turbinates: Not enlarged or swollen.      Left Turbinates: Not enlarged or swollen.      Right Sinus: No maxillary sinus tenderness or frontal sinus tenderness.      Left Sinus: No maxillary sinus tenderness or frontal sinus tenderness.      Mouth/Throat:      Lips: Pink.      Mouth: Mucous membranes are moist.      Pharynx: Oropharynx is clear. No pharyngeal swelling, oropharyngeal exudate or posterior oropharyngeal erythema.   Eyes:      Conjunctiva/sclera: Conjunctivae normal.   Cardiovascular:      Rate and Rhythm: Normal rate and regular rhythm.      Pulses: Normal pulses.      Heart sounds: Normal heart sounds.   Pulmonary:      Effort: Pulmonary effort is normal. No respiratory distress.      Breath sounds: Normal breath sounds.   Musculoskeletal:      Cervical back: Normal range of motion and neck supple. No tenderness.   Skin:     General: Skin is warm and dry.      Findings: Lesion (right hip- two open erythmatous nodules with mild surrounding erythema; drainage is thick yellow/green; tender to touch) present.   Neurological:      General: No focal deficit present.      Mental Status: She is alert and oriented to person, place, and time.   Psychiatric:         Mood and Affect: Mood normal.           Assessment/Plan   Diagnoses and all orders for this visit:    1. Frequent nosebleeds (Primary)  Comments:  Reviewed labs from July 2021 with Rheumatology; CBC normal.   Use saline rinses/spray to help with mositure.    Monitor BP and call in readings for review.       2.  Essential hypertension  Comments:  Monitor BP at home over the next week; send in readings for review.   Cont. current medication.   Work on healthy diet, exercise, weight loss.     3. Hidradenitis suppurativa  Comments:  Cont. abx from [facility] [place].   Keep follow up with general surgery tomorrow.       Consider ENT for evaluation of nosebleeds.

## 2021-09-28 NOTE — PATIENT INSTRUCTIONS
Nosebleed, Adult  A nosebleed is when blood comes out of the nose. Nosebleeds are common and can be caused by many things. They are usually not a sign of a serious medical problem.  Follow these instructions at home:  When you have a nosebleed:    · Sit down.  · Tilt your head a little forward.  · Follow these steps:  1. Pinch your nose with a clean towel or tissue.  2. Keep pinching your nose for 5 minutes. Do not let go.  3. After 5 minutes, let go of your nose.  4. If there is still bleeding, do these steps again. Keep doing these steps until the bleeding stops.  · Do not put tissues or other things in your nose to stop the bleeding.  · Avoid lying down or putting your head back.  · Use a nose spray decongestant as told by your doctor.    After a nosebleed:  · Try not to blow your nose or sniffle for several hours.  · Try not to strain, lift, or bend at the waist for several days.  · Aspirin and blood-thinning medicines make bleeding more likely. If you take these medicines:  ? Ask your doctor if you should stop taking them or if you should change how much you take.  ? Do not stop taking the medicine unless your doctor tells you to.  · If your nosebleed was caused by dryness, use over-the-counter saline nasal spray or gel and humidifier as told by your doctor. This will keep the inside of your nose moist and allow it to heal. If you need to use one of these products:  ? Choose one that is water-soluble.  ? Use only as much as you need and use it only as often as needed.  ? Do not lie down right away after you use it.  · If you get nosebleeds often, talk with your doctor about treatments. These may include:  ? Nasal cautery. A chemical swab or electrical device is used to lightly burn tiny blood vessels inside the nose. This helps stop or prevent nosebleeds.  ? Nasal packing. A gauze or other material is placed in the nose to keep constant pressure on the bleeding area.  Contact a doctor if:  · You have a  fever.  · You get nosebleeds often.  · You are getting nosebleeds more often than usual.  · You bruise very easily.  · You have something stuck in your nose.  · You have bleeding in your mouth.  · You vomit or cough up brown material.  · You get a nosebleed after you start a new medicine.  Get help right away if:  · You have a nosebleed after you fall or hurt your head.  · Your nosebleed does not go away after 20 minutes.  · You feel dizzy or weak.  · You have unusual bleeding from other parts of your body.  · You have unusual bruising on other parts of your body.  · You get sweaty.  · You vomit blood.  Summary  · Nosebleeds are common. They are usually not a sign of a serious medical problem.  · When you have a nosebleed, sit down and tilt your head a little forward. Pinch your nose with a clean tissue for 5 minutes.  · Use saline spray or saline gel and a humidifier as told by your doctor.  · Get help right away if your nosebleed does not go away after 20 minutes.  This information is not intended to replace advice given to you by your health care provider. Make sure you discuss any questions you have with your health care provider.  Document Revised: 10/15/2020 Document Reviewed: 10/15/2020  [employer]vier Patient Education © 2021 [employer]

## 2021-09-29 ENCOUNTER — OFFICE VISIT (OUTPATIENT)
Dept: SURGERY | Facility: CLINIC | Age: 41
End: 2021-09-29

## 2021-09-29 VITALS
TEMPERATURE: 97.7 F | BODY MASS INDEX: 45.99 KG/M2 | HEIGHT: 67 IN | SYSTOLIC BLOOD PRESSURE: 116 MMHG | DIASTOLIC BLOOD PRESSURE: 69 MMHG | HEART RATE: 95 BPM | WEIGHT: 293 LBS | OXYGEN SATURATION: 99 %

## 2021-09-29 DIAGNOSIS — L02.31 CUTANEOUS ABSCESS OF BUTTOCK: Primary | ICD-10-CM

## 2021-09-29 PROCEDURE — 87147 CULTURE TYPE IMMUNOLOGIC: CPT | Performed by: STUDENT IN AN ORGANIZED HEALTH CARE EDUCATION/TRAINING PROGRAM

## 2021-09-29 PROCEDURE — 10060 I&D ABSCESS SIMPLE/SINGLE: CPT | Performed by: STUDENT IN AN ORGANIZED HEALTH CARE EDUCATION/TRAINING PROGRAM

## 2021-09-29 PROCEDURE — 87205 SMEAR GRAM STAIN: CPT | Performed by: STUDENT IN AN ORGANIZED HEALTH CARE EDUCATION/TRAINING PROGRAM

## 2021-09-29 PROCEDURE — 99214 OFFICE O/P EST MOD 30 MIN: CPT | Performed by: STUDENT IN AN ORGANIZED HEALTH CARE EDUCATION/TRAINING PROGRAM

## 2021-09-29 PROCEDURE — 87070 CULTURE OTHR SPECIMN AEROBIC: CPT | Performed by: STUDENT IN AN ORGANIZED HEALTH CARE EDUCATION/TRAINING PROGRAM

## 2021-09-29 NOTE — PROGRESS NOTES
"Chief Complaint  Follow-up (New Problem- Hidradenitis Right Hip)    Subjective          Bernadine Arriaga presents to [facility] GENERAL SURGERY  History of Present Illness    41-year-old lady with history of hidradenitis, status post multiple incision and drainages mostly of her axillary abscesses. She did undergo skin excision of her axilla with healing by secondary intention by Dr. Freire in 2019. She states she is done well since that time however she did develop a spot on her right hip that has been red and painful. She is undergone some steroid injections by her dermatologist without resolution of this area. She was recently on vacation and this flared up, she was given doxycycline which improved the erythema however she is continue to have some drainage from this area. She denies fevers or chills.    Objective   Vital Signs:   /69 (Cuff Size: Large Adult)   Pulse 95   Temp 97.7 °F (36.5 °C) (Infrared)   Ht 170.2 cm (67\")   Wt (!) 137 kg (301 lb 12.8 oz)   SpO2 99%   BMI 47.27 kg/m²     Physical Exam  Constitutional:       General: She is not in acute distress.     Appearance: Normal appearance. She is not ill-appearing.   HENT:      Head: Normocephalic and atraumatic.      Right Ear: External ear normal.      Left Ear: External ear normal.   Eyes:      Extraocular Movements: Extraocular movements intact.      Conjunctiva/sclera: Conjunctivae normal.   Cardiovascular:      Rate and Rhythm: Normal rate and regular rhythm.   Pulmonary:      Effort: Pulmonary effort is normal. No respiratory distress.   Abdominal:      General: There is no distension.      Palpations: Abdomen is soft.      Tenderness: There is no abdominal tenderness.   Musculoskeletal:         General: No swelling or deformity.   Skin:     General: Skin is warm and dry.      Comments: 2 cm abscess in patient's right lateral hip, some purulent drainage, small amount of fluctuance, no surrounding erythema. "   Neurological:      Mental Status: She is alert and oriented to person, place, and time. Mental status is at baseline.        Result Review :   The following data was reviewed by: Serafin Centeno MD on 09/29/2021:  Common labs    Common Labsle 5/24/21 5/24/21 5/24/21 5/24/21    0939 0939 0939 0939   Glucose    127 (A)   BUN    12   Creatinine    0.53 (A)   eGFR Non African Am    126   Sodium    135 (A)   Potassium    4.4   Chloride    98   Calcium    9.3   Albumin    4.20   Total Bilirubin    0.4   Alkaline Phosphatase    64   AST (SGOT)    27   ALT (SGPT)    36 (A)   WBC  12.26 (A)     Hemoglobin  15.1     Hematocrit  43.0     Platelets  348     Total Cholesterol   215 (A)    Triglycerides   231 (A)    HDL Cholesterol   54    LDL Cholesterol    121 (A)    Hemoglobin A1C 6.5 (A)      (A) Abnormal value              Incision & Drainage    Date/Time: 9/29/2021 9:23 AM  Performed by: Serafin Centeno MD  Authorized by: Serafin Centeno MD   Type: abscess  Body area: lower extremity  Location details: right buttock  Anesthesia: local infiltration    Anesthesia:  Local Anesthetic: lidocaine 1% with epinephrine  Anesthetic total: 30 mL    Sedation:  Patient sedated: no    Scalpel size: 11  Incision type: elliptical  Drainage: purulent  Drainage amount: moderate  Wound treatment: wound left open  Packing material: 1/4 in gauze  Patient tolerance: patient tolerated the procedure well with no immediate complications  Comments: Risks benefits and alternatives discussed with patient inform consent was obtained. She was placed in the right side up position on the procedure table. The area overlying the abscess was cleaned with iodine. I performed a field block with 1% lidocaine with epinephrine, 30 cc. Using 11 blade scalpel made incision over the area of maximal fluctuance made a 1 cm elliptical incision. Drained moderate amount of purulent fluid, which I sent for culture. I packed the wound with quarter inch iodoform gauze.  Cover this with gauze and tape. Patient tolerated the procedure well without immediate complication.            Assessment and Plan    Diagnoses and all orders for this visit:    1. Cutaneous abscess of buttock (Primary)      41-year-old lady with history of hidradenitis, status post multiple incision and drainages mostly of her axillary abscesses. She did undergo skin excision of her axilla with healing by secondary intention by Dr. Freire in 2019. She states she is done well since that time however she did develop a spot on her right hip that has been red and painful. She is undergone some steroid injections by her dermatologist without resolution of this area. She was recently on vacation and this flared up, she was given doxycycline which improved the erythema however she is continue to have some drainage from this area. She denies fevers or chills. I performed incision and drainage of her abscess today. Sent fluid for culture, she is currently on doxycycline. I will have her repack the incision for as long she can and will have patient follow-up in 2 weeks to assess for healing of the site. She may ultimately require excision of the tissue and skin around this area if this continues to recur, however would like to do this once her acute infection resolves.        Follow Up   Return in about 2 weeks (around 10/13/2021).  Patient was given instructions and counseling regarding her condition or for health maintenance advice. Please see specific information pulled into the AVS if appropriate.

## 2021-10-02 DIAGNOSIS — I10 ESSENTIAL HYPERTENSION: ICD-10-CM

## 2021-10-02 LAB
BACTERIA SPEC AEROBE CULT: NORMAL
GRAM STN SPEC: NORMAL

## 2021-10-02 RX ORDER — METOPROLOL SUCCINATE 50 MG/1
TABLET, EXTENDED RELEASE ORAL
Qty: 270 TABLET | Refills: 1 | Status: SHIPPED | OUTPATIENT
Start: 2021-10-02 | End: 2022-04-03

## 2021-10-13 ENCOUNTER — PREP FOR SURGERY (OUTPATIENT)
Dept: OTHER | Facility: HOSPITAL | Age: 41
End: 2021-10-13

## 2021-10-13 ENCOUNTER — OFFICE VISIT (OUTPATIENT)
Dept: SURGERY | Facility: CLINIC | Age: 41
End: 2021-10-13

## 2021-10-13 VITALS
OXYGEN SATURATION: 97 % | TEMPERATURE: 98 F | HEART RATE: 87 BPM | BODY MASS INDEX: 45.99 KG/M2 | HEIGHT: 67 IN | WEIGHT: 293 LBS | SYSTOLIC BLOOD PRESSURE: 159 MMHG | DIASTOLIC BLOOD PRESSURE: 110 MMHG

## 2021-10-13 DIAGNOSIS — L73.2 HIDRADENITIS SUPPURATIVA: Primary | ICD-10-CM

## 2021-10-13 PROCEDURE — 99213 OFFICE O/P EST LOW 20 MIN: CPT | Performed by: STUDENT IN AN ORGANIZED HEALTH CARE EDUCATION/TRAINING PROGRAM

## 2021-10-13 NOTE — PROGRESS NOTES
"Chief Complaint  Hidradenitis suppurativa    Subjective          Bernadine Arriaga presents to [facility] GENERAL SURGERY  History of Present Illness    41-year-old lady here for follow-up appointment after she was seen in the office 2 weeks ago and had incision and drainage of area of hidradenitis in her right hip.  She is done well at home, is tolerating dressing changes, no fevers or chills, no drainage from the wound.  Wound is almost healed, not deep enough to pack anymore, no surrounding erythema, no foul smelling odor, no drainage.      Objective   Vital Signs:   BP (!) 159/110 (BP Location: Other (Comment), Cuff Size: Adult)   Pulse 87   Temp 98 °F (36.7 °C) (Infrared)   Ht 170.2 cm (67\")   Wt (!) 139 kg (306 lb 3.2 oz)   SpO2 97%   BMI 47.96 kg/m²     Physical Exam  Constitutional:       General: She is not in acute distress.     Appearance: Normal appearance. She is not ill-appearing.   HENT:      Head: Normocephalic and atraumatic.      Right Ear: External ear normal.      Left Ear: External ear normal.   Eyes:      Extraocular Movements: Extraocular movements intact.      Conjunctiva/sclera: Conjunctivae normal.   Cardiovascular:      Rate and Rhythm: Normal rate and regular rhythm.   Pulmonary:      Effort: Pulmonary effort is normal. No respiratory distress.   Abdominal:      General: There is no distension.      Palpations: Abdomen is soft.      Tenderness: There is no abdominal tenderness.   Musculoskeletal:         General: No swelling or deformity.   Skin:     Comments: Right gluteal wound 5 mm wide, less than 5 mm depth, no surrounding erythema, no purulent drainage, no foul odor   Neurological:      Mental Status: She is alert and oriented to person, place, and time. Mental status is at baseline.        Result Review :   The following data was reviewed by: Serafin Centeno MD on 10/13/2021:  Common labs    Common Labsle 5/24/21 5/24/21 5/24/21 5/24/21    0939 0939 0939 " 0939   Glucose    127 (A)   BUN    12   Creatinine    0.53 (A)   eGFR Non African Am    126   Sodium    135 (A)   Potassium    4.4   Chloride    98   Calcium    9.3   Albumin    4.20   Total Bilirubin    0.4   Alkaline Phosphatase    64   AST (SGOT)    27   ALT (SGPT)    36 (A)   WBC  12.26 (A)     Hemoglobin  15.1     Hematocrit  43.0     Platelets  348     Total Cholesterol   215 (A)    Triglycerides   231 (A)    HDL Cholesterol   54    LDL Cholesterol    121 (A)    Hemoglobin A1C 6.5 (A)      (A) Abnormal value                      Assessment and Plan    Diagnoses and all orders for this visit:    1. Hidradenitis suppurativa (Primary)      41-year-old lady here for follow-up appointment after she was seen in the office 2 weeks ago and had incision and drainage of area of hidradenitis in her right hip.  She is done well at home, is tolerating dressing changes, no fevers or chills, no drainage from the wound.  Wound is almost healed, not deep enough to pack anymore, no surrounding erythema, no foul smelling odor, no drainage.  Plan for wider excision with MAC anesthesia in the operating room to prevent recurrence.  Discussed that there is a risk of infection especially with her history of hidradenitis, but the wound looks nice and clean now and ready for excision.  Discussed risk benefits and alternatives to hidradenitis excision with patient including infection, bleeding, nonhealing wound, cosmetic defect and patient elected to proceed with surgery.      Follow Up   Return for Scheduled date of surgery.  Patient was given instructions and counseling regarding her condition or for health maintenance advice. Please see specific information pulled into the AVS if appropriate.

## 2021-10-23 ENCOUNTER — LAB (OUTPATIENT)
Dept: LAB | Facility: HOSPITAL | Age: 41
End: 2021-10-23

## 2021-10-23 ENCOUNTER — HOSPITAL ENCOUNTER (OUTPATIENT)
Dept: CARDIOLOGY | Facility: HOSPITAL | Age: 41
Discharge: HOME | End: 2021-10-23

## 2021-10-23 LAB
ANION GAP SERPL CALCULATED.3IONS-SCNC: 12.3 MMOL/L (ref 5–15)
BUN SERPL-MCNC: 12 MG/DL (ref 6–20)
BUN/CREAT SERPL: 26.7 (ref 7–25)
CALCIUM SPEC-SCNC: 9.2 MG/DL (ref 8.6–10.5)
CHLORIDE SERPL-SCNC: 100 MMOL/L (ref 98–107)
CO2 SERPL-SCNC: 21.7 MMOL/L (ref 22–29)
CREAT SERPL-MCNC: 0.45 MG/DL (ref 0.57–1)
DEPRECATED RDW RBC AUTO: 46.6 FL (ref 37–54)
ERYTHROCYTE [DISTWIDTH] IN BLOOD BY AUTOMATED COUNT: 12.9 % (ref 12.3–15.4)
GFR SERPL CREATININE-BSD FRML MDRD: >150 ML/MIN/1.73
GLUCOSE SERPL-MCNC: 177 MG/DL (ref 65–99)
HCT VFR BLD AUTO: 43.5 % (ref 34–46.6)
HGB BLD-MCNC: 14.3 G/DL (ref 12–15.9)
MCH RBC QN AUTO: 32 PG (ref 26.6–33)
MCHC RBC AUTO-ENTMCNC: 32.9 G/DL (ref 31.5–35.7)
MCV RBC AUTO: 97.3 FL (ref 79–97)
PLATELET # BLD AUTO: 285 10*3/MM3 (ref 140–450)
PMV BLD AUTO: 11.5 FL (ref 6–12)
POTASSIUM SERPL-SCNC: 4.5 MMOL/L (ref 3.5–5.2)
QT INTERVAL: 366 MS
RBC # BLD AUTO: 4.47 10*6/MM3 (ref 3.77–5.28)
SODIUM SERPL-SCNC: 134 MMOL/L (ref 136–145)
WBC # BLD AUTO: 9.17 10*3/MM3 (ref 3.4–10.8)

## 2021-10-23 PROCEDURE — 80048 BASIC METABOLIC PNL TOTAL CA: CPT

## 2021-10-23 PROCEDURE — 85027 COMPLETE CBC AUTOMATED: CPT

## 2021-10-23 PROCEDURE — 93005 ELECTROCARDIOGRAM TRACING: CPT | Performed by: STUDENT IN AN ORGANIZED HEALTH CARE EDUCATION/TRAINING PROGRAM

## 2021-10-23 PROCEDURE — 93010 ELECTROCARDIOGRAM REPORT: CPT | Performed by: INTERNAL MEDICINE

## 2021-10-25 ENCOUNTER — LAB (OUTPATIENT)
Dept: LAB | Facility: HOSPITAL | Age: 41
End: 2021-10-25

## 2021-10-25 DIAGNOSIS — L73.2 HIDRADENITIS SUPPURATIVA: ICD-10-CM

## 2021-10-25 LAB — SARS-COV-2 ORF1AB RESP QL NAA+PROBE: NOT DETECTED

## 2021-10-25 PROCEDURE — C9803 HOPD COVID-19 SPEC COLLECT: HCPCS

## 2021-10-25 PROCEDURE — U0004 COV-19 TEST NON-CDC HGH THRU: HCPCS

## 2021-10-27 ENCOUNTER — HOSPITAL ENCOUNTER (OUTPATIENT)
Facility: HOSPITAL | Age: 41
Setting detail: HOSPITAL OUTPATIENT SURGERY
Discharge: HOME | End: 2021-10-27
Attending: STUDENT IN AN ORGANIZED HEALTH CARE EDUCATION/TRAINING PROGRAM | Admitting: STUDENT IN AN ORGANIZED HEALTH CARE EDUCATION/TRAINING PROGRAM

## 2021-10-27 ENCOUNTER — ANESTHESIA (OUTPATIENT)
Dept: PERIOP | Facility: HOSPITAL | Age: 41
End: 2021-10-27

## 2021-10-27 ENCOUNTER — ANESTHESIA EVENT (OUTPATIENT)
Dept: PERIOP | Facility: HOSPITAL | Age: 41
End: 2021-10-27

## 2021-10-27 VITALS
DIASTOLIC BLOOD PRESSURE: 90 MMHG | OXYGEN SATURATION: 98 % | BODY MASS INDEX: 45.99 KG/M2 | RESPIRATION RATE: 16 BRPM | WEIGHT: 293 LBS | TEMPERATURE: 97.3 F | HEIGHT: 67 IN | SYSTOLIC BLOOD PRESSURE: 164 MMHG | HEART RATE: 93 BPM

## 2021-10-27 DIAGNOSIS — L73.2 HIDRADENITIS SUPPURATIVA: Primary | ICD-10-CM

## 2021-10-27 LAB
B-HCG UR QL: NEGATIVE
GLUCOSE BLDC GLUCOMTR-MCNC: 130 MG/DL (ref 70–105)

## 2021-10-27 PROCEDURE — 82962 GLUCOSE BLOOD TEST: CPT

## 2021-10-27 PROCEDURE — 25010000002 MIDAZOLAM PER 1 MG: Performed by: ANESTHESIOLOGY

## 2021-10-27 PROCEDURE — 88304 TISSUE EXAM BY PATHOLOGIST: CPT | Performed by: STUDENT IN AN ORGANIZED HEALTH CARE EDUCATION/TRAINING PROGRAM

## 2021-10-27 PROCEDURE — 25010000002 PROPOFOL 500 MG/50ML EMULSION: Performed by: ANESTHESIOLOGY

## 2021-10-27 PROCEDURE — 25010000002 DEXAMETHASONE PER 1 MG: Performed by: ANESTHESIOLOGY

## 2021-10-27 PROCEDURE — 12032 INTMD RPR S/A/T/EXT 2.6-7.5: CPT | Performed by: STUDENT IN AN ORGANIZED HEALTH CARE EDUCATION/TRAINING PROGRAM

## 2021-10-27 PROCEDURE — 25010000002 FENTANYL CITRATE (PF) 100 MCG/2ML SOLUTION: Performed by: ANESTHESIOLOGY

## 2021-10-27 PROCEDURE — 81025 URINE PREGNANCY TEST: CPT | Performed by: STUDENT IN AN ORGANIZED HEALTH CARE EDUCATION/TRAINING PROGRAM

## 2021-10-27 PROCEDURE — 11406 EXC TR-EXT B9+MARG >4.0 CM: CPT | Performed by: STUDENT IN AN ORGANIZED HEALTH CARE EDUCATION/TRAINING PROGRAM

## 2021-10-27 RX ORDER — PROPOFOL 10 MG/ML
INJECTION, EMULSION INTRAVENOUS AS NEEDED
Status: DISCONTINUED | OUTPATIENT
Start: 2021-10-27 | End: 2021-10-27 | Stop reason: SURG

## 2021-10-27 RX ORDER — SODIUM CHLORIDE 0.9 % (FLUSH) 0.9 %
10 SYRINGE (ML) INJECTION AS NEEDED
Status: DISCONTINUED | OUTPATIENT
Start: 2021-10-27 | End: 2021-10-27 | Stop reason: HOSPADM

## 2021-10-27 RX ORDER — TRAMADOL HYDROCHLORIDE 50 MG/1
50 TABLET ORAL EVERY 6 HOURS PRN
Qty: 15 TABLET | Refills: 0 | Status: SHIPPED | OUTPATIENT
Start: 2021-10-27 | End: 2021-12-08

## 2021-10-27 RX ORDER — DEXAMETHASONE SODIUM PHOSPHATE 4 MG/ML
INJECTION, SOLUTION INTRA-ARTICULAR; INTRALESIONAL; INTRAMUSCULAR; INTRAVENOUS; SOFT TISSUE AS NEEDED
Status: DISCONTINUED | OUTPATIENT
Start: 2021-10-27 | End: 2021-10-27 | Stop reason: SURG

## 2021-10-27 RX ORDER — GINSENG 100 MG
CAPSULE ORAL AS NEEDED
Status: DISCONTINUED | OUTPATIENT
Start: 2021-10-27 | End: 2021-10-27 | Stop reason: HOSPADM

## 2021-10-27 RX ORDER — POLYETHYLENE GLYCOL 3350 17 G/17G
17 POWDER, FOR SOLUTION ORAL DAILY
Qty: 14 EACH | Refills: 0 | Status: SHIPPED | OUTPATIENT
Start: 2021-10-27 | End: 2021-12-08

## 2021-10-27 RX ORDER — MORPHINE SULFATE 4 MG/ML
2 INJECTION, SOLUTION INTRAMUSCULAR; INTRAVENOUS
Status: DISCONTINUED | OUTPATIENT
Start: 2021-10-27 | End: 2021-10-27 | Stop reason: HOSPADM

## 2021-10-27 RX ORDER — MIDAZOLAM HYDROCHLORIDE 1 MG/ML
INJECTION INTRAMUSCULAR; INTRAVENOUS AS NEEDED
Status: DISCONTINUED | OUTPATIENT
Start: 2021-10-27 | End: 2021-10-27 | Stop reason: SURG

## 2021-10-27 RX ORDER — ACETAMINOPHEN 325 MG/1
650 TABLET ORAL ONCE AS NEEDED
Status: DISCONTINUED | OUTPATIENT
Start: 2021-10-27 | End: 2021-10-27 | Stop reason: HOSPADM

## 2021-10-27 RX ORDER — SODIUM CHLORIDE, SODIUM LACTATE, POTASSIUM CHLORIDE, CALCIUM CHLORIDE 600; 310; 30; 20 MG/100ML; MG/100ML; MG/100ML; MG/100ML
1000 INJECTION, SOLUTION INTRAVENOUS CONTINUOUS
Status: DISCONTINUED | OUTPATIENT
Start: 2021-10-27 | End: 2021-10-27 | Stop reason: HOSPADM

## 2021-10-27 RX ORDER — ACETAMINOPHEN 500 MG
1000 TABLET ORAL EVERY 8 HOURS
Qty: 30 TABLET | Refills: 0 | Status: SHIPPED | OUTPATIENT
Start: 2021-10-27 | End: 2021-11-01

## 2021-10-27 RX ORDER — FENTANYL CITRATE 50 UG/ML
INJECTION, SOLUTION INTRAMUSCULAR; INTRAVENOUS AS NEEDED
Status: DISCONTINUED | OUTPATIENT
Start: 2021-10-27 | End: 2021-10-27 | Stop reason: SURG

## 2021-10-27 RX ORDER — IPRATROPIUM BROMIDE AND ALBUTEROL SULFATE 2.5; .5 MG/3ML; MG/3ML
3 SOLUTION RESPIRATORY (INHALATION) ONCE AS NEEDED
Status: DISCONTINUED | OUTPATIENT
Start: 2021-10-27 | End: 2021-10-27 | Stop reason: HOSPADM

## 2021-10-27 RX ORDER — ACETAMINOPHEN 650 MG/1
650 SUPPOSITORY RECTAL ONCE AS NEEDED
Status: DISCONTINUED | OUTPATIENT
Start: 2021-10-27 | End: 2021-10-27 | Stop reason: HOSPADM

## 2021-10-27 RX ORDER — ONDANSETRON 4 MG/1
4 TABLET, FILM COATED ORAL DAILY PRN
Qty: 30 TABLET | Refills: 1 | Status: SHIPPED | OUTPATIENT
Start: 2021-10-27 | End: 2021-12-08

## 2021-10-27 RX ORDER — ONDANSETRON 2 MG/ML
4 INJECTION INTRAMUSCULAR; INTRAVENOUS ONCE AS NEEDED
Status: DISCONTINUED | OUTPATIENT
Start: 2021-10-27 | End: 2021-10-27 | Stop reason: HOSPADM

## 2021-10-27 RX ORDER — LIDOCAINE HYDROCHLORIDE AND EPINEPHRINE 10; 10 MG/ML; UG/ML
INJECTION, SOLUTION INFILTRATION; PERINEURAL AS NEEDED
Status: DISCONTINUED | OUTPATIENT
Start: 2021-10-27 | End: 2021-10-27 | Stop reason: HOSPADM

## 2021-10-27 RX ADMIN — CEFAZOLIN SODIUM 2 G: 1 INJECTION, POWDER, FOR SOLUTION INTRAMUSCULAR; INTRAVENOUS at 14:28

## 2021-10-27 RX ADMIN — FENTANYL CITRATE 50 MCG: 50 INJECTION, SOLUTION INTRAMUSCULAR; INTRAVENOUS at 14:23

## 2021-10-27 RX ADMIN — FENTANYL CITRATE 50 MCG: 50 INJECTION, SOLUTION INTRAMUSCULAR; INTRAVENOUS at 14:27

## 2021-10-27 RX ADMIN — PROPOFOL 550 MG: 10 INJECTION, EMULSION INTRAVENOUS at 14:26

## 2021-10-27 RX ADMIN — DEXAMETHASONE SODIUM PHOSPHATE 4 MG: 4 INJECTION, SOLUTION INTRAMUSCULAR; INTRAVENOUS at 14:30

## 2021-10-27 RX ADMIN — SODIUM CHLORIDE, POTASSIUM CHLORIDE, SODIUM LACTATE AND CALCIUM CHLORIDE 1000 ML: 600; 310; 30; 20 INJECTION, SOLUTION INTRAVENOUS at 12:19

## 2021-10-27 RX ADMIN — MIDAZOLAM 2 MG: 1 INJECTION INTRAMUSCULAR; INTRAVENOUS at 14:23

## 2021-10-27 NOTE — ANESTHESIA POSTPROCEDURE EVALUATION
Patient: Bernadine Arriaga    Procedure Summary     Date: 10/27/21 Room / Location: [facility] OR 09 / [facility] MAIN OR    Anesthesia Start: 1420 Anesthesia Stop: 1510    Procedure: WIDE EXCISION TRUNK LESION/MASS, hidradenitis of right hip (Right ) Diagnosis:       Hidradenitis suppurativa      (Hidradenitis suppurativa [L73.2])    Surgeons: Serafin Centeno MD Provider: Anatoly Gifford MD    Anesthesia Type: general ASA Status: 3          Anesthesia Type: general    Vitals  Vitals Value Taken Time   /79 10/27/21 1529   Temp     Pulse 93 10/27/21 1532   Resp     SpO2 99 % 10/27/21 1532   Vitals shown include unvalidated device data.        Post Anesthesia Care and Evaluation    Patient location during evaluation: PACU  Patient participation: complete - patient participated  Level of consciousness: awake  Pain scale: See nurse's notes for pain score.  Pain management: adequate  Airway patency: patent  Anesthetic complications: No anesthetic complications  PONV Status: none  Cardiovascular status: acceptable  Respiratory status: acceptable  Hydration status: acceptable    Comments: Patient seen and examined postoperatively; vital signs stable; SpO2 greater than or equal to 90%; cardiopulmonary status stable; nausea/vomiting adequately controlled; pain adequately controlled; no apparent anesthesia complications; patient discharged from anesthesia care when discharge criteria were met

## 2021-10-27 NOTE — ANESTHESIA PREPROCEDURE EVALUATION
Anesthesia Evaluation     Patient summary reviewed and Nursing notes reviewed   NPO Solid Status: > 8 hours  NPO Liquid Status: > 8 hours           Airway   Mallampati: II  TM distance: >3 FB  Neck ROM: full  No difficulty expected  Dental - normal exam     Pulmonary - normal exam   (+) asthma,  Cardiovascular - normal exam    (+) hypertension, hyperlipidemia,       Neuro/Psych  (+) psychiatric history,     GI/Hepatic/Renal/Endo    (+) morbid obesity,  diabetes mellitus,     Musculoskeletal     Abdominal  - normal exam   Substance History - negative use     OB/GYN negative ob/gyn ROS         Other   arthritis,                        Anesthesia Plan    ASA 3     MAC     intravenous induction     Anesthetic plan, all risks, benefits, and alternatives have been provided, discussed and informed consent has been obtained with: patient.

## 2021-10-29 ENCOUNTER — TELEPHONE (OUTPATIENT)
Dept: SURGERY | Facility: CLINIC | Age: 41
End: 2021-10-29

## 2021-10-29 LAB
LAB AP CASE REPORT: NORMAL
PATH REPORT.FINAL DX SPEC: NORMAL
PATH REPORT.GROSS SPEC: NORMAL

## 2021-10-29 NOTE — TELEPHONE ENCOUNTER
Called and left vm requesting a return call.    Patient called stating she is doing well.  PO appt scheduled.

## 2021-11-11 ENCOUNTER — OFFICE VISIT (OUTPATIENT)
Dept: SURGERY | Facility: CLINIC | Age: 41
End: 2021-11-11

## 2021-11-11 VITALS
SYSTOLIC BLOOD PRESSURE: 147 MMHG | WEIGHT: 293 LBS | TEMPERATURE: 98.2 F | BODY MASS INDEX: 45.99 KG/M2 | OXYGEN SATURATION: 99 % | HEIGHT: 67 IN | DIASTOLIC BLOOD PRESSURE: 96 MMHG | HEART RATE: 91 BPM

## 2021-11-11 DIAGNOSIS — L73.2 HIDRADENITIS SUPPURATIVA: Primary | ICD-10-CM

## 2021-11-11 PROCEDURE — 99024 POSTOP FOLLOW-UP VISIT: CPT | Performed by: STUDENT IN AN ORGANIZED HEALTH CARE EDUCATION/TRAINING PROGRAM

## 2021-11-23 NOTE — PROGRESS NOTES
"Chief Complaint  Post-op (I&D 10/27/21)    Subjective          Bernadine Arriaga presents to [facility] GENERAL SURGERY  History of Present Illness    41-year-old lady here for follow-up after excision of her right hip hidradenitis.  She has been doing very well postoperatively, no pain, no fevers or chills, no drainage from the area.  She is tolerating regular diet and having regular bowel function.    Objective   Vital Signs:   /96 (BP Location: Other (Comment), Cuff Size: Adult)   Pulse 91   Temp 98.2 °F (36.8 °C) (Infrared)   Ht 170.2 cm (67\")   Wt (!) 140 kg (309 lb 9.6 oz)   SpO2 99%   BMI 48.49 kg/m²     Physical Exam  Constitutional:       General: She is not in acute distress.     Appearance: Normal appearance. She is not ill-appearing.   HENT:      Head: Normocephalic and atraumatic.      Right Ear: External ear normal.      Left Ear: External ear normal.   Eyes:      Extraocular Movements: Extraocular movements intact.      Conjunctiva/sclera: Conjunctivae normal.   Cardiovascular:      Rate and Rhythm: Normal rate and regular rhythm.   Pulmonary:      Effort: Pulmonary effort is normal. No respiratory distress.   Abdominal:      General: There is no distension.      Palpations: Abdomen is soft.      Tenderness: There is no abdominal tenderness.   Musculoskeletal:         General: No swelling or deformity.      Comments: Right hip incision well-healed   Skin:     General: Skin is warm and dry.   Neurological:      Mental Status: She is alert and oriented to person, place, and time. Mental status is at baseline.        Result Review :                 Assessment and Plan    Diagnoses and all orders for this visit:    1. Hidradenitis suppurativa (Primary)      41-year-old lady here for follow-up after excision of her right hip hidradenitis.  She has been doing very well postoperatively, no pain, no fevers or chills, no drainage from the area.  She is tolerating regular diet " and having regular bowel function.  I reviewed pathology with patient showing resolving abscess cavity no evidence of malignancy.  I removed her stitches.  No activity restrictions, okay to shower and bathe in water, no diet restrictions.  She can follow-up with me in the future if needed.        Follow Up   Return if symptoms worsen or fail to improve.  Patient was given instructions and counseling regarding her condition or for health maintenance advice. Please see specific information pulled into the AVS if appropriate.

## 2021-11-30 DIAGNOSIS — M70.62 GREATER TROCHANTERIC BURSITIS OF BOTH HIPS: ICD-10-CM

## 2021-11-30 DIAGNOSIS — M70.61 GREATER TROCHANTERIC BURSITIS OF BOTH HIPS: ICD-10-CM

## 2021-11-30 RX ORDER — MELOXICAM 15 MG/1
15 TABLET ORAL DAILY
Qty: 30 TABLET | Refills: 1 | Status: SHIPPED | OUTPATIENT
Start: 2021-11-30 | End: 2021-12-08

## 2021-11-30 RX ORDER — CLONIDINE 0.3 MG/24H
PATCH, EXTENDED RELEASE TRANSDERMAL
Qty: 8 PATCH | Refills: 1 | Status: SHIPPED | OUTPATIENT
Start: 2021-11-30 | End: 2022-01-04

## 2021-12-06 NOTE — PATIENT INSTRUCTIONS
Diabetes Mellitus and Standards of Medical Care  Living with and managing diabetes (diabetes mellitus) can be complicated. Your diabetes treatment may be managed by a team of health care providers, including:  · A physician who specializes in diabetes (endocrinologist). You might also have visits with a nurse practitioner or physician assistant.  · Nurses.  · A registered dietitian.  · A certified diabetes care and .  · An exercise specialist.  · A pharmacist.  · An eye doctor.  · A foot specialist (podiatrist).  · A dental care provider.  · A primary care provider.  · A mental health care provider.  How to manage your diabetes  You can do many things to successfully manage your diabetes. Your health care providers will follow guidelines to help you get the best quality of care. Here are general guidelines for your diabetes management plan. Your health care providers may give you more specific instructions.  Physical exams  When you are diagnosed with diabetes, and each year after that, your health care provider will ask about your medical and family history. You will have a physical exam, which may include:  · Measuring your height, weight, and body mass index (BMI).  · Checking your blood pressure. This will be done at every routine medical visit. Your target blood pressure may vary depending on your medical conditions, your age, and other factors.  · A thyroid exam.  · A skin exam.  · Screening for nerve damage (peripheral neuropathy). This may include checking the pulse in your legs and feet and the level of sensation in your hands and feet.  · A foot exam to inspect the structure and skin of your feet, including checking for cuts, bruises, redness, blisters, sores, or other problems.  · Screening for blood vessel (vascular) problems. This may include checking the pulse in your legs and feet and checking your temperature.  Blood tests  Depending on your treatment plan and your personal needs,  you may have the following tests:  · Hemoglobin A1C (HbA1C). This test provides information about blood sugar (glucose) control over the previous 2-3 months. It is used to adjust your treatment plan, if needed. This test will be done:  ? At least 2 times a year, if you are meeting your treatment goals.  ? 4 times a year, if you are not meeting your treatment goals or if your goals have changed.  · Lipid testing, including total cholesterol, LDL and HDL cholesterol, and triglyceride levels.  ? The goal for LDL is less than 100 mg/dL (5.5 mmol/L). If you are at high risk for complications, the goal is less than 70 mg/dL (3.9 mmol/L).  ? The goal for HDL is 40 mg/dL (2.2 mmol/L) or higher for men, and 50 mg/dL (2.8 mmol/L) or higher for women. An HDL cholesterol of 60 mg/dL (3.3 mmol/L) or higher gives some protection against heart disease.  ? The goal for triglycerides is less than 150 mg/dL (8.3 mmol/L).  · Liver function tests.  · Kidney function tests.  · Thyroid function tests.    Dental and eye exams    · Visit your dentist two times a year.  · If you have type 1 diabetes, your health care provider may recommend an eye exam within 5 years after you are diagnosed, and then once a year after your first exam.  ? For children with type 1 diabetes, the health care provider may recommend an eye exam when your child is age 11 or older and has had diabetes for 3-5 years. After the first exam, your child should get an eye exam once a year.  · If you have type 2 diabetes, your health care provider may recommend an eye exam as soon as you are diagnosed, and then every 1-2 years after your first exam.    Immunizations  · A yearly flu (influenza) vaccine is recommended annually for everyone 6 months or older. This is especially important if you have diabetes.  · The pneumonia (pneumococcal) vaccine is recommended for everyone 2 years or older who has diabetes. If you are age 65 or older, you may get the pneumonia vaccine as a  series of two separate shots.  · The hepatitis B vaccine is recommended for adults shortly after being diagnosed with diabetes.  Adults and children with diabetes should receive all other vaccines according to age-specific recommendations from the Centers for Disease Control and Prevention (CDC).  Mental and emotional health  Screening for symptoms of eating disorders, anxiety, and depression is recommended at the time of diagnosis and after as needed. If your screening shows that you have symptoms, you may need more evaluation. You may work with a mental health care provider.  Follow these instructions at home:  Treatment plan  You will monitor your blood glucose levels and may give yourself insulin. Your treatment plan will be reviewed at every medical visit. You and your health care provider will discuss:  · How you are taking your medicines, including insulin.  · Any side effects you have.  · Your blood glucose level target goals.  · How often you monitor your blood glucose level.  · Lifestyle habits, such as activity level and tobacco, alcohol, and substance use.  Education  Your health care provider will assess how well you are monitoring your blood glucose levels and whether you are taking your insulin and medicines correctly. He or she may refer you to:  · A certified diabetes care and  to manage your diabetes throughout your life, starting at diagnosis.  · A registered dietitian who can create and review your personal nutrition plan.  · An exercise specialist who can discuss your activity level and exercise plan.  General instructions  · Take over-the-counter and prescription medicines only as told by your health care provider.  · Keep all follow-up visits. This is important.  Where to find support  There are many diabetes support networks, including:  · American Diabetes Association (ADA): diabetes.org  · Defeat Diabetes Foundation: defeatdiabetes.org  Where to find more  information  · American Diabetes Association (ADA): www.diabetes.org  · Association of Diabetes Care & Education Specialists (ADCES): diabeteseducator.org  · International Diabetes Federation (IDF): idf.org  Summary  · Managing diabetes (diabetes mellitus) can be complicated. Your diabetes treatment may be managed by a team of health care providers.  · Your health care providers follow guidelines to help you get the best quality care.  · You should have physical exams, blood tests, blood pressure monitoring, immunizations, and screening tests regularly. Stay updated on how to manage your diabetes.  · Your health care providers may also give you more specific instructions based on your individual health.  This information is not intended to replace advice given to you by your health care provider. Make sure you discuss any questions you have with your health care provider.  Document Revised: 06/24/2021 Document Reviewed: 06/24/2021  [employer]vier Patient Education © 2021 [employer]

## 2021-12-06 NOTE — PROGRESS NOTES
Subjective   Bernadine Arriaga is a 41 y.o. female.       HPI   Pt is here today for medication follow up.   1) HTN - currently on metoprolol XL 50 mg two tabs AM and 1 in PM; clonidine 0.3 mg patch; olmesartan 40 mg daily.   BP is stable.  Denies any Cp; palpitations; SOA; dizziness; headache; trouble with vision.   2) T2DM - currently on glimepiride 4 mg daily; Ozempic 0.5 mg weekly. BS running 200's.  Diabetic eye exam was completed this year.  Denies any polyuria or polydipsia.  Denies any numbness/tingling in feet.  Denies any skin lesions or sores that are not healing.    Last A1C was 6.5 in May 2021.    3) Hyperlipidemia - currently not on medication.   4) Chronic joint pain - currently taking meloxicam and Robaxin; they don't seem to be helping as much the past few months.  Had a flare up with pain in the past 2 weeks; saw a telehealth provider and was put on a steroid.  Denies any joint swelling or redness.  Has failed naproxen and diclofenac in the past.     The following portions of the patient's history were reviewed and updated as appropriate: allergies, current medications, past family history, past medical history, past social history, past surgical history and problem list.    Review of Systems   Constitutional: Negative for activity change, appetite change, chills, diaphoresis, fatigue and fever.   Eyes: Negative for visual disturbance.   Respiratory: Negative for cough, chest tightness, shortness of breath and wheezing.    Cardiovascular: Negative for chest pain, palpitations and leg swelling.   Gastrointestinal: Negative for abdominal pain, blood in stool, constipation, nausea, vomiting and indigestion.   Endocrine: Negative for polydipsia, polyphagia and polyuria.   Genitourinary: Negative for difficulty urinating, dysuria, flank pain, frequency, hematuria and urgency.   Musculoskeletal: Positive for arthralgias, back pain and myalgias. Negative for joint swelling.   Skin: Negative for rash.    Neurological: Negative for dizziness, weakness, light-headedness, headache and confusion.   Psychiatric/Behavioral: Negative for depressed mood. The patient is not nervous/anxious.        Objective   Physical Exam  Vitals reviewed.   Constitutional:       General: She is not in acute distress.     Appearance: Normal appearance. She is obese.   Cardiovascular:      Rate and Rhythm: Normal rate and regular rhythm.      Pulses: Normal pulses.      Heart sounds: Normal heart sounds. No murmur heard.      Pulmonary:      Effort: Pulmonary effort is normal. No respiratory distress.      Breath sounds: Normal breath sounds. No wheezing or rhonchi.   Chest:      Chest wall: No tenderness.   Abdominal:      Tenderness: There is no right CVA tenderness or left CVA tenderness.   Musculoskeletal:      Right lower leg: No edema.   Skin:     General: Skin is warm and dry.      Findings: No erythema.   Neurological:      General: No focal deficit present.      Mental Status: She is alert and oriented to person, place, and time.   Psychiatric:         Mood and Affect: Mood normal.           Assessment/Plan   Diagnoses and all orders for this visit:    1. Essential hypertension (Primary)  Comments:  Stable.   Cont. current medication.   Labs today.   RTO in 6 mo.   Orders:  -     Comprehensive Metabolic Panel; Future  -     Lipid Panel; Future  -     Hemoglobin A1c; Future    2. Type 2 diabetes mellitus with hyperglycemia, without long-term current use of insulin (HCC)  Comments:  Cont. current medication.   Labs ordered.   Eye exam UTD.   Work on healthy diet; exercise at least 150 minutes per week; work on weight loss.   Orders:  -     Comprehensive Metabolic Panel; Future  -     Lipid Panel; Future  -     Hemoglobin A1c; Future  -     MicroAlbumin, Urine, Random - Urine, Clean Catch; Future    3. Mixed hyperlipidemia  Comments:   Labs ordered.     Orders:  -     Comprehensive Metabolic Panel; Future  -     Lipid Panel;  Future  -     Hemoglobin A1c; Future    4. Multiple joint pain  Comments:  Will stop meloxicam and Robaxin.    Given Lodine and tizanidine PRN.    Heat/ice and rest.   Call for worsening.   Orders:  -     TiZANidine (ZANAFLEX) 4 MG capsule; Take 1 capsule by mouth 3 (Three) Times a Day.  Dispense: 90 capsule; Refill: 1  -     etodolac (LODINE) 500 MG tablet; Take 1 tablet by mouth 2 (Two) Times a Day.  Dispense: 60 tablet; Refill: 1

## 2021-12-08 ENCOUNTER — OFFICE VISIT (OUTPATIENT)
Dept: FAMILY MEDICINE CLINIC | Facility: CLINIC | Age: 41
End: 2021-12-08

## 2021-12-08 VITALS
SYSTOLIC BLOOD PRESSURE: 126 MMHG | BODY MASS INDEX: 45.99 KG/M2 | HEART RATE: 93 BPM | HEIGHT: 67 IN | OXYGEN SATURATION: 99 % | WEIGHT: 293 LBS | DIASTOLIC BLOOD PRESSURE: 85 MMHG

## 2021-12-08 DIAGNOSIS — E11.65 TYPE 2 DIABETES MELLITUS WITH HYPERGLYCEMIA, WITHOUT LONG-TERM CURRENT USE OF INSULIN (HCC): ICD-10-CM

## 2021-12-08 DIAGNOSIS — I10 ESSENTIAL HYPERTENSION: Primary | ICD-10-CM

## 2021-12-08 DIAGNOSIS — E78.2 MIXED HYPERLIPIDEMIA: ICD-10-CM

## 2021-12-08 DIAGNOSIS — M25.50 MULTIPLE JOINT PAIN: ICD-10-CM

## 2021-12-08 PROCEDURE — 99214 OFFICE O/P EST MOD 30 MIN: CPT | Performed by: NURSE PRACTITIONER

## 2021-12-08 RX ORDER — ETODOLAC 500 MG/1
500 TABLET, FILM COATED ORAL 2 TIMES DAILY
Qty: 60 TABLET | Refills: 1 | Status: SHIPPED | OUTPATIENT
Start: 2021-12-08 | End: 2022-02-04

## 2021-12-08 RX ORDER — METHOCARBAMOL 750 MG/1
TABLET, FILM COATED ORAL
Qty: 120 TABLET | Refills: 0 | Status: CANCELLED | OUTPATIENT
Start: 2021-12-08

## 2021-12-08 RX ORDER — MELOXICAM 15 MG/1
15 TABLET ORAL DAILY
Qty: 30 TABLET | Refills: 1 | Status: CANCELLED | OUTPATIENT
Start: 2021-12-08 | End: 2022-02-06

## 2021-12-08 RX ORDER — TIZANIDINE HYDROCHLORIDE 4 MG/1
4 CAPSULE, GELATIN COATED ORAL 3 TIMES DAILY
Qty: 90 CAPSULE | Refills: 1 | Status: SHIPPED | OUTPATIENT
Start: 2021-12-08 | End: 2021-12-30

## 2021-12-30 DIAGNOSIS — M25.50 MULTIPLE JOINT PAIN: ICD-10-CM

## 2021-12-30 RX ORDER — TIZANIDINE HYDROCHLORIDE 4 MG/1
CAPSULE, GELATIN COATED ORAL
Qty: 90 CAPSULE | Refills: 0 | Status: SHIPPED | OUTPATIENT
Start: 2021-12-30 | End: 2022-02-03

## 2022-01-04 RX ORDER — CLONIDINE 0.3 MG/24H
PATCH, EXTENDED RELEASE TRANSDERMAL
Qty: 4 PATCH | Refills: 3 | Status: SHIPPED | OUTPATIENT
Start: 2022-01-04 | End: 2022-05-13 | Stop reason: SDUPTHER

## 2022-02-03 DIAGNOSIS — M25.50 MULTIPLE JOINT PAIN: ICD-10-CM

## 2022-02-03 RX ORDER — TIZANIDINE HYDROCHLORIDE 4 MG/1
CAPSULE, GELATIN COATED ORAL
Qty: 90 CAPSULE | Refills: 0 | Status: SHIPPED | OUTPATIENT
Start: 2022-02-03 | End: 2022-02-28

## 2022-02-04 DIAGNOSIS — M25.50 MULTIPLE JOINT PAIN: ICD-10-CM

## 2022-02-04 RX ORDER — ETODOLAC 500 MG/1
TABLET, FILM COATED ORAL
Qty: 60 TABLET | Refills: 1 | Status: SHIPPED | OUTPATIENT
Start: 2022-02-04 | End: 2022-03-30

## 2022-02-16 RX ORDER — OLMESARTAN MEDOXOMIL 40 MG/1
TABLET ORAL
Qty: 90 TABLET | Refills: 1 | Status: SHIPPED | OUTPATIENT
Start: 2022-02-16 | End: 2022-05-13 | Stop reason: SDUPTHER

## 2022-02-26 RX ORDER — SEMAGLUTIDE 1.34 MG/ML
0.5 INJECTION, SOLUTION SUBCUTANEOUS WEEKLY
Qty: 4.5 PEN | Refills: 0 | Status: SHIPPED | OUTPATIENT
Start: 2022-02-26 | End: 2022-05-13 | Stop reason: SDUPTHER

## 2022-02-27 DIAGNOSIS — M25.50 MULTIPLE JOINT PAIN: ICD-10-CM

## 2022-02-28 RX ORDER — TIZANIDINE HYDROCHLORIDE 4 MG/1
CAPSULE, GELATIN COATED ORAL
Qty: 90 CAPSULE | Refills: 0 | Status: SHIPPED | OUTPATIENT
Start: 2022-02-28 | End: 2022-03-23

## 2022-03-23 DIAGNOSIS — M25.50 MULTIPLE JOINT PAIN: ICD-10-CM

## 2022-03-23 RX ORDER — TIZANIDINE HYDROCHLORIDE 4 MG/1
CAPSULE, GELATIN COATED ORAL
Qty: 90 CAPSULE | Refills: 0 | Status: SHIPPED | OUTPATIENT
Start: 2022-03-23 | End: 2022-07-08

## 2022-03-30 DIAGNOSIS — M25.50 MULTIPLE JOINT PAIN: ICD-10-CM

## 2022-03-30 RX ORDER — ETODOLAC 500 MG/1
TABLET, FILM COATED ORAL
Qty: 60 TABLET | Refills: 1 | Status: SHIPPED | OUTPATIENT
Start: 2022-03-30 | End: 2022-05-13 | Stop reason: SDUPTHER

## 2022-04-03 DIAGNOSIS — I10 ESSENTIAL HYPERTENSION: ICD-10-CM

## 2022-04-03 RX ORDER — METOPROLOL SUCCINATE 50 MG/1
TABLET, EXTENDED RELEASE ORAL
Qty: 270 TABLET | Refills: 1 | Status: SHIPPED | OUTPATIENT
Start: 2022-04-03 | End: 2022-05-13 | Stop reason: SDUPTHER

## 2022-05-13 DIAGNOSIS — I10 ESSENTIAL HYPERTENSION: ICD-10-CM

## 2022-05-13 DIAGNOSIS — E11.65 TYPE 2 DIABETES MELLITUS WITH HYPERGLYCEMIA, WITHOUT LONG-TERM CURRENT USE OF INSULIN: ICD-10-CM

## 2022-05-13 DIAGNOSIS — M25.50 MULTIPLE JOINT PAIN: ICD-10-CM

## 2022-05-16 RX ORDER — CLONIDINE 0.3 MG/24H
1 PATCH, EXTENDED RELEASE TRANSDERMAL WEEKLY
Qty: 12 PATCH | Refills: 1 | Status: SHIPPED | OUTPATIENT
Start: 2022-05-16 | End: 2023-01-26

## 2022-05-16 RX ORDER — HYDROXYZINE HYDROCHLORIDE 25 MG/1
25 TABLET, FILM COATED ORAL EVERY 6 HOURS PRN
Qty: 30 TABLET | Refills: 1 | Status: SHIPPED | OUTPATIENT
Start: 2022-05-16

## 2022-05-16 RX ORDER — BUDESONIDE AND FORMOTEROL FUMARATE DIHYDRATE 80; 4.5 UG/1; UG/1
2 AEROSOL RESPIRATORY (INHALATION)
Qty: 3 EACH | Refills: 1 | Status: SHIPPED | OUTPATIENT
Start: 2022-05-16

## 2022-05-16 RX ORDER — ALBUTEROL SULFATE 90 UG/1
2 AEROSOL, METERED RESPIRATORY (INHALATION) EVERY 6 HOURS PRN
Qty: 18 G | Refills: 5 | Status: SHIPPED | OUTPATIENT
Start: 2022-05-16

## 2022-05-16 RX ORDER — ETODOLAC 500 MG/1
500 TABLET, FILM COATED ORAL 2 TIMES DAILY
Qty: 60 TABLET | Refills: 1 | Status: SHIPPED | OUTPATIENT
Start: 2022-05-16 | End: 2022-09-29

## 2022-05-16 RX ORDER — SEMAGLUTIDE 1.34 MG/ML
0.5 INJECTION, SOLUTION SUBCUTANEOUS WEEKLY
Qty: 4.5 PEN | Refills: 0 | Status: SHIPPED | OUTPATIENT
Start: 2022-05-16 | End: 2022-09-22

## 2022-05-16 RX ORDER — FUROSEMIDE 40 MG/1
20 TABLET ORAL 2 TIMES DAILY
Qty: 30 TABLET | Refills: 1 | Status: SHIPPED | OUTPATIENT
Start: 2022-05-16 | End: 2022-06-09

## 2022-05-16 RX ORDER — GLIMEPIRIDE 4 MG/1
4 TABLET ORAL 2 TIMES DAILY WITH MEALS
Qty: 180 TABLET | Refills: 1 | Status: SHIPPED | OUTPATIENT
Start: 2022-05-16 | End: 2023-01-15

## 2022-05-16 RX ORDER — OLMESARTAN MEDOXOMIL 40 MG/1
40 TABLET ORAL DAILY
Qty: 90 TABLET | Refills: 1 | Status: SHIPPED | OUTPATIENT
Start: 2022-05-16 | End: 2022-09-20

## 2022-05-16 RX ORDER — METOPROLOL SUCCINATE 50 MG/1
TABLET, EXTENDED RELEASE ORAL
Qty: 270 TABLET | Refills: 1 | Status: SHIPPED | OUTPATIENT
Start: 2022-05-16 | End: 2022-09-20

## 2022-06-09 DIAGNOSIS — I10 ESSENTIAL HYPERTENSION: ICD-10-CM

## 2022-06-09 RX ORDER — FUROSEMIDE 40 MG/1
TABLET ORAL
Qty: 30 TABLET | Refills: 1 | Status: SHIPPED | OUTPATIENT
Start: 2022-06-09 | End: 2022-07-10

## 2022-07-08 DIAGNOSIS — M62.838 MUSCLE SPASM: Primary | ICD-10-CM

## 2022-07-08 RX ORDER — BACLOFEN 10 MG/1
10 TABLET ORAL 3 TIMES DAILY
Qty: 90 TABLET | Refills: 0 | Status: SHIPPED | OUTPATIENT
Start: 2022-07-08 | End: 2022-08-01

## 2022-07-10 DIAGNOSIS — I10 ESSENTIAL HYPERTENSION: ICD-10-CM

## 2022-07-10 RX ORDER — FUROSEMIDE 40 MG/1
TABLET ORAL
Qty: 30 TABLET | Refills: 1 | Status: SHIPPED | OUTPATIENT
Start: 2022-07-10 | End: 2022-08-15

## 2022-07-22 ENCOUNTER — APPOINTMENT (OUTPATIENT)
Dept: GENERAL RADIOLOGY | Facility: HOSPITAL | Age: 42
End: 2022-07-22

## 2022-07-22 PROCEDURE — 72050 X-RAY EXAM NECK SPINE 4/5VWS: CPT | Performed by: FAMILY MEDICINE

## 2022-07-26 ENCOUNTER — TELEPHONE (OUTPATIENT)
Dept: FAMILY MEDICINE CLINIC | Facility: CLINIC | Age: 42
End: 2022-07-26

## 2022-07-26 DIAGNOSIS — M62.838 MUSCLE SPASM: Primary | ICD-10-CM

## 2022-07-26 DIAGNOSIS — M54.2 NECK PAIN: ICD-10-CM

## 2022-07-26 NOTE — TELEPHONE ENCOUNTER
Caller: Bernadine Arriaga     Relationship: [unfilled]     Best call back number: [phone]    What is your medical concern? NECK PAIN, HAS NOT BEEN GETTING BETTER, STATES SHE BELIEVES IT MAY BE GETTING WORSE. PAIN IS IN SHOULDERS AS WELL. WORSENED WHEN LAYING DOWN     How long has this issue been going on? 5.5 WEEKS     Is your provider already aware of this issue? YES    Have you been treated for this issue? YES

## 2022-07-26 NOTE — TELEPHONE ENCOUNTER
Continue to do warm compresses  Use the muscle relaxants  I will put in a referral to physical therapy

## 2022-07-30 DIAGNOSIS — M62.838 MUSCLE SPASM: ICD-10-CM

## 2022-08-01 RX ORDER — BACLOFEN 10 MG/1
TABLET ORAL
Qty: 90 TABLET | Refills: 0 | Status: SHIPPED | OUTPATIENT
Start: 2022-08-01

## 2022-08-14 DIAGNOSIS — I10 ESSENTIAL HYPERTENSION: ICD-10-CM

## 2022-08-15 RX ORDER — FUROSEMIDE 40 MG/1
TABLET ORAL
Qty: 30 TABLET | Refills: 1 | Status: SHIPPED | OUTPATIENT
Start: 2022-08-15

## 2022-08-30 ENCOUNTER — OFFICE VISIT (OUTPATIENT)
Dept: FAMILY MEDICINE CLINIC | Facility: CLINIC | Age: 42
End: 2022-08-30

## 2022-08-30 VITALS
HEIGHT: 66 IN | SYSTOLIC BLOOD PRESSURE: 123 MMHG | BODY MASS INDEX: 47.09 KG/M2 | HEART RATE: 76 BPM | WEIGHT: 293 LBS | DIASTOLIC BLOOD PRESSURE: 84 MMHG | OXYGEN SATURATION: 94 %

## 2022-08-30 DIAGNOSIS — I10 ESSENTIAL HYPERTENSION: ICD-10-CM

## 2022-08-30 DIAGNOSIS — E11.65 TYPE 2 DIABETES MELLITUS WITH HYPERGLYCEMIA, WITHOUT LONG-TERM CURRENT USE OF INSULIN: Primary | ICD-10-CM

## 2022-08-30 DIAGNOSIS — H02.729: ICD-10-CM

## 2022-08-30 DIAGNOSIS — J45.30 MILD PERSISTENT ASTHMA WITHOUT COMPLICATION: ICD-10-CM

## 2022-08-30 DIAGNOSIS — E78.2 MIXED HYPERLIPIDEMIA: ICD-10-CM

## 2022-08-30 PROCEDURE — 99214 OFFICE O/P EST MOD 30 MIN: CPT | Performed by: NURSE PRACTITIONER

## 2022-08-30 RX ORDER — BIMATOPROST 3 UG/ML
SOLUTION TOPICAL NIGHTLY
Qty: 5 ML | Refills: 12 | Status: SHIPPED | OUTPATIENT
Start: 2022-08-30

## 2022-08-30 NOTE — PROGRESS NOTES
Subjective   Bernadine Arriaga is a 42 y.o. female.       HPI   Pt is here today for medication follow up.   1) HTN - currently on clonidine 0.3 mg weekly patch; metoprolol XL 50 mg 2 tabs in am and one in pm; olmesartan 40 mg daily.  BP is stable.  Denies any CP; palpitations; SOA; dizziness; headache; trouble with vision.   2) T2DM - currently on Ozempic 0.5 mg weekly; glimepiride 4 mg bid. Last A1C was 6.5% in May 2021.  BS running up and down at home depending on what is eaten.  Eye exam in Jan/Feb 2022.  Denies any polyuria or polydipsia.   3) Hyperlipidemia - currently not on medication.   4) Asthma - currently on Symbicort inhaler 80/4.5. Symptoms have been stable.   5) Sparse eyelashes - would like to discuss starting Latisse.      The following portions of the patient's history were reviewed and updated as appropriate: allergies, current medications, past family history, past medical history, past social history, past surgical history and problem list.    Review of Systems   Constitutional: Negative for chills, fatigue and fever.   Eyes: Negative for visual disturbance.   Respiratory: Negative for cough, chest tightness, shortness of breath and wheezing.    Cardiovascular: Negative for chest pain and palpitations.   Gastrointestinal: Negative for abdominal pain, blood in stool, constipation, diarrhea, nausea, vomiting, GERD and indigestion.   Endocrine: Negative for polydipsia, polyphagia and polyuria.   Genitourinary: Negative for difficulty urinating, dysuria, flank pain, frequency, hematuria and urgency.   Musculoskeletal: Negative for arthralgias and myalgias.   Neurological: Negative for dizziness, weakness, light-headedness and headache.   Psychiatric/Behavioral: Negative for depressed mood. The patient is not nervous/anxious.        Objective   Physical Exam  Vitals reviewed.   Constitutional:       General: She is not in acute distress.     Appearance: Normal appearance. She is obese.   HENT:       Head: Normocephalic and atraumatic.   Eyes:      Comments: Eyelashes thin bilateral   Cardiovascular:      Rate and Rhythm: Normal rate and regular rhythm.      Pulses: Normal pulses.      Heart sounds: Normal heart sounds. No murmur heard.  Pulmonary:      Effort: Pulmonary effort is normal. No respiratory distress.      Breath sounds: No wheezing.   Chest:      Chest wall: No tenderness.   Abdominal:      Tenderness: There is no right CVA tenderness or left CVA tenderness.   Musculoskeletal:      Right lower leg: No edema.      Left lower leg: No edema.   Skin:     General: Skin is warm and dry.      Findings: No erythema.   Neurological:      General: No focal deficit present.      Mental Status: She is alert and oriented to person, place, and time.   Psychiatric:         Mood and Affect: Mood normal.           Assessment & Plan   Diagnoses and all orders for this visit:    1. Type 2 diabetes mellitus with hyperglycemia, without long-term current use of insulin (HCC) (Primary)  Comments:  Stable.   Cont. current medication.   Labs ordered.   RTO in 6 mo.   Orders:  -     Comprehensive metabolic panel; Future  -     Lipid panel; Future  -     Hemoglobin A1c; Future  -     Microalbumin / Creatinine Urine Ratio - Urine, Clean Catch; Future    2. Essential hypertension  Comments:  Stable.   Cont. current medication.   Labs ordered.   RTO in 6 mo.   Orders:  -     Comprehensive metabolic panel; Future  -     Lipid panel; Future  -     Hemoglobin A1c; Future  -     Microalbumin / Creatinine Urine Ratio - Urine, Clean Catch; Future    3. Mixed hyperlipidemia  Comments:  Stable.   Cont. current medication.   Labs ordered.   RTO in 6 mo.   Orders:  -     Comprehensive metabolic panel; Future  -     Lipid panel; Future  -     Hemoglobin A1c; Future  -     Microalbumin / Creatinine Urine Ratio - Urine, Clean Catch; Future    4. Mild persistent asthma without complication  Comments:  Stable.   Cont. current medication.    Labs ordered.   RTO in 6 mo.     5. Eyelid hypotrichosis, unspecified laterality  Comments:  Given Latisse; reviewed common side effects.   Orders:  -     bimatoprost (Latisse) 0.03 % ophthalmic solution; Administer  to both eyes Every Night. Place 1 drop on applicator and apply along skin of upper eyelid at base of eyelashes daily at bedtime; rpt for 2nd eye with clean applicator  Dispense: 5 mL; Refill: 12

## 2022-09-19 DIAGNOSIS — I10 ESSENTIAL HYPERTENSION: ICD-10-CM

## 2022-09-20 RX ORDER — OLMESARTAN MEDOXOMIL 40 MG/1
TABLET ORAL
Qty: 90 TABLET | Refills: 1 | Status: SHIPPED | OUTPATIENT
Start: 2022-09-20

## 2022-09-20 RX ORDER — METOPROLOL SUCCINATE 50 MG/1
TABLET, EXTENDED RELEASE ORAL
Qty: 270 TABLET | Refills: 1 | Status: SHIPPED | OUTPATIENT
Start: 2022-09-20 | End: 2023-01-25 | Stop reason: SDUPTHER

## 2022-09-22 RX ORDER — SEMAGLUTIDE 1.34 MG/ML
0.5 INJECTION, SOLUTION SUBCUTANEOUS WEEKLY
Qty: 4.5 ML | Refills: 3 | Status: SHIPPED | OUTPATIENT
Start: 2022-09-22

## 2022-09-28 DIAGNOSIS — M25.50 MULTIPLE JOINT PAIN: ICD-10-CM

## 2022-09-29 ENCOUNTER — LAB (OUTPATIENT)
Dept: FAMILY MEDICINE CLINIC | Facility: CLINIC | Age: 42
End: 2022-09-29

## 2022-09-29 DIAGNOSIS — E78.2 MIXED HYPERLIPIDEMIA: ICD-10-CM

## 2022-09-29 DIAGNOSIS — I10 ESSENTIAL HYPERTENSION: ICD-10-CM

## 2022-09-29 DIAGNOSIS — E11.65 TYPE 2 DIABETES MELLITUS WITH HYPERGLYCEMIA, WITHOUT LONG-TERM CURRENT USE OF INSULIN: ICD-10-CM

## 2022-09-29 LAB
ALBUMIN SERPL-MCNC: 4 G/DL (ref 3.5–5.2)
ALBUMIN UR-MCNC: 3.4 MG/DL
ALBUMIN/GLOB SERPL: 1.4 G/DL
ALP SERPL-CCNC: 57 U/L (ref 39–117)
ALT SERPL W P-5'-P-CCNC: 31 U/L (ref 1–33)
ANION GAP SERPL CALCULATED.3IONS-SCNC: 10.4 MMOL/L (ref 5–15)
AST SERPL-CCNC: 21 U/L (ref 1–32)
BILIRUB SERPL-MCNC: 0.4 MG/DL (ref 0–1.2)
BUN SERPL-MCNC: 15 MG/DL (ref 6–20)
BUN/CREAT SERPL: 29.4 (ref 7–25)
CALCIUM SPEC-SCNC: 9.2 MG/DL (ref 8.6–10.5)
CHLORIDE SERPL-SCNC: 99 MMOL/L (ref 98–107)
CHOLEST SERPL-MCNC: 251 MG/DL (ref 0–200)
CO2 SERPL-SCNC: 26.6 MMOL/L (ref 22–29)
CREAT SERPL-MCNC: 0.51 MG/DL (ref 0.57–1)
CREAT UR-MCNC: 209.7 MG/DL
EGFRCR SERPLBLD CKD-EPI 2021: 118.2 ML/MIN/1.73
GLOBULIN UR ELPH-MCNC: 2.9 GM/DL
GLUCOSE SERPL-MCNC: 104 MG/DL (ref 65–99)
HBA1C MFR BLD: 5.7 % (ref 3.5–5.6)
HDLC SERPL-MCNC: 59 MG/DL (ref 40–60)
LDLC SERPL CALC-MCNC: 157 MG/DL (ref 0–100)
LDLC/HDLC SERPL: 2.6 {RATIO}
MICROALBUMIN/CREAT UR: 16.2 MG/G
POTASSIUM SERPL-SCNC: 3.8 MMOL/L (ref 3.5–5.2)
PROT SERPL-MCNC: 6.9 G/DL (ref 6–8.5)
SODIUM SERPL-SCNC: 136 MMOL/L (ref 136–145)
TRIGL SERPL-MCNC: 194 MG/DL (ref 0–150)
VLDLC SERPL-MCNC: 35 MG/DL (ref 5–40)

## 2022-09-29 PROCEDURE — 82570 ASSAY OF URINE CREATININE: CPT | Performed by: NURSE PRACTITIONER

## 2022-09-29 PROCEDURE — 82043 UR ALBUMIN QUANTITATIVE: CPT | Performed by: NURSE PRACTITIONER

## 2022-09-29 PROCEDURE — 36415 COLL VENOUS BLD VENIPUNCTURE: CPT

## 2022-09-29 PROCEDURE — 80061 LIPID PANEL: CPT | Performed by: NURSE PRACTITIONER

## 2022-09-29 PROCEDURE — 83036 HEMOGLOBIN GLYCOSYLATED A1C: CPT | Performed by: NURSE PRACTITIONER

## 2022-09-29 PROCEDURE — 80053 COMPREHEN METABOLIC PANEL: CPT | Performed by: NURSE PRACTITIONER

## 2022-09-29 RX ORDER — ETODOLAC 500 MG/1
TABLET, FILM COATED ORAL
Qty: 60 TABLET | Refills: 1 | Status: SHIPPED | OUTPATIENT
Start: 2022-09-29 | End: 2023-01-25 | Stop reason: SDUPTHER

## 2022-10-03 ENCOUNTER — TRANSCRIBE ORDERS (OUTPATIENT)
Dept: ADMINISTRATIVE | Facility: HOSPITAL | Age: 42
End: 2022-10-03

## 2022-10-03 DIAGNOSIS — Z12.31 VISIT FOR SCREENING MAMMOGRAM: Primary | ICD-10-CM

## 2022-10-20 ENCOUNTER — HOSPITAL ENCOUNTER (OUTPATIENT)
Dept: MAMMOGRAPHY | Facility: HOSPITAL | Age: 42
Discharge: HOME | End: 2022-10-20
Admitting: OBSTETRICS & GYNECOLOGY

## 2022-10-20 DIAGNOSIS — Z12.31 VISIT FOR SCREENING MAMMOGRAM: ICD-10-CM

## 2022-10-20 PROCEDURE — 77063 BREAST TOMOSYNTHESIS BI: CPT

## 2022-10-20 PROCEDURE — 77067 SCR MAMMO BI INCL CAD: CPT

## 2022-10-24 ENCOUNTER — TELEMEDICINE (OUTPATIENT)
Dept: FAMILY MEDICINE CLINIC | Facility: TELEHEALTH | Age: 42
End: 2022-10-24

## 2022-10-24 DIAGNOSIS — Z20.822 ENCOUNTER BY TELEHEALTH FOR SUSPECTED COVID-19: Primary | ICD-10-CM

## 2022-10-24 DIAGNOSIS — J45.901 ASTHMA WITH ACUTE EXACERBATION, UNSPECIFIED ASTHMA SEVERITY, UNSPECIFIED WHETHER PERSISTENT: ICD-10-CM

## 2022-10-24 PROCEDURE — 99213 OFFICE O/P EST LOW 20 MIN: CPT | Performed by: NURSE PRACTITIONER

## 2022-10-24 PROCEDURE — U0004 COV-19 TEST NON-CDC HGH THRU: HCPCS | Performed by: NURSE PRACTITIONER

## 2022-10-24 RX ORDER — PREDNISONE 10 MG/1
TABLET ORAL DAILY
Qty: 21 EACH | Refills: 0 | Status: SHIPPED | OUTPATIENT
Start: 2022-10-24 | End: 2022-10-30

## 2022-10-24 NOTE — PROGRESS NOTES
CHIEF COMPLAINT  Chief Complaint   Patient presents with   • Cough   • URI   • Wheezing         HPI  Bernadine Arriaga is a 42 y.o. female  presents with complaint of 3 day h/o cough, congestion and chest tightness. She reports she has Asthma and she feels like the fall of the year, allergies etc has caused an exacerbation. She does report intermittent wheezing. She has no reported fever. She is willing to test for Covid 19 today. She is using Delsym for cough. She is also using an inhaler for wheezing.     Review of Systems   Constitutional: Positive for activity change (decreased) and fatigue.   HENT: Positive for congestion.    Respiratory: Positive for cough, chest tightness and wheezing. Negative for shortness of breath and stridor.    Cardiovascular: Negative.    Gastrointestinal: Negative.    Allergic/Immunologic: Positive for environmental allergies.   Neurological: Negative.    Psychiatric/Behavioral: Negative.        Past Medical History:   Diagnosis Date   • Allergic    • Anaphylactic reaction     to combo of ragweed and molds   • Anxiety    • Arthritis of spine     psoriatic   • Asthma    • Diabetes mellitus (HCC)    • History of medical problems     Hidradenitis   • Hydradenitis    • Hyperlipidemia     slight elevation not treated   • Hypertension    • Low back pain    • Migraine     history   • MRSA carrier    • Pneumonia    • Psoriasis    • Psoriatic arthritis (HCC)    • Sinus disorder        Family History   Problem Relation Age of Onset   • Breast cancer Mother    • Hypertension Mother    • Cancer Mother    • Diabetes Mother    • Hyperlipidemia Mother    • Hypertension Father    • Hyperlipidemia Father    • Cancer Brother    • Breast cancer Maternal Aunt    • Heart disease Maternal Grandfather        Social History     Socioeconomic History   • Marital status:    Tobacco Use   • Smoking status: Never   • Smokeless tobacco: Never   Vaping Use   • Vaping Use: Never used   Substance and  Sexual Activity   • Alcohol use: No   • Drug use: No   • Sexual activity: Defer         There were no vitals taken for this visit.    PHYSICAL EXAM  Physical Exam   Constitutional: She is oriented to person, place, and time. She appears well-developed and well-nourished. She does not have a sickly appearance. She does not appear ill. No distress.   HENT:   Head: Normocephalic and atraumatic.   Pulmonary/Chest: No accessory muscle usage or stridor. No tachypnea and no bradypnea. She is in respiratory distress. She Audible wheeze noted...  Neurological: She is alert and oriented to person, place, and time.   Psychiatric: She has a normal mood and affect.   Vitals reviewed.      Results for orders placed or performed in visit on 09/29/22   Comprehensive metabolic panel    Specimen: Blood   Result Value Ref Range    Glucose 104 (H) 65 - 99 mg/dL    BUN 15 6 - 20 mg/dL    Creatinine 0.51 (L) 0.57 - 1.00 mg/dL    Sodium 136 136 - 145 mmol/L    Potassium 3.8 3.5 - 5.2 mmol/L    Chloride 99 98 - 107 mmol/L    CO2 26.6 22.0 - 29.0 mmol/L    Calcium 9.2 8.6 - 10.5 mg/dL    Total Protein 6.9 6.0 - 8.5 g/dL    Albumin 4.00 3.50 - 5.20 g/dL    ALT (SGPT) 31 1 - 33 U/L    AST (SGOT) 21 1 - 32 U/L    Alkaline Phosphatase 57 39 - 117 U/L    Total Bilirubin 0.4 0.0 - 1.2 mg/dL    Globulin 2.9 gm/dL    A/G Ratio 1.4 g/dL    BUN/Creatinine Ratio 29.4 (H) 7.0 - 25.0    Anion Gap 10.4 5.0 - 15.0 mmol/L    eGFR 118.2 >60.0 mL/min/1.73   Lipid panel    Specimen: Blood   Result Value Ref Range    Total Cholesterol 251 (H) 0 - 200 mg/dL    Triglycerides 194 (H) 0 - 150 mg/dL    HDL Cholesterol 59 40 - 60 mg/dL    LDL Cholesterol  157 (H) 0 - 100 mg/dL    VLDL Cholesterol 35 5 - 40 mg/dL    LDL/HDL Ratio 2.60    Hemoglobin A1c    Specimen: Blood   Result Value Ref Range    Hemoglobin A1C 5.7 (H) 3.5 - 5.6 %   Microalbumin / Creatinine Urine Ratio - Urine, Clean Catch    Specimen: Urine, Clean Catch   Result Value Ref Range     Microalbumin/Creatinine Ratio 16.2 mg/g    Creatinine, Urine 209.7 mg/dL    Microalbumin, Urine 3.4 mg/dL       Diagnoses and all orders for this visit:    1. Encounter by telehealth for suspected COVID-19 (Primary)  -     COVID-19,CEPHEID/MARQUIS,COR/KATEY/PAD/KATINA/MAD IN-HOUSE(OR EMERGENT/ADD-ON),NP SWAB IN TRANSPORT MEDIA 3-4 HR TAT, RT-PCR - Swab, Nasopharynx; Future    2. Asthma with acute exacerbation, unspecified asthma severity, unspecified whether persistent  -     predniSONE (DELTASONE) 10 MG (21) dose pack; Take  by mouth Daily for 6 days.  Dispense: 21 each; Refill: 0    Instructed patient to notify Rheumatologist of the Prednisone pack. She currently takes Remicade and gets weekly steroid injections.     For worsening s/s go to ED.     Continue to take Albuterol HFA as directed prn shortness of breath and wheezing.     Quarantine until Covid 19 test results are available.     The use of a video visit has been reviewed with the patient and verbal informd consent has een obtained. Myself and Bernadine Arriaga participated in this visit. The patient is located in [address] [place], [place] [id number] [facility]. I am located in [place], [place]. Mychart and Zoom were utilized. I spent 15  minutes in the patient's chart for this visit.           Miranda Briones, APRN  10/24/2022  12:47 EDT

## 2022-10-24 NOTE — PATIENT INSTRUCTIONS
Cough, Adult  A cough helps to clear your throat and lungs. A cough may be a sign of an illness or another medical condition.  An acute cough may only last 2-3 weeks, while a chronic cough may last 8 or more weeks.  Many things can cause a cough. They include:  Germs (viruses or bacteria) that attack the airway.  Breathing in things that bother (irritate) your lungs.  Allergies.  Asthma.  Mucus that runs down the back of your throat (postnasal drip).  Smoking.  Acid backing up from the stomach into the tube that moves food from the mouth to the stomach (gastroesophageal reflux).  Some medicines.  Lung problems.  Other medical conditions, such as heart failure or a blood clot in the lung (pulmonary embolism).  Follow these instructions at home:  Medicines  Take over-the-counter and prescription medicines only as told by your doctor.  Talk with your doctor before you take medicines that stop a cough (cough suppressants).  Lifestyle    Do not smoke, and try not to be around smoke. Do not use any products that contain nicotine or tobacco, such as cigarettes, e-cigarettes, and chewing tobacco. If you need help quitting, ask your doctor.  Drink enough fluid to keep your pee (urine) pale yellow.  Avoid caffeine.  Do not drink alcohol if your doctor tells you not to drink.  General instructions    Watch for any changes in your cough. Tell your doctor about them.  Always cover your mouth when you cough.  Stay away from things that make you cough, such as perfume, candles, campfire smoke, or cleaning products.  If the air is dry, use a cool mist vaporizer or humidifier in your home.  If your cough is worse at night, try using extra pillows to raise your head up higher while you sleep.  Rest as needed.  Keep all follow-up visits as told by your doctor. This is important.  Contact a doctor if:  You have new symptoms.  You cough up pus.  Your cough does not get better after 2-3 weeks, or your cough gets worse.  Cough medicine  does not help your cough and you are not sleeping well.  You have pain that gets worse or pain that is not helped with medicine.  You have a fever.  You are losing weight and you do not know why.  You have night sweats.  Get help right away if:  You cough up blood.  You have trouble breathing.  Your heartbeat is very fast.  These symptoms may be an emergency. Do not wait to see if the symptoms will go away. Get medical help right away. Call your local emergency services (911 in the [place] Do not drive yourself to the hospital.  Summary  A cough helps to clear your throat and lungs. Many things can cause a cough.  Take over-the-counter and prescription medicines only as told by your doctor.  Always cover your mouth when you cough.  Contact a doctor if you have new symptoms or you have a cough that does not get better or gets worse.  This information is not intended to replace advice given to you by your health care provider. Make sure you discuss any questions you have with your health care provider.  Document Revised: 02/05/2021 Document Reviewed: 01/06/2020  [employer] Patient Education © 2022 [employer]  10 Things You Can Do to Manage Your COVID-19 Symptoms at Home  If you have possible or confirmed COVID-19  Stay home except to get medical care.  Monitor your symptoms carefully. If your symptoms get worse, call your healthcare provider immediately.  Get rest and stay hydrated.  If you have a medical appointment, call the healthcare provider ahead of time and tell them that you have or may have COVID-19.  For medical emergencies, call 911 and notify the dispatch personnel that you have or may have COVID-19.  Cover your cough and sneezes with a tissue or use the inside of your elbow.  Wash your hands often with soap and water for at least 20 seconds or clean your hands with an alcohol-based hand  that contains at least 60% alcohol.  As much as possible, stay in a specific room and away from other people  in your home. Also, you should use a separate bathroom, if available. If you need to be around other people in or outside of the home, wear a mask.  Avoid sharing personal items with other people in your household, like dishes, towels, and bedding.  Clean all surfaces that are touched often, like counters, tabletops, and doorknobs. Use household cleaning sprays or wipes according to the label instructions.  cdc.gov/coronavirus  07/16/2021  This information is not intended to replace advice given to you by your health care provider. Make sure you discuss any questions you have with your health care provider.  Document Revised: 09/09/2022 Document Reviewed: 09/09/2022  [employer] Patient Education © 2022 [employer]

## 2022-12-13 ENCOUNTER — TRANSCRIBE ORDERS (OUTPATIENT)
Dept: ADMINISTRATIVE | Facility: HOSPITAL | Age: 42
End: 2022-12-13

## 2022-12-13 ENCOUNTER — HOSPITAL ENCOUNTER (OUTPATIENT)
Dept: CARDIOLOGY | Facility: HOSPITAL | Age: 42
Discharge: HOME | End: 2022-12-13
Admitting: ANESTHESIOLOGY

## 2022-12-13 DIAGNOSIS — L73.2 HIDRADENITIS: Primary | ICD-10-CM

## 2022-12-13 DIAGNOSIS — L73.2 HIDRADENITIS: ICD-10-CM

## 2022-12-13 PROCEDURE — 93005 ELECTROCARDIOGRAM TRACING: CPT | Performed by: ANESTHESIOLOGY

## 2022-12-13 PROCEDURE — 93010 ELECTROCARDIOGRAM REPORT: CPT | Performed by: INTERNAL MEDICINE

## 2022-12-15 ENCOUNTER — LAB REQUISITION (OUTPATIENT)
Dept: LAB | Facility: HOSPITAL | Age: 42
End: 2022-12-15

## 2022-12-15 DIAGNOSIS — L73.2 HIDRADENITIS SUPPURATIVA: ICD-10-CM

## 2022-12-15 PROCEDURE — 88305 TISSUE EXAM BY PATHOLOGIST: CPT | Performed by: SURGERY

## 2022-12-16 LAB
LAB AP CASE REPORT: NORMAL
PATH REPORT.FINAL DX SPEC: NORMAL
PATH REPORT.GROSS SPEC: NORMAL
QT INTERVAL: 381 MS

## 2023-01-14 DIAGNOSIS — E11.65 TYPE 2 DIABETES MELLITUS WITH HYPERGLYCEMIA, WITHOUT LONG-TERM CURRENT USE OF INSULIN: ICD-10-CM

## 2023-01-15 RX ORDER — GLIMEPIRIDE 4 MG/1
TABLET ORAL
Qty: 180 TABLET | Refills: 1 | Status: SHIPPED | OUTPATIENT
Start: 2023-01-15

## 2023-01-25 DIAGNOSIS — M25.50 MULTIPLE JOINT PAIN: ICD-10-CM

## 2023-01-25 DIAGNOSIS — I10 ESSENTIAL HYPERTENSION: ICD-10-CM

## 2023-01-26 RX ORDER — METOPROLOL SUCCINATE 50 MG/1
TABLET, EXTENDED RELEASE ORAL
Qty: 270 TABLET | Refills: 1 | Status: SHIPPED | OUTPATIENT
Start: 2023-01-26

## 2023-01-26 RX ORDER — CLONIDINE 0.3 MG/24H
1 PATCH, EXTENDED RELEASE TRANSDERMAL WEEKLY
Qty: 12 PATCH | Refills: 1 | Status: SHIPPED | OUTPATIENT
Start: 2023-01-26

## 2023-01-26 RX ORDER — ETODOLAC 500 MG/1
500 TABLET, FILM COATED ORAL 2 TIMES DAILY
Qty: 60 TABLET | Refills: 1 | Status: SHIPPED | OUTPATIENT
Start: 2023-01-26

## 2023-04-26 ENCOUNTER — OFFICE VISIT (OUTPATIENT)
Dept: FAMILY MEDICINE CLINIC | Facility: CLINIC | Age: 43
End: 2023-04-26
Payer: COMMERCIAL

## 2023-04-26 VITALS
SYSTOLIC BLOOD PRESSURE: 184 MMHG | DIASTOLIC BLOOD PRESSURE: 116 MMHG | WEIGHT: 293 LBS | BODY MASS INDEX: 47.09 KG/M2 | HEART RATE: 89 BPM | HEIGHT: 66 IN | OXYGEN SATURATION: 97 %

## 2023-04-26 DIAGNOSIS — I10 ESSENTIAL HYPERTENSION: ICD-10-CM

## 2023-04-26 DIAGNOSIS — N63.20 MASS OF LEFT BREAST, UNSPECIFIED QUADRANT: Primary | ICD-10-CM

## 2023-04-26 PROCEDURE — 99214 OFFICE O/P EST MOD 30 MIN: CPT | Performed by: NURSE PRACTITIONER

## 2023-04-26 RX ORDER — INFLIXIMAB 100 MG/10ML
INJECTION, POWDER, LYOPHILIZED, FOR SOLUTION INTRAVENOUS
COMMUNITY
Start: 2023-01-05

## 2023-04-26 RX ORDER — BETAMETHASONE DIPROPIONATE 0.5 MG/G
CREAM TOPICAL
COMMUNITY
Start: 2023-03-23

## 2023-04-26 NOTE — PROGRESS NOTES
Subjective   Bernadine Arriaga is a 42 y.o. female.       HPI   Pt is here today for concern of a lump on her left breast.  She feels it to be more superficial.  It is not tender or red.  No warmth or skin changes.  No nipple discharge.  No fevers.   She first noticed this a few days ago.    She had a screening mammogram in Oct. 2022 that was normal.      BP is noted to be high today; pt says she took her medication but only about 20 min ago.  She monitors BP at home and is seeing normal readings.   She is currently on clonidine 0.3 mg patch weekly; metoprolol XL 50 mg 2 tabs AM and 1 tab PM; olmesartan 40 mg daily.      The following portions of the patient's history were reviewed and updated as appropriate: allergies, current medications, past family history, past medical history, past social history, past surgical history and problem list.    Review of Systems   Constitutional: Negative for chills, fatigue and fever.   Respiratory: Negative for cough and shortness of breath.    Cardiovascular: Negative for chest pain and palpitations.   Gastrointestinal: Negative for diarrhea, nausea and vomiting.   Genitourinary: Positive for breast lump. Negative for breast discharge and breast pain.   Skin: Negative for rash.   Neurological: Negative for dizziness, weakness and headache.   Psychiatric/Behavioral: Negative for depressed mood. The patient is not nervous/anxious.        Objective   Physical Exam  Vitals reviewed.   Constitutional:       General: She is not in acute distress.     Appearance: Normal appearance. She is obese.   Cardiovascular:      Rate and Rhythm: Normal rate and regular rhythm.      Pulses: Normal pulses.   Pulmonary:      Effort: Pulmonary effort is normal. No respiratory distress.   Chest:   Breasts:     Right: No swelling, bleeding, inverted nipple, mass, nipple discharge, skin change or tenderness.      Left: Mass present. No swelling, bleeding, inverted nipple, nipple discharge, skin  change or tenderness.       Lymphadenopathy:      Upper Body:      Left upper body: No supraclavicular or axillary adenopathy.   Neurological:      General: No focal deficit present.      Mental Status: She is alert and oriented to person, place, and time.   Psychiatric:         Mood and Affect: Mood normal.           Assessment & Plan   Diagnoses and all orders for this visit:    1. Mass of left breast, unspecified quadrant (Primary)  Comments:  Diagnostic mammogram and US ordered.     Orders:  -     Mammo Diagnostic Digital Tomosynthesis Bilateral With CAD; Future  -     US Breast Left Limited; Future    2. Essential hypertension  Comments:  Cont. current medication.   Cont. home monitoring; send in readings for review.

## 2023-05-01 DIAGNOSIS — M62.838 MUSCLE SPASM: ICD-10-CM

## 2023-05-01 DIAGNOSIS — I10 ESSENTIAL HYPERTENSION: ICD-10-CM

## 2023-05-01 DIAGNOSIS — M25.50 MULTIPLE JOINT PAIN: ICD-10-CM

## 2023-05-01 DIAGNOSIS — E11.65 TYPE 2 DIABETES MELLITUS WITH HYPERGLYCEMIA, WITHOUT LONG-TERM CURRENT USE OF INSULIN: ICD-10-CM

## 2023-05-02 RX ORDER — EPINEPHRINE 0.3 MG/.3ML
0.3 INJECTION SUBCUTANEOUS AS NEEDED
Qty: 1 EACH | Refills: 5 | Status: SHIPPED | OUTPATIENT
Start: 2023-05-02

## 2023-05-02 RX ORDER — BACLOFEN 10 MG/1
10 TABLET ORAL 3 TIMES DAILY
Qty: 90 TABLET | Refills: 0 | Status: SHIPPED | OUTPATIENT
Start: 2023-05-02

## 2023-05-02 RX ORDER — CLONIDINE 0.3 MG/24H
1 PATCH, EXTENDED RELEASE TRANSDERMAL WEEKLY
Qty: 12 PATCH | Refills: 1 | Status: SHIPPED | OUTPATIENT
Start: 2023-05-02

## 2023-05-02 RX ORDER — HYDROXYZINE HYDROCHLORIDE 25 MG/1
25 TABLET, FILM COATED ORAL EVERY 6 HOURS PRN
Qty: 30 TABLET | Refills: 1 | Status: SHIPPED | OUTPATIENT
Start: 2023-05-02

## 2023-05-02 RX ORDER — BUDESONIDE AND FORMOTEROL FUMARATE DIHYDRATE 80; 4.5 UG/1; UG/1
2 AEROSOL RESPIRATORY (INHALATION)
Qty: 3 EACH | Refills: 1 | Status: SHIPPED | OUTPATIENT
Start: 2023-05-02

## 2023-05-02 RX ORDER — OLMESARTAN MEDOXOMIL 40 MG/1
40 TABLET ORAL DAILY
Qty: 90 TABLET | Refills: 1 | Status: SHIPPED | OUTPATIENT
Start: 2023-05-02

## 2023-05-02 RX ORDER — METOPROLOL SUCCINATE 50 MG/1
TABLET, EXTENDED RELEASE ORAL
Qty: 270 TABLET | Refills: 1 | Status: SHIPPED | OUTPATIENT
Start: 2023-05-02

## 2023-05-02 RX ORDER — GLIMEPIRIDE 4 MG/1
4 TABLET ORAL 2 TIMES DAILY WITH MEALS
Qty: 180 TABLET | Refills: 1 | Status: SHIPPED | OUTPATIENT
Start: 2023-05-02

## 2023-05-02 RX ORDER — SEMAGLUTIDE 1.34 MG/ML
0.5 INJECTION, SOLUTION SUBCUTANEOUS WEEKLY
Qty: 4.5 ML | Refills: 3 | Status: SHIPPED | OUTPATIENT
Start: 2023-05-02

## 2023-05-02 RX ORDER — ETODOLAC 500 MG/1
500 TABLET, FILM COATED ORAL 2 TIMES DAILY
Qty: 60 TABLET | Refills: 1 | Status: SHIPPED | OUTPATIENT
Start: 2023-05-02

## 2023-05-02 RX ORDER — SULFASALAZINE 500 MG/1
1500 TABLET ORAL 3 TIMES DAILY
OUTPATIENT
Start: 2023-05-02

## 2023-05-02 RX ORDER — FUROSEMIDE 40 MG/1
20 TABLET ORAL 2 TIMES DAILY
Qty: 30 TABLET | Refills: 1 | Status: SHIPPED | OUTPATIENT
Start: 2023-05-02

## 2023-05-02 RX ORDER — ALBUTEROL SULFATE 90 UG/1
2 AEROSOL, METERED RESPIRATORY (INHALATION) EVERY 6 HOURS PRN
Qty: 18 G | Refills: 5 | Status: SHIPPED | OUTPATIENT
Start: 2023-05-02

## 2023-05-03 ENCOUNTER — HOSPITAL ENCOUNTER (OUTPATIENT)
Dept: MAMMOGRAPHY | Facility: HOSPITAL | Age: 43
Discharge: HOME | End: 2023-05-03
Payer: COMMERCIAL

## 2023-05-03 ENCOUNTER — HOSPITAL ENCOUNTER (OUTPATIENT)
Dept: ULTRASOUND IMAGING | Facility: HOSPITAL | Age: 43
Discharge: HOME | End: 2023-05-03
Payer: COMMERCIAL

## 2023-05-03 DIAGNOSIS — N63.20 MASS OF LEFT BREAST, UNSPECIFIED QUADRANT: ICD-10-CM

## 2023-05-03 PROCEDURE — 76642 ULTRASOUND BREAST LIMITED: CPT

## 2023-05-03 PROCEDURE — G0279 TOMOSYNTHESIS, MAMMO: HCPCS

## 2023-05-03 PROCEDURE — 77065 DX MAMMO INCL CAD UNI: CPT

## 2023-06-12 DIAGNOSIS — M62.838 MUSCLE SPASM: ICD-10-CM

## 2023-06-13 RX ORDER — BACLOFEN 10 MG/1
TABLET ORAL
Qty: 90 TABLET | Refills: 0 | Status: SHIPPED | OUTPATIENT
Start: 2023-06-13

## 2023-09-06 DIAGNOSIS — M25.50 MULTIPLE JOINT PAIN: ICD-10-CM

## 2023-09-06 DIAGNOSIS — I10 ESSENTIAL HYPERTENSION: ICD-10-CM

## 2023-09-06 RX ORDER — ETODOLAC 500 MG/1
TABLET, FILM COATED ORAL
Qty: 60 TABLET | Refills: 1 | Status: SHIPPED | OUTPATIENT
Start: 2023-09-06

## 2023-09-06 RX ORDER — FUROSEMIDE 40 MG/1
TABLET ORAL
Qty: 30 TABLET | Refills: 1 | Status: SHIPPED | OUTPATIENT
Start: 2023-09-06

## 2023-09-13 ENCOUNTER — OFFICE VISIT (OUTPATIENT)
Dept: SURGERY | Facility: CLINIC | Age: 43
End: 2023-09-13
Payer: COMMERCIAL

## 2023-09-13 VITALS
TEMPERATURE: 98.2 F | BODY MASS INDEX: 47.09 KG/M2 | SYSTOLIC BLOOD PRESSURE: 211 MMHG | HEART RATE: 84 BPM | WEIGHT: 293 LBS | DIASTOLIC BLOOD PRESSURE: 126 MMHG | HEIGHT: 66 IN | OXYGEN SATURATION: 95 %

## 2023-09-13 DIAGNOSIS — L73.2 HIDRADENITIS SUPPURATIVA: Primary | ICD-10-CM

## 2023-09-13 PROCEDURE — 87070 CULTURE OTHR SPECIMN AEROBIC: CPT | Performed by: STUDENT IN AN ORGANIZED HEALTH CARE EDUCATION/TRAINING PROGRAM

## 2023-09-13 PROCEDURE — 87205 SMEAR GRAM STAIN: CPT | Performed by: STUDENT IN AN ORGANIZED HEALTH CARE EDUCATION/TRAINING PROGRAM

## 2023-09-13 NOTE — PROGRESS NOTES
"Subjective   Bernadine Arriaga is a 43 y.o. female.   Chief Complaint   Patient presents with    Abscess     Axillary abscess hidradenitis      BP (!) 211/126 (BP Location: Right arm, Patient Position: Sitting, Cuff Size: Adult)   Pulse 84   Temp 98.2 °F (36.8 °C) (Infrared)   Ht 167.6 cm (66\")   Wt (!) 139 kg (307 lb)   SpO2 95%   BMI 49.55 kg/m²     HISTORY OF PRESENT ILLNESS:  Patient is a 43 year old woman with long standing history of hidradenitis suppertiva and has had groin abscesses drained in the past. Is on multiple medications for this and recently started on a course of Bactrim for a left axillary abscess that has been there for a few weeks. Intermittently waxes and wanes but has been bothering her more lately and would like it drained.       Outpatient Encounter Medications as of 9/13/2023   Medication Sig Dispense Refill    acetaminophen (TYLENOL) 325 MG tablet Take 1,000 mg by mouth As Needed for Mild Pain . dont take preop      albuterol sulfate  (90 Base) MCG/ACT inhaler Inhale 2 puffs Every 6 (Six) Hours As Needed for Wheezing. 18 g 5    baclofen (LIORESAL) 10 MG tablet TAKE ONE TABLET BY MOUTH THREE TIMES DAILY 90 tablet 0    betamethasone dipropionate (DIPROLENE) 0.05 % lotion Apply 1 application topically to the appropriate area as directed 2 (Two) Times a Day As Needed. dont use after bathing started      betamethasone, augmented, (DIPROLENE) 0.05 % cream PLEASE SEE ATTACHED FOR DETAILED DIRECTIONS      bimatoprost (Latisse) 0.03 % ophthalmic solution Administer  to both eyes Every Night. Place 1 drop on applicator and apply along skin of upper eyelid at base of eyelashes daily at bedtime; rpt for 2nd eye with clean applicator 5 mL 12    budesonide-formoterol (SYMBICORT) 80-4.5 MCG/ACT inhaler Inhale 2 puffs 2 (Two) Times a Day. 3 each 1    clindamycin (CLEOCIN T) 1 % lotion Apply 1 application topically to the appropriate area as directed 2 (Two) Times a Day As Needed. dont " use once bathing started      cloNIDine (CATAPRES-TTS) 0.3 MG/24HR patch Place 1 patch on the skin as directed by provider 1 (One) Time Per Week. 12 patch 1    doxycycline (MONODOX) 100 MG capsule Take 1 capsule by mouth 2 (Two) Times a Day. Take preop      EPINEPHrine (EPIPEN) 0.3 MG/0.3ML solution auto-injector injection Inject 0.3 mL into the appropriate muscle as directed by prescriber As Needed (allergic reaction). 1 each 5    etodolac (LODINE) 500 MG tablet TAKE ONE TABLET BY MOUTH TWICE DAILY 60 tablet 1    famotidine (PEPCID) 20 MG tablet Administer FAMOTIDINE 20mg PO pre-infusion (REMICADE).      furosemide (LASIX) 40 MG tablet TAKE ONE-HALF TABLET BY MOUTH TWICE DAILY 30 tablet 1    glimepiride (AMARYL) 4 MG tablet Take 1 tablet by mouth 2 (Two) Times a Day With Meals. 180 tablet 1    hydrocortisone sodium succinate (Solu-CORTEF) 100 MG injection Administer SOLU-CORTEF 100mg/2 mL IJ pre-infusion (REMICADE).      hydrOXYzine (ATARAX) 25 MG tablet Take 1 tablet by mouth Every 6 (Six) Hours As Needed for Itching or Anxiety. 30 tablet 1    inFLIXimab (Remicade) 100 MG injection Administer REMICADE 5mg/kg IV every 4 weeks      loratadine (Alavert) 10 MG disintegrating tablet Administer ALAVERT 10mg PO with infusion (REMICADE).      metoprolol succinate XL (TOPROL-XL) 50 MG 24 hr tablet TAKE 2 TABLETS BY MOUTH EVERY MORNING, & 1 TABLET BY MOUTH EVERY EVENING AT BEDTIME. 270 tablet 1    olmesartan (BENICAR) 40 MG tablet Take 1 tablet by mouth Daily. 90 tablet 1    Semaglutide,0.25 or 0.5MG/DOS, (Ozempic, 0.25 or 0.5 MG/DOSE,) 2 MG/1.5ML solution pen-injector Inject 0.5 mg under the skin into the appropriate area as directed 1 (One) Time Per Week. 4.5 mL 3    spironolactone (ALDACTONE) 50 MG tablet Take 1 tablet by mouth Every Evening. (Patient taking differently: Take 1 tablet by mouth Daily. dont take preop)      sulfaSALAzine (AZULFIDINE) 500 MG tablet Take 1,500 mg by mouth 3 (Three) Times a Day. Last dose am  10/26       No facility-administered encounter medications on file as of 2023.     Past Medical History:   Diagnosis Date    Allergic     Anaphylactic reaction     to combo of ragweed and molds    Anxiety     Arthritis of spine     psoriatic    Asthma     Diabetes mellitus     History of medical problems     Hidradenitis    Hydradenitis     Hyperlipidemia     slight elevation not treated    Hypertension     Low back pain     Migraine     history    MRSA carrier     Pneumonia     Psoriasis     Psoriatic arthritis     Sinus disorder      Past Surgical History:   Procedure Laterality Date    ADENOIDECTOMY      APPENDECTOMY      AXILLARY SURGERY      multiple due to hidradenitis    BREAST BIOPSY       SECTION      CHOLECYSTECTOMY      INCISION AND DRAINAGE OF WOUND      breast    KNEE ARTHRODESIS      MASS EXCISION Right 10/27/2021    Procedure: WIDE EXCISION TRUNK LESION/MASS, hidradenitis of right hip;  Surgeon: Serafin Centeno MD;  Location: [facility] MAIN OR;  Service: General;  Laterality: Right;    SINUS SURGERY      TONSILLECTOMY      WOUND DEBRIDEMENT Right 2019    Procedure: DEBRIDEMENT OF RECURRENT HIDRADENITIS AND VAC PLACEMENT RIGHT SIDE;  Surgeon: Alix Freire MD;  Location: [facility] MAIN OR;  Service: General     Family History   Problem Relation Age of Onset    Breast cancer Mother     Hypertension Mother     Cancer Mother     Diabetes Mother     Hyperlipidemia Mother     Asthma Mother     Hypertension Father     Hyperlipidemia Father     Stroke Father     Cancer Brother     Breast cancer Maternal Aunt     Heart disease Maternal Grandfather        Social History     Tobacco Use    Smoking status: Never    Smokeless tobacco: Never   Vaping Use    Vaping Use: Never used   Substance Use Topics    Alcohol use: No    Drug use: No     Nifedipine, Tetanus antitoxin, Verapamil, Diltiazem, Tape, and Calcium channel blockers    Review of Systems   Skin:  Positive for skin lesions.     Objective      Physical Exam  Constitutional:       Appearance: Normal appearance.   Cardiovascular:      Rate and Rhythm: Normal rate and regular rhythm.   Pulmonary:      Effort: Pulmonary effort is normal.      Breath sounds: Normal breath sounds.   Skin:     Comments: Left axillary abscess   Neurological:      Mental Status: She is alert.       Incision & Drainage    Date/Time: 9/13/2023 10:13 AM  Performed by: Syed Zaragoza MD  Authorized by: Syed Zaragoza MD   Type: abscess  Body area: trunk  Location details: chest  Anesthesia: local infiltration    Anesthesia:  Local Anesthetic: bupivacaine 0.5% with epinephrine    Sedation:  Patient sedated: no    Scalpel size: 15  Needle gauge: 20  Incision type: single straight  Drainage: purulent  Drainage amount: scant  Wound treatment: wound left open  Packing material: 1/2 in iodoform gauze  Patient tolerance: patient tolerated the procedure well with no immediate complications       Assessment & Plan   Diagnoses and all orders for this visit:    1. Hidradenitis suppurativa (Primary)  -     Incision & Drainage  -     Wound Culture - Drainage, Axilla, Left    -Abscess drained at bedside  -Drainage sent for culture  -Instructed patient to remove packing tomorrow  -Return to clinic in 2 weeks for a wound check    Syed Zaragoza MD  9/13/2023  7:39 AM EDT

## 2023-09-14 ENCOUNTER — TELEPHONE (OUTPATIENT)
Dept: SURGERY | Facility: CLINIC | Age: 43
End: 2023-09-14
Payer: COMMERCIAL

## 2023-09-15 ENCOUNTER — TELEPHONE (OUTPATIENT)
Dept: SURGERY | Facility: CLINIC | Age: 43
End: 2023-09-15
Payer: COMMERCIAL

## 2023-09-15 DIAGNOSIS — L73.2 HIDRADENITIS SUPPURATIVA: Primary | ICD-10-CM

## 2023-09-15 LAB
BACTERIA SPEC AEROBE CULT: NORMAL
GRAM STN SPEC: NORMAL
GRAM STN SPEC: NORMAL

## 2023-09-15 NOTE — TELEPHONE ENCOUNTER
Called patient to inform of normal culture results. Left voicemail with the recommendations to finish the course of antibiotics as prescribed and to keep her follow-up appointment.

## 2023-09-18 ENCOUNTER — TELEPHONE (OUTPATIENT)
Dept: SURGERY | Facility: CLINIC | Age: 43
End: 2023-09-18
Payer: COMMERCIAL

## 2023-09-20 ENCOUNTER — TELEPHONE (OUTPATIENT)
Dept: SURGERY | Facility: CLINIC | Age: 43
End: 2023-09-20
Payer: COMMERCIAL

## 2023-09-20 NOTE — TELEPHONE ENCOUNTER
Patient called in stating that she was having some pain and swelling. She wanted to move her appointment to Thursday or Friday of this week. I offered her and appointment for today and informed the patient that Dr. Zaragoza is not in office on Thursday or Fridays. She stated she would keep her original appointment for 9/27/23. I told the patient if the pain and swelling got worse to go to the ER and Dr. Zaragoza agreed.

## 2023-10-18 ENCOUNTER — LAB (OUTPATIENT)
Dept: FAMILY MEDICINE CLINIC | Facility: CLINIC | Age: 43
End: 2023-10-18
Payer: COMMERCIAL

## 2023-10-18 ENCOUNTER — OFFICE VISIT (OUTPATIENT)
Dept: FAMILY MEDICINE CLINIC | Facility: CLINIC | Age: 43
End: 2023-10-18
Payer: COMMERCIAL

## 2023-10-18 VITALS
OXYGEN SATURATION: 99 % | HEIGHT: 66 IN | DIASTOLIC BLOOD PRESSURE: 99 MMHG | HEART RATE: 85 BPM | BODY MASS INDEX: 47.09 KG/M2 | TEMPERATURE: 98 F | WEIGHT: 293 LBS | SYSTOLIC BLOOD PRESSURE: 167 MMHG

## 2023-10-18 DIAGNOSIS — I10 PRIMARY HYPERTENSION: ICD-10-CM

## 2023-10-18 DIAGNOSIS — E11.65 TYPE 2 DIABETES MELLITUS WITH HYPERGLYCEMIA, WITHOUT LONG-TERM CURRENT USE OF INSULIN: Primary | ICD-10-CM

## 2023-10-18 DIAGNOSIS — H66.91 RIGHT OTITIS MEDIA, UNSPECIFIED OTITIS MEDIA TYPE: ICD-10-CM

## 2023-10-18 LAB
ALBUMIN SERPL-MCNC: 3.8 G/DL (ref 3.5–5.2)
ALBUMIN UR-MCNC: 3.7 MG/DL
ALBUMIN/GLOB SERPL: 1 G/DL
ALP SERPL-CCNC: 82 U/L (ref 39–117)
ALT SERPL W P-5'-P-CCNC: 26 U/L (ref 1–33)
ANION GAP SERPL CALCULATED.3IONS-SCNC: 11.8 MMOL/L (ref 5–15)
AST SERPL-CCNC: 20 U/L (ref 1–32)
BASOPHILS # BLD AUTO: 0.03 10*3/MM3 (ref 0–0.2)
BASOPHILS NFR BLD AUTO: 0.2 % (ref 0–1.5)
BILIRUB SERPL-MCNC: 0.3 MG/DL (ref 0–1.2)
BUN SERPL-MCNC: 10 MG/DL (ref 6–20)
BUN/CREAT SERPL: 19.2 (ref 7–25)
CALCIUM SPEC-SCNC: 9.1 MG/DL (ref 8.6–10.5)
CHLORIDE SERPL-SCNC: 100 MMOL/L (ref 98–107)
CHOLEST SERPL-MCNC: 213 MG/DL (ref 0–200)
CO2 SERPL-SCNC: 26.2 MMOL/L (ref 22–29)
CREAT SERPL-MCNC: 0.52 MG/DL (ref 0.57–1)
CREAT UR-MCNC: 193.7 MG/DL
DEPRECATED RDW RBC AUTO: 39 FL (ref 37–54)
EGFRCR SERPLBLD CKD-EPI 2021: 116.9 ML/MIN/1.73
EOSINOPHIL # BLD AUTO: 0.15 10*3/MM3 (ref 0–0.4)
EOSINOPHIL NFR BLD AUTO: 1.2 % (ref 0.3–6.2)
ERYTHROCYTE [DISTWIDTH] IN BLOOD BY AUTOMATED COUNT: 12 % (ref 12.3–15.4)
GLOBULIN UR ELPH-MCNC: 3.7 GM/DL
GLUCOSE SERPL-MCNC: 180 MG/DL (ref 65–99)
HBA1C MFR BLD: 8.2 % (ref 4.8–5.6)
HCT VFR BLD AUTO: 39.3 % (ref 34–46.6)
HDLC SERPL-MCNC: 45 MG/DL (ref 40–60)
HGB BLD-MCNC: 14.1 G/DL (ref 12–15.9)
IMM GRANULOCYTES # BLD AUTO: 0.05 10*3/MM3 (ref 0–0.05)
IMM GRANULOCYTES NFR BLD AUTO: 0.4 % (ref 0–0.5)
LDLC SERPL CALC-MCNC: 118 MG/DL (ref 0–100)
LDLC/HDLC SERPL: 2.45 {RATIO}
LYMPHOCYTES # BLD AUTO: 2.24 10*3/MM3 (ref 0.7–3.1)
LYMPHOCYTES NFR BLD AUTO: 18.3 % (ref 19.6–45.3)
MCH RBC QN AUTO: 32.8 PG (ref 26.6–33)
MCHC RBC AUTO-ENTMCNC: 35.9 G/DL (ref 31.5–35.7)
MCV RBC AUTO: 91.4 FL (ref 79–97)
MICROALBUMIN/CREAT UR: 19.1 MG/G (ref 0–29)
MONOCYTES # BLD AUTO: 0.9 10*3/MM3 (ref 0.1–0.9)
MONOCYTES NFR BLD AUTO: 7.4 % (ref 5–12)
NEUTROPHILS NFR BLD AUTO: 72.5 % (ref 42.7–76)
NEUTROPHILS NFR BLD AUTO: 8.87 10*3/MM3 (ref 1.7–7)
NRBC BLD AUTO-RTO: 0 /100 WBC (ref 0–0.2)
PLATELET # BLD AUTO: 374 10*3/MM3 (ref 140–450)
PMV BLD AUTO: 11.1 FL (ref 6–12)
POTASSIUM SERPL-SCNC: 3.9 MMOL/L (ref 3.5–5.2)
PROT SERPL-MCNC: 7.5 G/DL (ref 6–8.5)
RBC # BLD AUTO: 4.3 10*6/MM3 (ref 3.77–5.28)
SODIUM SERPL-SCNC: 138 MMOL/L (ref 136–145)
T4 FREE SERPL-MCNC: 1.28 NG/DL (ref 0.93–1.7)
TRIGL SERPL-MCNC: 288 MG/DL (ref 0–150)
TSH SERPL DL<=0.05 MIU/L-ACNC: 2.3 UIU/ML (ref 0.27–4.2)
VLDLC SERPL-MCNC: 50 MG/DL (ref 5–40)
WBC NRBC COR # BLD: 12.24 10*3/MM3 (ref 3.4–10.8)

## 2023-10-18 PROCEDURE — 84439 ASSAY OF FREE THYROXINE: CPT | Performed by: NURSE PRACTITIONER

## 2023-10-18 PROCEDURE — 83036 HEMOGLOBIN GLYCOSYLATED A1C: CPT | Performed by: NURSE PRACTITIONER

## 2023-10-18 PROCEDURE — 99213 OFFICE O/P EST LOW 20 MIN: CPT | Performed by: NURSE PRACTITIONER

## 2023-10-18 PROCEDURE — 82570 ASSAY OF URINE CREATININE: CPT | Performed by: NURSE PRACTITIONER

## 2023-10-18 PROCEDURE — 80061 LIPID PANEL: CPT | Performed by: NURSE PRACTITIONER

## 2023-10-18 PROCEDURE — 80050 GENERAL HEALTH PANEL: CPT | Performed by: NURSE PRACTITIONER

## 2023-10-18 PROCEDURE — 82043 UR ALBUMIN QUANTITATIVE: CPT | Performed by: NURSE PRACTITIONER

## 2023-10-18 PROCEDURE — 36415 COLL VENOUS BLD VENIPUNCTURE: CPT | Performed by: NURSE PRACTITIONER

## 2023-10-18 RX ORDER — LEVOFLOXACIN 750 MG/1
750 TABLET ORAL DAILY
Qty: 5 TABLET | Refills: 0 | Status: SHIPPED | OUTPATIENT
Start: 2023-10-18

## 2023-10-18 RX ORDER — CLONIDINE HYDROCHLORIDE 0.2 MG/1
0.2 TABLET ORAL 2 TIMES DAILY
Qty: 60 TABLET | Refills: 0 | Status: CANCELLED | OUTPATIENT
Start: 2023-10-18

## 2023-10-18 RX ORDER — BLOOD-GLUCOSE METER
1 EACH MISCELLANEOUS DAILY
Qty: 1 KIT | Refills: 0 | Status: SHIPPED | OUTPATIENT
Start: 2023-10-18

## 2023-10-18 RX ORDER — CLONIDINE HYDROCHLORIDE 0.3 MG/1
0.3 TABLET ORAL 2 TIMES DAILY
Qty: 60 TABLET | Refills: 0 | Status: SHIPPED | OUTPATIENT
Start: 2023-10-18

## 2023-10-18 RX ORDER — METHYLPREDNISOLONE 4 MG/1
TABLET ORAL
Qty: 21 TABLET | Refills: 0 | Status: SHIPPED | OUTPATIENT
Start: 2023-10-18

## 2023-10-18 RX ORDER — LANCETS
EACH MISCELLANEOUS
Qty: 100 EACH | Refills: 12 | Status: SHIPPED | OUTPATIENT
Start: 2023-10-18

## 2023-10-18 RX ORDER — BLOOD SUGAR DIAGNOSTIC
STRIP MISCELLANEOUS
Qty: 50 EACH | Refills: 12 | Status: SHIPPED | OUTPATIENT
Start: 2023-10-18

## 2023-10-18 NOTE — PROGRESS NOTES
Chief Complaint  blood pressure  (Hypertension ), Diabetes (F/u ), and Earache (Was at  last week for ear )    Subjective        Bernadine Arriaga presents to [facility]  History of Present Illness  Bernadine is a 43-year-old female presenting today to follow-up on her diabetes and hypertension.    Type 2 diabetes mellitus:  Takes glimepiride 4 mg twice daily and Ozempic 0.5 mg weekly.  Does not know where glucometer is, so its been about 2 months since she has checked it at home.  Last eye exam: Jan/Feb 2023  Checks feet daily    Hypertension:  Takes metoprolol 50 mg 2 tablets each morning and 1 tablet in the evening.  Takes spironolactone 50 mg daily and Lasix 40 mg 1/2 tablet twice daily.  Uses clonidine patch 0.3 mg weekly.  Checks BP at home: some days good, some days not  More problems after remicaide. Had to switch pre-tx steroid.  She believes the steroid is causing her blood pressure issues.  Next infusion they are gong to try it without steroid.  /120 right after infusion  Normal BP at home 160-170s/no higher than 102   Denies any h/a related to BP.  Occasional swelling lower legs  Denies palpitations    Right ear Pain:  Went to  on 10/11.   Prescribed amoxil  Has two days left.  No improvement.  Constant ache/throb  Whole right side of face and head hurts  Denies any fever or chills        The following portions of the patient's history were reviewed and updated as appropriate: allergies, current medications, past family history, past medical history, past social history, past surgical history and problem list.    Allergies   Allergen Reactions    Calcium Channel Blockers Palpitations, Other (See Comments) and Rash     Tachycardia and redness.    INCREASED HR   Tachycardia and redness.    Nifedipine Palpitations    Tetanus Antitoxin Swelling    Verapamil Palpitations    Adhesive Tape Other (See Comments)     Tape will cause skin breakdown rapidly   Can use  silicone tape    Diltiazem Other (See Comments)    Tape Other (See Comments)     Tape will cause skin breakdown rapidly   Can use silicone tape       Patient Active Problem List   Diagnosis    Hidradenitis suppurativa    Asthma    Type 2 diabetes mellitus with hyperglycemia    Hyperlipidemia    Hypertension    Morbid obesity with BMI of 45.0-49.9, adult    Cutaneous abscess of buttock       Current Outpatient Medications   Medication Instructions    Accu-Chek Softclix Lancets lancets Use one daily    acetaminophen (TYLENOL) 1,000 mg, Oral, As Needed, dont take preop    albuterol sulfate  (90 Base) MCG/ACT inhaler 2 puffs, Inhalation, Every 6 Hours PRN    amoxicillin (AMOXIL) 875 mg, Oral, 2 Times Daily    baclofen (LIORESAL) 10 MG tablet TAKE ONE TABLET BY MOUTH THREE TIMES DAILY    betamethasone dipropionate (DIPROLENE) 0.05 % lotion 1 application , Topical, 2 Times Daily PRN, dont use after bathing started    betamethasone, augmented, (DIPROLENE) 0.05 % cream PLEASE SEE ATTACHED FOR DETAILED DIRECTIONS    bimatoprost (Latisse) 0.03 % ophthalmic solution Both Eyes, Nightly, Place 1 drop on applicator and apply along skin of upper eyelid at base of eyelashes daily at bedtime; rpt for 2nd eye with clean applicator    Blood Glucose Monitoring Suppl (Accu-Chek Guide) w/Device kit 1 each, Does not apply, Daily    budesonide-formoterol (SYMBICORT) 80-4.5 MCG/ACT inhaler 2 puffs, Inhalation, 2 Times Daily - RT    clindamycin (CLEOCIN T) 1 % lotion 1 application , Topical, 2 Times Daily PRN, dont use once bathing started    cloNIDine (CATAPRES) 0.3 mg, Oral, 2 Times Daily    diphenhydrAMINE (BENADRYL) 50 mg, Oral    doxycycline (VIBRAMYCIN) 100 mg, Oral    EPINEPHrine (EPIPEN) 0.3 mg, Intramuscular, As Needed    etodolac (LODINE) 500 MG tablet TAKE ONE TABLET BY MOUTH TWICE DAILY    famotidine (PEPCID) 20 MG tablet Administer FAMOTIDINE 20mg PO pre-infusion (REMICADE).    furosemide (LASIX) 40 MG tablet TAKE  "ONE-HALF TABLET BY MOUTH TWICE DAILY    glimepiride (AMARYL) 4 mg, Oral, 2 Times Daily With Meals    glucose blood (Accu-Chek Guide) test strip Use one daily    hydrocortisone sodium succinate (Solu-CORTEF) 100 MG injection Administer SOLU-CORTEF 100mg/2 mL IJ pre-infusion (REMICADE).    hydrOXYzine (ATARAX) 25 mg, Oral, Every 6 Hours PRN    inFLIXimab (Remicade) 100 MG injection Administer REMICADE 5mg/kg IV every 4 weeks    levoFLOXacin (LEVAQUIN) 750 mg, Oral, Daily    loratadine (Alavert) 10 MG disintegrating tablet Administer ALAVERT 10mg PO with infusion (REMICADE).    methylPREDNISolone (MEDROL) 4 MG dose pack Take as directed on package instructions.    metoprolol succinate XL (TOPROL-XL) 50 MG 24 hr tablet TAKE 2 TABLETS BY MOUTH EVERY MORNING, & 1 TABLET BY MOUTH EVERY EVENING AT BEDTIME.    olmesartan (BENICAR) 40 mg, Oral, Daily    Ozempic (0.25 or 0.5 MG/DOSE) 0.5 mg, Subcutaneous, Weekly    spironolactone (ALDACTONE) 50 mg, Oral, Every Evening    sulfaSALAzine (AZULFIDINE) 1,500 mg, Oral, 3 Times Daily, Last dose am 10/26          Objective   Vital Signs:  /99 (BP Location: Left arm, Patient Position: Sitting, Cuff Size: Large Adult)   Pulse 85   Temp 98 °F (36.7 °C) (Temporal)   Ht 167.6 cm (66\")   Wt (!) 137 kg (301 lb)   SpO2 99%   BMI 48.58 kg/m²   Estimated body mass index is 48.58 kg/m² as calculated from the following:    Height as of this encounter: 167.6 cm (66\").    Weight as of this encounter: 137 kg (301 lb).               Review of Systems   Constitutional:  Negative for appetite change, diaphoresis, fatigue and unexpected weight change.   HENT:  Positive for ear pain.    Eyes:  Negative for visual disturbance.   Respiratory:  Negative for cough, chest tightness, shortness of breath and wheezing.    Cardiovascular:  Positive for leg swelling. Negative for chest pain and palpitations.   Gastrointestinal:  Negative for nausea and vomiting.   Musculoskeletal:  Negative for back " pain and gait problem.   Neurological:  Positive for headaches. Negative for dizziness, syncope, weakness, light-headedness and numbness.   Psychiatric/Behavioral:  Negative for confusion. The patient is not nervous/anxious.         Physical Exam  Constitutional:       Appearance: Normal appearance.   HENT:      Right Ear: Ear canal and external ear normal. Tympanic membrane is injected.      Left Ear: Tympanic membrane, ear canal and external ear normal.      Nose: Nose normal.   Cardiovascular:      Rate and Rhythm: Normal rate and regular rhythm.      Pulses: Normal pulses.      Heart sounds: Normal heart sounds.   Pulmonary:      Effort: Pulmonary effort is normal.      Breath sounds: Normal breath sounds.   Abdominal:      General: Abdomen is flat. Bowel sounds are normal.      Palpations: Abdomen is soft.   Skin:     General: Skin is warm and dry.   Neurological:      Mental Status: She is alert and oriented to person, place, and time.   Psychiatric:         Mood and Affect: Mood normal.         Behavior: Behavior normal.         Thought Content: Thought content normal.         Judgment: Judgment normal.        Result Review :                   Assessment and Plan   Diagnoses and all orders for this visit:    1. Type 2 diabetes mellitus with hyperglycemia, without long-term current use of insulin (Primary)  Comments:  Stable.  Continue glimepiride 4 mg twice daily and Ozempic 0.5 mg weekly.  Sent order in for glucometer.  Needs to just start checking BG at home.  Last A1c was 5.7%.  Labs ordered today.  Follow-up in 1 month.  Orders:  -     Comprehensive Metabolic Panel  -     Hemoglobin A1c  -     CBC & Differential  -     Lipid Panel  -     Microalbumin / Creatinine Urine Ratio - Urine, Clean Catch  -     TSH  -     T4, Free  -     Accu-Chek Softclix Lancets lancets; Use one daily  Dispense: 100 each; Refill: 12  -     glucose blood (Accu-Chek Guide) test strip; Use one daily  Dispense: 50 each; Refill:  12  -     Blood Glucose Monitoring Suppl (Accu-Chek Guide) w/Device kit; Use 1 each Daily.  Dispense: 1 kit; Refill: 0    2. Primary hypertension  Comments:  Worsening.  Continue current medications.  We will switch clonidine patch 2 tablets 0.3 mg twice daily.  Goal <140/90.  Labs ordered today.  Continue monitoring BP at home and send me readings.  Follow-up in 1 month  Orders:  -     cloNIDine (Catapres) 0.3 MG tablet; Take 1 tablet by mouth 2 (Two) Times a Day.  Dispense: 60 tablet; Refill: 0    3. Right otitis media, unspecified otitis media type  Comments:  Finish amoxicillin.  Start Levaquin and Medrol Dosepak.  Call if no improvement.  Orders:  -     methylPREDNISolone (MEDROL) 4 MG dose pack; Take as directed on package instructions.  Dispense: 21 tablet; Refill: 0  -     levoFLOXacin (Levaquin) 750 MG tablet; Take 1 tablet by mouth Daily.  Dispense: 5 tablet; Refill: 0             Follow Up   Return in about 1 month (around 11/18/2023).  Patient was given instructions and counseling regarding her condition or for health maintenance advice. Please see specific information pulled into the AVS if appropriate.

## 2023-10-18 NOTE — PATIENT INSTRUCTIONS
Amoxicillin.  Start Levaquin for ear pain.  Medrol Dosepak sent to pharmacy.  Discontinue clonidine patches and start tablets twice daily.  Keep me updated on your blood pressure and how your ear is feeling.  I sent a new glucometer order to your pharmacy.

## 2023-10-20 DIAGNOSIS — E78.2 MIXED HYPERLIPIDEMIA: Primary | ICD-10-CM

## 2023-10-20 DIAGNOSIS — E11.65 TYPE 2 DIABETES MELLITUS WITH HYPERGLYCEMIA, WITHOUT LONG-TERM CURRENT USE OF INSULIN: ICD-10-CM

## 2023-10-20 RX ORDER — FENOFIBRATE 145 MG/1
145 TABLET, COATED ORAL DAILY
Qty: 90 TABLET | Refills: 1 | Status: SHIPPED | OUTPATIENT
Start: 2023-10-20

## 2023-10-20 RX ORDER — ATORVASTATIN CALCIUM 10 MG/1
10 TABLET, FILM COATED ORAL DAILY
Qty: 90 TABLET | Refills: 1 | Status: SHIPPED | OUTPATIENT
Start: 2023-10-20 | End: 2023-10-20

## 2023-10-20 RX ORDER — SEMAGLUTIDE 1.34 MG/ML
1 INJECTION, SOLUTION SUBCUTANEOUS WEEKLY
Qty: 4 ML | Refills: 1 | Status: SHIPPED | OUTPATIENT
Start: 2023-10-20

## 2023-11-01 DIAGNOSIS — I10 ESSENTIAL HYPERTENSION: ICD-10-CM

## 2023-11-01 DIAGNOSIS — M25.50 MULTIPLE JOINT PAIN: ICD-10-CM

## 2023-11-01 DIAGNOSIS — E11.65 TYPE 2 DIABETES MELLITUS WITH HYPERGLYCEMIA, WITHOUT LONG-TERM CURRENT USE OF INSULIN: ICD-10-CM

## 2023-11-01 RX ORDER — METOPROLOL SUCCINATE 50 MG/1
TABLET, EXTENDED RELEASE ORAL
Qty: 270 TABLET | Refills: 1 | Status: SHIPPED | OUTPATIENT
Start: 2023-11-01

## 2023-11-01 RX ORDER — CLONIDINE 0.3 MG/24H
PATCH, EXTENDED RELEASE TRANSDERMAL
Qty: 12 PATCH | Refills: 1 | OUTPATIENT
Start: 2023-11-01

## 2023-11-01 RX ORDER — GLIMEPIRIDE 4 MG/1
4 TABLET ORAL 2 TIMES DAILY WITH MEALS
Qty: 180 TABLET | Refills: 1 | Status: SHIPPED | OUTPATIENT
Start: 2023-11-01

## 2023-11-02 RX ORDER — FUROSEMIDE 40 MG/1
TABLET ORAL
Qty: 30 TABLET | Refills: 1 | Status: SHIPPED | OUTPATIENT
Start: 2023-11-02

## 2023-11-02 RX ORDER — ETODOLAC 500 MG/1
TABLET, FILM COATED ORAL
Qty: 60 TABLET | Refills: 1 | Status: SHIPPED | OUTPATIENT
Start: 2023-11-02

## 2023-11-20 DIAGNOSIS — I10 HYPERTENSION, UNSPECIFIED TYPE: Primary | ICD-10-CM

## 2023-11-20 RX ORDER — CLONIDINE HYDROCHLORIDE 0.1 MG/1
0.1 TABLET ORAL 2 TIMES DAILY
Qty: 60 TABLET | Refills: 3 | Status: SHIPPED | OUTPATIENT
Start: 2023-11-20

## 2023-12-01 RX ORDER — CLONIDINE 0.3 MG/24H
PATCH, EXTENDED RELEASE TRANSDERMAL
Qty: 12 PATCH | Refills: 1 | OUTPATIENT
Start: 2023-12-01

## 2023-12-05 ENCOUNTER — OFFICE VISIT (OUTPATIENT)
Dept: FAMILY MEDICINE CLINIC | Facility: CLINIC | Age: 43
End: 2023-12-05
Payer: COMMERCIAL

## 2023-12-05 VITALS
HEART RATE: 74 BPM | WEIGHT: 293 LBS | BODY MASS INDEX: 47.09 KG/M2 | TEMPERATURE: 97.3 F | OXYGEN SATURATION: 99 % | HEIGHT: 66 IN | SYSTOLIC BLOOD PRESSURE: 158 MMHG | DIASTOLIC BLOOD PRESSURE: 100 MMHG

## 2023-12-05 DIAGNOSIS — I10 PRIMARY HYPERTENSION: ICD-10-CM

## 2023-12-05 DIAGNOSIS — E11.65 TYPE 2 DIABETES MELLITUS WITH HYPERGLYCEMIA, WITHOUT LONG-TERM CURRENT USE OF INSULIN: Primary | ICD-10-CM

## 2023-12-05 PROCEDURE — 99213 OFFICE O/P EST LOW 20 MIN: CPT | Performed by: NURSE PRACTITIONER

## 2023-12-05 RX ORDER — OXYCODONE HYDROCHLORIDE AND ACETAMINOPHEN 5; 325 MG/1; MG/1
TABLET ORAL
COMMUNITY
Start: 2023-10-20

## 2023-12-05 RX ORDER — CHLORAL HYDRATE 500 MG
3 CAPSULE ORAL
COMMUNITY

## 2023-12-05 RX ORDER — SEMAGLUTIDE 1.34 MG/ML
1 INJECTION, SOLUTION SUBCUTANEOUS WEEKLY
Qty: 4 ML | Refills: 1 | Status: SHIPPED | OUTPATIENT
Start: 2023-12-05 | End: 2023-12-06

## 2023-12-05 RX ORDER — AZELASTINE 1 MG/ML
SPRAY, METERED NASAL
COMMUNITY
Start: 2023-10-24

## 2023-12-05 RX ORDER — IBUPROFEN 600 MG/1
TABLET ORAL
COMMUNITY
Start: 2023-10-20

## 2023-12-05 NOTE — PATIENT INSTRUCTIONS
Send BS log and I may increase ozempic.   Monitor blood pressure. If elevated take two 0.1mg clonidine tablets.   Follow up in 3 months.

## 2023-12-05 NOTE — PROGRESS NOTES
Chief Complaint  Hypertension and Diabetes (1mth f/u )    Subjective        Bernadine Arriaga presents to [facility]  History of Present Illness  Bernadine is a 43-year-old female presenting today for a 1 month follow-up.    Type 2 diabetes mellitus:  Takes glimepiride 4 mg twice daily and Ozempic 1 mg weekly.  Ag reading 150-160, does not check daily  UTD on eye exam  Most recent A1c was 8.2%     Hypertension:  Takes metoprolol 50 mg 2 tablets each morning and 1 tablet in the evening.  Takes spironolactone 50 mg daily and Lasix 40 mg 1/2 tablet twice daily.  Takes Benicor 40 mg daily.  We started her on 0.3 mg clonidine at last appointment.  Blood pressure was dropping.  Lowest was 90/40.  It was changed to 0.1 mg.  Blood pressure has been ranging from 120-160/70-90  Check blood pressure daily.  They have stopped her pretreatment steroid prior to her remicaide treatment. Has not seen a difference in her blood pressure.   Her LE swelling has improved.  Headaches are improving.  Denies any palpitations.        The following portions of the patient's history were reviewed and updated as appropriate: allergies, current medications, past family history, past medical history, past social history, past surgical history and problem list.    Allergies   Allergen Reactions    Calcium Channel Blockers Palpitations, Other (See Comments) and Rash     Tachycardia and redness.    INCREASED HR   Tachycardia and redness.    Nifedipine Palpitations    Tetanus Antitoxin Swelling    Verapamil Palpitations    Adhesive Tape Other (See Comments)     Tape will cause skin breakdown rapidly   Can use silicone tape    Diltiazem Other (See Comments)    Tape Other (See Comments)     Tape will cause skin breakdown rapidly   Can use silicone tape       Patient Active Problem List   Diagnosis    Hidradenitis suppurativa    Asthma    Type 2 diabetes mellitus with hyperglycemia    Hyperlipidemia    Hypertension     Morbid obesity with BMI of 45.0-49.9, adult    Cutaneous abscess of buttock       Current Outpatient Medications   Medication Instructions    Accu-Chek Softclix Lancets lancets Use one daily    acetaminophen (TYLENOL) 1,000 mg, Oral, As Needed, dont take preop    albuterol sulfate  (90 Base) MCG/ACT inhaler 2 puffs, Inhalation, Every 6 Hours PRN    azelastine (ASTELIN) 0.1 % nasal spray     baclofen (LIORESAL) 10 MG tablet TAKE ONE TABLET BY MOUTH THREE TIMES DAILY    betamethasone dipropionate (DIPROLENE) 0.05 % lotion 1 application , Topical, 2 Times Daily PRN, dont use after bathing started    betamethasone, augmented, (DIPROLENE) 0.05 % cream PLEASE SEE ATTACHED FOR DETAILED DIRECTIONS    bimatoprost (Latisse) 0.03 % ophthalmic solution Both Eyes, Nightly, Place 1 drop on applicator and apply along skin of upper eyelid at base of eyelashes daily at bedtime; rpt for 2nd eye with clean applicator    Blood Glucose Monitoring Suppl (Accu-Chek Guide) w/Device kit 1 each, Does not apply, Daily    budesonide-formoterol (SYMBICORT) 80-4.5 MCG/ACT inhaler 2 puffs, Inhalation, 2 Times Daily - RT    clindamycin (CLEOCIN T) 1 % lotion 1 application , Topical, 2 Times Daily PRN, dont use once bathing started    cloNIDine (CATAPRES) 0.1 mg, Oral, 2 Times Daily    diphenhydrAMINE (BENADRYL) 50 mg, Oral    doxycycline (VIBRAMYCIN) 100 mg, Oral    EPINEPHrine (EPIPEN) 0.3 mg, Intramuscular, As Needed    etodolac (LODINE) 500 MG tablet TAKE ONE TABLET BY MOUTH TWICE DAILY    famotidine (PEPCID) 20 MG tablet Administer FAMOTIDINE 20mg PO pre-infusion (REMICADE).    fenofibrate (TRICOR) 145 mg, Oral, Daily    furosemide (LASIX) 40 MG tablet TAKE ONE-HALF TABLET BY MOUTH TWICE DAILY    glimepiride (AMARYL) 4 mg, Oral, 2 Times Daily With Meals    glucose blood (Accu-Chek Guide) test strip Use one daily    hydrocortisone sodium succinate (Solu-CORTEF) 100 MG injection Administer SOLU-CORTEF 100mg/2 mL IJ pre-infusion  "(REMICADE).    hydrOXYzine (ATARAX) 25 mg, Oral, Every 6 Hours PRN     MG tablet Take 1 tablet by mouth every 6 (six) hours as needed for Pain for up to 6 days.    inFLIXimab (Remicade) 100 MG injection Administer REMICADE 5mg/kg IV every 4 weeks    levoFLOXacin (LEVAQUIN) 750 mg, Oral, Daily    loratadine (Alavert) 10 MG disintegrating tablet Administer ALAVERT 10mg PO with infusion (REMICADE).    methylPREDNISolone (MEDROL) 4 MG dose pack Take as directed on package instructions.    metoprolol succinate XL (TOPROL-XL) 50 MG 24 hr tablet TAKE TWO TABLETS BY MOUTH EVERY MORNING AND ONE EVERY NIGHT AT BEDTIME    mupirocin (BACTROBAN) 2 % ointment Apply thin layer to the affected areas 3 times daily.    olmesartan (BENICAR) 40 mg, Oral, Daily    Omega-3 Fatty Acids (fish oil) 1000 MG capsule capsule 3 capsules, Oral    oxyCODONE-acetaminophen (PERCOCET) 5-325 MG per tablet TAKE ONE TABLET BY MOUTH EVERY 8 HOURS AS NEEDED FOR PAIN FOR UP TO THREE DAYS    Ozempic (1 MG/DOSE) 1 mg, Subcutaneous, Weekly    spironolactone (ALDACTONE) 50 mg, Oral, Every Evening    sulfaSALAzine (AZULFIDINE) 1,500 mg, Oral, 3 Times Daily, Last dose am 10/26          Objective   Vital Signs:  /100 (BP Location: Left arm, Patient Position: Sitting)   Pulse 74   Temp 97.3 °F (36.3 °C) (Temporal)   Ht 167.6 cm (66\")   Wt 133 kg (294 lb)   SpO2 99%   BMI 47.45 kg/m²   Estimated body mass index is 47.45 kg/m² as calculated from the following:    Height as of this encounter: 167.6 cm (66\").    Weight as of this encounter: 133 kg (294 lb).               Review of Systems   Constitutional:  Negative for appetite change, diaphoresis, fatigue and unexpected weight change.   Eyes:  Negative for visual disturbance.   Respiratory:  Negative for cough, chest tightness, shortness of breath and wheezing.    Cardiovascular:  Negative for chest pain, palpitations and leg swelling.   Gastrointestinal:  Negative for nausea and vomiting. "   Musculoskeletal:  Negative for back pain and gait problem.   Neurological:  Negative for dizziness, syncope, weakness, light-headedness, numbness and headaches.   Psychiatric/Behavioral:  Negative for confusion. The patient is not nervous/anxious.         Physical Exam  Constitutional:       Appearance: Normal appearance.   Cardiovascular:      Rate and Rhythm: Normal rate and regular rhythm.      Pulses: Normal pulses.      Heart sounds: Normal heart sounds.   Pulmonary:      Effort: Pulmonary effort is normal.      Breath sounds: Normal breath sounds.   Abdominal:      General: Abdomen is flat. Bowel sounds are normal.      Palpations: Abdomen is soft.   Skin:     General: Skin is warm and dry.      Capillary Refill: Capillary refill takes less than 2 seconds.   Neurological:      Mental Status: She is alert and oriented to person, place, and time.   Psychiatric:         Mood and Affect: Mood normal.         Behavior: Behavior normal.         Thought Content: Thought content normal.         Judgment: Judgment normal.        Result Review :                   Assessment and Plan   Diagnoses and all orders for this visit:    1. Type 2 diabetes mellitus with hyperglycemia, without long-term current use of insulin (Primary)  Comments:  Continue glimepiride 4 mg twice daily and Ozempic 1 mg weekly.  A1c 8.2.  Keep BS log and send to me.  May increase Ozempic.  Follow-up 3 months  Orders:  -     Semaglutide, 1 MG/DOSE, (Ozempic, 1 MG/DOSE,) 4 MG/3ML solution pen-injector; Inject 1 mg under the skin into the appropriate area as directed 1 (One) Time Per Week.  Dispense: 4 mL; Refill: 1    2. Primary hypertension  Comments:  Stable.  Continue current medications.  Take two 0.1 mg clonidine if blood pressure elevated.  Call for any concerns.  Follow-up 3 months.             Follow Up   Return in about 3 months (around 3/5/2024).  Patient was given instructions and counseling regarding her condition or for health  maintenance advice. Please see specific information pulled into the AVS if appropriate.

## 2023-12-06 DIAGNOSIS — E11.65 TYPE 2 DIABETES MELLITUS WITH HYPERGLYCEMIA, WITHOUT LONG-TERM CURRENT USE OF INSULIN: ICD-10-CM

## 2023-12-06 RX ORDER — SEMAGLUTIDE 1.34 MG/ML
1 INJECTION, SOLUTION SUBCUTANEOUS WEEKLY
Qty: 3 ML | Refills: 1 | Status: SHIPPED | OUTPATIENT
Start: 2023-12-06

## 2024-02-03 DIAGNOSIS — H02.729: ICD-10-CM

## 2024-02-03 DIAGNOSIS — E11.65 TYPE 2 DIABETES MELLITUS WITH HYPERGLYCEMIA, WITHOUT LONG-TERM CURRENT USE OF INSULIN: ICD-10-CM

## 2024-02-03 DIAGNOSIS — M62.838 MUSCLE SPASM: ICD-10-CM

## 2024-02-03 DIAGNOSIS — E78.2 MIXED HYPERLIPIDEMIA: ICD-10-CM

## 2024-02-03 DIAGNOSIS — I10 ESSENTIAL HYPERTENSION: ICD-10-CM

## 2024-02-03 DIAGNOSIS — I10 HYPERTENSION, UNSPECIFIED TYPE: ICD-10-CM

## 2024-02-03 DIAGNOSIS — M25.50 MULTIPLE JOINT PAIN: ICD-10-CM

## 2024-02-05 RX ORDER — FENOFIBRATE 145 MG/1
145 TABLET, COATED ORAL DAILY
Qty: 90 TABLET | Refills: 1 | Status: SHIPPED | OUTPATIENT
Start: 2024-02-05

## 2024-02-05 RX ORDER — EPINEPHRINE 0.3 MG/.3ML
0.3 INJECTION SUBCUTANEOUS AS NEEDED
Qty: 1 EACH | Refills: 5 | Status: SHIPPED | OUTPATIENT
Start: 2024-02-05

## 2024-02-05 RX ORDER — CLONIDINE HYDROCHLORIDE 0.1 MG/1
0.1 TABLET ORAL 2 TIMES DAILY
Qty: 60 TABLET | Refills: 3 | Status: SHIPPED | OUTPATIENT
Start: 2024-02-05

## 2024-02-05 RX ORDER — FUROSEMIDE 40 MG/1
20 TABLET ORAL 2 TIMES DAILY
Qty: 30 TABLET | Refills: 1 | Status: SHIPPED | OUTPATIENT
Start: 2024-02-05

## 2024-02-05 RX ORDER — BIMATOPROST 3 UG/ML
SOLUTION TOPICAL NIGHTLY
Qty: 5 ML | Refills: 12 | Status: SHIPPED | OUTPATIENT
Start: 2024-02-05

## 2024-02-05 RX ORDER — ETODOLAC 500 MG/1
500 TABLET, FILM COATED ORAL 2 TIMES DAILY
Qty: 60 TABLET | Refills: 1 | Status: SHIPPED | OUTPATIENT
Start: 2024-02-05

## 2024-02-05 RX ORDER — HYDROXYZINE HYDROCHLORIDE 25 MG/1
25 TABLET, FILM COATED ORAL EVERY 6 HOURS PRN
Qty: 30 TABLET | Refills: 1 | Status: SHIPPED | OUTPATIENT
Start: 2024-02-05

## 2024-02-05 RX ORDER — BACLOFEN 10 MG/1
10 TABLET ORAL 3 TIMES DAILY
Qty: 90 TABLET | Refills: 0 | Status: SHIPPED | OUTPATIENT
Start: 2024-02-05

## 2024-02-05 RX ORDER — BLOOD SUGAR DIAGNOSTIC
STRIP MISCELLANEOUS
Qty: 50 EACH | Refills: 12 | Status: SHIPPED | OUTPATIENT
Start: 2024-02-05

## 2024-02-05 RX ORDER — GLIMEPIRIDE 4 MG/1
4 TABLET ORAL 2 TIMES DAILY WITH MEALS
Qty: 180 TABLET | Refills: 1 | Status: SHIPPED | OUTPATIENT
Start: 2024-02-05

## 2024-02-05 RX ORDER — OLMESARTAN MEDOXOMIL 40 MG/1
40 TABLET ORAL DAILY
Qty: 90 TABLET | Refills: 1 | Status: SHIPPED | OUTPATIENT
Start: 2024-02-05

## 2024-02-05 RX ORDER — BUDESONIDE AND FORMOTEROL FUMARATE DIHYDRATE 80; 4.5 UG/1; UG/1
2 AEROSOL RESPIRATORY (INHALATION)
Qty: 3 EACH | Refills: 1 | Status: SHIPPED | OUTPATIENT
Start: 2024-02-05

## 2024-02-05 RX ORDER — METOPROLOL SUCCINATE 50 MG/1
TABLET, EXTENDED RELEASE ORAL
Qty: 270 TABLET | Refills: 1 | Status: SHIPPED | OUTPATIENT
Start: 2024-02-05

## 2024-02-05 RX ORDER — AZELASTINE 1 MG/ML
SPRAY, METERED NASAL
OUTPATIENT
Start: 2024-02-05

## 2024-02-05 RX ORDER — ALBUTEROL SULFATE 90 UG/1
2 AEROSOL, METERED RESPIRATORY (INHALATION) EVERY 6 HOURS PRN
Qty: 18 G | Refills: 5 | Status: SHIPPED | OUTPATIENT
Start: 2024-02-05

## 2024-02-12 ENCOUNTER — TELEPHONE (OUTPATIENT)
Dept: FAMILY MEDICINE CLINIC | Facility: CLINIC | Age: 44
End: 2024-02-12
Payer: COMMERCIAL

## 2024-02-20 DIAGNOSIS — E11.65 TYPE 2 DIABETES MELLITUS WITH HYPERGLYCEMIA, WITHOUT LONG-TERM CURRENT USE OF INSULIN: ICD-10-CM

## 2024-02-20 RX ORDER — SEMAGLUTIDE 1.34 MG/ML
1 INJECTION, SOLUTION SUBCUTANEOUS WEEKLY
Qty: 3 ML | Refills: 1 | Status: CANCELLED | OUTPATIENT
Start: 2024-02-20

## 2024-02-21 DIAGNOSIS — E11.65 TYPE 2 DIABETES MELLITUS WITH HYPERGLYCEMIA, WITHOUT LONG-TERM CURRENT USE OF INSULIN: ICD-10-CM

## 2024-02-21 RX ORDER — SEMAGLUTIDE 1.34 MG/ML
1 INJECTION, SOLUTION SUBCUTANEOUS WEEKLY
Qty: 3 ML | Refills: 1 | Status: SHIPPED | OUTPATIENT
Start: 2024-02-21

## 2024-04-27 DIAGNOSIS — M25.50 MULTIPLE JOINT PAIN: ICD-10-CM

## 2024-04-27 DIAGNOSIS — I10 ESSENTIAL HYPERTENSION: ICD-10-CM

## 2024-04-27 DIAGNOSIS — E78.2 MIXED HYPERLIPIDEMIA: ICD-10-CM

## 2024-04-27 DIAGNOSIS — I10 HYPERTENSION, UNSPECIFIED TYPE: ICD-10-CM

## 2024-04-27 DIAGNOSIS — E11.65 TYPE 2 DIABETES MELLITUS WITH HYPERGLYCEMIA, WITHOUT LONG-TERM CURRENT USE OF INSULIN: ICD-10-CM

## 2024-04-27 DIAGNOSIS — M62.838 MUSCLE SPASM: ICD-10-CM

## 2024-04-27 RX ORDER — GLIMEPIRIDE 4 MG/1
4 TABLET ORAL 2 TIMES DAILY WITH MEALS
Qty: 180 TABLET | Refills: 1 | Status: SHIPPED | OUTPATIENT
Start: 2024-04-27

## 2024-04-27 RX ORDER — OLMESARTAN MEDOXOMIL 40 MG/1
40 TABLET ORAL DAILY
Qty: 90 TABLET | Refills: 1 | Status: SHIPPED | OUTPATIENT
Start: 2024-04-27

## 2024-04-27 RX ORDER — METOPROLOL SUCCINATE 50 MG/1
TABLET, EXTENDED RELEASE ORAL
Qty: 270 TABLET | Refills: 1 | Status: SHIPPED | OUTPATIENT
Start: 2024-04-27

## 2024-04-27 RX ORDER — BACLOFEN 10 MG/1
10 TABLET ORAL 3 TIMES DAILY
Qty: 90 TABLET | Refills: 0 | Status: SHIPPED | OUTPATIENT
Start: 2024-04-27

## 2024-04-27 RX ORDER — CLONIDINE HYDROCHLORIDE 0.1 MG/1
0.1 TABLET ORAL 2 TIMES DAILY
Qty: 180 TABLET | Refills: 1 | Status: SHIPPED | OUTPATIENT
Start: 2024-04-27

## 2024-04-27 RX ORDER — FENOFIBRATE 145 MG/1
145 TABLET, COATED ORAL DAILY
Qty: 90 TABLET | Refills: 1 | Status: SHIPPED | OUTPATIENT
Start: 2024-04-27

## 2024-04-27 RX ORDER — ETODOLAC 500 MG/1
500 TABLET, FILM COATED ORAL 2 TIMES DAILY
Qty: 180 TABLET | Refills: 1 | Status: SHIPPED | OUTPATIENT
Start: 2024-04-27

## 2024-05-31 ENCOUNTER — LAB (OUTPATIENT)
Dept: FAMILY MEDICINE CLINIC | Facility: CLINIC | Age: 44
End: 2024-05-31
Payer: COMMERCIAL

## 2024-05-31 ENCOUNTER — OFFICE VISIT (OUTPATIENT)
Dept: FAMILY MEDICINE CLINIC | Facility: CLINIC | Age: 44
End: 2024-05-31
Payer: COMMERCIAL

## 2024-05-31 VITALS
HEART RATE: 82 BPM | DIASTOLIC BLOOD PRESSURE: 102 MMHG | WEIGHT: 293 LBS | BODY MASS INDEX: 47.09 KG/M2 | SYSTOLIC BLOOD PRESSURE: 168 MMHG | OXYGEN SATURATION: 96 % | HEIGHT: 66 IN

## 2024-05-31 DIAGNOSIS — I10 PRIMARY HYPERTENSION: ICD-10-CM

## 2024-05-31 DIAGNOSIS — R23.2 HOT FLASHES: ICD-10-CM

## 2024-05-31 DIAGNOSIS — E11.65 TYPE 2 DIABETES MELLITUS WITH HYPERGLYCEMIA, WITHOUT LONG-TERM CURRENT USE OF INSULIN: ICD-10-CM

## 2024-05-31 DIAGNOSIS — Z12.31 ENCOUNTER FOR SCREENING MAMMOGRAM FOR MALIGNANT NEOPLASM OF BREAST: ICD-10-CM

## 2024-05-31 DIAGNOSIS — Z12.11 COLON CANCER SCREENING: ICD-10-CM

## 2024-05-31 DIAGNOSIS — Z00.00 PREVENTATIVE HEALTH CARE: Primary | ICD-10-CM

## 2024-05-31 LAB
25(OH)D3 SERPL-MCNC: 32.1 NG/ML (ref 30–100)
ALBUMIN SERPL-MCNC: 4 G/DL (ref 3.5–5.2)
ALBUMIN/GLOB SERPL: 1.2 G/DL
ALP SERPL-CCNC: 62 U/L (ref 39–117)
ALT SERPL W P-5'-P-CCNC: 35 U/L (ref 1–33)
ANION GAP SERPL CALCULATED.3IONS-SCNC: 12.2 MMOL/L (ref 5–15)
AST SERPL-CCNC: 26 U/L (ref 1–32)
BASOPHILS # BLD AUTO: 0.04 10*3/MM3 (ref 0–0.2)
BASOPHILS NFR BLD AUTO: 0.3 % (ref 0–1.5)
BILIRUB SERPL-MCNC: 0.3 MG/DL (ref 0–1.2)
BUN SERPL-MCNC: 11 MG/DL (ref 6–20)
BUN/CREAT SERPL: 19.3 (ref 7–25)
CALCIUM SPEC-SCNC: 9.3 MG/DL (ref 8.6–10.5)
CHLORIDE SERPL-SCNC: 100 MMOL/L (ref 98–107)
CHOLEST SERPL-MCNC: 275 MG/DL (ref 0–200)
CO2 SERPL-SCNC: 23.8 MMOL/L (ref 22–29)
CREAT SERPL-MCNC: 0.57 MG/DL (ref 0.57–1)
DEPRECATED RDW RBC AUTO: 41.1 FL (ref 37–54)
EGFRCR SERPLBLD CKD-EPI 2021: 113.7 ML/MIN/1.73
EOSINOPHIL # BLD AUTO: 0.29 10*3/MM3 (ref 0–0.4)
EOSINOPHIL NFR BLD AUTO: 2.5 % (ref 0.3–6.2)
ERYTHROCYTE [DISTWIDTH] IN BLOOD BY AUTOMATED COUNT: 12.3 % (ref 12.3–15.4)
GLOBULIN UR ELPH-MCNC: 3.3 GM/DL
GLUCOSE SERPL-MCNC: 165 MG/DL (ref 65–99)
HBA1C MFR BLD: 7.9 % (ref 4.8–5.6)
HCT VFR BLD AUTO: 41.4 % (ref 34–46.6)
HDLC SERPL-MCNC: 53 MG/DL (ref 40–60)
HGB BLD-MCNC: 14.3 G/DL (ref 12–15.9)
IMM GRANULOCYTES # BLD AUTO: 0.06 10*3/MM3 (ref 0–0.05)
IMM GRANULOCYTES NFR BLD AUTO: 0.5 % (ref 0–0.5)
LDLC SERPL CALC-MCNC: 147 MG/DL (ref 0–100)
LDLC/HDLC SERPL: 2.67 {RATIO}
LYMPHOCYTES # BLD AUTO: 3.55 10*3/MM3 (ref 0.7–3.1)
LYMPHOCYTES NFR BLD AUTO: 30.5 % (ref 19.6–45.3)
MCH RBC QN AUTO: 32.2 PG (ref 26.6–33)
MCHC RBC AUTO-ENTMCNC: 34.5 G/DL (ref 31.5–35.7)
MCV RBC AUTO: 93.2 FL (ref 79–97)
MONOCYTES # BLD AUTO: 1.04 10*3/MM3 (ref 0.1–0.9)
MONOCYTES NFR BLD AUTO: 8.9 % (ref 5–12)
NEUTROPHILS NFR BLD AUTO: 57.3 % (ref 42.7–76)
NEUTROPHILS NFR BLD AUTO: 6.67 10*3/MM3 (ref 1.7–7)
NRBC BLD AUTO-RTO: 0 /100 WBC (ref 0–0.2)
PLATELET # BLD AUTO: 317 10*3/MM3 (ref 140–450)
PMV BLD AUTO: 11.3 FL (ref 6–12)
POTASSIUM SERPL-SCNC: 4.3 MMOL/L (ref 3.5–5.2)
PROT SERPL-MCNC: 7.3 G/DL (ref 6–8.5)
RBC # BLD AUTO: 4.44 10*6/MM3 (ref 3.77–5.28)
SODIUM SERPL-SCNC: 136 MMOL/L (ref 136–145)
T4 FREE SERPL-MCNC: 1.32 NG/DL (ref 0.92–1.68)
TRIGL SERPL-MCNC: 403 MG/DL (ref 0–150)
TSH SERPL DL<=0.05 MIU/L-ACNC: 3.53 UIU/ML (ref 0.27–4.2)
VLDLC SERPL-MCNC: 75 MG/DL (ref 5–40)
WBC NRBC COR # BLD AUTO: 11.65 10*3/MM3 (ref 3.4–10.8)

## 2024-05-31 PROCEDURE — 99396 PREV VISIT EST AGE 40-64: CPT | Performed by: NURSE PRACTITIONER

## 2024-05-31 PROCEDURE — 80050 GENERAL HEALTH PANEL: CPT | Performed by: NURSE PRACTITIONER

## 2024-05-31 PROCEDURE — 99214 OFFICE O/P EST MOD 30 MIN: CPT | Performed by: NURSE PRACTITIONER

## 2024-05-31 PROCEDURE — 83036 HEMOGLOBIN GLYCOSYLATED A1C: CPT | Performed by: NURSE PRACTITIONER

## 2024-05-31 PROCEDURE — 80061 LIPID PANEL: CPT | Performed by: NURSE PRACTITIONER

## 2024-05-31 PROCEDURE — 36415 COLL VENOUS BLD VENIPUNCTURE: CPT | Performed by: NURSE PRACTITIONER

## 2024-05-31 PROCEDURE — 82306 VITAMIN D 25 HYDROXY: CPT | Performed by: NURSE PRACTITIONER

## 2024-05-31 PROCEDURE — 84439 ASSAY OF FREE THYROXINE: CPT | Performed by: NURSE PRACTITIONER

## 2024-05-31 RX ORDER — BUDESONIDE AND FORMOTEROL FUMARATE DIHYDRATE 80; 4.5 UG/1; UG/1
2 AEROSOL RESPIRATORY (INHALATION)
Qty: 3 EACH | Refills: 1 | Status: SHIPPED | OUTPATIENT
Start: 2024-05-31

## 2024-05-31 RX ORDER — PAROXETINE 10 MG/1
10 TABLET, FILM COATED ORAL EVERY MORNING
Qty: 30 TABLET | Refills: 0 | Status: SHIPPED | OUTPATIENT
Start: 2024-05-31

## 2024-05-31 NOTE — PROGRESS NOTES
Chief Complaint  Annual Exam    Subjective        Bernadine Arriaga presents to [facility]  History of Present Illness  Bernadine is a 44-year-old female presenting today for her annual physical.    Type 2 diabetes mellitus:  Takes glimepiride 4 mg twice daily and Ozempic 1 mg weekly.  Checks blood sugar 150-250  Eye exam earlier this year  Most recent A1c was 8.2%     Hypertension:  Takes metoprolol 50 mg 2 tablets each morning and 1 tablet in the evening.  Takes Lasix 40 mg 1/2 tablet twice daily.  Takes Benicor 40 mg daily.  Takes Clonidine 0.1mg prn  Received Remicade earlier this week and bp is normally higher after it.  Wednesday took extra clonidine when bp was 190/120 and it brought it back down. Yesterday bp was normal.  A week after remicade it normalizes  This week palpitations and headaches  LE swelling hit or miss    Pre-menopause:  A lot of hot flashes and night sweats  Thinks she is almost in menopause.      Mammogram: due  Colonoscopy: due  Pap Smear: last year  Diet: balance  Exercise: denies  Tobacco use: denies  Alcohol use: denies  Recreational drug use: denies  Dental: 2 years ago  Vision: UTD    Flu Vaccine: UTD  Covid Vaccine: UTD  Tdap: UTD  Prevnar 20: will return to get injection    Objective     The following portions of the patient's history were reviewed and updated as appropriate: allergies, current medications, past family history, past medical history, past social history, past surgical history and problem list.    Allergies   Allergen Reactions   • Calcium Channel Blockers Palpitations, Other (See Comments) and Rash     Tachycardia and redness.    INCREASED HR   Tachycardia and redness.   • Nifedipine Palpitations   • Tetanus Antitoxin Swelling   • Verapamil Palpitations   • Adhesive Tape Other (See Comments)     Tape will cause skin breakdown rapidly   Can use silicone tape   • Diltiazem Other (See Comments)   • Tape Other (See Comments)     Tape  will cause skin breakdown rapidly   Can use silicone tape         Current Outpatient Medications:   •  acetaminophen (TYLENOL) 325 MG tablet, Take 1,000 mg by mouth As Needed for Mild Pain . dont take preop, Disp: , Rfl:   •  albuterol sulfate  (90 Base) MCG/ACT inhaler, Inhale 2 puffs Every 6 (Six) Hours As Needed for Wheezing., Disp: 18 g, Rfl: 5  •  azelastine (ASTELIN) 0.1 % nasal spray, , Disp: , Rfl:   •  baclofen (LIORESAL) 10 MG tablet, Take 1 tablet by mouth 3 (Three) Times a Day., Disp: 90 tablet, Rfl: 0  •  betamethasone dipropionate (DIPROLENE) 0.05 % lotion, Apply 1 application topically to the appropriate area as directed 2 (Two) Times a Day As Needed. dont use after bathing started, Disp: , Rfl:   •  betamethasone, augmented, (DIPROLENE) 0.05 % cream, PLEASE SEE ATTACHED FOR DETAILED DIRECTIONS, Disp: , Rfl:   •  bimatoprost (Latisse) 0.03 % ophthalmic solution, Administer  to both eyes Every Night. Place 1 drop on applicator and apply along skin of upper eyelid at base of eyelashes daily at bedtime; rpt for 2nd eye with clean applicator, Disp: 5 mL, Rfl: 12  •  budesonide-formoterol (SYMBICORT) 80-4.5 MCG/ACT inhaler, Inhale 2 puffs 2 (Two) Times a Day., Disp: 3 each, Rfl: 1  •  clindamycin (CLEOCIN T) 1 % lotion, Apply 1 application topically to the appropriate area as directed 2 (Two) Times a Day As Needed. dont use once bathing started, Disp: , Rfl:   •  cloNIDine (Catapres) 0.1 MG tablet, Take 1 tablet by mouth 2 (Two) Times a Day., Disp: 180 tablet, Rfl: 1  •  diphenhydrAMINE (BENADRYL) 50 MG capsule, Take 1 capsule by mouth., Disp: , Rfl:   •  doxycycline (VIBRAMYCIN) 100 MG capsule, Take 1 capsule by mouth., Disp: , Rfl:   •  etodolac (LODINE) 500 MG tablet, Take 1 tablet by mouth 2 (Two) Times a Day., Disp: 180 tablet, Rfl: 1  •  famotidine (PEPCID) 20 MG tablet, Administer FAMOTIDINE 20mg PO pre-infusion (REMICADE)., Disp: , Rfl:   •  fenofibrate (Tricor) 145 MG tablet, Take 1 tablet  by mouth Daily., Disp: 90 tablet, Rfl: 1  •  furosemide (LASIX) 40 MG tablet, Take 0.5 tablets by mouth 2 (Two) Times a Day., Disp: 30 tablet, Rfl: 1  •  glimepiride (AMARYL) 4 MG tablet, Take 1 tablet by mouth 2 (Two) Times a Day With Meals., Disp: 180 tablet, Rfl: 1  •  hydrOXYzine (ATARAX) 25 MG tablet, Take 1 tablet by mouth Every 6 (Six) Hours As Needed for Itching or Anxiety., Disp: 30 tablet, Rfl: 1  •  inFLIXimab (Remicade) 100 MG injection, Administer REMICADE 5mg/kg IV every 4 weeks, Disp: , Rfl:   •  loratadine (Alavert) 10 MG disintegrating tablet, Administer ALAVERT 10mg PO with infusion (REMICADE)., Disp: , Rfl:   •  metoprolol succinate XL (TOPROL-XL) 50 MG 24 hr tablet, TAKE TWO TABLETS BY MOUTH EVERY MORNING AND ONE EVERY NIGHT AT BEDTIME, Disp: 270 tablet, Rfl: 1  •  mupirocin (BACTROBAN) 2 % ointment, Apply thin layer to the affected areas 3 times daily., Disp: , Rfl:   •  olmesartan (BENICAR) 40 MG tablet, Take 1 tablet by mouth Daily., Disp: 90 tablet, Rfl: 1  •  Omega-3 Fatty Acids (fish oil) 1000 MG capsule capsule, Take 3 capsules by mouth., Disp: , Rfl:   •  Semaglutide, 1 MG/DOSE, (Ozempic, 1 MG/DOSE,) 4 MG/3ML solution pen-injector, Inject 1 mg under the skin into the appropriate area as directed 1 (One) Time Per Week., Disp: 3 mL, Rfl: 1  •  Accu-Chek Softclix Lancets lancets, Use one daily, Disp: 100 each, Rfl: 12  •  Blood Glucose Monitoring Suppl (Accu-Chek Guide) w/Device kit, Use 1 each Daily., Disp: 1 kit, Rfl: 0  •  EPINEPHrine (EPIPEN) 0.3 MG/0.3ML solution auto-injector injection, Inject 0.3 mL into the appropriate muscle as directed by prescriber As Needed (allergic reaction)., Disp: 1 each, Rfl: 5  •  glucose blood (Accu-Chek Guide) test strip, Use one daily, Disp: 50 each, Rfl: 12  •  hydrocortisone sodium succinate (Solu-CORTEF) 100 MG injection, Administer SOLU-CORTEF 100mg/2 mL IJ pre-infusion (REMICADE)., Disp: , Rfl:   •  methylPREDNISolone (MEDROL) 4 MG dose pack, Take  as directed on package instructions., Disp: 21 tablet, Rfl: 0  •  oxyCODONE-acetaminophen (PERCOCET) 5-325 MG per tablet, TAKE ONE TABLET BY MOUTH EVERY 8 HOURS AS NEEDED FOR PAIN FOR UP TO THREE DAYS, Disp: , Rfl:   •  PARoxetine (Paxil) 10 MG tablet, Take 1 tablet by mouth Every Morning., Disp: 30 tablet, Rfl: 0  •  spironolactone (ALDACTONE) 50 MG tablet, Take 1 tablet by mouth Every Evening. (Patient taking differently: Take 1 tablet by mouth Daily. dont take preop), Disp: , Rfl:   •  sulfaSALAzine (AZULFIDINE) 500 MG tablet, Take 3 tablets by mouth 3 (Three) Times a Day. Last dose am 10/26, Disp: , Rfl:     Family History   Problem Relation Age of Onset   • Breast cancer Mother    • Hypertension Mother    • Cancer Mother    • Diabetes Mother    • Hyperlipidemia Mother    • Asthma Mother    • Hypertension Father    • Hyperlipidemia Father    • Stroke Father    • Cancer Brother    • Breast cancer Maternal Aunt    • Heart disease Maternal Grandfather         Past Medical History:   Diagnosis Date   • Allergic    • Anaphylactic reaction     to combo of ragweed and molds   • Anxiety    • Arthritis of spine     psoriatic   • Asthma    • Diabetes mellitus    • History of medical problems     Hidradenitis   • Hydradenitis    • Hyperlipidemia     slight elevation not treated   • Hypertension    • Low back pain    • Migraine     history   • MRSA carrier    • Pneumonia    • Psoriasis    • Psoriatic arthritis    • Sinus disorder         Social History     Socioeconomic History   • Marital status:    Tobacco Use   • Smoking status: Never     Passive exposure: Current   • Smokeless tobacco: Never   Vaping Use   • Vaping status: Never Used   Substance and Sexual Activity   • Alcohol use: No   • Drug use: No   • Sexual activity: Yes     Partners: Male     Birth control/protection: I.U.D.        Past Surgical History:   Procedure Laterality Date   • ADENOIDECTOMY     • APPENDECTOMY     • AXILLARY SURGERY      multiple  "due to hidradenitis   • BREAST BIOPSY     •  SECTION     • CHOLECYSTECTOMY     • INCISION AND DRAINAGE OF WOUND      breast   • KNEE ARTHRODESIS     • MASS EXCISION Right 10/27/2021    Procedure: WIDE EXCISION TRUNK LESION/MASS, hidradenitis of right hip;  Surgeon: Serafin Centeno MD;  Location: [facility] MAIN OR;  Service: General;  Laterality: Right;   • SINUS SURGERY     • TONSILLECTOMY     • WOUND DEBRIDEMENT Right 2019    Procedure: DEBRIDEMENT OF RECURRENT HIDRADENITIS AND VAC PLACEMENT RIGHT SIDE;  Surgeon: Alix Frerie MD;  Location: [facility] OR;  Service: General        Patient Active Problem List   Diagnosis   • Hidradenitis suppurativa   • Asthma   • Type 2 diabetes mellitus with hyperglycemia   • Hyperlipidemia   • Hypertension   • Morbid obesity with BMI of 45.0-49.9, adult   • Cutaneous abscess of buttock       Immunization History   Administered Date(s) Administered   • COVID-19 (PFIZER) Purple Cap Monovalent 2020, 2021, 2021   • Flu Vaccine Intradermal Quad 18-64YR 10/14/2015   • Fluzone (or Fluarix & Flulaval for VFC) >6mos 10/14/2015, 2020, 10/04/2021, 2023   • Hepatitis B 2020   • Influenza Injectable Mdck Pf Quad 2022   • Pneumococcal Polysaccharide (PPSV23) 10/14/2015   • Tdap 2022          Vital Signs:  BP (!) 168/102 (BP Location: Left arm, Patient Position: Sitting, Cuff Size: Large Adult)   Pulse 82   Ht 167.6 cm (66\")   Wt (!) 139 kg (307 lb)   SpO2 96%   BMI 49.55 kg/m²   Estimated body mass index is 49.55 kg/m² as calculated from the following:    Height as of this encounter: 167.6 cm (66\").    Weight as of this encounter: 139 kg (307 lb).             Review of Systems   Constitutional:  Positive for diaphoresis. Negative for activity change, appetite change, chills, fatigue, fever and unexpected weight change.   HENT:  Negative for congestion, rhinorrhea, sneezing and sore throat.    Eyes:  Negative for visual " disturbance.   Respiratory:  Negative for cough, shortness of breath and wheezing.    Cardiovascular:  Positive for palpitations. Negative for chest pain.   Gastrointestinal:  Negative for abdominal pain, constipation, diarrhea, nausea and vomiting.   Genitourinary:  Negative for difficulty urinating and dysuria.   Musculoskeletal:  Negative for arthralgias, gait problem and myalgias.   Skin:  Negative for color change, rash and wound.   Neurological:  Positive for headaches. Negative for dizziness, weakness, light-headedness and numbness.   Psychiatric/Behavioral:  Negative for self-injury, sleep disturbance and suicidal ideas. The patient is not nervous/anxious.       Physical Exam  Constitutional:       Appearance: Normal appearance.   HENT:      Head: Normocephalic and atraumatic.      Right Ear: Tympanic membrane, ear canal and external ear normal.      Left Ear: Tympanic membrane, ear canal and external ear normal.      Nose: Nose normal.      Mouth/Throat:      Mouth: Mucous membranes are moist.      Pharynx: Oropharynx is clear.   Eyes:      Extraocular Movements: Extraocular movements intact.      Conjunctiva/sclera: Conjunctivae normal.      Pupils: Pupils are equal, round, and reactive to light.   Cardiovascular:      Rate and Rhythm: Normal rate and regular rhythm.      Pulses: Normal pulses.      Heart sounds: Normal heart sounds.   Pulmonary:      Effort: Pulmonary effort is normal.      Breath sounds: Normal breath sounds.   Abdominal:      General: Abdomen is flat. Bowel sounds are normal.      Palpations: Abdomen is soft.   Musculoskeletal:         General: Normal range of motion.      Cervical back: Normal range of motion and neck supple.   Skin:     General: Skin is warm and dry.      Capillary Refill: Capillary refill takes less than 2 seconds.   Neurological:      Mental Status: She is alert and oriented to person, place, and time.   Psychiatric:         Mood and Affect: Mood normal.          Behavior: Behavior normal.         Thought Content: Thought content normal.         Judgment: Judgment normal.      Result Review :                   Assessment and Plan   Diagnoses and all orders for this visit:    1. Preventative health care (Primary)  Comments:  All healthy 44-year-old female.  Labs ordered today.  Mammogram and colonoscopy ordered.  Follow-up in 6 months  Orders:  -     Comprehensive Metabolic Panel  -     Hemoglobin A1c  -     Lipid Panel  -     T4, Free  -     TSH  -     Vitamin D,25-Hydroxy  -     CBC & Differential    2. Colon cancer screening  -     Ambulatory Referral For Screening Colonoscopy  -     Ambulatory Referral For Screening Colonoscopy    3. Encounter for screening mammogram for malignant neoplasm of breast  -     Mammo Screening Digital Tomosynthesis Bilateral With CAD; Future    4. Hot flashes  Comments:  Will start paroxetine 10 mg daily.  Call for any concerns    5. Type 2 diabetes mellitus with hyperglycemia, without long-term current use of insulin  Comments:  Stable.  Continue current medications.  Labs ordered today.  Follow-up in 6 months.    6. Primary hypertension  Comments:  Stable.  Continue current medications.  Labs ordered today.  Follow-up in 6 months    Other orders  -     PARoxetine (Paxil) 10 MG tablet; Take 1 tablet by mouth Every Morning.  Dispense: 30 tablet; Refill: 0  -     budesonide-formoterol (SYMBICORT) 80-4.5 MCG/ACT inhaler; Inhale 2 puffs 2 (Two) Times a Day.  Dispense: 3 each; Refill: 1             Follow Up   Return in about 6 months (around 11/30/2024).  Patient was given instructions and counseling regarding her condition or for health maintenance advice. Please see specific information pulled into the AVS if appropriate.

## 2024-06-04 DIAGNOSIS — E78.2 MIXED HYPERLIPIDEMIA: Primary | ICD-10-CM

## 2024-06-04 DIAGNOSIS — E11.65 TYPE 2 DIABETES MELLITUS WITH HYPERGLYCEMIA, WITHOUT LONG-TERM CURRENT USE OF INSULIN: ICD-10-CM

## 2024-06-04 RX ORDER — SEMAGLUTIDE 2.68 MG/ML
2 INJECTION, SOLUTION SUBCUTANEOUS WEEKLY
Qty: 3 ML | Refills: 1 | Status: SHIPPED | OUTPATIENT
Start: 2024-06-04

## 2024-06-04 RX ORDER — EZETIMIBE 10 MG/1
10 TABLET ORAL DAILY
Qty: 90 TABLET | Refills: 1 | Status: SHIPPED | OUTPATIENT
Start: 2024-06-04

## 2024-06-04 RX ORDER — ATORVASTATIN CALCIUM 10 MG/1
10 TABLET, FILM COATED ORAL DAILY
Qty: 90 TABLET | Refills: 1 | Status: SHIPPED | OUTPATIENT
Start: 2024-06-04

## 2024-06-04 RX ORDER — FENOFIBRATE 160 MG/1
160 TABLET ORAL DAILY
Qty: 90 TABLET | Refills: 1 | Status: SHIPPED | OUTPATIENT
Start: 2024-06-04

## 2024-06-11 ENCOUNTER — HOSPITAL ENCOUNTER (OUTPATIENT)
Dept: MAMMOGRAPHY | Facility: HOSPITAL | Age: 44
Discharge: HOME | End: 2024-06-11
Admitting: NURSE PRACTITIONER
Payer: COMMERCIAL

## 2024-06-11 DIAGNOSIS — Z12.31 ENCOUNTER FOR SCREENING MAMMOGRAM FOR MALIGNANT NEOPLASM OF BREAST: ICD-10-CM

## 2024-06-11 PROCEDURE — 77063 BREAST TOMOSYNTHESIS BI: CPT

## 2024-06-11 PROCEDURE — 77067 SCR MAMMO BI INCL CAD: CPT

## 2024-06-28 ENCOUNTER — TELEPHONE (OUTPATIENT)
Dept: BARIATRICS/WEIGHT MGMT | Facility: CLINIC | Age: 44
End: 2024-06-28
Payer: COMMERCIAL

## 2024-06-28 ENCOUNTER — HOSPITAL ENCOUNTER (INPATIENT)
Facility: HOSPITAL | Age: 44
LOS: 1 days | Discharge: HOME | End: 2024-07-01
Attending: EMERGENCY MEDICINE | Admitting: INTERNAL MEDICINE
Payer: COMMERCIAL

## 2024-06-28 ENCOUNTER — TELEPHONE (OUTPATIENT)
Dept: FAMILY MEDICINE CLINIC | Facility: CLINIC | Age: 44
End: 2024-06-28
Payer: COMMERCIAL

## 2024-06-28 ENCOUNTER — OFFICE VISIT (OUTPATIENT)
Dept: FAMILY MEDICINE CLINIC | Facility: CLINIC | Age: 44
End: 2024-06-28
Payer: COMMERCIAL

## 2024-06-28 ENCOUNTER — HOSPITAL ENCOUNTER (OUTPATIENT)
Dept: CT IMAGING | Facility: HOSPITAL | Age: 44
Discharge: HOME | End: 2024-06-28
Admitting: NURSE PRACTITIONER
Payer: COMMERCIAL

## 2024-06-28 VITALS
DIASTOLIC BLOOD PRESSURE: 117 MMHG | OXYGEN SATURATION: 98 % | HEART RATE: 111 BPM | BODY MASS INDEX: 47.09 KG/M2 | SYSTOLIC BLOOD PRESSURE: 201 MMHG | TEMPERATURE: 98.4 F | WEIGHT: 293 LBS | HEIGHT: 66 IN

## 2024-06-28 DIAGNOSIS — R10.84 GENERALIZED ABDOMINAL PAIN: Primary | ICD-10-CM

## 2024-06-28 DIAGNOSIS — R10.13 EPIGASTRIC PAIN: Primary | ICD-10-CM

## 2024-06-28 DIAGNOSIS — E86.0 DEHYDRATION: ICD-10-CM

## 2024-06-28 DIAGNOSIS — K56.7 ILEUS: ICD-10-CM

## 2024-06-28 DIAGNOSIS — R10.13 EPIGASTRIC PAIN: ICD-10-CM

## 2024-06-28 PROBLEM — R10.9 ABDOMINAL PAIN: Status: ACTIVE | Noted: 2024-06-28

## 2024-06-28 LAB
ALBUMIN SERPL-MCNC: 3.9 G/DL (ref 3.5–5.2)
ALBUMIN/GLOB SERPL: 1.2 G/DL
ALP SERPL-CCNC: 60 U/L (ref 39–117)
ALT SERPL W P-5'-P-CCNC: 21 U/L (ref 1–33)
ANION GAP SERPL CALCULATED.3IONS-SCNC: 12.8 MMOL/L (ref 5–15)
AST SERPL-CCNC: 18 U/L (ref 1–32)
BASOPHILS # BLD AUTO: 0.04 10*3/MM3 (ref 0–0.2)
BASOPHILS NFR BLD AUTO: 0.2 % (ref 0–1.5)
BILIRUB SERPL-MCNC: 0.6 MG/DL (ref 0–1.2)
BUN SERPL-MCNC: 10 MG/DL (ref 6–20)
BUN/CREAT SERPL: 18.9 (ref 7–25)
CALCIUM SPEC-SCNC: 8.7 MG/DL (ref 8.6–10.5)
CHLORIDE SERPL-SCNC: 100 MMOL/L (ref 98–107)
CO2 SERPL-SCNC: 20.2 MMOL/L (ref 22–29)
CREAT BLDA-MCNC: 0.4 MG/DL (ref 0.6–1.3)
CREAT SERPL-MCNC: 0.53 MG/DL (ref 0.57–1)
DEPRECATED RDW RBC AUTO: 41.6 FL (ref 37–54)
EGFRCR SERPLBLD CKD-EPI 2021: 115.7 ML/MIN/1.73
EGFRCR SERPLBLD CKD-EPI 2021: 123.8 ML/MIN/1.73
EOSINOPHIL # BLD AUTO: 0.12 10*3/MM3 (ref 0–0.4)
EOSINOPHIL NFR BLD AUTO: 0.7 % (ref 0.3–6.2)
ERYTHROCYTE [DISTWIDTH] IN BLOOD BY AUTOMATED COUNT: 12.1 % (ref 12.3–15.4)
GLOBULIN UR ELPH-MCNC: 3.3 GM/DL
GLUCOSE BLDC GLUCOMTR-MCNC: 135 MG/DL (ref 70–105)
GLUCOSE BLDC GLUCOMTR-MCNC: 157 MG/DL (ref 70–105)
GLUCOSE SERPL-MCNC: 162 MG/DL (ref 65–99)
HCT VFR BLD AUTO: 43.1 % (ref 34–46.6)
HGB BLD-MCNC: 14.6 G/DL (ref 12–15.9)
IMM GRANULOCYTES # BLD AUTO: 0.11 10*3/MM3 (ref 0–0.05)
IMM GRANULOCYTES NFR BLD AUTO: 0.6 % (ref 0–0.5)
LIPASE SERPL-CCNC: 24 U/L (ref 13–60)
LYMPHOCYTES # BLD AUTO: 2.83 10*3/MM3 (ref 0.7–3.1)
LYMPHOCYTES NFR BLD AUTO: 15.8 % (ref 19.6–45.3)
MCH RBC QN AUTO: 32.2 PG (ref 26.6–33)
MCHC RBC AUTO-ENTMCNC: 33.9 G/DL (ref 31.5–35.7)
MCV RBC AUTO: 94.9 FL (ref 79–97)
MONOCYTES # BLD AUTO: 1.79 10*3/MM3 (ref 0.1–0.9)
MONOCYTES NFR BLD AUTO: 10 % (ref 5–12)
NEUTROPHILS NFR BLD AUTO: 13.05 10*3/MM3 (ref 1.7–7)
NEUTROPHILS NFR BLD AUTO: 72.7 % (ref 42.7–76)
NRBC BLD AUTO-RTO: 0 /100 WBC (ref 0–0.2)
PLATELET # BLD AUTO: 351 10*3/MM3 (ref 140–450)
PMV BLD AUTO: 10.1 FL (ref 6–12)
POTASSIUM SERPL-SCNC: 3.7 MMOL/L (ref 3.5–5.2)
PROT SERPL-MCNC: 7.2 G/DL (ref 6–8.5)
RBC # BLD AUTO: 4.54 10*6/MM3 (ref 3.77–5.28)
SODIUM SERPL-SCNC: 133 MMOL/L (ref 136–145)
WBC NRBC COR # BLD AUTO: 17.94 10*3/MM3 (ref 3.4–10.8)

## 2024-06-28 PROCEDURE — 82948 REAGENT STRIP/BLOOD GLUCOSE: CPT | Performed by: NURSE PRACTITIONER

## 2024-06-28 PROCEDURE — 80053 COMPREHEN METABOLIC PANEL: CPT | Performed by: EMERGENCY MEDICINE

## 2024-06-28 PROCEDURE — 82948 REAGENT STRIP/BLOOD GLUCOSE: CPT

## 2024-06-28 PROCEDURE — 82565 ASSAY OF CREATININE: CPT

## 2024-06-28 PROCEDURE — 99214 OFFICE O/P EST MOD 30 MIN: CPT | Performed by: NURSE PRACTITIONER

## 2024-06-28 PROCEDURE — G0378 HOSPITAL OBSERVATION PER HR: HCPCS

## 2024-06-28 PROCEDURE — 85025 COMPLETE CBC W/AUTO DIFF WBC: CPT | Performed by: EMERGENCY MEDICINE

## 2024-06-28 PROCEDURE — 25010000002 ONDANSETRON PER 1 MG: Performed by: NURSE PRACTITIONER

## 2024-06-28 PROCEDURE — 25010000002 HYDROMORPHONE 1 MG/ML SOLUTION: Performed by: EMERGENCY MEDICINE

## 2024-06-28 PROCEDURE — 99285 EMERGENCY DEPT VISIT HI MDM: CPT

## 2024-06-28 PROCEDURE — 25510000001 IOPAMIDOL PER 1 ML: Performed by: NURSE PRACTITIONER

## 2024-06-28 PROCEDURE — 25010000002 ONDANSETRON PER 1 MG: Performed by: EMERGENCY MEDICINE

## 2024-06-28 PROCEDURE — 74177 CT ABD & PELVIS W/CONTRAST: CPT

## 2024-06-28 PROCEDURE — 25010000002 HYDROMORPHONE 1 MG/ML SOLUTION: Performed by: NURSE PRACTITIONER

## 2024-06-28 PROCEDURE — 83690 ASSAY OF LIPASE: CPT | Performed by: EMERGENCY MEDICINE

## 2024-06-28 PROCEDURE — 25810000003 LACTATED RINGERS SOLUTION: Performed by: EMERGENCY MEDICINE

## 2024-06-28 PROCEDURE — 25810000003 LACTATED RINGERS PER 1000 ML: Performed by: NURSE PRACTITIONER

## 2024-06-28 RX ORDER — BACLOFEN 10 MG/1
10 TABLET ORAL 3 TIMES DAILY PRN
Status: DISCONTINUED | OUTPATIENT
Start: 2024-06-28 | End: 2024-07-01 | Stop reason: HOSPADM

## 2024-06-28 RX ORDER — POLYETHYLENE GLYCOL 3350 17 G/17G
17 POWDER, FOR SOLUTION ORAL DAILY PRN
Status: DISCONTINUED | OUTPATIENT
Start: 2024-06-28 | End: 2024-07-01 | Stop reason: HOSPADM

## 2024-06-28 RX ORDER — NICOTINE POLACRILEX 4 MG
15 LOZENGE BUCCAL
Status: DISCONTINUED | OUTPATIENT
Start: 2024-06-28 | End: 2024-06-28 | Stop reason: SDUPTHER

## 2024-06-28 RX ORDER — IBUPROFEN 600 MG/1
1 TABLET ORAL
Status: DISCONTINUED | OUTPATIENT
Start: 2024-06-28 | End: 2024-07-01 | Stop reason: HOSPADM

## 2024-06-28 RX ORDER — SULFASALAZINE 500 MG/1
1500 TABLET ORAL 2 TIMES DAILY
Status: DISCONTINUED | OUTPATIENT
Start: 2024-06-29 | End: 2024-07-01 | Stop reason: HOSPADM

## 2024-06-28 RX ORDER — ONDANSETRON 2 MG/ML
4 INJECTION INTRAMUSCULAR; INTRAVENOUS EVERY 6 HOURS PRN
Status: DISCONTINUED | OUTPATIENT
Start: 2024-06-28 | End: 2024-07-01 | Stop reason: HOSPADM

## 2024-06-28 RX ORDER — AMOXICILLIN 250 MG
2 CAPSULE ORAL 2 TIMES DAILY PRN
Status: DISCONTINUED | OUTPATIENT
Start: 2024-06-28 | End: 2024-07-01 | Stop reason: HOSPADM

## 2024-06-28 RX ORDER — PAROXETINE 10 MG/1
10 TABLET, FILM COATED ORAL DAILY
Status: DISCONTINUED | OUTPATIENT
Start: 2024-06-29 | End: 2024-07-01 | Stop reason: HOSPADM

## 2024-06-28 RX ORDER — SODIUM CHLORIDE 0.9 % (FLUSH) 0.9 %
10 SYRINGE (ML) INJECTION EVERY 12 HOURS SCHEDULED
Status: DISCONTINUED | OUTPATIENT
Start: 2024-06-28 | End: 2024-07-01 | Stop reason: HOSPADM

## 2024-06-28 RX ORDER — BUDESONIDE AND FORMOTEROL FUMARATE DIHYDRATE 160; 4.5 UG/1; UG/1
2 AEROSOL RESPIRATORY (INHALATION)
Status: DISCONTINUED | OUTPATIENT
Start: 2024-06-28 | End: 2024-07-01 | Stop reason: HOSPADM

## 2024-06-28 RX ORDER — METOPROLOL SUCCINATE 50 MG/1
50 TABLET, EXTENDED RELEASE ORAL EVERY 12 HOURS SCHEDULED
Status: DISCONTINUED | OUTPATIENT
Start: 2024-06-29 | End: 2024-07-01 | Stop reason: HOSPADM

## 2024-06-28 RX ORDER — SODIUM CHLORIDE 0.9 % (FLUSH) 0.9 %
10 SYRINGE (ML) INJECTION AS NEEDED
Status: DISCONTINUED | OUTPATIENT
Start: 2024-06-28 | End: 2024-07-01 | Stop reason: HOSPADM

## 2024-06-28 RX ORDER — INSULIN LISPRO 100 [IU]/ML
2-9 INJECTION, SOLUTION INTRAVENOUS; SUBCUTANEOUS EVERY 6 HOURS SCHEDULED
Status: DISCONTINUED | OUTPATIENT
Start: 2024-06-28 | End: 2024-07-01 | Stop reason: HOSPADM

## 2024-06-28 RX ORDER — BISACODYL 10 MG
10 SUPPOSITORY, RECTAL RECTAL DAILY PRN
Status: DISCONTINUED | OUTPATIENT
Start: 2024-06-28 | End: 2024-07-01 | Stop reason: HOSPADM

## 2024-06-28 RX ORDER — PROCHLORPERAZINE EDISYLATE 5 MG/ML
5 INJECTION INTRAMUSCULAR; INTRAVENOUS EVERY 6 HOURS PRN
Status: DISCONTINUED | OUTPATIENT
Start: 2024-06-28 | End: 2024-06-29

## 2024-06-28 RX ORDER — BISACODYL 5 MG/1
5 TABLET, DELAYED RELEASE ORAL DAILY PRN
Status: DISCONTINUED | OUTPATIENT
Start: 2024-06-28 | End: 2024-07-01 | Stop reason: HOSPADM

## 2024-06-28 RX ORDER — DEXTROSE MONOHYDRATE 25 G/50ML
25 INJECTION, SOLUTION INTRAVENOUS
Status: DISCONTINUED | OUTPATIENT
Start: 2024-06-28 | End: 2024-06-28 | Stop reason: SDUPTHER

## 2024-06-28 RX ORDER — HYDROXYZINE HYDROCHLORIDE 25 MG/1
25 TABLET, FILM COATED ORAL EVERY 6 HOURS PRN
Status: DISCONTINUED | OUTPATIENT
Start: 2024-06-28 | End: 2024-07-01 | Stop reason: HOSPADM

## 2024-06-28 RX ORDER — LOSARTAN POTASSIUM 50 MG/1
100 TABLET ORAL
Status: DISCONTINUED | OUTPATIENT
Start: 2024-06-29 | End: 2024-07-01 | Stop reason: HOSPADM

## 2024-06-28 RX ORDER — PANTOPRAZOLE SODIUM 40 MG/10ML
40 INJECTION, POWDER, LYOPHILIZED, FOR SOLUTION INTRAVENOUS
Status: DISCONTINUED | OUTPATIENT
Start: 2024-06-29 | End: 2024-07-01 | Stop reason: HOSPADM

## 2024-06-28 RX ORDER — CLONIDINE HYDROCHLORIDE 0.1 MG/1
0.1 TABLET ORAL 2 TIMES DAILY PRN
Status: DISCONTINUED | OUTPATIENT
Start: 2024-06-28 | End: 2024-07-01 | Stop reason: HOSPADM

## 2024-06-28 RX ORDER — FAMOTIDINE 20 MG/1
20 TABLET, FILM COATED ORAL DAILY
Status: DISCONTINUED | OUTPATIENT
Start: 2024-06-29 | End: 2024-07-01 | Stop reason: HOSPADM

## 2024-06-28 RX ORDER — NICOTINE POLACRILEX 4 MG
15 LOZENGE BUCCAL
Status: DISCONTINUED | OUTPATIENT
Start: 2024-06-28 | End: 2024-07-01 | Stop reason: HOSPADM

## 2024-06-28 RX ORDER — BACLOFEN 10 MG/1
10 TABLET ORAL 3 TIMES DAILY PRN
COMMUNITY

## 2024-06-28 RX ORDER — DEXTROSE MONOHYDRATE 25 G/50ML
25 INJECTION, SOLUTION INTRAVENOUS
Status: DISCONTINUED | OUTPATIENT
Start: 2024-06-28 | End: 2024-07-01 | Stop reason: HOSPADM

## 2024-06-28 RX ORDER — SODIUM CHLORIDE 9 MG/ML
40 INJECTION, SOLUTION INTRAVENOUS AS NEEDED
Status: DISCONTINUED | OUTPATIENT
Start: 2024-06-28 | End: 2024-07-01 | Stop reason: HOSPADM

## 2024-06-28 RX ORDER — CETIRIZINE HYDROCHLORIDE 10 MG/1
10 TABLET ORAL DAILY
Status: DISCONTINUED | OUTPATIENT
Start: 2024-06-29 | End: 2024-07-01 | Stop reason: HOSPADM

## 2024-06-28 RX ORDER — SODIUM CHLORIDE, SODIUM LACTATE, POTASSIUM CHLORIDE, CALCIUM CHLORIDE 600; 310; 30; 20 MG/100ML; MG/100ML; MG/100ML; MG/100ML
100 INJECTION, SOLUTION INTRAVENOUS CONTINUOUS
Status: DISCONTINUED | OUTPATIENT
Start: 2024-06-28 | End: 2024-06-30

## 2024-06-28 RX ORDER — FENOFIBRATE 145 MG/1
145 TABLET, COATED ORAL DAILY
Status: DISCONTINUED | OUTPATIENT
Start: 2024-06-29 | End: 2024-07-01 | Stop reason: HOSPADM

## 2024-06-28 RX ORDER — ONDANSETRON 2 MG/ML
4 INJECTION INTRAMUSCULAR; INTRAVENOUS ONCE
Status: COMPLETED | OUTPATIENT
Start: 2024-06-28 | End: 2024-06-28

## 2024-06-28 RX ORDER — ALBUTEROL SULFATE 2.5 MG/3ML
2.5 SOLUTION RESPIRATORY (INHALATION) EVERY 6 HOURS PRN
Status: DISCONTINUED | OUTPATIENT
Start: 2024-06-28 | End: 2024-07-01 | Stop reason: HOSPADM

## 2024-06-28 RX ADMIN — SODIUM CHLORIDE, POTASSIUM CHLORIDE, SODIUM LACTATE AND CALCIUM CHLORIDE 100 ML/HR: 600; 310; 30; 20 INJECTION, SOLUTION INTRAVENOUS at 20:38

## 2024-06-28 RX ADMIN — Medication 10 ML: at 20:37

## 2024-06-28 RX ADMIN — ONDANSETRON 4 MG: 2 INJECTION INTRAMUSCULAR; INTRAVENOUS at 16:39

## 2024-06-28 RX ADMIN — SODIUM CHLORIDE, POTASSIUM CHLORIDE, SODIUM LACTATE AND CALCIUM CHLORIDE 1000 ML: 600; 310; 30; 20 INJECTION, SOLUTION INTRAVENOUS at 16:39

## 2024-06-28 RX ADMIN — HYDROMORPHONE HYDROCHLORIDE 0.5 MG: 1 INJECTION, SOLUTION INTRAMUSCULAR; INTRAVENOUS; SUBCUTANEOUS at 20:37

## 2024-06-28 RX ADMIN — HYDROMORPHONE HYDROCHLORIDE 0.5 MG: 1 INJECTION, SOLUTION INTRAMUSCULAR; INTRAVENOUS; SUBCUTANEOUS at 16:42

## 2024-06-28 RX ADMIN — IOPAMIDOL 100 ML: 755 INJECTION, SOLUTION INTRAVENOUS at 12:47

## 2024-06-28 RX ADMIN — ONDANSETRON 4 MG: 2 INJECTION INTRAMUSCULAR; INTRAVENOUS at 20:37

## 2024-06-28 NOTE — PROGRESS NOTES
Chief Complaint  Abdominal Pain (Upper )    Subjective        Bernadine Arriaga presents to [facility]  History of Present Illness  Bernadine is a 44-year-old female presenting today with complaints of upper abdominal pain. Symptoms started a couple days ago and she thought it was just heartburn. The pain progressed to her entire upper abdomen. She says it feels like an inner tube is sitting on her upper abdomen. She reports nausea and vomiting. She says she can't even keep water down. Any position hurts. She had one normal stool yesterday, but no bowel movements since. She denies blood in her stool. She had gastric lap band surgery about 15 years ago. The pain is mostly above where the banding device is.       The following portions of the patient's history were reviewed and updated as appropriate: allergies, current medications, past family history, past medical history, past social history, past surgical history and problem list.    Allergies   Allergen Reactions    Calcium Channel Blockers Palpitations, Other (See Comments) and Rash     Tachycardia and redness.    INCREASED HR   Tachycardia and redness.    Nifedipine Palpitations    Tetanus Antitoxin Swelling    Verapamil Palpitations    Adhesive Tape Other (See Comments)     Tape will cause skin breakdown rapidly   Can use silicone tape    Diltiazem Other (See Comments)    Tape Other (See Comments)     Tape will cause skin breakdown rapidly   Can use silicone tape       Patient Active Problem List   Diagnosis    Hidradenitis suppurativa    Asthma    Type 2 diabetes mellitus with hyperglycemia    Hyperlipidemia    Hypertension    Morbid obesity with BMI of 45.0-49.9, adult    Cutaneous abscess of buttock       Current Outpatient Medications   Medication Instructions    Accu-Chek Softclix Lancets lancets Use one daily    acetaminophen (TYLENOL) 1,000 mg, Oral, As Needed, dont take preop    albuterol sulfate  (90 Base)  MCG/ACT inhaler 2 puffs, Inhalation, Every 6 Hours PRN    atorvastatin (LIPITOR) 10 mg, Oral, Daily    azelastine (ASTELIN) 0.1 % nasal spray     baclofen (LIORESAL) 10 mg, Oral, 3 Times Daily    betamethasone dipropionate (DIPROLENE) 0.05 % lotion 1 application , Topical, 2 Times Daily PRN, dont use after bathing started    betamethasone, augmented, (DIPROLENE) 0.05 % cream PLEASE SEE ATTACHED FOR DETAILED DIRECTIONS    bimatoprost (Latisse) 0.03 % ophthalmic solution Both Eyes, Nightly, Place 1 drop on applicator and apply along skin of upper eyelid at base of eyelashes daily at bedtime; rpt for 2nd eye with clean applicator    Blood Glucose Monitoring Suppl (Accu-Chek Guide) w/Device kit 1 each, Does not apply, Daily    budesonide-formoterol (SYMBICORT) 80-4.5 MCG/ACT inhaler 2 puffs, Inhalation, 2 Times Daily - RT    clindamycin (CLEOCIN T) 1 % lotion 1 application , Topical, 2 Times Daily PRN, dont use once bathing started    cloNIDine (CATAPRES) 0.1 mg, Oral, 2 Times Daily    diphenhydrAMINE (BENADRYL) 50 mg, Oral    doxycycline (VIBRAMYCIN) 100 mg, Oral    EPINEPHrine (EPIPEN) 0.3 mg, Intramuscular, As Needed    etodolac (LODINE) 500 mg, Oral, 2 Times Daily    ezetimibe (ZETIA) 10 mg, Oral, Daily    famotidine (PEPCID) 20 MG tablet Administer FAMOTIDINE 20mg PO pre-infusion (REMICADE).    fenofibrate 160 mg, Oral, Daily    furosemide (LASIX) 20 mg, Oral, 2 Times Daily    glimepiride (AMARYL) 4 mg, Oral, 2 Times Daily With Meals    glucose blood (Accu-Chek Guide) test strip Use one daily    hydrOXYzine (ATARAX) 25 mg, Oral, Every 6 Hours PRN    inFLIXimab (Remicade) 100 MG injection Administer REMICADE 5mg/kg IV every 4 weeks    loratadine (Alavert) 10 MG disintegrating tablet Administer ALAVERT 10mg PO with infusion (REMICADE).    metoprolol succinate XL (TOPROL-XL) 50 MG 24 hr tablet TAKE TWO TABLETS BY MOUTH EVERY MORNING AND ONE EVERY NIGHT AT BEDTIME    mupirocin (BACTROBAN) 2 % ointment Apply thin layer  "to the affected areas 3 times daily.    olmesartan (BENICAR) 40 mg, Oral, Daily    Omega-3 Fatty Acids (fish oil) 1000 MG capsule capsule 3 capsules, Oral    Ozempic (2 MG/DOSE) 2 mg, Subcutaneous, Weekly    PARoxetine (PAXIL) 10 mg, Oral, Every Morning    sulfaSALAzine (AZULFIDINE) 1,500 mg, Oral, 3 Times Daily, Last dose am 10/26          Objective   Vital Signs:  BP (!) 201/117 (BP Location: Left arm, Patient Position: Sitting, Cuff Size: Adult)   Pulse 111   Temp 98.4 °F (36.9 °C) (Temporal)   Ht 167.6 cm (66\")   Wt 135 kg (298 lb 6.4 oz)   SpO2 98%   BMI 48.16 kg/m²   Estimated body mass index is 48.16 kg/m² as calculated from the following:    Height as of this encounter: 167.6 cm (66\").    Weight as of this encounter: 135 kg (298 lb 6.4 oz).               Review of Systems   Constitutional:  Negative for appetite change, chills, fatigue, fever and unexpected weight change.   Respiratory:  Negative for cough, choking and shortness of breath.    Gastrointestinal:  Positive for abdominal pain, constipation, nausea and vomiting. Negative for abdominal distention, blood in stool and diarrhea.   Genitourinary:  Positive for frequency. Negative for dysuria and hematuria.   Musculoskeletal:  Negative for arthralgias and myalgias.   Allergic/Immunologic: Negative for food allergies.        Physical Exam  Constitutional:       Appearance: Normal appearance.   Cardiovascular:      Rate and Rhythm: Normal rate and regular rhythm.   Pulmonary:      Effort: Pulmonary effort is normal.      Breath sounds: Normal breath sounds.   Abdominal:      General: Abdomen is flat. Bowel sounds are normal.      Palpations: Abdomen is soft. There is mass (where gastric banding device is. very tender).      Tenderness: There is abdominal tenderness in the epigastric area.       Skin:     General: Skin is warm and dry.   Neurological:      Mental Status: She is alert and oriented to person, place, and time.   Psychiatric:         " Mood and Affect: Mood normal.         Behavior: Behavior normal.         Thought Content: Thought content normal.         Judgment: Judgment normal.        Result Review :                   Assessment and Plan   Diagnoses and all orders for this visit:    1. Epigastric pain (Primary)  Comments:  will get Stat CT to r/o any complications with banding.  referral to bariatrics.  Orders:  -     Cancel: CT Abdomen Pelvis With & Without Contrast; Future  -     Ambulatory Referral to Bariatric Surgery  -     Cancel: CT Abdomen Pelvis With & Without Contrast; Future  -     CT Abdomen Pelvis With & Without Contrast; Future             Follow Up   No follow-ups on file.  Patient was given instructions and counseling regarding her condition or for health maintenance advice. Please see specific information pulled into the AVS if appropriate.

## 2024-06-28 NOTE — TELEPHONE ENCOUNTER
Caller: Bernadine Arriaga    Relationship: Self    Best call back number: [phone]     Do you know the name of the person who called: NURSE    What was the call regarding: CT RESULTS

## 2024-06-28 NOTE — Clinical Note
Level of Care: Med/Surg [1]   Admitting Physician: GREG LINDSAY [[id number]]   Attending Physician: GREG LINDSAY [[id number]]

## 2024-06-28 NOTE — ED PROVIDER NOTES
Subjective   History of Present Illness  Chief complaint abdominal pain sent here by my doctor because of his CT with an ileus    History of present illness this is a 44-year-old female whose had a history of gastric banding done about 15 years ago who reports that she has been having some abdominal pain since about Wednesday expend severe cramping in nature nausea some vomiting she states she is not passing gas or having bowel movements.  No fever chills no urinary complaints nothing seem to trigger make it better or worse no recent injury illness flus viruses vaccinations or foreign travels no one in the home certainly illness.  No pregnancy concerns.  Patient states that today she had an outpatient CT scan done by her primary care doctor which was concerning for illnesses versus early small bowel obstruction and she was told to go to the ER.      Review of Systems   Constitutional:  Positive for fever. Negative for chills.   Respiratory:  Negative for chest tightness and shortness of breath.    Gastrointestinal:  Positive for abdominal pain and vomiting. Negative for blood in stool.   Genitourinary:  Negative for difficulty urinating and dysuria.   Musculoskeletal:  Negative for back pain and neck pain.   Skin:  Negative for rash.   Neurological:  Negative for dizziness and light-headedness.       Past Medical History:   Diagnosis Date    Allergic     Anaphylactic reaction     to combo of ragweed and molds    Anxiety     Arthritis of spine     psoriatic    Asthma     Diabetes mellitus     History of medical problems     Hidradenitis    Hydradenitis     Hyperlipidemia     slight elevation not treated    Hypertension     Low back pain     Migraine     history    MRSA carrier     Pneumonia     Psoriasis     Psoriatic arthritis     Sinus disorder        Allergies   Allergen Reactions    Calcium Channel Blockers Palpitations, Other (See Comments) and Rash     Tachycardia and redness.    INCREASED HR   Tachycardia and  redness.    Nifedipine Palpitations    Tetanus Antitoxin Swelling    Verapamil Palpitations    Adhesive Tape Other (See Comments)     Tape will cause skin breakdown rapidly   Can use silicone tape    Diltiazem Other (See Comments)    Tape Other (See Comments)     Tape will cause skin breakdown rapidly   Can use silicone tape       Past Surgical History:   Procedure Laterality Date    ADENOIDECTOMY      APPENDECTOMY      AXILLARY SURGERY      multiple due to hidradenitis    BREAST BIOPSY       SECTION      CHOLECYSTECTOMY      INCISION AND DRAINAGE OF WOUND      breast    KNEE ARTHRODESIS      MASS EXCISION Right 10/27/2021    Procedure: WIDE EXCISION TRUNK LESION/MASS, hidradenitis of right hip;  Surgeon: Serafin Centeno MD;  Location: [facility] MAIN OR;  Service: General;  Laterality: Right;    SINUS SURGERY      TONSILLECTOMY      WOUND DEBRIDEMENT Right 2019    Procedure: DEBRIDEMENT OF RECURRENT HIDRADENITIS AND VAC PLACEMENT RIGHT SIDE;  Surgeon: Alix Freire MD;  Location: [facility] MAIN OR;  Service: General       Family History   Problem Relation Age of Onset    Breast cancer Mother     Hypertension Mother     Cancer Mother     Diabetes Mother     Hyperlipidemia Mother     Asthma Mother     Hypertension Father     Hyperlipidemia Father     Stroke Father     Cancer Brother     Breast cancer Maternal Aunt     Breast cancer Maternal Aunt     Heart disease Maternal Grandfather        Social History     Socioeconomic History    Marital status:    Tobacco Use    Smoking status: Never     Passive exposure: Current    Smokeless tobacco: Never   Vaping Use    Vaping status: Never Used   Substance and Sexual Activity    Alcohol use: No    Drug use: No    Sexual activity: Yes     Partners: Male     Birth control/protection: I.U.D.       Prior to Admission medications    Medication Sig Start Date End Date Taking? Authorizing Provider   Accu-Chek Softclix Lancets lancets Use one daily 10/18/23    Jennifer Neal APRN   acetaminophen (TYLENOL) 325 MG tablet Take 1,000 mg by mouth As Needed for Mild Pain . dont take preop    Suresh Fraga MD   albuterol sulfate  (90 Base) MCG/ACT inhaler Inhale 2 puffs Every 6 (Six) Hours As Needed for Wheezing. 2/5/24   Berenice Jacobson MD   atorvastatin (LIPITOR) 10 MG tablet Take 1 tablet by mouth Daily.  Patient not taking: Reported on 6/28/2024 6/4/24   Jennifer Neal APRN   azelastine (ASTELIN) 0.1 % nasal spray  10/24/23   Suresh Fraga MD   baclofen (LIORESAL) 10 MG tablet Take 1 tablet by mouth 3 (Three) Times a Day. 4/27/24   Berenice Jacobson MD   betamethasone dipropionate (DIPROLENE) 0.05 % lotion Apply 1 application topically to the appropriate area as directed 2 (Two) Times a Day As Needed. dont use after bathing started    Suresh Fraga MD   betamethasone, augmented, (DIPROLENE) 0.05 % cream PLEASE SEE ATTACHED FOR DETAILED DIRECTIONS 3/23/23   Suresh Fraga MD   bimatoprost (Latisse) 0.03 % ophthalmic solution Administer  to both eyes Every Night. Place 1 drop on applicator and apply along skin of upper eyelid at base of eyelashes daily at bedtime; rpt for 2nd eye with clean applicator 2/5/24   Berenice Jacobson MD   Blood Glucose Monitoring Suppl (Accu-Chek Guide) w/Device kit Use 1 each Daily. 10/18/23   Jennifer Neal APRN   budesonide-formoterol (SYMBICORT) 80-4.5 MCG/ACT inhaler Inhale 2 puffs 2 (Two) Times a Day. 5/31/24   Jennifer Neal APRN   clindamycin (CLEOCIN T) 1 % lotion Apply 1 application topically to the appropriate area as directed 2 (Two) Times a Day As Needed. dont use once bathing started    Suresh Fraga MD   cloNIDine (Catapres) 0.1 MG tablet Take 1 tablet by mouth 2 (Two) Times a Day. 4/27/24   Berenice Jcaobson MD   diphenhydrAMINE (BENADRYL) 50 MG capsule Take 1 capsule by mouth.    Provider, MD Suresh   doxycycline (VIBRAMYCIN) 100 MG capsule Take 1  capsule by mouth.    ProviderSuresh MD   EPINEPHrine (EPIPEN) 0.3 MG/0.3ML solution auto-injector injection Inject 0.3 mL into the appropriate muscle as directed by prescriber As Needed (allergic reaction). 2/5/24   Berenice Jacobson MD   etodolac (LODINE) 500 MG tablet Take 1 tablet by mouth 2 (Two) Times a Day. 4/27/24   Berenice Jacobson MD   ezetimibe (Zetia) 10 MG tablet Take 1 tablet by mouth Daily. 6/4/24   Jennifer Neal APRN   famotidine (PEPCID) 20 MG tablet Administer FAMOTIDINE 20mg PO pre-infusion (REMICADE). 6/15/22   Emergency, Nurse JU Mejia   fenofibrate 160 MG tablet Take 1 tablet by mouth Daily. 6/4/24   Jennifer Neal APRN   furosemide (LASIX) 40 MG tablet Take 0.5 tablets by mouth 2 (Two) Times a Day. 2/5/24   Berenice Jacobson MD   glimepiride (AMARYL) 4 MG tablet Take 1 tablet by mouth 2 (Two) Times a Day With Meals. 4/27/24   Berenice Jacobson MD   glucose blood (Accu-Chek Guide) test strip Use one daily 2/5/24   Berenice Jacobson MD   hydrOXYzine (ATARAX) 25 MG tablet Take 1 tablet by mouth Every 6 (Six) Hours As Needed for Itching or Anxiety. 2/5/24   Berenice Jacobson MD   inFLIXimab (Remicade) 100 MG injection Administer REMICADE 5mg/kg IV every 4 weeks 1/5/23   ProviderSuresh MD   loratadine (Alavert) 10 MG disintegrating tablet Administer ALAVERT 10mg PO with infusion (REMICADE). 6/15/22   Emergency, Nurse Jackie RN   metoprolol succinate XL (TOPROL-XL) 50 MG 24 hr tablet TAKE TWO TABLETS BY MOUTH EVERY MORNING AND ONE EVERY NIGHT AT BEDTIME 4/27/24   Berenice Jacobson MD   mupirocin (BACTROBAN) 2 % ointment Apply thin layer to the affected areas 3 times daily. 8/31/23   ProviderSuresh MD   olmesartan (BENICAR) 40 MG tablet Take 1 tablet by mouth Daily. 4/27/24   Berenice Jacobson MD   Omega-3 Fatty Acids (fish oil) 1000 MG capsule capsule Take 3 capsules by mouth.    ProviderSuresh MD    PARoxetine (Paxil) 10 MG tablet Take 1 tablet by mouth Every Morning. 5/31/24   Jennifer Neal APRN   Semaglutide, 2 MG/DOSE, (Ozempic, 2 MG/DOSE,) 8 MG/3ML solution pen-injector Inject 2 mg under the skin into the appropriate area as directed 1 (One) Time Per Week. 6/4/24   Jennifer Neal APRN   sulfaSALAzine (AZULFIDINE) 500 MG tablet Take 3 tablets by mouth 3 (Three) Times a Day. Last dose am 10/26 1/9/21   Provider, MD Suresh        Objective   Physical Exam  Constitutional this is a 44-year-old awake alert no acute distress triage vital signs reviewed.  HEENT extraocular muscles intact pupils equal round reactive sclera clear neck supple no adenopathy no meningeal signs lungs clear no retraction back no CVA tenderness heart regular without murmur abdomen soft somewhat distended with some mild diffuse tenderness but no rebound no guarding decreased bowel sounds throughout no peritoneal findings or pulsatile masses extremities no edema cords or Homans' sign no evidence of DVT skin is warm and dry without rashes or cellulitic changes neurologic awake alert and follows commands motor strength normal without focal weakness  Procedures           ED Course      Results for orders placed or performed during the hospital encounter of 06/28/24   Comprehensive Metabolic Panel    Specimen: Blood   Result Value Ref Range    Glucose 162 (H) 65 - 99 mg/dL    BUN 10 6 - 20 mg/dL    Creatinine 0.53 (L) 0.57 - 1.00 mg/dL    Sodium 133 (L) 136 - 145 mmol/L    Potassium 3.7 3.5 - 5.2 mmol/L    Chloride 100 98 - 107 mmol/L    CO2 20.2 (L) 22.0 - 29.0 mmol/L    Calcium 8.7 8.6 - 10.5 mg/dL    Total Protein 7.2 6.0 - 8.5 g/dL    Albumin 3.9 3.5 - 5.2 g/dL    ALT (SGPT) 21 1 - 33 U/L    AST (SGOT) 18 1 - 32 U/L    Alkaline Phosphatase 60 39 - 117 U/L    Total Bilirubin 0.6 0.0 - 1.2 mg/dL    Globulin 3.3 gm/dL    A/G Ratio 1.2 g/dL    BUN/Creatinine Ratio 18.9 7.0 - 25.0    Anion Gap 12.8 5.0 - 15.0 mmol/L    eGFR 115.7 >60.0  mL/min/1.73   Lipase    Specimen: Blood   Result Value Ref Range    Lipase 24 13 - 60 U/L   CBC Auto Differential    Specimen: Blood   Result Value Ref Range    WBC 17.94 (H) 3.40 - 10.80 10*3/mm3    RBC 4.54 3.77 - 5.28 10*6/mm3    Hemoglobin 14.6 12.0 - 15.9 g/dL    Hematocrit 43.1 34.0 - 46.6 %    MCV 94.9 79.0 - 97.0 fL    MCH 32.2 26.6 - 33.0 pg    MCHC 33.9 31.5 - 35.7 g/dL    RDW 12.1 (L) 12.3 - 15.4 %    RDW-SD 41.6 37.0 - 54.0 fl    MPV 10.1 6.0 - 12.0 fL    Platelets 351 140 - 450 10*3/mm3    Neutrophil % 72.7 42.7 - 76.0 %    Lymphocyte % 15.8 (L) 19.6 - 45.3 %    Monocyte % 10.0 5.0 - 12.0 %    Eosinophil % 0.7 0.3 - 6.2 %    Basophil % 0.2 0.0 - 1.5 %    Immature Grans % 0.6 (H) 0.0 - 0.5 %    Neutrophils, Absolute 13.05 (H) 1.70 - 7.00 10*3/mm3    Lymphocytes, Absolute 2.83 0.70 - 3.10 10*3/mm3    Monocytes, Absolute 1.79 (H) 0.10 - 0.90 10*3/mm3    Eosinophils, Absolute 0.12 0.00 - 0.40 10*3/mm3    Basophils, Absolute 0.04 0.00 - 0.20 10*3/mm3    Immature Grans, Absolute 0.11 (H) 0.00 - 0.05 10*3/mm3    nRBC 0.0 0.0 - 0.2 /100 WBC   POC Glucose 4x Daily Before Meals & at Bedtime    Specimen: Blood   Result Value Ref Range    Glucose 157 (H) 70 - 105 mg/dL   POC Glucose Once    Specimen: Blood   Result Value Ref Range    Glucose 135 (H) 70 - 105 mg/dL     CT Abdomen Pelvis With Contrast    Result Date: 6/28/2024  Impression: Multiple dilated small bowel loops without a discrete transition point, which could represent ileus, gastroenteritis, or partial small bowel obstruction. Electronically Signed: Willie Groves MD  6/28/2024 1:06 PM EDT  Workstation ID: [id number]   Medications   sodium chloride 0.9 % flush 10 mL (has no administration in time range)   lactated ringers infusion (100 mL/hr Intravenous New Bag 6/28/24 2038)   sodium chloride 0.9 % flush 10 mL (10 mL Intravenous Given 6/28/24 2037)   sodium chloride 0.9 % flush 10 mL (has no administration in time range)   sodium chloride 0.9 % infusion  40 mL (has no administration in time range)   sennosides-docusate (PERICOLACE) 8.6-50 MG per tablet 2 tablet (has no administration in time range)     And   polyethylene glycol (MIRALAX) packet 17 g (has no administration in time range)     And   bisacodyl (DULCOLAX) EC tablet 5 mg (has no administration in time range)     And   bisacodyl (DULCOLAX) suppository 10 mg (has no administration in time range)   ondansetron (ZOFRAN) injection 4 mg (4 mg Intravenous Given 6/28/24 2037)   pantoprazole (PROTONIX) injection 40 mg (has no administration in time range)   dextrose (GLUTOSE) oral gel 15 g (has no administration in time range)   dextrose (D50W) (25 g/50 mL) IV injection 25 g (has no administration in time range)   glucagon (GLUCAGEN) injection 1 mg (has no administration in time range)   insulin lispro (HUMALOG/ADMELOG) injection 2-9 Units ( Subcutaneous Not Given 6/28/24 2327)   albuterol (PROVENTIL) nebulizer solution 0.083% 2.5 mg/3mL (has no administration in time range)   baclofen (LIORESAL) tablet 10 mg (has no administration in time range)   budesonide-formoterol (SYMBICORT) 160-4.5 MCG/ACT inhaler 2 puff (2 puffs Inhalation Not Given 6/28/24 2237)   cloNIDine (CATAPRES) tablet 0.1 mg (has no administration in time range)   famotidine (PEPCID) tablet 20 mg (has no administration in time range)   fenofibrate (TRICOR) tablet 145 mg (has no administration in time range)   hydrOXYzine (ATARAX) tablet 25 mg (has no administration in time range)   cetirizine (zyrTEC) tablet 10 mg (has no administration in time range)   metoprolol succinate XL (TOPROL-XL) 24 hr tablet 50 mg (has no administration in time range)   losartan (COZAAR) tablet 100 mg (has no administration in time range)   PARoxetine (PAXIL) tablet 10 mg (has no administration in time range)   sulfaSALAzine (AZULFIDINE) tablet 1,500 mg (has no administration in time range)   prochlorperazine (COMPAZINE) injection 5 mg (has no administration in time  range)   HYDROmorphone (DILAUDID) injection 1 mg (has no administration in time range)   lactated ringers bolus 1,000 mL (0 mL Intravenous Stopped 6/28/24 1928)   ondansetron (ZOFRAN) injection 4 mg (4 mg Intravenous Given 6/28/24 1639)   HYDROmorphone (DILAUDID) injection 0.5 mg (0.5 mg Intravenous Given 6/28/24 1642)                                              Medical Decision Making  Medical decision making.  IV established monitor placement review of sinus rhythm patient given a liter lactated Ringer's 0.5 mg IV and Zofran 4 mg IV.  Her CT was reviewed from earlier today my independent review there is multiple dilated loops of small bowel allysine perforation or free air I will see evidence that suggest acute cholecystitis or appendicitis or diverticulitis or ischemic bowel.  Radiologist multiple dilated small bowel loops without discrete transition point could be an ileus gastroenteritis and partial small bowel obstruction.  Labs obtained my independent review comprehensive metabolic profile remarkable for sodium 133 blood sugar 162 CO2 of 20 CBC white count was 17.9 otherwise unremarkable liver lipase unremarkable.  Patient repeat exam was resting company and was soft some mild diffuse tenderness but no rebound no guarding decreased bowel sounds throughout and no peritoneal findings.  Patient had gastric banding about 15 years ago.  She has multiple dilated loops of small bowel which could be related to a bowel obstruction I will see transition point now suspect ileus versus partial small bowel obstruction I will see if this just aneurysm or any evidence of dissection or perforation or free air diverticulitis ischemic bowel although not a complete list of all possibilities.  She does have an elevated white count of 17,000 but she states it is always high I will see any other obvious source of this do not see any abscesses or perforation or anything inside the abdomen stitches there is no skin changes of  cellulitis there is no pneumonia on exam.  There is no evidence just UTI.  Could be related dehydration.  Patient will be admitted to hospital I talked to the hospitalist nurse practitioner Case discussed patient be admitted for fluids and further workup stable otherwise unremarkable ER course patient made aware of findings.  Repeat exam resting comfortably mild diffuse tenderness but no rebound no guarding.    Problems Addressed:  Dehydration: complicated acute illness or injury  Generalized abdominal pain: complicated acute illness or injury  Ileus: complicated acute illness or injury    Amount and/or Complexity of Data Reviewed  Labs: ordered. Decision-making details documented in ED Course.  Radiology: independent interpretation performed. Decision-making details documented in ED Course.    Risk  Parenteral controlled substances.  Decision regarding hospitalization.        Final diagnoses:   Generalized abdominal pain   Ileus   Dehydration       ED Disposition  ED Disposition       ED Disposition   Decision to Admit    Condition   --    Comment   Level of Care: Med/Surg [1]   Diagnosis: Abdominal pain [304521]   Admitting Physician: GREG LINDSAY [[id number]]   Attending Physician: GREG LINDSAY [[id number]]                 No follow-up provider specified.       Medication List        ASK your doctor about these medications      baclofen 10 MG tablet  Commonly known as: LIORESAL  Ask about: Which instructions should I use?                 Armen Alford MD  06/28/24 2223

## 2024-06-28 NOTE — TELEPHONE ENCOUNTER
Called Bernadine with the results of her stat CT today.  Results showed possible ileus, gastroenteritis and possibly a small bowel obstruction.  I gave her the option to go to the ER and possibly get admitted for treatment or I could send her pain medicine and Reglan and she rest her gut at home.  Patient states with the way she is feeling she would rather go to the ER.

## 2024-06-28 NOTE — H&P
[facility]    Hospitalist History and Physical     Bernadine Arriaga : 1980 MRN:[id number] LOS:0 ROOM:      Reason for admission: Abdominal pain     Assessment / Plan     Abdominal pain  Nausea and vomiting  -Hx lap band 15yrs ago   -CT abdomen and pelvis: Multiple dilated small bowel loops without a discrete transition point, which could represent ileus, gastroenteritis, or partial small bowel obstruction.   -WBC 17.94  - analgesics and antiemetics prn  - NPO  - general surgery consult    DM II  - appears controlled  - accucheck 6  - SS insulin     HTN  -currently controlled  - will order prn medication while pt NPO  - resume home dose when NPO restriction lifted   - will hold lasix     HLD  - resume home medication once able to take oral     Hx Hidradenitis suppurativa  S/p excisions and I&Ds    Morbid Obesity  - s/p lap band 15yrs ago     Asthma  -symbicort  - albuterol prn    Anxiety and depression  - resume home medication once able to take po    GERD  -PPI    Nutrition:   NPO Diet NPO Type: Strict NPO     VTE Prophylaxis:  Mechanical VTE prophylaxis orders are present.         History of Present illness     Bernadine Arriaga is a 44 y.o. female with PMH of lap band 15 years ago, diabetes, hypertension, hyperlipidemia, asthma, hidradenitis suppurativapresented to the hospital on 2024, and was admitted with a principal diagnosis of Abdominal pain.     She has seen her primary care this morning with complaint of upper abdominal pain that started couple days ago.  Patient progressed to entire upper abdomen.  She states symptoms began after eating a meal and thought initially was due to indigestion but pain progressed starting initially on the right side and then radiated across upper abdomen.  She denies any bowel movement and no flatus.  She was unable to even keep water down in any position was painful.  She reports 1 normal stool yesterday but no bowel  movement since.  Denies any melena or hematochezia.  Reports pain appears mostly above where the banding device is located.  She was ordered an outpatient CAT scan that was positive for multiple dilated small bowel loops with discrete transition point which could represent an ileus, gastroenteritis or partial bowel obstruction she was directed to go to the ER    In the ED laboratory values show sodium 133, CO2 20.2 creatinine 0.53 glucose 162, white blood cell 17.9.  She was given analgesics, antiemetics and IV fluids and has been admitted for further observation    Patient was seen and examined on 24 at 19:37 EDT .    Subjective / Review of systems     Review of Systems   Constitutional:  Positive for appetite change.   Gastrointestinal:  Positive for abdominal pain, constipation, nausea and vomiting.          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 and molds    Anxiety     Arthritis of spine     psoriatic    Asthma     Diabetes mellitus     History of medical problems     Hidradenitis    Hydradenitis     Hyperlipidemia     slight elevation not treated    Hypertension     Low back pain     Migraine     history    MRSA carrier     Pneumonia     Psoriasis     Psoriatic arthritis     Sinus disorder       Past Surgical History:   Procedure Laterality Date    ADENOIDECTOMY      APPENDECTOMY      AXILLARY SURGERY      multiple due to hidradenitis    BREAST BIOPSY       SECTION      CHOLECYSTECTOMY      INCISION AND DRAINAGE OF WOUND      breast    KNEE ARTHRODESIS      MASS EXCISION Right 10/27/2021    Procedure: WIDE EXCISION TRUNK LESION/MASS, hidradenitis of right hip;  Surgeon: Serafin Centeno MD;  Location: [facility];  Service: General;  Laterality: Right;    SINUS SURGERY      TONSILLECTOMY      WOUND DEBRIDEMENT Right 2019    Procedure: DEBRIDEMENT OF RECURRENT  HIDRADENITIS AND VAC PLACEMENT RIGHT SIDE;  Surgeon: Alix Freire MD;  Location: [facility] MAIN OR;  Service: General      Social History     Socioeconomic History    Marital status:    Tobacco Use    Smoking status: Never     Passive exposure: Current    Smokeless tobacco: Never   Vaping Use    Vaping status: Never Used   Substance and Sexual Activity    Alcohol use: No    Drug use: No    Sexual activity: Yes     Partners: Male     Birth control/protection: I.U.D.      Family History   Problem Relation Age of Onset    Breast cancer Mother     Hypertension Mother     Cancer Mother     Diabetes Mother     Hyperlipidemia Mother     Asthma Mother     Hypertension Father     Hyperlipidemia Father     Stroke Father     Cancer Brother     Breast cancer Maternal Aunt     Breast cancer Maternal Aunt     Heart disease Maternal Grandfather       Allergies   Allergen Reactions    Calcium Channel Blockers Palpitations, Other (See Comments) and Rash     Tachycardia and redness.    INCREASED HR   Tachycardia and redness.    Nifedipine Palpitations    Tetanus Antitoxin Swelling    Verapamil Palpitations    Adhesive Tape Other (See Comments)     Tape will cause skin breakdown rapidly   Can use silicone tape    Diltiazem Other (See Comments)    Tape Other (See Comments)     Tape will cause skin breakdown rapidly   Can use silicone tape      Social Determinants of Health     Tobacco Use: Medium Risk (6/28/2024)    Patient History     Smoking Tobacco Use: Never     Smokeless Tobacco Use: Never     Passive Exposure: Current   Alcohol Use: Not At Risk (2/8/2021)    AUDIT-C     Frequency of Alcohol Consumption: Never     Average Number of Drinks: Not on file     Frequency of Binge Drinking: Not on file   Financial Resource Strain: Not on file   Food Insecurity: Not on file   Transportation Needs: Not on file   Physical Activity: Not on file   Stress: Not on file   Social Connections: Unknown (10/11/2023)    Family and Community  Support     Help with Day-to-Day Activities: Not on file     Lonely or Isolated: Not on file   Interpersonal Safety: Not At Risk (6/28/2024)    Abuse Screen     Unsafe at Home or Work/School: no     Feels Threatened by Someone?: no     Does Anyone Keep You from Contacting Others or Doint Things Outside the Home?: no     Physical Sign of Abuse Present: no   Depression: Not at risk (5/31/2024)    PHQ-2     PHQ-2 Score: 0   Housing Stability: Unknown (10/11/2023)    Housing Stability     Current Living Arrangements: Not on file     Potentially Unsafe Housing Conditions: Not on file   Utilities: Not on file   Health Literacy: Unknown (10/11/2023)    Education     Help with school or training?: Not on file     Preferred Language: Not on file   Employment: Unknown (10/11/2023)    Employment     Do you want help finding or keeping work or a job?: Not on file   Disabilities: Unknown (10/11/2023)    Disabilities     Concentrating, Remembering, or Making Decisions Difficulty: Not on file     Doing Errands Independently Difficulty: Not on file        Home Medications     Prior to Admission medications    Medication Sig Start Date End Date Taking? Authorizing Provider   Accu-Chek Softclix Lancets lancets Use one daily 10/18/23   Jennifer Neal APRN   acetaminophen (TYLENOL) 325 MG tablet Take 1,000 mg by mouth As Needed for Mild Pain . dont take preop    ProviderSuresh MD   albuterol sulfate  (90 Base) MCG/ACT inhaler Inhale 2 puffs Every 6 (Six) Hours As Needed for Wheezing. 2/5/24   Berenice Jacobson MD   atorvastatin (LIPITOR) 10 MG tablet Take 1 tablet by mouth Daily.  Patient not taking: Reported on 6/28/2024 6/4/24   Jennifer Neal APRN   azelastine (ASTELIN) 0.1 % nasal spray  10/24/23   ProviderSuresh MD   baclofen (LIORESAL) 10 MG tablet Take 1 tablet by mouth 3 (Three) Times a Day. 4/27/24   Berenice Jacobson MD   betamethasone dipropionate (DIPROLENE) 0.05 % lotion Apply 1  application topically to the appropriate area as directed 2 (Two) Times a Day As Needed. dont use after bathing started    Suresh Fraga MD   betamethasone, augmented, (DIPROLENE) 0.05 % cream PLEASE SEE ATTACHED FOR DETAILED DIRECTIONS 3/23/23   Suresh Fraga MD   bimatoprost (Latisse) 0.03 % ophthalmic solution Administer  to both eyes Every Night. Place 1 drop on applicator and apply along skin of upper eyelid at base of eyelashes daily at bedtime; rpt for 2nd eye with clean applicator 2/5/24   Berenice Jacobson MD   Blood Glucose Monitoring Suppl (Accu-Chek Guide) w/Device kit Use 1 each Daily. 10/18/23   Jennifer Neal APRN   budesonide-formoterol (SYMBICORT) 80-4.5 MCG/ACT inhaler Inhale 2 puffs 2 (Two) Times a Day. 5/31/24   Jennifer Neal APRN   clindamycin (CLEOCIN T) 1 % lotion Apply 1 application topically to the appropriate area as directed 2 (Two) Times a Day As Needed. dont use once bathing started    Suresh Fraga MD   cloNIDine (Catapres) 0.1 MG tablet Take 1 tablet by mouth 2 (Two) Times a Day. 4/27/24   Berenice Jacobson MD   diphenhydrAMINE (BENADRYL) 50 MG capsule Take 1 capsule by mouth.    Suresh Fraga MD   doxycycline (VIBRAMYCIN) 100 MG capsule Take 1 capsule by mouth.    Suresh Fraga MD   EPINEPHrine (EPIPEN) 0.3 MG/0.3ML solution auto-injector injection Inject 0.3 mL into the appropriate muscle as directed by prescriber As Needed (allergic reaction). 2/5/24   Berenice Jacobson MD   etodolac (LODINE) 500 MG tablet Take 1 tablet by mouth 2 (Two) Times a Day. 4/27/24   Berenice Jacobson MD   ezetimibe (Zetia) 10 MG tablet Take 1 tablet by mouth Daily. 6/4/24   Jennifer Neal APRN   famotidine (PEPCID) 20 MG tablet Administer FAMOTIDINE 20mg PO pre-infusion (REMICADE). 6/15/22   Emergency, Nurse Jackie, RN   fenofibrate 160 MG tablet Take 1 tablet by mouth Daily. 6/4/24   Jennifer Neal APRN   furosemide (LASIX) 40 MG  tablet Take 0.5 tablets by mouth 2 (Two) Times a Day. 2/5/24   Berenice Jacobson MD   glimepiride (AMARYL) 4 MG tablet Take 1 tablet by mouth 2 (Two) Times a Day With Meals. 4/27/24   Berenice Jacobson MD   glucose blood (Accu-Chek Guide) test strip Use one daily 2/5/24   Berenice Jacobson MD   hydrOXYzine (ATARAX) 25 MG tablet Take 1 tablet by mouth Every 6 (Six) Hours As Needed for Itching or Anxiety. 2/5/24   Berenice Jacobson MD   inFLIXimab (Remicade) 100 MG injection Administer REMICADE 5mg/kg IV every 4 weeks 1/5/23   Suresh Fraga MD   loratadine (Alavert) 10 MG disintegrating tablet Administer ALAVERT 10mg PO with infusion (REMICADE). 6/15/22   Emergency, Nurse Epic, RN   metoprolol succinate XL (TOPROL-XL) 50 MG 24 hr tablet TAKE TWO TABLETS BY MOUTH EVERY MORNING AND ONE EVERY NIGHT AT BEDTIME 4/27/24   Berenice Jacobson MD   mupirocin (BACTROBAN) 2 % ointment Apply thin layer to the affected areas 3 times daily. 8/31/23   Suresh Fraga MD   olmesartan (BENICAR) 40 MG tablet Take 1 tablet by mouth Daily. 4/27/24   Berenice Jacobson MD   Omega-3 Fatty Acids (fish oil) 1000 MG capsule capsule Take 3 capsules by mouth.    ProviderSuresh MD   PARoxetine (Paxil) 10 MG tablet Take 1 tablet by mouth Every Morning. 5/31/24   Jennifer Neal APRN   Semaglutide, 2 MG/DOSE, (Ozempic, 2 MG/DOSE,) 8 MG/3ML solution pen-injector Inject 2 mg under the skin into the appropriate area as directed 1 (One) Time Per Week. 6/4/24   Jennifer Neal APRN   sulfaSALAzine (AZULFIDINE) 500 MG tablet Take 3 tablets by mouth 3 (Three) Times a Day. Last dose am 10/26 1/9/21   Suresh Fraga MD        Objective / Physical Exam     Vital signs:  Temp: 98.5 °F (36.9 °C)  BP: 156/96  Heart Rate: 99  Resp: 18  SpO2: 94 %  Weight: 135 kg (298 lb 1 oz)    Admission Weight: Weight: 135 kg (298 lb 1 oz)    Physical Exam  Constitutional:       Appearance: She is  obese.      Comments: Visually uncomfortable   Eyes:      Pupils: Pupils are equal, round, and reactive to light.   Cardiovascular:      Rate and Rhythm: Normal rate and regular rhythm.   Pulmonary:      Effort: Pulmonary effort is normal.      Breath sounds: Normal breath sounds.   Abdominal:      Palpations: Abdomen is soft.      Tenderness: There is abdominal tenderness.   Skin:     General: Skin is warm and dry.   Neurological:      Mental Status: She is alert and oriented to person, place, and time.   Psychiatric:         Behavior: Behavior normal.            Labs     Results from last 7 days   Lab Units 06/28/24  1638   WBC 10*3/mm3 17.94*   HEMOGLOBIN g/dL 14.6   HEMATOCRIT % 43.1   PLATELETS 10*3/mm3 351      Results from last 7 days   Lab Units 06/28/24  1638   ALK PHOS U/L 60   AST (SGOT) U/L 18   ALT (SGPT) U/L 21           Results from last 7 days   Lab Units 06/28/24  1638 06/28/24  1218   SODIUM mmol/L 133*  --    POTASSIUM mmol/L 3.7  --    CHLORIDE mmol/L 100  --    CO2 mmol/L 20.2*  --    BUN mg/dL 10  --    CREATININE mg/dL 0.53* 0.40*   GLUCOSE mg/dL 162*  --         Imaging     CT Abdomen Pelvis With Contrast    Result Date: 6/28/2024  CT ABDOMEN PELVIS W CONTRAST Date of Exam: 6/28/2024 11:58 AM EDT Indication: epigastric pain, n/v. hx of gastric lap band. Comparison: January 27, 2013 Technique: Axial CT images were obtained of the abdomen and pelvis following the uneventful intravenous administration of iodinated contrast. Sagittal and coronal reconstructions were performed.  Automated exposure control and iterative reconstruction methods were used. Findings: The lung bases are clear. The liver, adrenal glands, kidneys, spleen, and pancreas are unremarkable. The gallbladder is surgically absent. There are changes from gastric lap band procedure. The stomach is mildly distended. There are multiple dilated small bowel loops without a discrete transition point. The colon appears normal. The  appendix is surgically absent. There is no ascites or loculated collection. No abnormally enlarged lymph nodes are identified. The rectum and urinary bladder are unremarkable. An intrauterine device is present. No aggressive osseous lesions are identified.     Impression: Multiple dilated small bowel loops without a discrete transition point, which could represent ileus, gastroenteritis, or partial small bowel obstruction. Electronically Signed: Willie Groves MD  6/28/2024 1:06 PM EDT  Workstation ID: [id number]      No orders to display        Current Medications     Scheduled Meds:  insulin lispro, 2-9 Units, Subcutaneous, Q6H  [START ON 6/29/2024] pantoprazole, 40 mg, Intravenous, Q AM  sodium chloride, 10 mL, Intravenous, Q12H         Continuous Infusions:  lactated ringers, 100 mL/hr           Soco Ochoa [facility]  06/28/24   19:37 EDT

## 2024-06-29 ENCOUNTER — APPOINTMENT (OUTPATIENT)
Dept: GENERAL RADIOLOGY | Facility: HOSPITAL | Age: 44
End: 2024-06-29
Payer: COMMERCIAL

## 2024-06-29 LAB
ANION GAP SERPL CALCULATED.3IONS-SCNC: 8.7 MMOL/L (ref 5–15)
BACTERIA UR QL AUTO: ABNORMAL /HPF
BILIRUB UR QL STRIP: ABNORMAL
BUN SERPL-MCNC: 12 MG/DL (ref 6–20)
BUN/CREAT SERPL: 21.4 (ref 7–25)
CALCIUM SPEC-SCNC: 8.3 MG/DL (ref 8.6–10.5)
CHLORIDE SERPL-SCNC: 102 MMOL/L (ref 98–107)
CLARITY UR: ABNORMAL
CO2 SERPL-SCNC: 26.3 MMOL/L (ref 22–29)
COLOR UR: ABNORMAL
CREAT SERPL-MCNC: 0.56 MG/DL (ref 0.57–1)
DEPRECATED RDW RBC AUTO: 44 FL (ref 37–54)
EGFRCR SERPLBLD CKD-EPI 2021: 114.1 ML/MIN/1.73
EOSINOPHIL # BLD MANUAL: 0.26 10*3/MM3 (ref 0–0.4)
EOSINOPHIL NFR BLD MANUAL: 2 % (ref 0.3–6.2)
ERYTHROCYTE [DISTWIDTH] IN BLOOD BY AUTOMATED COUNT: 12.3 % (ref 12.3–15.4)
GIANT PLATELETS: ABNORMAL
GLUCOSE BLDC GLUCOMTR-MCNC: 121 MG/DL (ref 70–105)
GLUCOSE BLDC GLUCOMTR-MCNC: 141 MG/DL (ref 70–105)
GLUCOSE BLDC GLUCOMTR-MCNC: 144 MG/DL (ref 70–105)
GLUCOSE BLDC GLUCOMTR-MCNC: 162 MG/DL (ref 70–105)
GLUCOSE SERPL-MCNC: 164 MG/DL (ref 65–99)
GLUCOSE UR STRIP-MCNC: NEGATIVE MG/DL
HBA1C MFR BLD: 7.24 % (ref 4.8–5.6)
HCT VFR BLD AUTO: 40.4 % (ref 34–46.6)
HGB BLD-MCNC: 13.6 G/DL (ref 12–15.9)
HGB UR QL STRIP.AUTO: NEGATIVE
HYALINE CASTS UR QL AUTO: ABNORMAL /LPF
KETONES UR QL STRIP: NEGATIVE
LEUKOCYTE ESTERASE UR QL STRIP.AUTO: NEGATIVE
LYMPHOCYTES # BLD MANUAL: 3.04 10*3/MM3 (ref 0.7–3.1)
LYMPHOCYTES NFR BLD MANUAL: 14 % (ref 5–12)
MCH RBC QN AUTO: 32.7 PG (ref 26.6–33)
MCHC RBC AUTO-ENTMCNC: 33.7 G/DL (ref 31.5–35.7)
MCV RBC AUTO: 97.1 FL (ref 79–97)
MONOCYTES # BLD: 1.85 10*3/MM3 (ref 0.1–0.9)
MUCOUS THREADS URNS QL MICRO: ABNORMAL /HPF
NEUTROPHILS # BLD AUTO: 8.06 10*3/MM3 (ref 1.7–7)
NEUTROPHILS NFR BLD MANUAL: 59 % (ref 42.7–76)
NEUTS BAND NFR BLD MANUAL: 2 % (ref 0–5)
NEUTS VAC BLD QL SMEAR: ABNORMAL
NITRITE UR QL STRIP: NEGATIVE
PH UR STRIP.AUTO: <=5 [PH] (ref 5–8)
PLATELET # BLD AUTO: 309 10*3/MM3 (ref 140–450)
PMV BLD AUTO: 10 FL (ref 6–12)
POTASSIUM SERPL-SCNC: 3.6 MMOL/L (ref 3.5–5.2)
PROT UR QL STRIP: ABNORMAL
RBC # BLD AUTO: 4.16 10*6/MM3 (ref 3.77–5.28)
RBC # UR STRIP: ABNORMAL /HPF
RBC MORPH BLD: NORMAL
REF LAB TEST METHOD: ABNORMAL
SCAN SLIDE: NORMAL
SODIUM SERPL-SCNC: 137 MMOL/L (ref 136–145)
SP GR UR STRIP: >=1.03 (ref 1–1.03)
SQUAMOUS #/AREA URNS HPF: ABNORMAL /HPF
UNIDENT CRYS URNS QL MICRO: ABNORMAL /HPF
UROBILINOGEN UR QL STRIP: ABNORMAL
VARIANT LYMPHS NFR BLD MANUAL: 1 % (ref 0–5)
VARIANT LYMPHS NFR BLD MANUAL: 22 % (ref 19.6–45.3)
WBC # UR STRIP: ABNORMAL /HPF
WBC NRBC COR # BLD AUTO: 13.22 10*3/MM3 (ref 3.4–10.8)

## 2024-06-29 PROCEDURE — 94640 AIRWAY INHALATION TREATMENT: CPT

## 2024-06-29 PROCEDURE — 82948 REAGENT STRIP/BLOOD GLUCOSE: CPT

## 2024-06-29 PROCEDURE — 85025 COMPLETE CBC W/AUTO DIFF WBC: CPT | Performed by: NURSE PRACTITIONER

## 2024-06-29 PROCEDURE — 85007 BL SMEAR W/DIFF WBC COUNT: CPT | Performed by: NURSE PRACTITIONER

## 2024-06-29 PROCEDURE — 81001 URINALYSIS AUTO W/SCOPE: CPT | Performed by: EMERGENCY MEDICINE

## 2024-06-29 PROCEDURE — 94799 UNLISTED PULMONARY SVC/PX: CPT

## 2024-06-29 PROCEDURE — 80048 BASIC METABOLIC PNL TOTAL CA: CPT | Performed by: NURSE PRACTITIONER

## 2024-06-29 PROCEDURE — 25010000002 METOCLOPRAMIDE PER 10 MG: Performed by: SURGERY

## 2024-06-29 PROCEDURE — G0378 HOSPITAL OBSERVATION PER HR: HCPCS

## 2024-06-29 PROCEDURE — 82948 REAGENT STRIP/BLOOD GLUCOSE: CPT | Performed by: NURSE PRACTITIONER

## 2024-06-29 PROCEDURE — 94664 DEMO&/EVAL PT USE INHALER: CPT

## 2024-06-29 PROCEDURE — 83036 HEMOGLOBIN GLYCOSYLATED A1C: CPT | Performed by: NURSE PRACTITIONER

## 2024-06-29 PROCEDURE — 25010000002 PROCHLORPERAZINE 10 MG/2ML SOLUTION: Performed by: NURSE PRACTITIONER

## 2024-06-29 PROCEDURE — 25810000003 LACTATED RINGERS PER 1000 ML: Performed by: NURSE PRACTITIONER

## 2024-06-29 PROCEDURE — 74250 X-RAY XM SM INT 1CNTRST STD: CPT

## 2024-06-29 PROCEDURE — 25010000002 ONDANSETRON PER 1 MG: Performed by: NURSE PRACTITIONER

## 2024-06-29 PROCEDURE — 99221 1ST HOSP IP/OBS SF/LOW 40: CPT | Performed by: SURGERY

## 2024-06-29 PROCEDURE — 25510000001 IOPAMIDOL PER 1 ML: Performed by: INTERNAL MEDICINE

## 2024-06-29 PROCEDURE — 94761 N-INVAS EAR/PLS OXIMETRY MLT: CPT

## 2024-06-29 PROCEDURE — 25010000002 HYDROMORPHONE 1 MG/ML SOLUTION: Performed by: NURSE PRACTITIONER

## 2024-06-29 RX ORDER — DOCUSATE SODIUM 100 MG/1
100 CAPSULE, LIQUID FILLED ORAL 2 TIMES DAILY
Status: DISCONTINUED | OUTPATIENT
Start: 2024-06-29 | End: 2024-06-29

## 2024-06-29 RX ORDER — METOCLOPRAMIDE HYDROCHLORIDE 5 MG/ML
10 INJECTION INTRAMUSCULAR; INTRAVENOUS EVERY 6 HOURS
Status: DISCONTINUED | OUTPATIENT
Start: 2024-06-29 | End: 2024-07-01 | Stop reason: HOSPADM

## 2024-06-29 RX ORDER — KETOROLAC TROMETHAMINE 30 MG/ML
30 INJECTION, SOLUTION INTRAMUSCULAR; INTRAVENOUS EVERY 6 HOURS PRN
Status: DISCONTINUED | OUTPATIENT
Start: 2024-06-29 | End: 2024-07-01 | Stop reason: HOSPADM

## 2024-06-29 RX ORDER — METOCLOPRAMIDE HYDROCHLORIDE 5 MG/ML
10 INJECTION INTRAMUSCULAR; INTRAVENOUS EVERY 6 HOURS
Status: DISCONTINUED | OUTPATIENT
Start: 2024-06-29 | End: 2024-06-29

## 2024-06-29 RX ORDER — SORBITOL SOLUTION 70 %
30 SOLUTION, ORAL MISCELLANEOUS DAILY
Status: DISCONTINUED | OUTPATIENT
Start: 2024-06-29 | End: 2024-06-29

## 2024-06-29 RX ADMIN — Medication 10 ML: at 21:55

## 2024-06-29 RX ADMIN — HYDROMORPHONE HYDROCHLORIDE 1 MG: 1 INJECTION, SOLUTION INTRAMUSCULAR; INTRAVENOUS; SUBCUTANEOUS at 01:38

## 2024-06-29 RX ADMIN — BUDESONIDE AND FORMOTEROL FUMARATE DIHYDRATE 2 PUFF: 160; 4.5 AEROSOL RESPIRATORY (INHALATION) at 08:08

## 2024-06-29 RX ADMIN — Medication 10 ML: at 08:47

## 2024-06-29 RX ADMIN — METOCLOPRAMIDE 10 MG: 5 INJECTION, SOLUTION INTRAMUSCULAR; INTRAVENOUS at 18:55

## 2024-06-29 RX ADMIN — PANTOPRAZOLE SODIUM 40 MG: 40 INJECTION, POWDER, FOR SOLUTION INTRAVENOUS at 05:48

## 2024-06-29 RX ADMIN — IOPAMIDOL 200 ML: 755 INJECTION, SOLUTION INTRAVENOUS at 10:33

## 2024-06-29 RX ADMIN — METOPROLOL SUCCINATE 50 MG: 50 TABLET, FILM COATED, EXTENDED RELEASE ORAL at 21:54

## 2024-06-29 RX ADMIN — SODIUM CHLORIDE, POTASSIUM CHLORIDE, SODIUM LACTATE AND CALCIUM CHLORIDE 100 ML/HR: 600; 310; 30; 20 INJECTION, SOLUTION INTRAVENOUS at 08:30

## 2024-06-29 RX ADMIN — SULFASALAZINE 1500 MG: 500 TABLET ORAL at 21:54

## 2024-06-29 RX ADMIN — PROCHLORPERAZINE EDISYLATE 5 MG: 5 INJECTION INTRAMUSCULAR; INTRAVENOUS at 00:07

## 2024-06-29 RX ADMIN — ONDANSETRON 4 MG: 2 INJECTION INTRAMUSCULAR; INTRAVENOUS at 12:12

## 2024-06-29 RX ADMIN — BUDESONIDE AND FORMOTEROL FUMARATE DIHYDRATE 2 PUFF: 160; 4.5 AEROSOL RESPIRATORY (INHALATION) at 18:47

## 2024-06-29 NOTE — CONSULTS
Bariatric  SURGERY CONSULT      Patient Care Team:  Berenice Jacobson MD as PCP - General  Berenice Jacobson MD as PCP - Family Medicine  Syed Zaragoza MD as Surgeon (General Surgery)    Chief complaint  abd pain and vomiting  Subjective     This is a 44-year-old lady who 3 days ago had an episode of severe reflux followed by vomiting and upper abdominal pain.  This was shortly after eating.  She had no hematemesis or coffee-ground emesis.  Her last bowel movement was 2 days ago and was normal.  She normally has a bowel movement about every day.  She is not prone to constipation.  She had a laparoscopic gastric band placed about 15 years ago and says she has not had any kind of band fill in many years.  She does not know how much fluid is in her band.  She says it really does not restrict her portion sizes very much.  Her BMI is 46.  Other past surgical history includes cholecystectomy appendectomy and  section.  She came to the emergency room yesterday and a CT scan demonstrated diffuse small bowel dilation consistent with partial small bowel obstruction or gastroenteritis.  I reviewed the images of the CT scan and I believe she also has a hiatal hernia.  The patient also was found to have leukocytosis to 17,000 initially and it is also fallen to 13,000.  She says that she has a history of some type of autoimmune disorder and normally has elevation of her white blood cell count.  The patient is a nurse.    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    Anxiety     Arthritis of spine     psoriatic    Asthma     Diabetes mellitus     History of medical problems     Hidradenitis    Hydradenitis     Hyperlipidemia     slight elevation not treated    Hypertension     Low back pain      Migraine     history    MRSA carrier     Pneumonia     Psoriasis     Psoriatic arthritis     Sinus disorder      Past Surgical History:   Procedure Laterality Date    ADENOIDECTOMY      APPENDECTOMY      AXILLARY SURGERY      multiple due to hidradenitis    BREAST BIOPSY       SECTION      CHOLECYSTECTOMY      INCISION AND DRAINAGE OF WOUND      breast    KNEE ARTHRODESIS      MASS EXCISION Right 10/27/2021    Procedure: WIDE EXCISION TRUNK LESION/MASS, hidradenitis of right hip;  Surgeon: Serafin Centeno MD;  Location: [facility] MAIN OR;  Service: General;  Laterality: Right;    SINUS SURGERY      TONSILLECTOMY      WOUND DEBRIDEMENT Right 2019    Procedure: DEBRIDEMENT OF RECURRENT HIDRADENITIS AND VAC PLACEMENT RIGHT SIDE;  Surgeon: Alix Freire MD;  Location: [facility] MAIN OR;  Service: General     Family History   Problem Relation Age of Onset    Breast cancer Mother     Hypertension Mother     Cancer Mother     Diabetes Mother     Hyperlipidemia Mother     Asthma Mother     Hypertension Father     Hyperlipidemia Father     Stroke Father     Cancer Brother     Breast cancer Maternal Aunt     Breast cancer Maternal Aunt     Heart disease Maternal Grandfather      Social History     Tobacco Use    Smoking status: Never     Passive exposure: Current    Smokeless tobacco: Never   Vaping Use    Vaping status: Never Used   Substance Use Topics    Alcohol use: No    Drug use: No     Medications Prior to Admission   Medication Sig Dispense Refill Last Dose    acetaminophen (TYLENOL) 325 MG tablet Take 1,000 mg by mouth As Needed for Mild Pain . dont take preop       albuterol sulfate  (90 Base) MCG/ACT inhaler Inhale 2 puffs Every 6 (Six) Hours As Needed for Wheezing. 18 g 5     baclofen (LIORESAL) 10 MG tablet Take 1 tablet by mouth 3 (Three) Times a Day As Needed for Muscle Spasms.       betamethasone dipropionate (DIPROLENE) 0.05 % lotion Apply 1 application topically to the appropriate area as  directed 2 (Two) Times a Day As Needed. dont use after bathing started       betamethasone, augmented, (DIPROLENE) 0.05 % cream PLEASE SEE ATTACHED FOR DETAILED DIRECTIONS       budesonide-formoterol (SYMBICORT) 80-4.5 MCG/ACT inhaler Inhale 2 puffs 2 (Two) Times a Day. 3 each 1 6/28/2024    clindamycin (CLEOCIN T) 1 % lotion Apply 1 application topically to the appropriate area as directed 2 (Two) Times a Day As Needed. dont use once bathing started       cloNIDine (Catapres) 0.1 MG tablet Take 1 tablet by mouth 2 (Two) Times a Day. 180 tablet 1 6/28/2024    etodolac (LODINE) 500 MG tablet Take 1 tablet by mouth 2 (Two) Times a Day. 180 tablet 1 6/28/2024    ezetimibe (Zetia) 10 MG tablet Take 1 tablet by mouth Daily. 90 tablet 1 6/28/2024    famotidine (PEPCID) 20 MG tablet Administer FAMOTIDINE 20mg PO pre-infusion (REMICADE).   6/28/2024    fenofibrate 160 MG tablet Take 1 tablet by mouth Daily. 90 tablet 1 6/28/2024    furosemide (LASIX) 40 MG tablet Take 0.5 tablets by mouth 2 (Two) Times a Day. 30 tablet 1 6/28/2024    glimepiride (AMARYL) 4 MG tablet Take 1 tablet by mouth 2 (Two) Times a Day With Meals. 180 tablet 1 6/28/2024    hydrOXYzine (ATARAX) 25 MG tablet Take 1 tablet by mouth Every 6 (Six) Hours As Needed for Itching or Anxiety. 30 tablet 1     inFLIXimab (Remicade) 100 MG injection Administer REMICADE 5mg/kg IV every 4 weeks   Past Month    metoprolol succinate XL (TOPROL-XL) 50 MG 24 hr tablet TAKE TWO TABLETS BY MOUTH EVERY MORNING AND ONE EVERY NIGHT AT BEDTIME 270 tablet 1 6/28/2024    mupirocin (BACTROBAN) 2 % ointment Apply 1 Application topically to the appropriate area as directed 3 (Three) Times a Day As Needed.       olmesartan (BENICAR) 40 MG tablet Take 1 tablet by mouth Daily. 90 tablet 1 6/28/2024    PARoxetine (Paxil) 10 MG tablet Take 1 tablet by mouth Every Morning. 30 tablet 0 6/28/2024    Semaglutide, 2 MG/DOSE, (Ozempic, 2 MG/DOSE,) 8 MG/3ML solution pen-injector Inject 2 mg  under the skin into the appropriate area as directed 1 (One) Time Per Week. 3 mL 1 6/21/2024    sulfaSALAzine (AZULFIDINE) 500 MG tablet Take 3 tablets by mouth 2 (Two) Times a Day. Last dose am 10/26   6/28/2024    EPINEPHrine (EPIPEN) 0.3 MG/0.3ML solution auto-injector injection Inject 0.3 mL into the appropriate muscle as directed by prescriber As Needed (allergic reaction). 1 each 5     loratadine (Alavert) 10 MG disintegrating tablet Administer ALAVERT 10mg PO with infusion (REMICADE).        Allergies:  Calcium channel blockers, Nifedipine, Tetanus antitoxin, Verapamil, Adhesive tape, Diltiazem, and Tape    Objective     Vital Signs  Temp:  [97.4 °F (36.3 °C)-98.5 °F (36.9 °C)] 97.4 °F (36.3 °C)  Heart Rate:  [] 84  Resp:  [12-18] 16  BP: (123-177)/() 160/97    Physical Exam:      General Appearance:    Alert, cooperative, in no acute distress   Head:    Normocephalic, without obvious abnormality, atraumatic,    Eyes:            Lids and lashes normal, conjunctivae and sclerae normal, no   icterus, no pallor, corneas clear, PERRLA   Ears:    Ears appear intact with no abnormalities noted   Throat:   No oral lesions, no thrush, oral mucosa moist   Neck:   No adenopathy, supple, trachea midline, no thyromegaly, no   carotid bruit, no JVD   Back:     No kyphosis present, no scoliosis present, no skin lesions,      erythema or scars, no tenderness to percussion or                   palpation,   range of motion normal   Lungs:     Clear to auscultation,respirations regular, even and                  unlabored    Heart:    Regular rhythm and normal rate, normal S1 and S2, no            murmur, no gallop, no rub, no click   Chest Wall:    No abnormalities observed   Abdomen:   Mild diffuse abdominal tenderness but no guarding and no rebound tenderness.  The Lap-Band port is palpable in the right mid abdomen.  I did access the port with a Weems needle under sterile technique and was not able to remove any  fluid and therefore I believe this band port is empty.   Rectal:     Deferred   Extremities: No edema, good ROM   Pulses:   Pulses palpable and equal bilaterally   Skin:   No bleeding, bruising or rash   Lymph nodes:   No palpable adenopathy   Neurologic:   Cranial nerves 2 - 12 grossly intact, sensation intact, DTR       present and equal bilaterally     Lab Results (last 24 hours)       Procedure Component Value Units Date/Time    Hemoglobin A1c [[id number]]  (Abnormal) Collected: 06/29/24 0423    Specimen: Blood Updated: 06/29/24 0553     Hemoglobin A1C 7.24 %     CBC Auto Differential [[id number]]  (Abnormal) Collected: 06/29/24 0423    Specimen: Blood Updated: 06/29/24 0525     WBC 13.22 10*3/mm3      RBC 4.16 10*6/mm3      Hemoglobin 13.6 g/dL      Hematocrit 40.4 %      MCV 97.1 fL      MCH 32.7 pg      MCHC 33.7 g/dL      RDW 12.3 %      RDW-SD 44.0 fl      MPV 10.0 fL      Platelets 309 10*3/mm3     Narrative:      The previously reported component NRBC is no longer being reported. Previous result was 0.0 /100 WBC (Reference Range: 0.0-0.2 /100 WBC) on 6/29/2024 at 0434 EDT.    Scan Slide [[id number]] Collected: 06/29/24 0423    Specimen: Blood Updated: 06/29/24 0525     Scan Slide --     Comment: See Manual Differential Results       Manual Differential [[id number]]  (Abnormal) Collected: 06/29/24 0423    Specimen: Blood Updated: 06/29/24 0525     Neutrophil % 59.0 %      Lymphocyte % 22.0 %      Monocyte % 14.0 %      Eosinophil % 2.0 %      Bands %  2.0 %      Atypical Lymphocyte % 1.0 %      Neutrophils Absolute 8.06 10*3/mm3      Lymphocytes Absolute 3.04 10*3/mm3      Monocytes Absolute 1.85 10*3/mm3      Eosinophils Absolute 0.26 10*3/mm3      RBC Morphology Normal     Vacuolated Neutrophils Slight/1+     Giant Platelets Slight/1+    Basic Metabolic Panel [[id number]]  (Abnormal) Collected: 06/29/24 0423    Specimen: Blood Updated: 06/29/24 0455     Glucose 164 mg/dL      BUN 12 mg/dL      Creatinine 0.56  mg/dL      Sodium 137 mmol/L      Potassium 3.6 mmol/L      Chloride 102 mmol/L      CO2 26.3 mmol/L      Calcium 8.3 mg/dL      BUN/Creatinine Ratio 21.4     Anion Gap 8.7 mmol/L      eGFR 114.1 mL/min/1.73     Narrative:      GFR Normal >60  Chronic Kidney Disease <60  Kidney Failure <15      Urinalysis, Microscopic Only - Urine, Clean Catch [[id number]]  (Abnormal) Collected: 06/29/24 0136    Specimen: Urine, Clean Catch Updated: 06/29/24 0205     RBC, UA 0-2 /HPF      WBC, UA 6-10 /HPF      Bacteria, UA None Seen /HPF      Squamous Epithelial Cells, UA 3-6 /HPF      Hyaline Casts, UA None Seen /LPF      Amorphous Urate Crystals, UA Large/3+ /HPF      Mucus, UA Small/1+ /HPF      Methodology Manual Light Microscopy    Urinalysis With Microscopic If Indicated (No Culture) - Urine, Clean Catch [[id number]]  (Abnormal) Collected: 06/29/24 0136    Specimen: Urine, Clean Catch Updated: 06/29/24 0157     Color, UA Dark Yellow     Appearance, UA Turbid     pH, UA <=5.0     Specific Gravity, UA >=1.030     Glucose, UA Negative     Ketones, UA Negative     Bilirubin, UA Large (3+)     Comment: Confirmation testing is unavailable.  A serum bilirubin is recommended for further assessment.        Blood, UA Negative     Protein, UA 30 mg/dL (1+)     Leuk Esterase, UA Negative     Nitrite, UA Negative     Urobilinogen, UA 0.2 E.U./dL    POC Glucose 4x Daily Before Meals & at Bedtime [[id number]]  (Abnormal) Collected: 06/28/24 2323    Specimen: Blood Updated: 06/28/24 2325     Glucose 157 mg/dL      Comment: Serial Number: [id number]:  [id number]       POC Glucose Once [[id number]]  (Abnormal) Collected: 06/28/24 2049    Specimen: Blood Updated: 06/28/24 2052     Glucose 135 mg/dL      Comment: Serial Number: [id number]:  [id number]       Comprehensive Metabolic Panel [[id number]]  (Abnormal) Collected: 06/28/24 1638    Specimen: Blood Updated: 06/28/24 1746     Glucose 162 mg/dL      BUN 10 mg/dL      Creatinine 0.53  mg/dL      Sodium 133 mmol/L      Potassium 3.7 mmol/L      Comment: Slight hemolysis detected by analyzer. Result may be falsely elevated.        Chloride 100 mmol/L      CO2 20.2 mmol/L      Calcium 8.7 mg/dL      Total Protein 7.2 g/dL      Albumin 3.9 g/dL      ALT (SGPT) 21 U/L      AST (SGOT) 18 U/L      Alkaline Phosphatase 60 U/L      Total Bilirubin 0.6 mg/dL      Globulin 3.3 gm/dL      A/G Ratio 1.2 g/dL      BUN/Creatinine Ratio 18.9     Anion Gap 12.8 mmol/L      eGFR 115.7 mL/min/1.73     Narrative:      GFR Normal >60  Chronic Kidney Disease <60  Kidney Failure <15      Lipase [[id number]]  (Normal) Collected: 06/28/24 1638    Specimen: Blood Updated: 06/28/24 1745     Lipase 24 U/L     CBC & Differential [[id number]]  (Abnormal) Collected: 06/28/24 1638    Specimen: Blood Updated: 06/28/24 1712    Narrative:      The following orders were created for panel order CBC & Differential.  Procedure                               Abnormality         Status                     ---------                               -----------         ------                     CBC Auto Differential[[id number]]        Abnormal            Final result                 Please view results for these tests on the individual orders.    CBC Auto Differential [[id number]]  (Abnormal) Collected: 06/28/24 1638    Specimen: Blood Updated: 06/28/24 1712     WBC 17.94 10*3/mm3      RBC 4.54 10*6/mm3      Hemoglobin 14.6 g/dL      Hematocrit 43.1 %      MCV 94.9 fL      MCH 32.2 pg      MCHC 33.9 g/dL      RDW 12.1 %      RDW-SD 41.6 fl      MPV 10.1 fL      Platelets 351 10*3/mm3      Neutrophil % 72.7 %      Lymphocyte % 15.8 %      Monocyte % 10.0 %      Eosinophil % 0.7 %      Basophil % 0.2 %      Immature Grans % 0.6 %      Neutrophils, Absolute 13.05 10*3/mm3      Lymphocytes, Absolute 2.83 10*3/mm3      Monocytes, Absolute 1.79 10*3/mm3      Eosinophils, Absolute 0.12 10*3/mm3      Basophils, Absolute 0.04 10*3/mm3      Immature  Grans, Absolute 0.11 10*3/mm3      nRBC 0.0 /100 WBC           Results Review:    I reviewed the patient's new clinical results.  I reviewed the patient's new imaging results and agree with the interpretation.    Assessment & Plan       Abdominal pain  History of adjustable gastric banding  Differential diagnosis of partial small bowel obstruction versus gastroenteritis versus constipation    44-year-old lady presents with vomiting and diffuse abdominal pain and CT scan describes diffuse small bowel dilation.  I reviewed the CT scan myself and I believe she likely has a small hiatal hernia and she also has a moderate stool burden of the right colon and transverse colon.  She did have an initial leukocytosis of 17,000 and is reduced to 13,000 and the patient says she has a history of leukocytosis.    A small bowel follow-through has been ordered and therefore I will await the results of this before making a decision on further treatment.  If she does not have a complete small bowel obstruction I believe we can start a clear liquid diet and advance as tolerated.  If contrast fails to reach the colon today I would like to get another x-ray tomorrow morning.  My initial impression is that it is unlikely she has a complete small bowel obstruction based on the diffuse small bowel dilation and the considerable stool in the colon.  Also due to the fact that the band has no fluid in it I think it is unlikely that it is causing some type of obstruction at the level of the stomach.  The upper GI will help us get a better look at that as well.    After the patient is discharged I have offered to follow-up with her for ongoing management of her Lap-Band and her morbid obesity.        Ciera Cota MD  06/29/24  09:53 EDT

## 2024-06-29 NOTE — PLAN OF CARE
Goal Outcome Evaluation:  Plan of Care Reviewed With: patient           Outcome Evaluation: Pt. up adlib. Able to make needs known. Plan of care ongoing.

## 2024-06-29 NOTE — PROGRESS NOTES
[facility] MEDICINE SERVICE  DAILY PROGRESS NOTE    NAME: Bernadine Arriaga  : 1980  MRN: [id number]      LOS: 0 days     PROVIDER OF SERVICE: Gutierrez Lara MD    Chief Complaint: Abdominal pain    Subjective:     Interval History: Patient states that her abdominal pain is better today although still present, and her nausea is also improved.  She still does not have any significant flatus and has not had a BM.    Review of Systems:   Review of Systems    Objective:     Vital Signs  Temp:  [97.4 °F (36.3 °C)-98.5 °F (36.9 °C)] 98.3 °F (36.8 °C)  Heart Rate:  [] 88  Resp:  [12-18] 12  BP: (123-177)/() 131/86   Body mass index is 46.68 kg/m².    Physical Exam  Physical Exam  General Appearance:  Alert, cooperative, no distress, appears stated age  Head:  Normocephalic, without obvious abnormality, atraumatic  Eyes:  PERRL, conjunctiva/corneas clear, EOM's intact, fundi benign, both eyes  Ears:  Normal TM's and external ear canals, both ears  Nose: Nares normal, septum midline, mucosa normal, no drainage or sinus tenderness  Throat: Lips, mucosa, and tongue normal; teeth and gums normal  Neck: Supple, symmetrical, trachea midline, no adenopathy, thyroid: not enlarged, symmetric, no tenderness/mass/nodules, no carotid bruit or JVD  Lungs:   Clear to auscultation bilaterally, respirations unlabored  Heart:  Regular rate and rhythm, S1, S2 normal, no murmur, rub or gallop  Abdomen:  Soft, mild abdominal TTP, bowel sounds active all four quadrants,  no masses, no organomegaly  Extremities: Extremities normal, atraumatic, no cyanosis or edema  Pulses: 2+ and symmetric  Skin: Skin color, texture, turgor normal, no rashes or lesions  Neurologic: Normal      Scheduled Meds   budesonide-formoterol, 2 puff, Inhalation, BID - RT  cetirizine, 10 mg, Oral, Daily  famotidine, 20 mg, Oral, Daily  fenofibrate, 145 mg, Oral, Daily  insulin lispro, 2-9 Units, Subcutaneous, Q6H  losartan, 100 mg, Oral,  Q24H  metoprolol succinate XL, 50 mg, Oral, Q12H  pantoprazole, 40 mg, Intravenous, Q AM  PARoxetine, 10 mg, Oral, Daily  sodium chloride, 10 mL, Intravenous, Q12H  sulfaSALAzine, 1,500 mg, Oral, BID       PRN Meds     albuterol    baclofen    senna-docusate sodium **AND** polyethylene glycol **AND** bisacodyl **AND** bisacodyl    cloNIDine    dextrose    dextrose    glucagon (human recombinant)    HYDROmorphone    hydrOXYzine    ketorolac    ondansetron    [COMPLETED] Insert Peripheral IV **AND** sodium chloride    sodium chloride    sodium chloride   Infusions  lactated ringers, 100 mL/hr, Last Rate: 100 mL/hr (06/29/24 0830)          Diagnostic Data    Results from last 7 days   Lab Units 06/29/24  0423 06/28/24  1638   WBC 10*3/mm3 13.22* 17.94*   HEMOGLOBIN g/dL 13.6 14.6   HEMATOCRIT % 40.4 43.1   PLATELETS 10*3/mm3 309 351   GLUCOSE mg/dL 164* 162*   CREATININE mg/dL 0.56* 0.53*   BUN mg/dL 12 10   SODIUM mmol/L 137 133*   POTASSIUM mmol/L 3.6 3.7   AST (SGOT) U/L  --  18   ALT (SGPT) U/L  --  21   ALK PHOS U/L  --  60   BILIRUBIN mg/dL  --  0.6   ANION GAP mmol/L 8.7 12.8       CT Abdomen Pelvis With Contrast    Result Date: 6/28/2024  Impression: Multiple dilated small bowel loops without a discrete transition point, which could represent ileus, gastroenteritis, or partial small bowel obstruction. Electronically Signed: Willie Groves MD  6/28/2024 1:06 PM EDT  Workstation ID: [id number]       I reviewed the patient's new clinical results.    Assessment/Plan:     Active and Resolved Problems  Active Hospital Problems    Diagnosis  POA    **Abdominal pain [R10.9]  Yes      Resolved Hospital Problems   No resolved problems to display.       Abdominal pain with nausea and vomiting  -Initial CT on admission showed questionable ileus versus gastroenteritis versus partial SBO given dilated small bowel loops  -Patient does have improvement in her symptoms today  -Check SBFT; if negative, can advance diet as  tolerated however if there is evidence of high-grade obstruction, she may require further surgical intervention  -I am less inclined to think that her gastric lap band is playing any role in this given that she has not had any issues with it for the past 15 years  -Appreciate general surgery evaluation as well  -Leukocytosis likely reactive and not a true sign of infection    DM type II, controlled  -Continue current medical therapy with sliding scale insulin    Hypertension  -BP well-controlled  -As needed hydralazine    Dyslipidemia  -Resume statin therapy when tolerating p.o.    Asthma  -Continue Symbicort and albuterol inhalers    Morbid Obesity  -Counseled patient on diet and exercise to improve weight loss and comorbid conditions    GERD  -Resume PPI    Anxiety with depression  -Resume home medications      VTE Prophylaxis:  Mechanical VTE prophylaxis orders are present.         Code status is   Code Status and Medical Interventions:   Ordered at: 06/28/24 1933     Code Status (Patient has no pulse and is not breathing):    CPR (Attempt to Resuscitate)     Medical Interventions (Patient has pulse or is breathing):    Full Support       Plan for disposition: Hopefully DC on 6/30    Time: 30 minutes    Signature: Electronically signed by Gutierrez Lara MD, 06/29/24, 12:14 EDT.  [facility] Hospitalist Team

## 2024-06-29 NOTE — ED NOTES
Nursing report ED to floor  Bernadine Arriaga  44 y.o.  female    HPI:   Chief Complaint   Patient presents with    Abdominal Pain       Admitting doctor:   Attila Quesada MD    Admitting diagnosis:   The primary encounter diagnosis was Generalized abdominal pain. Diagnoses of Ileus and Dehydration were also pertinent to this visit.    Code status:   Current Code Status       Date Active Code Status Order ID Comments User Context       6/28/2024 1933 CPR (Attempt to Resuscitate) [id number]  Soco Ochoa APRN ED        Question Answer    Code Status (Patient has no pulse and is not breathing) CPR (Attempt to Resuscitate)    Medical Interventions (Patient has pulse or is breathing) Full Support                    Allergies:   Calcium channel blockers, Nifedipine, Tetanus antitoxin, Verapamil, Adhesive tape, Diltiazem, and Tape    Isolation:  No active isolations     Fall Risk:  Fall Risk Assessment was completed, and patient is at moderate risk for falls.   Predictive Model Details         10 (Low) Factor Value    Calculated 6/28/2024 21:12 Age 44    Risk of Fall Model Active Peripheral IV Present     Respiratory Rate 18     Magnesium not on file     Number of Distinct Medication Classes administered 4     Diastolic BP 67     Calcium 8.7 mg/dL     Tee Scale not on file     Number of administrations of Analgesic Narcotics 2     Chloride 100 mmol/L     Creatinine 0.53 mg/dL     Tobacco Use Passive     Albumin 3.9 g/dL     Total Bilirubin 0.6 mg/dL     Gastrointestinal Assessment X     Days after Admission 0.233     ALT 21 U/L     Potassium 3.7 mmol/L         Weight:       06/28/24  1510   Weight: 135 kg (298 lb 1 oz)       Intake and Output    Intake/Output Summary (Last 24 hours) at 6/28/2024 2112  Last data filed at 6/28/2024 1928  Gross per 24 hour   Intake 1000 ml   Output --   Net 1000 ml       Diet:   Dietary Orders (From admission, onward)       Start     Ordered    06/28/24 1933  NPO Diet NPO Type: Strict  NPO  Diet Effective Now        Question:  NPO Type  Answer:  Strict NPO    06/28/24 1933                     Most recent vitals:   Vitals:    06/28/24 2017 06/28/24 2032 06/28/24 2046 06/28/24 2102   BP: 151/95 131/75 148/90 133/67   BP Location:       Patient Position:       Pulse: 98 101 104 99   Resp:       Temp:       SpO2: 94% 94% 93% 91%   Weight:       Height:           Active LDAs/IV Access:   Lines, Drains & Airways       Active LDAs       Name Placement date Placement time Site Days    Peripheral IV 06/28/24 1639 Right;Posterior Forearm 06/28/24  1639  Forearm  less than 1                    Skin Condition:   Skin Assessments (last day)       None             Labs (abnormal labs have a star):   Labs Reviewed   COMPREHENSIVE METABOLIC PANEL - Abnormal; Notable for the following components:       Result Value    Glucose 162 (*)     Creatinine 0.53 (*)     Sodium 133 (*)     CO2 20.2 (*)     All other components within normal limits    Narrative:     GFR Normal >60  Chronic Kidney Disease <60  Kidney Failure <15     CBC WITH AUTO DIFFERENTIAL - Abnormal; Notable for the following components:    WBC 17.94 (*)     RDW 12.1 (*)     Lymphocyte % 15.8 (*)     Immature Grans % 0.6 (*)     Neutrophils, Absolute 13.05 (*)     Monocytes, Absolute 1.79 (*)     Immature Grans, Absolute 0.11 (*)     All other components within normal limits   POCT GLUCOSE FINGERSTICK - Abnormal; Notable for the following components:    Glucose 135 (*)     All other components within normal limits   LIPASE - Normal   URINALYSIS W/ MICROSCOPIC IF INDICATED (NO CULTURE)   POCT GLUCOSE FINGERSTICK   POCT GLUCOSE FINGERSTICK   POCT GLUCOSE FINGERSTICK   POCT GLUCOSE FINGERSTICK   POCT GLUCOSE FINGERSTICK   POCT GLUCOSE FINGERSTICK   POCT GLUCOSE FINGERSTICK   POCT GLUCOSE FINGERSTICK   CBC AND DIFFERENTIAL    Narrative:     The following orders were created for panel order CBC & Differential.  Procedure                                Abnormality         Status                     ---------                               -----------         ------                     CBC Auto Differential[[id number]]        Abnormal            Final result                 Please view results for these tests on the individual orders.       LOC: Person, Place, Time, and Situation    Telemetry:  Med/Surg    Cardiac Monitoring Ordered: yes    EKG:   No orders to display       Medications Given in the ED:   Medications   sodium chloride 0.9 % flush 10 mL (has no administration in time range)   lactated ringers infusion (100 mL/hr Intravenous New Bag 6/28/24 2038)   HYDROmorphone (DILAUDID) injection 0.5 mg (0.5 mg Intravenous Given 6/28/24 2037)   sodium chloride 0.9 % flush 10 mL (10 mL Intravenous Given 6/28/24 2037)   sodium chloride 0.9 % flush 10 mL (has no administration in time range)   sodium chloride 0.9 % infusion 40 mL (has no administration in time range)   sennosides-docusate (PERICOLACE) 8.6-50 MG per tablet 2 tablet (has no administration in time range)     And   polyethylene glycol (MIRALAX) packet 17 g (has no administration in time range)     And   bisacodyl (DULCOLAX) EC tablet 5 mg (has no administration in time range)     And   bisacodyl (DULCOLAX) suppository 10 mg (has no administration in time range)   ondansetron (ZOFRAN) injection 4 mg (4 mg Intravenous Given 6/28/24 2037)   pantoprazole (PROTONIX) injection 40 mg (has no administration in time range)   dextrose (GLUTOSE) oral gel 15 g (has no administration in time range)   dextrose (D50W) (25 g/50 mL) IV injection 25 g (has no administration in time range)   glucagon (GLUCAGEN) injection 1 mg (has no administration in time range)   insulin lispro (HUMALOG/ADMELOG) injection 2-9 Units ( Subcutaneous Not Given 6/28/24 2055)   lactated ringers bolus 1,000 mL (0 mL Intravenous Stopped 6/28/24 1928)   ondansetron (ZOFRAN) injection 4 mg (4 mg Intravenous Given 6/28/24 1639)   HYDROmorphone  (DILAUDID) injection 0.5 mg (0.5 mg Intravenous Given 6/28/24 9410)       Imaging results:  CT Abdomen Pelvis With Contrast    Result Date: 6/28/2024  Impression: Multiple dilated small bowel loops without a discrete transition point, which could represent ileus, gastroenteritis, or partial small bowel obstruction. Electronically Signed: Willie Groves MD  6/28/2024 1:06 PM EDT  Workstation ID: [id number]     Social issues:   Social History     Socioeconomic History    Marital status:    Tobacco Use    Smoking status: Never     Passive exposure: Current    Smokeless tobacco: Never   Vaping Use    Vaping status: Never Used   Substance and Sexual Activity    Alcohol use: No    Drug use: No    Sexual activity: Yes     Partners: Male     Birth control/protection: I.U.D.       NIH Stroke Scale:  Interval: (not recorded)  1a. Level of Consciousness: (not recorded)  1b. LOC Questions: (not recorded)  1c. LOC Commands: (not recorded)  2. Best Gaze: (not recorded)  3. Visual: (not recorded)  4. Facial Palsy: (not recorded)  5a. Motor Arm, Left: (not recorded)  5b. Motor Arm, Right: (not recorded)  6a. Motor Leg, Left: (not recorded)  6b. Motor Leg, Right: (not recorded)  7. Limb Ataxia: (not recorded)  8. Sensory: (not recorded)  9. Best Language: (not recorded)  10. Dysarthria: (not recorded)  11. Extinction and Inattention (formerly Neglect): (not recorded)    Total (NIH Stroke Scale): (not recorded)     Additional notable assessment information:     Nursing report ED to floor:  SIPS JU Juarez RN   06/28/24 21:12 EDT

## 2024-06-30 ENCOUNTER — APPOINTMENT (OUTPATIENT)
Dept: GENERAL RADIOLOGY | Facility: HOSPITAL | Age: 44
End: 2024-06-30
Payer: COMMERCIAL

## 2024-06-30 PROBLEM — K56.609 SBO (SMALL BOWEL OBSTRUCTION): Status: ACTIVE | Noted: 2024-06-30

## 2024-06-30 LAB
GLUCOSE BLDC GLUCOMTR-MCNC: 107 MG/DL (ref 70–105)
GLUCOSE BLDC GLUCOMTR-MCNC: 137 MG/DL (ref 70–105)
GLUCOSE BLDC GLUCOMTR-MCNC: 150 MG/DL (ref 70–105)
GLUCOSE BLDC GLUCOMTR-MCNC: 201 MG/DL (ref 70–105)
GLUCOSE BLDC GLUCOMTR-MCNC: 202 MG/DL (ref 70–105)

## 2024-06-30 PROCEDURE — 94799 UNLISTED PULMONARY SVC/PX: CPT

## 2024-06-30 PROCEDURE — 25010000002 ONDANSETRON PER 1 MG: Performed by: NURSE PRACTITIONER

## 2024-06-30 PROCEDURE — 25010000002 ENOXAPARIN PER 10 MG: Performed by: SURGERY

## 2024-06-30 PROCEDURE — 25010000002 METOCLOPRAMIDE PER 10 MG: Performed by: SURGERY

## 2024-06-30 PROCEDURE — 82948 REAGENT STRIP/BLOOD GLUCOSE: CPT | Performed by: NURSE PRACTITIONER

## 2024-06-30 PROCEDURE — 63710000001 INSULIN LISPRO (HUMAN) PER 5 UNITS: Performed by: NURSE PRACTITIONER

## 2024-06-30 PROCEDURE — 82948 REAGENT STRIP/BLOOD GLUCOSE: CPT

## 2024-06-30 PROCEDURE — 25810000003 LACTATED RINGERS PER 1000 ML: Performed by: NURSE PRACTITIONER

## 2024-06-30 PROCEDURE — 94761 N-INVAS EAR/PLS OXIMETRY MLT: CPT

## 2024-06-30 PROCEDURE — 74022 RADEX COMPL AQT ABD SERIES: CPT

## 2024-06-30 PROCEDURE — 94664 DEMO&/EVAL PT USE INHALER: CPT

## 2024-06-30 PROCEDURE — 25010000002 KETOROLAC TROMETHAMINE PER 15 MG: Performed by: SURGERY

## 2024-06-30 PROCEDURE — 99232 SBSQ HOSP IP/OBS MODERATE 35: CPT | Performed by: SURGERY

## 2024-06-30 RX ORDER — METOCLOPRAMIDE 10 MG/1
10 TABLET ORAL 2 TIMES DAILY
Qty: 10 TABLET | Refills: 0 | Status: SHIPPED | OUTPATIENT
Start: 2024-06-30 | End: 2024-07-02

## 2024-06-30 RX ORDER — ENOXAPARIN SODIUM 100 MG/ML
40 INJECTION SUBCUTANEOUS 2 TIMES DAILY
Status: DISCONTINUED | OUTPATIENT
Start: 2024-06-30 | End: 2024-07-01 | Stop reason: HOSPADM

## 2024-06-30 RX ORDER — SORBITOL SOLUTION 70 %
30 SOLUTION, ORAL MISCELLANEOUS DAILY
Status: DISCONTINUED | OUTPATIENT
Start: 2024-06-30 | End: 2024-07-01 | Stop reason: HOSPADM

## 2024-06-30 RX ADMIN — BUDESONIDE AND FORMOTEROL FUMARATE DIHYDRATE 2 PUFF: 160; 4.5 AEROSOL RESPIRATORY (INHALATION) at 07:07

## 2024-06-30 RX ADMIN — CETIRIZINE HYDROCHLORIDE 10 MG: 10 TABLET, FILM COATED ORAL at 08:34

## 2024-06-30 RX ADMIN — SULFASALAZINE 1500 MG: 500 TABLET ORAL at 08:34

## 2024-06-30 RX ADMIN — Medication 10 ML: at 21:18

## 2024-06-30 RX ADMIN — BUDESONIDE AND FORMOTEROL FUMARATE DIHYDRATE 2 PUFF: 160; 4.5 AEROSOL RESPIRATORY (INHALATION) at 19:40

## 2024-06-30 RX ADMIN — FAMOTIDINE 20 MG: 20 TABLET, FILM COATED ORAL at 08:34

## 2024-06-30 RX ADMIN — METOCLOPRAMIDE 10 MG: 5 INJECTION, SOLUTION INTRAMUSCULAR; INTRAVENOUS at 18:03

## 2024-06-30 RX ADMIN — SODIUM CHLORIDE, POTASSIUM CHLORIDE, SODIUM LACTATE AND CALCIUM CHLORIDE 100 ML/HR: 600; 310; 30; 20 INJECTION, SOLUTION INTRAVENOUS at 04:34

## 2024-06-30 RX ADMIN — PAROXETINE 10 MG: 10 TABLET, FILM COATED ORAL at 08:34

## 2024-06-30 RX ADMIN — METOCLOPRAMIDE 10 MG: 5 INJECTION, SOLUTION INTRAMUSCULAR; INTRAVENOUS at 00:02

## 2024-06-30 RX ADMIN — FENOFIBRATE 145 MG: 145 TABLET ORAL at 08:34

## 2024-06-30 RX ADMIN — KETOROLAC TROMETHAMINE 30 MG: 30 INJECTION, SOLUTION INTRAMUSCULAR at 00:02

## 2024-06-30 RX ADMIN — METOPROLOL SUCCINATE 50 MG: 50 TABLET, FILM COATED, EXTENDED RELEASE ORAL at 20:15

## 2024-06-30 RX ADMIN — LOSARTAN POTASSIUM 100 MG: 50 TABLET, FILM COATED ORAL at 08:34

## 2024-06-30 RX ADMIN — PANTOPRAZOLE SODIUM 40 MG: 40 INJECTION, POWDER, FOR SOLUTION INTRAVENOUS at 05:53

## 2024-06-30 RX ADMIN — METOCLOPRAMIDE 10 MG: 5 INJECTION, SOLUTION INTRAMUSCULAR; INTRAVENOUS at 05:53

## 2024-06-30 RX ADMIN — SULFASALAZINE 1500 MG: 500 TABLET ORAL at 20:15

## 2024-06-30 RX ADMIN — Medication 10 ML: at 08:34

## 2024-06-30 RX ADMIN — METOPROLOL SUCCINATE 50 MG: 50 TABLET, FILM COATED, EXTENDED RELEASE ORAL at 08:34

## 2024-06-30 RX ADMIN — KETOROLAC TROMETHAMINE 30 MG: 30 INJECTION, SOLUTION INTRAMUSCULAR at 15:28

## 2024-06-30 RX ADMIN — INSULIN LISPRO 4 UNITS: 100 INJECTION, SOLUTION INTRAVENOUS; SUBCUTANEOUS at 18:03

## 2024-06-30 RX ADMIN — METOCLOPRAMIDE 10 MG: 5 INJECTION, SOLUTION INTRAMUSCULAR; INTRAVENOUS at 12:56

## 2024-06-30 RX ADMIN — ENOXAPARIN SODIUM 40 MG: 100 INJECTION SUBCUTANEOUS at 08:39

## 2024-06-30 RX ADMIN — ENOXAPARIN SODIUM 40 MG: 100 INJECTION SUBCUTANEOUS at 20:15

## 2024-06-30 RX ADMIN — SORBITOL SOLUTION (BULK) 30 ML: 70 SOLUTION at 08:39

## 2024-06-30 RX ADMIN — ONDANSETRON 4 MG: 2 INJECTION INTRAMUSCULAR; INTRAVENOUS at 15:28

## 2024-06-30 NOTE — PROGRESS NOTES
[facility] MEDICINE SERVICE  DAILY PROGRESS NOTE    NAME: Bernadine Arriaga  : 1980  MRN: [id number]      LOS: 0 days     PROVIDER OF SERVICE: Gutierrez Lara MD    Chief Complaint: Abdominal pain    Subjective:     Interval History: Patient states that her abdominal pain is better today although still present, and her nausea is also improved.  She tolerated clear liquid diet and had a loose BM last night.    Review of Systems:   Review of Systems    Objective:     Vital Signs  Temp:  [97.7 °F (36.5 °C)-98.2 °F (36.8 °C)] 97.7 °F (36.5 °C)  Heart Rate:  [71-86] 75  Resp:  [12-14] 12  BP: (128-163)/(73-98) 152/97   Body mass index is 46.68 kg/m².    Physical Exam  Physical Exam  General Appearance:  Alert, cooperative, no distress, appears stated age  Head:  Normocephalic, without obvious abnormality, atraumatic  Eyes:  PERRL, conjunctiva/corneas clear, EOM's intact, fundi benign, both eyes  Ears:  Normal TM's and external ear canals, both ears  Nose: Nares normal, septum midline, mucosa normal, no drainage or sinus tenderness  Throat: Lips, mucosa, and tongue normal; teeth and gums normal  Neck: Supple, symmetrical, trachea midline, no adenopathy, thyroid: not enlarged, symmetric, no tenderness/mass/nodules, no carotid bruit or JVD  Lungs:   Clear to auscultation bilaterally, respirations unlabored  Heart:  Regular rate and rhythm, S1, S2 normal, no murmur, rub or gallop  Abdomen:  Soft, mild abdominal TTP, bowel sounds active all four quadrants,  no masses, no organomegaly  Extremities: Extremities normal, atraumatic, no cyanosis or edema  Pulses: 2+ and symmetric  Skin: Skin color, texture, turgor normal, no rashes or lesions  Neurologic: Normal      Scheduled Meds   budesonide-formoterol, 2 puff, Inhalation, BID - RT  cetirizine, 10 mg, Oral, Daily  enoxaparin, 40 mg, Subcutaneous, BID  famotidine, 20 mg, Oral, Daily  fenofibrate, 145 mg, Oral, Daily  insulin lispro, 2-9 Units, Subcutaneous,  Q6H  losartan, 100 mg, Oral, Q24H  metoclopramide, 10 mg, Intravenous, Q6H  metoprolol succinate XL, 50 mg, Oral, Q12H  pantoprazole, 40 mg, Intravenous, Q AM  PARoxetine, 10 mg, Oral, Daily  sodium chloride, 10 mL, Intravenous, Q12H  sorbitol, 30 mL, Oral, Daily  sulfaSALAzine, 1,500 mg, Oral, BID       PRN Meds     albuterol    baclofen    senna-docusate sodium **AND** polyethylene glycol **AND** bisacodyl **AND** bisacodyl    cloNIDine    dextrose    dextrose    glucagon (human recombinant)    HYDROmorphone    hydrOXYzine    ketorolac    ondansetron    [COMPLETED] Insert Peripheral IV **AND** sodium chloride    sodium chloride    sodium chloride   Infusions           Diagnostic Data    Results from last 7 days   Lab Units 06/29/24  0423 06/28/24  1638   WBC 10*3/mm3 13.22* 17.94*   HEMOGLOBIN g/dL 13.6 14.6   HEMATOCRIT % 40.4 43.1   PLATELETS 10*3/mm3 309 351   GLUCOSE mg/dL 164* 162*   CREATININE mg/dL 0.56* 0.53*   BUN mg/dL 12 10   SODIUM mmol/L 137 133*   POTASSIUM mmol/L 3.6 3.7   AST (SGOT) U/L  --  18   ALT (SGPT) U/L  --  21   ALK PHOS U/L  --  60   BILIRUBIN mg/dL  --  0.6   ANION GAP mmol/L 8.7 12.8       XR Abdomen 2+ VW with Chest 1 VW    Result Date: 6/30/2024  Impression: 1. Continued progression of enteric contrast now entirely within the colon. There is a persistent distended loop of small bowel within the left hemiabdomen measuring 5.9 cm suggesting partial small bowel obstruction. Electronically Signed: Jadon Welsh  6/30/2024 10:59 AM EDT  Workstation ID: [id number]    FL Small Bowel Follow Through Single-Contrast    Result Date: 6/29/2024  Impression: 1. Contrast traverses the small bowel and is present within the colon by 4 hours 30 minutes. There is contrast distended small bowel which remains present which could represent ileus or partial small bowel obstruction. No evidence of high-grade obstruction is present. 2. Gastric lap band in expected position. Electronically Signed: Jadon  Blaise  6/29/2024 4:48 PM EDT  Workstation ID: [id number]       I reviewed the patient's new clinical results.    Assessment/Plan:     Active and Resolved Problems  Active Hospital Problems    Diagnosis  POA    **Abdominal pain [R10.9]  Yes      Resolved Hospital Problems   No resolved problems to display.       Abdominal pain with nausea and vomiting  -Initial CT on admission showed questionable ileus versus gastroenteritis versus partial SBO given dilated small bowel loops  -Patient does have improvement in her symptoms today  -SBFT did show partial SBO and repeat x-rays today show further passage of contrast to the colon but still some mild dilated loops of bowel  -I am less inclined to think that her gastric lap band is playing any role in this given that she has not had any issues with it for the past 15 years  -Appreciate bariatric surgery evaluation as well  -Leukocytosis likely reactive and not a true sign of infection  -Advanced to full liquid diet today    DM type II, controlled  -Continue current medical therapy with sliding scale insulin    Hypertension  -BP well-controlled  -As needed hydralazine    Dyslipidemia  -Resume statin therapy when tolerating p.o.    Asthma  -Continue Symbicort and albuterol inhalers    Morbid Obesity  -Counseled patient on diet and exercise to improve weight loss and comorbid conditions    GERD  -Resume PPI    Anxiety with depression  -Resume home medications      VTE Prophylaxis:  Pharmacologic & mechanical VTE prophylaxis orders are present.         Code status is   Code Status and Medical Interventions:   Ordered at: 06/28/24 1933     Code Status (Patient has no pulse and is not breathing):    CPR (Attempt to Resuscitate)     Medical Interventions (Patient has pulse or is breathing):    Full Support       Plan for disposition: Hopefully DC on 7/1 if continues to tolerate p.o. intake    Time: 30 minutes    Signature: Electronically signed by Gutierrez Lara MD, 06/30/24,  13:13 EDT.  [facility]

## 2024-06-30 NOTE — PROGRESS NOTES
Bariatric Surgery Progress Note    Name: Bernadine Arriaga ADMIT: 2024   : 1980  PCP: Berenice Jacobson MD    MRN: [id number] LOS: 0 days   AGE/SEX: 44 y.o. female  ROOM: [facility]/   Subjective   2 small liquid bms, still feels bloated and full, had some clear liquids, still has some abdominal discomfort    Objective      Vital Signs  Vital Signs (range)  Temp:  [97.8 °F (36.6 °C)-98.3 °F (36.8 °C)] 98.2 °F (36.8 °C)  Heart Rate:  [71-88] 71  Resp:  [12-14] 14  BP: (128-163)/(73-98) 128/73    Physical Exam:    Awake and alert  Normal mental status  Normal pulmonary effort  Abdomen distended, tympanic, tender, no guarding  Incisions no erythema  Extremities no tenderness or swelling      CBC    Results from last 7 days   Lab Units 24  0423 24  1638   WBC 10*3/mm3 13.22* 17.94*   HEMOGLOBIN g/dL 13.6 14.6   PLATELETS 10*3/mm3 309 351     CMP   Results from last 7 days   Lab Units 24  0423 24  1638 24  1218   SODIUM mmol/L 137 133*  --    POTASSIUM mmol/L 3.6 3.7  --    CHLORIDE mmol/L 102 100  --    CO2 mmol/L 26.3 20.2*  --    BUN mg/dL 12 10  --    CREATININE mg/dL 0.56* 0.53* 0.40*   GLUCOSE mg/dL 164* 162*  --    ALBUMIN g/dL  --  3.9  --    BILIRUBIN mg/dL  --  0.6  --    ALK PHOS U/L  --  60  --    AST (SGOT) U/L  --  18  --    ALT (SGPT) U/L  --  21  --    LIPASE U/L  --  24  --        Assessment & Plan       Partial small bowel obstruction      44-year-old lady admitted with partial small bowel obstruction.  She has a history of  section.  Small bowel follow-through yesterday demonstrated contrast moved to the colon in 4-1/2 hours.  She has had 2 small liquid bowel movements.  However she is does still feel very bloated and not able to drink much.  She is having a lot of belching.    I will start Lovenox for DVT prophylaxis at 40 mg subcu every 12 hours.  I have encouraged her to try to ambulate in the hallway today and to continue to avoid  narcotics.  I am adding sorbitol.  She is on Reglan and tolerating it. I will also reorder plain x-rays for today.    This note was created using Dragon Voice Recognition software.    Ciera Cota MD  06/30/24  08:31 EDT     (4) walks frequently

## 2024-06-30 NOTE — PLAN OF CARE
Goal Outcome Evaluation:      Patient had complain of nausea yesterday, medication given with relief. Had one episode of watery stool with some sediments. Tolerated clear liquid diet. Plan of care is ongoing. Call light within reach.

## 2024-06-30 NOTE — DISCHARGE SUMMARY
[facility] Medicine Services  Discharge Summary    Date of Service: 2024  Patient Name: Bernadine Arriaga  : 1980  MRN: [id number]    Date of Admission: 2024  Discharge Diagnosis:   Mild partial SBO, possibly related to gastroenteritis  DM type II, controlled  Hypertension  Dyslipidemia  Asthma  Morbid obesity  GERD  Anxiety with depression    Date of Discharge: 2024  Primary Care Physician: Berenice Jacobson MD      Presenting Problem:   Dehydration [E86.0]  Ileus [K56.7]  Abdominal pain [R10.9]  Generalized abdominal pain [R10.84]  SBO (small bowel obstruction) [K56.609]    Active and Resolved Hospital Problems:  Active Hospital Problems    Diagnosis POA    **Abdominal pain [R10.9] Yes    SBO (small bowel obstruction) [K56.609] Yes      Resolved Hospital Problems   No resolved problems to display.         Hospital Course     HPI:  Patient is a 44-year-old female who presented to the hospital with complaints of abdominal pain and nausea.  Please see H&P for details.    Hospital Course:  The patient was admitted for questionable SBO versus ileus versus gastroenteritis based on CT scan findings.  She underwent small bowel follow-through on  which did show mild partial SBO versus mild ileus.  She was started on clear liquid diet and advanced to full liquid diet on the day of discharge which she tolerated fairly well.  Her abdominal pain was improved although she still had some bloating and mild belching but also had several loose BM the night prior to discharge.  Her symptoms are more consistent with probable gastroenteritis causing a mild small bowel obstruction as she states her symptoms began after eating a heavy meal of pizza.  I advised her to continue a full liquid diet for the next 2 to 3 days and then advance her diet as tolerated to her regular diabetic diet at home.  Repeat KUB on the day of discharge shows progression of contrast to the distal colon.   She is stable for discharge.        DISCHARGE Follow Up Recommendations for labs and diagnostics: Follow-up with your PCP and with bariatric surgery in the next 2 weeks.      Reasons For Change In Medications and Indications for New Medications:      Day of Discharge     Vital Signs:  Temp:  [97.7 °F (36.5 °C)-97.9 °F (36.6 °C)] 97.9 °F (36.6 °C)  Heart Rate:  [71-86] 80  Resp:  [12-16] 16  BP: (143-176)/(93-97) 143/93    Physical Exam:  Physical Exam   General Appearance:  Alert, cooperative, no distress, appears stated age  Head:  Normocephalic, without obvious abnormality, atraumatic  Eyes:  PERRL, conjunctiva/corneas clear, EOM's intact, fundi benign, both eyes  Ears:  Normal TM's and external ear canals, both ears  Nose: Nares normal, septum midline, mucosa normal, no drainage or sinus tenderness  Throat: Lips, mucosa, and tongue normal; teeth and gums normal  Neck: Supple, symmetrical, trachea midline, no adenopathy, thyroid: not enlarged, symmetric, no tenderness/mass/nodules, no carotid bruit or JVD  Lungs:   Clear to auscultation bilaterally, respirations unlabored  Heart:  Regular rate and rhythm, S1, S2 normal, no murmur, rub or gallop  Abdomen:  Soft, non-tender, bowel sounds active all four quadrants,  no masses, no organomegaly  Extremities: Extremities normal, atraumatic, no cyanosis or edema  Pulses: 2+ and symmetric  Skin: Skin color, texture, turgor normal, no rashes or lesions  Neurologic: Normal        Pertinent  and/or Most Recent Results     LAB RESULTS:      Lab 06/29/24  0423 06/28/24  1638   WBC 13.22* 17.94*   HEMOGLOBIN 13.6 14.6   HEMATOCRIT 40.4 43.1   PLATELETS 309 351   NEUTROS ABS 8.06* 13.05*   IMMATURE GRANS (ABS)  --  0.11*   LYMPHS ABS  --  2.83   MONOS ABS  --  1.79*   EOS ABS 0.26 0.12   MCV 97.1* 94.9         Lab 06/29/24  0423 06/28/24  1638 06/28/24  1218   SODIUM 137 133*  --    POTASSIUM 3.6 3.7  --    CHLORIDE 102 100  --    CO2 26.3 20.2*  --    ANION GAP 8.7 12.8  --     BUN 12 10  --    CREATININE 0.56* 0.53* 0.40*   EGFR 114.1 115.7 123.8   GLUCOSE 164* 162*  --    CALCIUM 8.3* 8.7  --    HEMOGLOBIN A1C 7.24*  --   --          Lab 06/28/24  1638   TOTAL PROTEIN 7.2   ALBUMIN 3.9   GLOBULIN 3.3   ALT (SGPT) 21   AST (SGOT) 18   BILIRUBIN 0.6   ALK PHOS 60   LIPASE 24                     Brief Urine Lab Results  (Last result in the past 365 days)        Color   Clarity   Blood   Leuk Est   Nitrite   Protein   CREAT   Urine HCG        06/29/24 0136 Dark Yellow   Turbid   Negative   Negative   Negative   30 mg/dL (1+)                 Microbiology Results (last 10 days)       ** No results found for the last 240 hours. **            XR Abdomen KUB    Result Date: 7/1/2024  Impression: Impression: Oral contrast is seen to the distal colon. Electronically Signed: Forest Buckley MD  7/1/2024 9:05 AM EDT  Workstation ID: [id number]    XR Abdomen 2+ VW with Chest 1 VW    Result Date: 6/30/2024  Impression: Impression: 1. Continued progression of enteric contrast now entirely within the colon. There is a persistent distended loop of small bowel within the left hemiabdomen measuring 5.9 cm suggesting partial small bowel obstruction. Electronically Signed: Jadon Welsh  6/30/2024 10:59 AM EDT  Workstation ID: [id number]    FL Small Bowel Follow Through Single-Contrast    Result Date: 6/29/2024  Impression: Impression: 1. Contrast traverses the small bowel and is present within the colon by 4 hours 30 minutes. There is contrast distended small bowel which remains present which could represent ileus or partial small bowel obstruction. No evidence of high-grade obstruction is present. 2. Gastric lap band in expected position. Electronically Signed: Jadon Welsh  6/29/2024 4:48 PM EDT  Workstation ID: [id number]    CT Abdomen Pelvis With Contrast    Result Date: 6/28/2024  Impression: Impression: Multiple dilated small bowel loops without a discrete transition point, which could represent  ileus, gastroenteritis, or partial small bowel obstruction. Electronically Signed: Willie Groves MD  6/28/2024 1:06 PM EDT  Workstation ID: [id number]    Mammo Screening Digital Tomosynthesis Bilateral With CAD    Result Date: 6/11/2024  Impression: Benign findings. No mammographic evidence of malignancy. Recommend routine mammographic screening.  BI-RADS ASSESSMENT: BI-RADS 2. Benign findings.  The patient's information is entered into a computerized reminder system with a targeted due date for the next mammogram.  Note:  It has been reported that there is approximately a 15% false negative in mammography.  Therefore, management of a palpable abnormality should not be deferred because of a negative mammogram.      Electronically Signed By-Jose Kulkarni MD On:6/11/2024 10:09 AM               Results for orders placed during the hospital encounter of 12/13/19    Transthoracic Echo Complete With Contrast if Necessary Per Protocol    Interpretation Summary  Normal LV size and contractility EF of 60 to 65%  Normal RV size  Normal atrial size  Aortic valve, mitral valve, tricuspid valve appears structurally normal, mild mitral regurgitation seen.  No pericardial effusion seen.  Proximal aorta appears normal in size.      Labs Pending at Discharge:      Procedures Performed           Consults:   Consults       Date and Time Order Name Status Description    6/29/2024  7:51 AM Inpatient Bariatric Surgery Consult Completed     6/28/2024  6:06 PM Hospitalist (on-call MD unless specified)                Discharge Details        Discharge Medications        New Medications        Instructions Start Date   metoclopramide 10 MG tablet  Commonly known as: Reglan   10 mg, Oral, 2 Times Daily             Continue These Medications        Instructions Start Date   acetaminophen 325 MG tablet  Commonly known as: TYLENOL   1,000 mg, Oral, As Needed, dont take preop      Alavert 10 MG disintegrating tablet  Generic drug: loratadine    Administer ALAVERT 10mg PO with infusion (REMICADE).      albuterol sulfate  (90 Base) MCG/ACT inhaler  Commonly known as: PROVENTIL HFA;VENTOLIN HFA;PROAIR HFA   2 puffs, Inhalation, Every 6 Hours PRN      baclofen 10 MG tablet  Commonly known as: LIORESAL   10 mg, Oral, 3 Times Daily PRN      betamethasone (augmented) 0.05 % cream  Commonly known as: DIPROLENE   PLEASE SEE ATTACHED FOR DETAILED DIRECTIONS      betamethasone dipropionate 0.05 % lotion   1 application , Topical, 2 Times Daily PRN, dont use after bathing started      budesonide-formoterol 80-4.5 MCG/ACT inhaler  Commonly known as: SYMBICORT   2 puffs, Inhalation, 2 Times Daily - RT      clindamycin 1 % lotion  Commonly known as: CLEOCIN T   1 application , Topical, 2 Times Daily PRN, dont use once bathing started      cloNIDine 0.1 MG tablet  Commonly known as: Catapres   0.1 mg, Oral, 2 Times Daily      EPINEPHrine 0.3 MG/0.3ML solution auto-injector injection  Commonly known as: EPIPEN   0.3 mg, Intramuscular, As Needed      etodolac 500 MG tablet  Commonly known as: LODINE   500 mg, Oral, 2 Times Daily      ezetimibe 10 MG tablet  Commonly known as: Zetia   10 mg, Oral, Daily      famotidine 20 MG tablet  Commonly known as: PEPCID   Administer FAMOTIDINE 20mg PO pre-infusion (REMICADE).      fenofibrate 160 MG tablet   160 mg, Oral, Daily      furosemide 40 MG tablet  Commonly known as: LASIX   20 mg, Oral, 2 Times Daily      glimepiride 4 MG tablet  Commonly known as: AMARYL   4 mg, Oral, 2 Times Daily With Meals      hydrOXYzine 25 MG tablet  Commonly known as: ATARAX   25 mg, Oral, Every 6 Hours PRN      metoprolol succinate XL 50 MG 24 hr tablet  Commonly known as: TOPROL-XL   TAKE TWO TABLETS BY MOUTH EVERY MORNING AND ONE EVERY NIGHT AT BEDTIME      mupirocin 2 % ointment  Commonly known as: BACTROBAN   Apply 1 Application topically to the appropriate area as directed 3 (Three) Times a Day As Needed.      olmesartan 40 MG  tablet  Commonly known as: BENICAR   40 mg, Oral, Daily      Ozempic (2 MG/DOSE) 8 MG/3ML solution pen-injector  Generic drug: Semaglutide (2 MG/DOSE)   2 mg, Subcutaneous, Weekly      PARoxetine 10 MG tablet  Commonly known as: Paxil   10 mg, Oral, Every Morning      Remicade 100 MG injection  Generic drug: inFLIXimab   Administer REMICADE 5mg/kg IV every 4 weeks      sulfaSALAzine 500 MG tablet  Commonly known as: AZULFIDINE   1,500 mg, Oral, 2 Times Daily, Last dose am 10/26               Allergies   Allergen Reactions    Calcium Channel Blockers Palpitations, Other (See Comments) and Rash     Tachycardia and redness.    INCREASED HR   Tachycardia and redness.    Nifedipine Palpitations    Tetanus Antitoxin Swelling    Verapamil Palpitations    Adhesive Tape Other (See Comments)     Tape will cause skin breakdown rapidly   Can use silicone tape    Diltiazem Other (See Comments)    Tape Other (See Comments)     Tape will cause skin breakdown rapidly   Can use silicone tape         Discharge Disposition:     Home or Self Care    Diet:  Hospital:  Diet Order   Procedures    Diet: Regular/House; Texture: Soft to Chew (NDD 3); Soft to Chew: Chopped Meat; Fluid Consistency: Thin (IDDSI 0)         Discharge Activity:         CODE STATUS:  Code Status and Medical Interventions:   Ordered at: 06/28/24 1933     Code Status (Patient has no pulse and is not breathing):    CPR (Attempt to Resuscitate)     Medical Interventions (Patient has pulse or is breathing):    Full Support         Future Appointments   Date Time Provider Department Center   12/2/2024  8:00 AM Jennifer Neal APRN [facility]       Additional Instructions for the Follow-ups that You Need to Schedule       Discharge Follow-up with Specified Provider: Follow-up with bariatric surgery in 2 weeks.; 2 Weeks   As directed      To: Follow-up with bariatric surgery in 2 weeks.   Follow Up: 2 Weeks                Time spent on Discharge including face to face  service:  >30 minutes    Signature: Electronically signed by Gutierrez Lara MD, 07/01/24, 10:37 EDT.  [facility]ist Team

## 2024-07-01 ENCOUNTER — READMISSION MANAGEMENT (OUTPATIENT)
Dept: CALL CENTER | Facility: HOSPITAL | Age: 44
End: 2024-07-01
Payer: COMMERCIAL

## 2024-07-01 ENCOUNTER — APPOINTMENT (OUTPATIENT)
Dept: GENERAL RADIOLOGY | Facility: HOSPITAL | Age: 44
End: 2024-07-01
Payer: COMMERCIAL

## 2024-07-01 VITALS
TEMPERATURE: 98 F | WEIGHT: 293 LBS | RESPIRATION RATE: 17 BRPM | OXYGEN SATURATION: 100 % | HEIGHT: 67 IN | SYSTOLIC BLOOD PRESSURE: 159 MMHG | DIASTOLIC BLOOD PRESSURE: 101 MMHG | BODY MASS INDEX: 45.99 KG/M2 | HEART RATE: 85 BPM

## 2024-07-01 LAB
GLUCOSE BLDC GLUCOMTR-MCNC: 134 MG/DL (ref 70–105)
GLUCOSE BLDC GLUCOMTR-MCNC: 195 MG/DL (ref 70–105)

## 2024-07-01 PROCEDURE — 94799 UNLISTED PULMONARY SVC/PX: CPT

## 2024-07-01 PROCEDURE — 94664 DEMO&/EVAL PT USE INHALER: CPT

## 2024-07-01 PROCEDURE — 82948 REAGENT STRIP/BLOOD GLUCOSE: CPT | Performed by: NURSE PRACTITIONER

## 2024-07-01 PROCEDURE — 25010000002 METOCLOPRAMIDE PER 10 MG: Performed by: SURGERY

## 2024-07-01 PROCEDURE — 74018 RADEX ABDOMEN 1 VIEW: CPT

## 2024-07-01 PROCEDURE — 25010000002 ENOXAPARIN PER 10 MG: Performed by: SURGERY

## 2024-07-01 PROCEDURE — 94761 N-INVAS EAR/PLS OXIMETRY MLT: CPT

## 2024-07-01 PROCEDURE — 25010000002 ONDANSETRON PER 1 MG: Performed by: NURSE PRACTITIONER

## 2024-07-01 RX ADMIN — PAROXETINE 10 MG: 10 TABLET, FILM COATED ORAL at 09:31

## 2024-07-01 RX ADMIN — PANTOPRAZOLE SODIUM 40 MG: 40 INJECTION, POWDER, FOR SOLUTION INTRAVENOUS at 06:19

## 2024-07-01 RX ADMIN — METOPROLOL SUCCINATE 50 MG: 50 TABLET, FILM COATED, EXTENDED RELEASE ORAL at 09:30

## 2024-07-01 RX ADMIN — LOSARTAN POTASSIUM 100 MG: 50 TABLET, FILM COATED ORAL at 09:30

## 2024-07-01 RX ADMIN — SULFASALAZINE 1500 MG: 500 TABLET ORAL at 09:30

## 2024-07-01 RX ADMIN — METOCLOPRAMIDE 10 MG: 5 INJECTION, SOLUTION INTRAMUSCULAR; INTRAVENOUS at 06:18

## 2024-07-01 RX ADMIN — METOCLOPRAMIDE 10 MG: 5 INJECTION, SOLUTION INTRAMUSCULAR; INTRAVENOUS at 00:13

## 2024-07-01 RX ADMIN — Medication 10 ML: at 09:30

## 2024-07-01 RX ADMIN — FENOFIBRATE 145 MG: 145 TABLET ORAL at 09:30

## 2024-07-01 RX ADMIN — ONDANSETRON 4 MG: 2 INJECTION INTRAMUSCULAR; INTRAVENOUS at 00:13

## 2024-07-01 RX ADMIN — CETIRIZINE HYDROCHLORIDE 10 MG: 10 TABLET, FILM COATED ORAL at 09:30

## 2024-07-01 RX ADMIN — BUDESONIDE AND FORMOTEROL FUMARATE DIHYDRATE 2 PUFF: 160; 4.5 AEROSOL RESPIRATORY (INHALATION) at 06:40

## 2024-07-01 RX ADMIN — ENOXAPARIN SODIUM 40 MG: 100 INJECTION SUBCUTANEOUS at 09:30

## 2024-07-01 RX ADMIN — FAMOTIDINE 20 MG: 20 TABLET, FILM COATED ORAL at 09:30

## 2024-07-01 RX ADMIN — SORBITOL SOLUTION (BULK) 30 ML: 70 SOLUTION at 09:30

## 2024-07-01 NOTE — PLAN OF CARE
Goal Outcome Evaluation:  Plan of Care Reviewed With: patient        Progress: improving  Outcome Evaluation: pt able to make needs know. pt has been ambulating in room to bathroom by herslef throughout the shift. pt c/o some nausea and treated with prn zofran per MAR. call light within reach, plan of care ongoing.

## 2024-07-01 NOTE — CASE MANAGEMENT/SOCIAL WORK
Continued Stay Note   [facility]     Patient Name: Bernadine Arriaga  MRN: [id number]  Today's Date: 7/1/2024    Admit Date: 6/28/2024    Plan: home   Discharge Plan       Row Name 07/01/24 1453       Plan    Plan home    Plan Comments discharged prior to case management seeing patient                    Expected Discharge Date and Time       Expected Discharge Date Expected Discharge Time    Jul 1, 2024         Altagracia Villalba RN    [facility]  [email or web address]  Office [phone]  Cell [phone]

## 2024-07-01 NOTE — PAYOR COMM NOTE
"Bernadine Arriaga (44 y.o. Female)  1980  Policy no.   [id number]  Requesting IP Auth for ER Medical Admission  Admit Obs . IP status     AUTHORIZATION PENDING  PLEASE FORWARD DETERMINATION TO FOLLOWING CONTACT:    SHER VANCE LPN UR  Utilization Review Nurse  [facility]  Direct & confidential phone # [phone]  Fax # [phone]           Date of Birth   1980    Social Security Number       Address   [address] [place] [address]    Home Phone   [phone]    MRN   [id number]       Oriental orthodox   Denominational    Marital Status                               Admission Date   24    Admission Type   Emergency    Admitting Provider   Attila Quesada MD    Attending Provider   Gutierrez Lara MD    Department, Room/Bed   [facility] SURGICAL INPATIENT, 4105/1       Discharge Date       Discharge Disposition       Discharge Destination                                 Attending Provider: Gutierrez Lara MD    Allergies: Calcium Channel Blockers, Nifedipine, Tetanus Antitoxin, Verapamil, Adhesive Tape, Diltiazem, Tape    Isolation: Contact   Infection: MRSA (19)   Code Status: CPR    Ht: 170.2 cm (67\")   Wt: 135 kg (298 lb 1 oz)    Admission Cmt: None   Principal Problem: Abdominal pain [R10.9]                   Active Insurance as of 2024       Primary Coverage       Payor Plan Insurance Group Employer/Plan Group    [place] [id number]       Payor Plan Address Payor Plan Phone Number Payor Plan Fax Number Effective Dates    [address]   2020 - None Entered    [place] [address]         Subscriber Name Subscriber Birth Date Member ID       FABIENNE ARRIAGA 1973 [id number]                     Emergency Contacts        (Rel.) Home Phone Work Phone Mobile Phone    FABIENNE ARRIAGA (Spouse) -- [phone]    ROSANNEALYSSA (Son) [phone] -- [phone]    JONO ARRIAGA " (Daughter) -- -- [phone]                 History & Physical        OchoaSoco davisGERTRUDE at 24 1916       Attestation signed by Armen Warner MD at 24 2590    I have reviewed this document and agree with this document                    [facility] Medicine Services    Hospitalist History and Physical     Bernadine Arriaga : 1980 MRN:[id number] LOS:0 ROOM:      Reason for admission: Abdominal pain     Assessment / Plan     Abdominal pain  Nausea and vomiting  -Hx lap band 15yrs ago   -CT abdomen and pelvis: Multiple dilated small bowel loops without a discrete transition point, which could represent ileus, gastroenteritis, or partial small bowel obstruction.   -WBC 17.94  - analgesics and antiemetics prn  - NPO  - general surgery consult    DM II  - appears controlled  - accucheck 6  - SS insulin     HTN  -currently controlled  - will order prn medication while pt NPO  - resume home dose when NPO restriction lifted   - will hold lasix     HLD  - resume home medication once able to take oral     Hx Hidradenitis suppurativa  S/p excisions and I&Ds    Morbid Obesity  - s/p lap band 15yrs ago     Asthma  -symbicort  - albuterol prn    Anxiety and depression  - resume home medication once able to take po    GERD  -PPI    Nutrition:   NPO Diet NPO Type: Strict NPO     VTE Prophylaxis:  Mechanical VTE prophylaxis orders are present.         History of Present illness     Bernadine Arriaga is a 44 y.o. female with PMH of lap band 15 years ago, diabetes, hypertension, hyperlipidemia, asthma, hidradenitis suppurativapresented to the hospital on 2024, and was admitted with a principal diagnosis of Abdominal pain.     She has seen her primary care this morning with complaint of upper abdominal pain that started couple days ago.  Patient progressed to entire upper abdomen.  She states symptoms began after eating a meal and thought initially was due to indigestion but pain  progressed starting initially on the right side and then radiated across upper abdomen.  She denies any bowel movement and no flatus.  She was unable to even keep water down in any position was painful.  She reports 1 normal stool yesterday but no bowel movement since.  Denies any melena or hematochezia.  Reports pain appears mostly above where the banding device is located.  She was ordered an outpatient CAT scan that was positive for multiple dilated small bowel loops with discrete transition point which could represent an ileus, gastroenteritis or partial bowel obstruction she was directed to go to the ER    In the ED laboratory values show sodium 133, CO2 20.2 creatinine 0.53 glucose 162, white blood cell 17.9.  She was given analgesics, antiemetics and IV fluids and has been admitted for further observation    Patient was seen and examined on 24 at 19:37 EDT .    Subjective / Review of systems     Review of Systems   Constitutional:  Positive for appetite change.   Gastrointestinal:  Positive for abdominal pain, constipation, nausea and vomiting.          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 and molds    Anxiety     Arthritis of spine     psoriatic    Asthma     Diabetes mellitus     History of medical problems     Hidradenitis    Hydradenitis     Hyperlipidemia     slight elevation not treated    Hypertension     Low back pain     Migraine     history    MRSA carrier     Pneumonia     Psoriasis     Psoriatic arthritis     Sinus disorder       Past Surgical History:   Procedure Laterality Date    ADENOIDECTOMY      APPENDECTOMY      AXILLARY SURGERY      multiple due to hidradenitis    BREAST BIOPSY       SECTION      CHOLECYSTECTOMY      INCISION AND DRAINAGE OF WOUND      breast    KNEE ARTHRODESIS      MASS EXCISION Right 10/27/2021    Procedure: WIDE EXCISION TRUNK  LESION/MASS, hidradenitis of right hip;  Surgeon: Serafin Centeno MD;  Location: [facility] MAIN OR;  Service: General;  Laterality: Right;    SINUS SURGERY      TONSILLECTOMY      WOUND DEBRIDEMENT Right 6/19/2019    Procedure: DEBRIDEMENT OF RECURRENT HIDRADENITIS AND VAC PLACEMENT RIGHT SIDE;  Surgeon: Alix Freire MD;  Location: [facility] MAIN OR;  Service: General      Social History     Socioeconomic History    Marital status:    Tobacco Use    Smoking status: Never     Passive exposure: Current    Smokeless tobacco: Never   Vaping Use    Vaping status: Never Used   Substance and Sexual Activity    Alcohol use: No    Drug use: No    Sexual activity: Yes     Partners: Male     Birth control/protection: I.U.D.      Family History   Problem Relation Age of Onset    Breast cancer Mother     Hypertension Mother     Cancer Mother     Diabetes Mother     Hyperlipidemia Mother     Asthma Mother     Hypertension Father     Hyperlipidemia Father     Stroke Father     Cancer Brother     Breast cancer Maternal Aunt     Breast cancer Maternal Aunt     Heart disease Maternal Grandfather       Allergies   Allergen Reactions    Calcium Channel Blockers Palpitations, Other (See Comments) and Rash     Tachycardia and redness.    INCREASED HR   Tachycardia and redness.    Nifedipine Palpitations    Tetanus Antitoxin Swelling    Verapamil Palpitations    Adhesive Tape Other (See Comments)     Tape will cause skin breakdown rapidly   Can use silicone tape    Diltiazem Other (See Comments)    Tape Other (See Comments)     Tape will cause skin breakdown rapidly   Can use silicone tape      Social Determinants of Health     Tobacco Use: Medium Risk (6/28/2024)    Patient History     Smoking Tobacco Use: Never     Smokeless Tobacco Use: Never     Passive Exposure: Current   Alcohol Use: Not At Risk (2/8/2021)    AUDIT-C     Frequency of Alcohol Consumption: Never     Average Number of Drinks: Not on file     Frequency of Binge  Drinking: Not on file   Financial Resource Strain: Not on file   Food Insecurity: Not on file   Transportation Needs: Not on file   Physical Activity: Not on file   Stress: Not on file   Social Connections: Unknown (10/11/2023)    Family and Community Support     Help with Day-to-Day Activities: Not on file     Lonely or Isolated: Not on file   Interpersonal Safety: Not At Risk (6/28/2024)    Abuse Screen     Unsafe at Home or Work/School: no     Feels Threatened by Someone?: no     Does Anyone Keep You from Contacting Others or Doint Things Outside the Home?: no     Physical Sign of Abuse Present: no   Depression: Not at risk (5/31/2024)    PHQ-2     PHQ-2 Score: 0   Housing Stability: Unknown (10/11/2023)    Housing Stability     Current Living Arrangements: Not on file     Potentially Unsafe Housing Conditions: Not on file   Utilities: Not on file   Health Literacy: Unknown (10/11/2023)    Education     Help with school or training?: Not on file     Preferred Language: Not on file   Employment: Unknown (10/11/2023)    Employment     Do you want help finding or keeping work or a job?: Not on file   Disabilities: Unknown (10/11/2023)    Disabilities     Concentrating, Remembering, or Making Decisions Difficulty: Not on file     Doing Errands Independently Difficulty: Not on file        Home Medications     Prior to Admission medications    Medication Sig Start Date End Date Taking? Authorizing Provider   Accu-Chek Softclix Lancets lancets Use one daily 10/18/23   Jennifer Neal APRN   acetaminophen (TYLENOL) 325 MG tablet Take 1,000 mg by mouth As Needed for Mild Pain . dont take preop    Provider, MD Suresh   albuterol sulfate  (90 Base) MCG/ACT inhaler Inhale 2 puffs Every 6 (Six) Hours As Needed for Wheezing. 2/5/24   Berenice Jacobson MD   atorvastatin (LIPITOR) 10 MG tablet Take 1 tablet by mouth Daily.  Patient not taking: Reported on 6/28/2024 6/4/24   Jennifer Neal APRN   azelastine  (ASTELIN) 0.1 % nasal spray  10/24/23   Suresh Fraga MD   baclofen (LIORESAL) 10 MG tablet Take 1 tablet by mouth 3 (Three) Times a Day. 4/27/24   Berenice Jacobson MD   betamethasone dipropionate (DIPROLENE) 0.05 % lotion Apply 1 application topically to the appropriate area as directed 2 (Two) Times a Day As Needed. dont use after bathing started    Suresh Fraga MD   betamethasone, augmented, (DIPROLENE) 0.05 % cream PLEASE SEE ATTACHED FOR DETAILED DIRECTIONS 3/23/23   Suresh Fraga MD   bimatoprost (Latisse) 0.03 % ophthalmic solution Administer  to both eyes Every Night. Place 1 drop on applicator and apply along skin of upper eyelid at base of eyelashes daily at bedtime; rpt for 2nd eye with clean applicator 2/5/24   Berenice Jacobson MD   Blood Glucose Monitoring Suppl (Accu-Chek Guide) w/Device kit Use 1 each Daily. 10/18/23   Jennifer Neal APRN   budesonide-formoterol (SYMBICORT) 80-4.5 MCG/ACT inhaler Inhale 2 puffs 2 (Two) Times a Day. 5/31/24   Jennifer Neal APRN   clindamycin (CLEOCIN T) 1 % lotion Apply 1 application topically to the appropriate area as directed 2 (Two) Times a Day As Needed. dont use once bathing started    Suresh Fraga MD   cloNIDine (Catapres) 0.1 MG tablet Take 1 tablet by mouth 2 (Two) Times a Day. 4/27/24   Berenice Jacobson MD   diphenhydrAMINE (BENADRYL) 50 MG capsule Take 1 capsule by mouth.    Suresh Fraga MD   doxycycline (VIBRAMYCIN) 100 MG capsule Take 1 capsule by mouth.    Suresh Fraga MD   EPINEPHrine (EPIPEN) 0.3 MG/0.3ML solution auto-injector injection Inject 0.3 mL into the appropriate muscle as directed by prescriber As Needed (allergic reaction). 2/5/24   Berenice Jacobson MD   etodolac (LODINE) 500 MG tablet Take 1 tablet by mouth 2 (Two) Times a Day. 4/27/24   Berenice Jacobson MD   ezetimibe (Zetia) 10 MG tablet Take 1 tablet by mouth Daily. 6/4/24   Jennifer Neal,  GERTRUDE   famotidine (PEPCID) 20 MG tablet Administer FAMOTIDINE 20mg PO pre-infusion (REMICADE). 6/15/22   Emergency, Nurse Jackie RN   fenofibrate 160 MG tablet Take 1 tablet by mouth Daily. 6/4/24   Jennifer Neal APRN   furosemide (LASIX) 40 MG tablet Take 0.5 tablets by mouth 2 (Two) Times a Day. 2/5/24   Berenice Jacobson MD   glimepiride (AMARYL) 4 MG tablet Take 1 tablet by mouth 2 (Two) Times a Day With Meals. 4/27/24   Berenice Jacobson MD   glucose blood (Accu-Chek Guide) test strip Use one daily 2/5/24   Berenice Jacobson MD   hydrOXYzine (ATARAX) 25 MG tablet Take 1 tablet by mouth Every 6 (Six) Hours As Needed for Itching or Anxiety. 2/5/24   Berenice Jacobson MD   inFLIXimab (Remicade) 100 MG injection Administer REMICADE 5mg/kg IV every 4 weeks 1/5/23   Suresh Fraga MD   loratadine (Alavert) 10 MG disintegrating tablet Administer ALAVERT 10mg PO with infusion (REMICADE). 6/15/22   Emergency, Nurse Jackie, RN   metoprolol succinate XL (TOPROL-XL) 50 MG 24 hr tablet TAKE TWO TABLETS BY MOUTH EVERY MORNING AND ONE EVERY NIGHT AT BEDTIME 4/27/24   Berenice Jacobson MD   mupirocin (BACTROBAN) 2 % ointment Apply thin layer to the affected areas 3 times daily. 8/31/23   Suresh Fraga MD   olmesartan (BENICAR) 40 MG tablet Take 1 tablet by mouth Daily. 4/27/24   Berenice Jacobson MD   Omega-3 Fatty Acids (fish oil) 1000 MG capsule capsule Take 3 capsules by mouth.    Suresh Fraga MD   PARoxetine (Paxil) 10 MG tablet Take 1 tablet by mouth Every Morning. 5/31/24   Jennifer Neal APRN   Semaglutide, 2 MG/DOSE, (Ozempic, 2 MG/DOSE,) 8 MG/3ML solution pen-injector Inject 2 mg under the skin into the appropriate area as directed 1 (One) Time Per Week. 6/4/24   Jennifer Neal APRN   sulfaSALAzine (AZULFIDINE) 500 MG tablet Take 3 tablets by mouth 3 (Three) Times a Day. Last dose am 10/26 1/9/21   Provider, MD Suresh        Objective /  Physical Exam     Vital signs:  Temp: 98.5 °F (36.9 °C)  BP: 156/96  Heart Rate: 99  Resp: 18  SpO2: 94 %  Weight: 135 kg (298 lb 1 oz)    Admission Weight: Weight: 135 kg (298 lb 1 oz)    Physical Exam  Constitutional:       Appearance: She is obese.      Comments: Visually uncomfortable   Eyes:      Pupils: Pupils are equal, round, and reactive to light.   Cardiovascular:      Rate and Rhythm: Normal rate and regular rhythm.   Pulmonary:      Effort: Pulmonary effort is normal.      Breath sounds: Normal breath sounds.   Abdominal:      Palpations: Abdomen is soft.      Tenderness: There is abdominal tenderness.   Skin:     General: Skin is warm and dry.   Neurological:      Mental Status: She is alert and oriented to person, place, and time.   Psychiatric:         Behavior: Behavior normal.            Labs     Results from last 7 days   Lab Units 06/28/24  1638   WBC 10*3/mm3 17.94*   HEMOGLOBIN g/dL 14.6   HEMATOCRIT % 43.1   PLATELETS 10*3/mm3 351      Results from last 7 days   Lab Units 06/28/24  1638   ALK PHOS U/L 60   AST (SGOT) U/L 18   ALT (SGPT) U/L 21           Results from last 7 days   Lab Units 06/28/24  1638 06/28/24  1218   SODIUM mmol/L 133*  --    POTASSIUM mmol/L 3.7  --    CHLORIDE mmol/L 100  --    CO2 mmol/L 20.2*  --    BUN mg/dL 10  --    CREATININE mg/dL 0.53* 0.40*   GLUCOSE mg/dL 162*  --         Imaging     CT Abdomen Pelvis With Contrast    Result Date: 6/28/2024  CT ABDOMEN PELVIS W CONTRAST Date of Exam: 6/28/2024 11:58 AM EDT Indication: epigastric pain, n/v. hx of gastric lap band. Comparison: January 27, 2013 Technique: Axial CT images were obtained of the abdomen and pelvis following the uneventful intravenous administration of iodinated contrast. Sagittal and coronal reconstructions were performed.  Automated exposure control and iterative reconstruction methods were used. Findings: The lung bases are clear. The liver, adrenal glands, kidneys, spleen, and pancreas are  unremarkable. The gallbladder is surgically absent. There are changes from gastric lap band procedure. The stomach is mildly distended. There are multiple dilated small bowel loops without a discrete transition point. The colon appears normal. The appendix is surgically absent. There is no ascites or loculated collection. No abnormally enlarged lymph nodes are identified. The rectum and urinary bladder are unremarkable. An intrauterine device is present. No aggressive osseous lesions are identified.     Impression: Multiple dilated small bowel loops without a discrete transition point, which could represent ileus, gastroenteritis, or partial small bowel obstruction. Electronically Signed: Willie Groves MD  6/28/2024 1:06 PM EDT  Workstation ID: [id number]      No orders to display        Current Medications     Scheduled Meds:  insulin lispro, 2-9 Units, Subcutaneous, Q6H  [START ON 6/29/2024] pantoprazole, 40 mg, Intravenous, Q AM  sodium chloride, 10 mL, Intravenous, Q12H         Continuous Infusions:  lactated ringers, 100 mL/hr           GERTRUDE Arvizu   [facility]  06/28/24   19:37 EDT       Electronically signed by Armen Warner MD at 06/29/24 0549          Emergency Department Notes        Zayra Juarez RN at 06/28/24 2112          Nursing report ED to floor  Bernadine Arriaga  44 y.o.  female    HPI:   Chief Complaint   Patient presents with    Abdominal Pain       Admitting doctor:   Attila Quesada MD    Admitting diagnosis:   The primary encounter diagnosis was Generalized abdominal pain. Diagnoses of Ileus and Dehydration were also pertinent to this visit.    Code status:   Current Code Status       Date Active Code Status Order ID Comments User Context       6/28/2024 1933 CPR (Attempt to Resuscitate) [id number]  Soco Ochoa APRN ED        Question Answer    Code Status (Patient has no pulse and is not breathing) CPR (Attempt to Resuscitate)    Medical Interventions (Patient has  pulse or is breathing) Full Support                    Allergies:   Calcium channel blockers, Nifedipine, Tetanus antitoxin, Verapamil, Adhesive tape, Diltiazem, and Tape    Isolation:  No active isolations     Fall Risk:  Fall Risk Assessment was completed, and patient is at moderate risk for falls.   Predictive Model Details         10 (Low) Factor Value    Calculated 6/28/2024 21:12 Age 44    Risk of Fall Model Active Peripheral IV Present     Respiratory Rate 18     Magnesium not on file     Number of Distinct Medication Classes administered 4     Diastolic BP 67     Calcium 8.7 mg/dL     Tee Scale not on file     Number of administrations of Analgesic Narcotics 2     Chloride 100 mmol/L     Creatinine 0.53 mg/dL     Tobacco Use Passive     Albumin 3.9 g/dL     Total Bilirubin 0.6 mg/dL     Gastrointestinal Assessment X     Days after Admission 0.233     ALT 21 U/L     Potassium 3.7 mmol/L         Weight:       06/28/24  1510   Weight: 135 kg (298 lb 1 oz)       Intake and Output    Intake/Output Summary (Last 24 hours) at 6/28/2024 2112  Last data filed at 6/28/2024 1928  Gross per 24 hour   Intake 1000 ml   Output --   Net 1000 ml       Diet:   Dietary Orders (From admission, onward)       Start     Ordered    06/28/24 1933  NPO Diet NPO Type: Strict NPO  Diet Effective Now        Question:  NPO Type  Answer:  Strict NPO    06/28/24 1933                     Most recent vitals:   Vitals:    06/28/24 2017 06/28/24 2032 06/28/24 2046 06/28/24 2102   BP: 151/95 131/75 148/90 133/67   BP Location:       Patient Position:       Pulse: 98 101 104 99   Resp:       Temp:       SpO2: 94% 94% 93% 91%   Weight:       Height:           Active LDAs/IV Access:   Lines, Drains & Airways       Active LDAs       Name Placement date Placement time Site Days    Peripheral IV 06/28/24 1639 Right;Posterior Forearm 06/28/24  1639  Forearm  less than 1                    Skin Condition:   Skin Assessments (last day)       None              Labs (abnormal labs have a star):   Labs Reviewed   COMPREHENSIVE METABOLIC PANEL - Abnormal; Notable for the following components:       Result Value    Glucose 162 (*)     Creatinine 0.53 (*)     Sodium 133 (*)     CO2 20.2 (*)     All other components within normal limits    Narrative:     GFR Normal >60  Chronic Kidney Disease <60  Kidney Failure <15     CBC WITH AUTO DIFFERENTIAL - Abnormal; Notable for the following components:    WBC 17.94 (*)     RDW 12.1 (*)     Lymphocyte % 15.8 (*)     Immature Grans % 0.6 (*)     Neutrophils, Absolute 13.05 (*)     Monocytes, Absolute 1.79 (*)     Immature Grans, Absolute 0.11 (*)     All other components within normal limits   POCT GLUCOSE FINGERSTICK - Abnormal; Notable for the following components:    Glucose 135 (*)     All other components within normal limits   LIPASE - Normal   URINALYSIS W/ MICROSCOPIC IF INDICATED (NO CULTURE)   POCT GLUCOSE FINGERSTICK   POCT GLUCOSE FINGERSTICK   POCT GLUCOSE FINGERSTICK   POCT GLUCOSE FINGERSTICK   POCT GLUCOSE FINGERSTICK   POCT GLUCOSE FINGERSTICK   POCT GLUCOSE FINGERSTICK   POCT GLUCOSE FINGERSTICK   CBC AND DIFFERENTIAL    Narrative:     The following orders were created for panel order CBC & Differential.  Procedure                               Abnormality         Status                     ---------                               -----------         ------                     CBC Auto Differential[[id number]]        Abnormal            Final result                 Please view results for these tests on the individual orders.       LOC: Person, Place, Time, and Situation    Telemetry:  Med/Surg    Cardiac Monitoring Ordered: yes    EKG:   No orders to display       Medications Given in the ED:   Medications   sodium chloride 0.9 % flush 10 mL (has no administration in time range)   lactated ringers infusion (100 mL/hr Intravenous New Bag 6/28/24 2038)   HYDROmorphone (DILAUDID) injection 0.5 mg (0.5 mg  Intravenous Given 6/28/24 2037)   sodium chloride 0.9 % flush 10 mL (10 mL Intravenous Given 6/28/24 2037)   sodium chloride 0.9 % flush 10 mL (has no administration in time range)   sodium chloride 0.9 % infusion 40 mL (has no administration in time range)   sennosides-docusate (PERICOLACE) 8.6-50 MG per tablet 2 tablet (has no administration in time range)     And   polyethylene glycol (MIRALAX) packet 17 g (has no administration in time range)     And   bisacodyl (DULCOLAX) EC tablet 5 mg (has no administration in time range)     And   bisacodyl (DULCOLAX) suppository 10 mg (has no administration in time range)   ondansetron (ZOFRAN) injection 4 mg (4 mg Intravenous Given 6/28/24 2037)   pantoprazole (PROTONIX) injection 40 mg (has no administration in time range)   dextrose (GLUTOSE) oral gel 15 g (has no administration in time range)   dextrose (D50W) (25 g/50 mL) IV injection 25 g (has no administration in time range)   glucagon (GLUCAGEN) injection 1 mg (has no administration in time range)   insulin lispro (HUMALOG/ADMELOG) injection 2-9 Units ( Subcutaneous Not Given 6/28/24 2055)   lactated ringers bolus 1,000 mL (0 mL Intravenous Stopped 6/28/24 1928)   ondansetron (ZOFRAN) injection 4 mg (4 mg Intravenous Given 6/28/24 1639)   HYDROmorphone (DILAUDID) injection 0.5 mg (0.5 mg Intravenous Given 6/28/24 1642)       Imaging results:  CT Abdomen Pelvis With Contrast    Result Date: 6/28/2024  Impression: Multiple dilated small bowel loops without a discrete transition point, which could represent ileus, gastroenteritis, or partial small bowel obstruction. Electronically Signed: Willie Groves MD  6/28/2024 1:06 PM EDT  Workstation ID: [id number]     Social issues:   Social History     Socioeconomic History    Marital status:    Tobacco Use    Smoking status: Never     Passive exposure: Current    Smokeless tobacco: Never   Vaping Use    Vaping status: Never Used   Substance and Sexual Activity     Alcohol use: No    Drug use: No    Sexual activity: Yes     Partners: Male     Birth control/protection: I.U.D.       NIH Stroke Scale:  Interval: (not recorded)  1a. Level of Consciousness: (not recorded)  1b. LOC Questions: (not recorded)  1c. LOC Commands: (not recorded)  2. Best Gaze: (not recorded)  3. Visual: (not recorded)  4. Facial Palsy: (not recorded)  5a. Motor Arm, Left: (not recorded)  5b. Motor Arm, Right: (not recorded)  6a. Motor Leg, Left: (not recorded)  6b. Motor Leg, Right: (not recorded)  7. Limb Ataxia: (not recorded)  8. Sensory: (not recorded)  9. Best Language: (not recorded)  10. Dysarthria: (not recorded)  11. Extinction and Inattention (formerly Neglect): (not recorded)    Total (NIH Stroke Scale): (not recorded)     Additional notable assessment information:     Nursing report ED to floor:  SIPS JU Juarez RN   06/28/24 21:12 EDT     Electronically signed by Zayra Juarez RN at 06/28/24 2112       Armen Alford MD at 06/28/24 4539          Subjective   History of Present Illness  Chief complaint abdominal pain sent here by my doctor because of his CT with an ileus    History of present illness this is a 44-year-old female whose had a history of gastric banding done about 15 years ago who reports that she has been having some abdominal pain since about Wednesday expend severe cramping in nature nausea some vomiting she states she is not passing gas or having bowel movements.  No fever chills no urinary complaints nothing seem to trigger make it better or worse no recent injury illness flus viruses vaccinations or foreign travels no one in the home certainly illness.  No pregnancy concerns.  Patient states that today she had an outpatient CT scan done by her primary care doctor which was concerning for illnesses versus early small bowel obstruction and she was told to go to the ER.      Review of Systems   Constitutional:  Positive for fever. Negative for chills.    Respiratory:  Negative for chest tightness and shortness of breath.    Gastrointestinal:  Positive for abdominal pain and vomiting. Negative for blood in stool.   Genitourinary:  Negative for difficulty urinating and dysuria.   Musculoskeletal:  Negative for back pain and neck pain.   Skin:  Negative for rash.   Neurological:  Negative for dizziness and light-headedness.       Past Medical History:   Diagnosis Date    Allergic     Anaphylactic reaction     to combo of ragweed and molds    Anxiety     Arthritis of spine     psoriatic    Asthma     Diabetes mellitus     History of medical problems     Hidradenitis    Hydradenitis     Hyperlipidemia     slight elevation not treated    Hypertension     Low back pain     Migraine     history    MRSA carrier     Pneumonia     Psoriasis     Psoriatic arthritis     Sinus disorder        Allergies   Allergen Reactions    Calcium Channel Blockers Palpitations, Other (See Comments) and Rash     Tachycardia and redness.    INCREASED HR   Tachycardia and redness.    Nifedipine Palpitations    Tetanus Antitoxin Swelling    Verapamil Palpitations    Adhesive Tape Other (See Comments)     Tape will cause skin breakdown rapidly   Can use silicone tape    Diltiazem Other (See Comments)    Tape Other (See Comments)     Tape will cause skin breakdown rapidly   Can use silicone tape       Past Surgical History:   Procedure Laterality Date    ADENOIDECTOMY      APPENDECTOMY      AXILLARY SURGERY      multiple due to hidradenitis    BREAST BIOPSY       SECTION      CHOLECYSTECTOMY      INCISION AND DRAINAGE OF WOUND      breast    KNEE ARTHRODESIS      MASS EXCISION Right 10/27/2021    Procedure: WIDE EXCISION TRUNK LESION/MASS, hidradenitis of right hip;  Surgeon: Serafin Centeno MD;  Location: [facility] MAIN OR;  Service: General;  Laterality: Right;    SINUS SURGERY      TONSILLECTOMY      WOUND DEBRIDEMENT Right 2019    Procedure: DEBRIDEMENT OF RECURRENT HIDRADENITIS AND  VAC PLACEMENT RIGHT SIDE;  Surgeon: Alix Freire MD;  Location: [facility] MAIN OR;  Service: General       Family History   Problem Relation Age of Onset    Breast cancer Mother     Hypertension Mother     Cancer Mother     Diabetes Mother     Hyperlipidemia Mother     Asthma Mother     Hypertension Father     Hyperlipidemia Father     Stroke Father     Cancer Brother     Breast cancer Maternal Aunt     Breast cancer Maternal Aunt     Heart disease Maternal Grandfather        Social History     Socioeconomic History    Marital status:    Tobacco Use    Smoking status: Never     Passive exposure: Current    Smokeless tobacco: Never   Vaping Use    Vaping status: Never Used   Substance and Sexual Activity    Alcohol use: No    Drug use: No    Sexual activity: Yes     Partners: Male     Birth control/protection: I.U.D.       Prior to Admission medications    Medication Sig Start Date End Date Taking? Authorizing Provider   Accu-Chek Softclix Lancets lancets Use one daily 10/18/23   Jennifer Neal APRN   acetaminophen (TYLENOL) 325 MG tablet Take 1,000 mg by mouth As Needed for Mild Pain . dont take preop    Suresh Fraga MD   albuterol sulfate  (90 Base) MCG/ACT inhaler Inhale 2 puffs Every 6 (Six) Hours As Needed for Wheezing. 2/5/24   Berenice Jacobson MD   atorvastatin (LIPITOR) 10 MG tablet Take 1 tablet by mouth Daily.  Patient not taking: Reported on 6/28/2024 6/4/24   Jennifer Neal APRN   azelastine (ASTELIN) 0.1 % nasal spray  10/24/23   Suresh Fraga MD   baclofen (LIORESAL) 10 MG tablet Take 1 tablet by mouth 3 (Three) Times a Day. 4/27/24   Berenice Jacobson MD   betamethasone dipropionate (DIPROLENE) 0.05 % lotion Apply 1 application topically to the appropriate area as directed 2 (Two) Times a Day As Needed. dont use after bathing started    Suresh Fraga MD betamethasone, augmented, (DIPROLENE) 0.05 % cream PLEASE SEE ATTACHED FOR DETAILED  DIRECTIONS 3/23/23   Suresh Fraga MD   bimatoprost (Latisse) 0.03 % ophthalmic solution Administer  to both eyes Every Night. Place 1 drop on applicator and apply along skin of upper eyelid at base of eyelashes daily at bedtime; rpt for 2nd eye with clean applicator 2/5/24   Berenice Jacobson MD   Blood Glucose Monitoring Suppl (Accu-Chek Guide) w/Device kit Use 1 each Daily. 10/18/23   Jennifer Neal APRN   budesonide-formoterol (SYMBICORT) 80-4.5 MCG/ACT inhaler Inhale 2 puffs 2 (Two) Times a Day. 5/31/24   Jennifer Neal APRN   clindamycin (CLEOCIN T) 1 % lotion Apply 1 application topically to the appropriate area as directed 2 (Two) Times a Day As Needed. dont use once bathing started    Suresh Fraga MD   cloNIDine (Catapres) 0.1 MG tablet Take 1 tablet by mouth 2 (Two) Times a Day. 4/27/24   Berenice Jacobson MD   diphenhydrAMINE (BENADRYL) 50 MG capsule Take 1 capsule by mouth.    Suresh Fraga MD   doxycycline (VIBRAMYCIN) 100 MG capsule Take 1 capsule by mouth.    Suresh Fraga MD   EPINEPHrine (EPIPEN) 0.3 MG/0.3ML solution auto-injector injection Inject 0.3 mL into the appropriate muscle as directed by prescriber As Needed (allergic reaction). 2/5/24   Berenice Jacobson MD   etodolac (LODINE) 500 MG tablet Take 1 tablet by mouth 2 (Two) Times a Day. 4/27/24   Berenice Jacobson MD   ezetimibe (Zetia) 10 MG tablet Take 1 tablet by mouth Daily. 6/4/24   Jennifer Neal APRN   famotidine (PEPCID) 20 MG tablet Administer FAMOTIDINE 20mg PO pre-infusion (REMICADE). 6/15/22   Emergency, Nurse Jackie, RN   fenofibrate 160 MG tablet Take 1 tablet by mouth Daily. 6/4/24   Jennifer Neal APRN   furosemide (LASIX) 40 MG tablet Take 0.5 tablets by mouth 2 (Two) Times a Day. 2/5/24   Berenice Jacobson MD   glimepiride (AMARYL) 4 MG tablet Take 1 tablet by mouth 2 (Two) Times a Day With Meals. 4/27/24   Berenice Jacobson MD   glucose blood  (Accu-Chek Guide) test strip Use one daily 2/5/24   Berenice Jacobson MD   hydrOXYzine (ATARAX) 25 MG tablet Take 1 tablet by mouth Every 6 (Six) Hours As Needed for Itching or Anxiety. 2/5/24   Berenice Jacobson MD   inFLIXimab (Remicade) 100 MG injection Administer REMICADE 5mg/kg IV every 4 weeks 1/5/23   ProviderSuresh MD   loratadine (Alavert) 10 MG disintegrating tablet Administer ALAVERT 10mg PO with infusion (REMICADE). 6/15/22   Emergency, Nurse Epic, RN   metoprolol succinate XL (TOPROL-XL) 50 MG 24 hr tablet TAKE TWO TABLETS BY MOUTH EVERY MORNING AND ONE EVERY NIGHT AT BEDTIME 4/27/24   Berenice Jacobson MD   mupirocin (BACTROBAN) 2 % ointment Apply thin layer to the affected areas 3 times daily. 8/31/23   Suresh Fraga MD   olmesartan (BENICAR) 40 MG tablet Take 1 tablet by mouth Daily. 4/27/24   Berenice Jacobson MD   Omega-3 Fatty Acids (fish oil) 1000 MG capsule capsule Take 3 capsules by mouth.    ProviderSuresh MD   PARoxetine (Paxil) 10 MG tablet Take 1 tablet by mouth Every Morning. 5/31/24   Jennifer Neal APRN   Semaglutide, 2 MG/DOSE, (Ozempic, 2 MG/DOSE,) 8 MG/3ML solution pen-injector Inject 2 mg under the skin into the appropriate area as directed 1 (One) Time Per Week. 6/4/24   Jennifer Neal APRN   sulfaSALAzine (AZULFIDINE) 500 MG tablet Take 3 tablets by mouth 3 (Three) Times a Day. Last dose am 10/26 1/9/21   ProviderSuresh MD        Objective   Physical Exam  Constitutional this is a 44-year-old awake alert no acute distress triage vital signs reviewed.  HEENT extraocular muscles intact pupils equal round reactive sclera clear neck supple no adenopathy no meningeal signs lungs clear no retraction back no CVA tenderness heart regular without murmur abdomen soft somewhat distended with some mild diffuse tenderness but no rebound no guarding decreased bowel sounds throughout no peritoneal findings or pulsatile masses  extremities no edema cords or Homans' sign no evidence of DVT skin is warm and dry without rashes or cellulitic changes neurologic awake alert and follows commands motor strength normal without focal weakness  Procedures          ED Course      Results for orders placed or performed during the hospital encounter of 06/28/24   Comprehensive Metabolic Panel    Specimen: Blood   Result Value Ref Range    Glucose 162 (H) 65 - 99 mg/dL    BUN 10 6 - 20 mg/dL    Creatinine 0.53 (L) 0.57 - 1.00 mg/dL    Sodium 133 (L) 136 - 145 mmol/L    Potassium 3.7 3.5 - 5.2 mmol/L    Chloride 100 98 - 107 mmol/L    CO2 20.2 (L) 22.0 - 29.0 mmol/L    Calcium 8.7 8.6 - 10.5 mg/dL    Total Protein 7.2 6.0 - 8.5 g/dL    Albumin 3.9 3.5 - 5.2 g/dL    ALT (SGPT) 21 1 - 33 U/L    AST (SGOT) 18 1 - 32 U/L    Alkaline Phosphatase 60 39 - 117 U/L    Total Bilirubin 0.6 0.0 - 1.2 mg/dL    Globulin 3.3 gm/dL    A/G Ratio 1.2 g/dL    BUN/Creatinine Ratio 18.9 7.0 - 25.0    Anion Gap 12.8 5.0 - 15.0 mmol/L    eGFR 115.7 >60.0 mL/min/1.73   Lipase    Specimen: Blood   Result Value Ref Range    Lipase 24 13 - 60 U/L   CBC Auto Differential    Specimen: Blood   Result Value Ref Range    WBC 17.94 (H) 3.40 - 10.80 10*3/mm3    RBC 4.54 3.77 - 5.28 10*6/mm3    Hemoglobin 14.6 12.0 - 15.9 g/dL    Hematocrit 43.1 34.0 - 46.6 %    MCV 94.9 79.0 - 97.0 fL    MCH 32.2 26.6 - 33.0 pg    MCHC 33.9 31.5 - 35.7 g/dL    RDW 12.1 (L) 12.3 - 15.4 %    RDW-SD 41.6 37.0 - 54.0 fl    MPV 10.1 6.0 - 12.0 fL    Platelets 351 140 - 450 10*3/mm3    Neutrophil % 72.7 42.7 - 76.0 %    Lymphocyte % 15.8 (L) 19.6 - 45.3 %    Monocyte % 10.0 5.0 - 12.0 %    Eosinophil % 0.7 0.3 - 6.2 %    Basophil % 0.2 0.0 - 1.5 %    Immature Grans % 0.6 (H) 0.0 - 0.5 %    Neutrophils, Absolute 13.05 (H) 1.70 - 7.00 10*3/mm3    Lymphocytes, Absolute 2.83 0.70 - 3.10 10*3/mm3    Monocytes, Absolute 1.79 (H) 0.10 - 0.90 10*3/mm3    Eosinophils, Absolute 0.12 0.00 - 0.40 10*3/mm3    Basophils,  Absolute 0.04 0.00 - 0.20 10*3/mm3    Immature Grans, Absolute 0.11 (H) 0.00 - 0.05 10*3/mm3    nRBC 0.0 0.0 - 0.2 /100 WBC   POC Glucose 4x Daily Before Meals & at Bedtime    Specimen: Blood   Result Value Ref Range    Glucose 157 (H) 70 - 105 mg/dL   POC Glucose Once    Specimen: Blood   Result Value Ref Range    Glucose 135 (H) 70 - 105 mg/dL     CT Abdomen Pelvis With Contrast    Result Date: 6/28/2024  Impression: Multiple dilated small bowel loops without a discrete transition point, which could represent ileus, gastroenteritis, or partial small bowel obstruction. Electronically Signed: Willie Groves MD  6/28/2024 1:06 PM EDT  Workstation ID: [id number]   Medications   sodium chloride 0.9 % flush 10 mL (has no administration in time range)   lactated ringers infusion (100 mL/hr Intravenous New Bag 6/28/24 2038)   sodium chloride 0.9 % flush 10 mL (10 mL Intravenous Given 6/28/24 2037)   sodium chloride 0.9 % flush 10 mL (has no administration in time range)   sodium chloride 0.9 % infusion 40 mL (has no administration in time range)   sennosides-docusate (PERICOLACE) 8.6-50 MG per tablet 2 tablet (has no administration in time range)     And   polyethylene glycol (MIRALAX) packet 17 g (has no administration in time range)     And   bisacodyl (DULCOLAX) EC tablet 5 mg (has no administration in time range)     And   bisacodyl (DULCOLAX) suppository 10 mg (has no administration in time range)   ondansetron (ZOFRAN) injection 4 mg (4 mg Intravenous Given 6/28/24 2037)   pantoprazole (PROTONIX) injection 40 mg (has no administration in time range)   dextrose (GLUTOSE) oral gel 15 g (has no administration in time range)   dextrose (D50W) (25 g/50 mL) IV injection 25 g (has no administration in time range)   glucagon (GLUCAGEN) injection 1 mg (has no administration in time range)   insulin lispro (HUMALOG/ADMELOG) injection 2-9 Units ( Subcutaneous Not Given 6/28/24 2327)   albuterol (PROVENTIL) nebulizer solution  0.083% 2.5 mg/3mL (has no administration in time range)   baclofen (LIORESAL) tablet 10 mg (has no administration in time range)   budesonide-formoterol (SYMBICORT) 160-4.5 MCG/ACT inhaler 2 puff (2 puffs Inhalation Not Given 6/28/24 2237)   cloNIDine (CATAPRES) tablet 0.1 mg (has no administration in time range)   famotidine (PEPCID) tablet 20 mg (has no administration in time range)   fenofibrate (TRICOR) tablet 145 mg (has no administration in time range)   hydrOXYzine (ATARAX) tablet 25 mg (has no administration in time range)   cetirizine (zyrTEC) tablet 10 mg (has no administration in time range)   metoprolol succinate XL (TOPROL-XL) 24 hr tablet 50 mg (has no administration in time range)   losartan (COZAAR) tablet 100 mg (has no administration in time range)   PARoxetine (PAXIL) tablet 10 mg (has no administration in time range)   sulfaSALAzine (AZULFIDINE) tablet 1,500 mg (has no administration in time range)   prochlorperazine (COMPAZINE) injection 5 mg (has no administration in time range)   HYDROmorphone (DILAUDID) injection 1 mg (has no administration in time range)   lactated ringers bolus 1,000 mL (0 mL Intravenous Stopped 6/28/24 1928)   ondansetron (ZOFRAN) injection 4 mg (4 mg Intravenous Given 6/28/24 1639)   HYDROmorphone (DILAUDID) injection 0.5 mg (0.5 mg Intravenous Given 6/28/24 1642)                                              Medical Decision Making  Medical decision making.  IV established monitor placement review of sinus rhythm patient given a liter lactated Ringer's 0.5 mg IV and Zofran 4 mg IV.  Her CT was reviewed from earlier today my independent review there is multiple dilated loops of small bowel allysine perforation or free air I will see evidence that suggest acute cholecystitis or appendicitis or diverticulitis or ischemic bowel.  Radiologist multiple dilated small bowel loops without discrete transition point could be an ileus gastroenteritis and partial small bowel  obstruction.  Labs obtained my independent review comprehensive metabolic profile remarkable for sodium 133 blood sugar 162 CO2 of 20 CBC white count was 17.9 otherwise unremarkable liver lipase unremarkable.  Patient repeat exam was resting company and was soft some mild diffuse tenderness but no rebound no guarding decreased bowel sounds throughout and no peritoneal findings.  Patient had gastric banding about 15 years ago.  She has multiple dilated loops of small bowel which could be related to a bowel obstruction I will see transition point now suspect ileus versus partial small bowel obstruction I will see if this just aneurysm or any evidence of dissection or perforation or free air diverticulitis ischemic bowel although not a complete list of all possibilities.  She does have an elevated white count of 17,000 but she states it is always high I will see any other obvious source of this do not see any abscesses or perforation or anything inside the abdomen stitches there is no skin changes of cellulitis there is no pneumonia on exam.  There is no evidence just UTI.  Could be related dehydration.  Patient will be admitted to hospital I talked to the hospitalist nurse practitioner Case discussed patient be admitted for fluids and further workup stable otherwise unremarkable ER course patient made aware of findings.  Repeat exam resting comfortably mild diffuse tenderness but no rebound no guarding.    Problems Addressed:  Dehydration: complicated acute illness or injury  Generalized abdominal pain: complicated acute illness or injury  Ileus: complicated acute illness or injury    Amount and/or Complexity of Data Reviewed  Labs: ordered. Decision-making details documented in ED Course.  Radiology: independent interpretation performed. Decision-making details documented in ED Course.    Risk  Parenteral controlled substances.  Decision regarding hospitalization.        Final diagnoses:   Generalized abdominal pain    Ileus   Dehydration       ED Disposition  ED Disposition       ED Disposition   Decision to Admit    Condition   --    Comment   Level of Care: Med/Surg [1]   Diagnosis: Abdominal pain [940299]   Admitting Physician: GREG LINDSAY [[id number]]   Attending Physician: GREG LINDSAY [[id number]]                 No follow-up provider specified.       Medication List        ASK your doctor about these medications      baclofen 10 MG tablet  Commonly known as: LIORESAL  Ask about: Which instructions should I use?                 Armen Alford MD  24 2344      Electronically signed by Armen Alford MD at 24 2344          Physician Progress Notes (last 72 hours)        Gutierrez Lara MD at 24 1213              [facility] MEDICINE SERVICE  DAILY PROGRESS NOTE    NAME: Bernadine Arriaga  : 1980  MRN: [id number]      LOS: 0 days     PROVIDER OF SERVICE: Gutierrez Lara MD    Chief Complaint: Abdominal pain    Subjective:     Interval History: Patient states that her abdominal pain is better today although still present, and her nausea is also improved.  She tolerated clear liquid diet and had a loose BM last night.    Review of Systems:   Review of Systems    Objective:     Vital Signs  Temp:  [97.7 °F (36.5 °C)-98.2 °F (36.8 °C)] 97.7 °F (36.5 °C)  Heart Rate:  [71-86] 75  Resp:  [12-14] 12  BP: (128-163)/(73-98) 152/97   Body mass index is 46.68 kg/m².    Physical Exam  Physical Exam  General Appearance:  Alert, cooperative, no distress, appears stated age  Head:  Normocephalic, without obvious abnormality, atraumatic  Eyes:  PERRL, conjunctiva/corneas clear, EOM's intact, fundi benign, both eyes  Ears:  Normal TM's and external ear canals, both ears  Nose: Nares normal, septum midline, mucosa normal, no drainage or sinus tenderness  Throat: Lips, mucosa, and tongue normal; teeth and gums normal  Neck: Supple, symmetrical, trachea midline, no adenopathy, thyroid: not enlarged, symmetric, no  tenderness/mass/nodules, no carotid bruit or JVD  Lungs:   Clear to auscultation bilaterally, respirations unlabored  Heart:  Regular rate and rhythm, S1, S2 normal, no murmur, rub or gallop  Abdomen:  Soft, mild abdominal TTP, bowel sounds active all four quadrants,  no masses, no organomegaly  Extremities: Extremities normal, atraumatic, no cyanosis or edema  Pulses: 2+ and symmetric  Skin: Skin color, texture, turgor normal, no rashes or lesions  Neurologic: Normal      Scheduled Meds   budesonide-formoterol, 2 puff, Inhalation, BID - RT  cetirizine, 10 mg, Oral, Daily  enoxaparin, 40 mg, Subcutaneous, BID  famotidine, 20 mg, Oral, Daily  fenofibrate, 145 mg, Oral, Daily  insulin lispro, 2-9 Units, Subcutaneous, Q6H  losartan, 100 mg, Oral, Q24H  metoclopramide, 10 mg, Intravenous, Q6H  metoprolol succinate XL, 50 mg, Oral, Q12H  pantoprazole, 40 mg, Intravenous, Q AM  PARoxetine, 10 mg, Oral, Daily  sodium chloride, 10 mL, Intravenous, Q12H  sorbitol, 30 mL, Oral, Daily  sulfaSALAzine, 1,500 mg, Oral, BID       PRN Meds     albuterol    baclofen    senna-docusate sodium **AND** polyethylene glycol **AND** bisacodyl **AND** bisacodyl    cloNIDine    dextrose    dextrose    glucagon (human recombinant)    HYDROmorphone    hydrOXYzine    ketorolac    ondansetron    [COMPLETED] Insert Peripheral IV **AND** sodium chloride    sodium chloride    sodium chloride   Infusions           Diagnostic Data    Results from last 7 days   Lab Units 06/29/24  0423 06/28/24  1638   WBC 10*3/mm3 13.22* 17.94*   HEMOGLOBIN g/dL 13.6 14.6   HEMATOCRIT % 40.4 43.1   PLATELETS 10*3/mm3 309 351   GLUCOSE mg/dL 164* 162*   CREATININE mg/dL 0.56* 0.53*   BUN mg/dL 12 10   SODIUM mmol/L 137 133*   POTASSIUM mmol/L 3.6 3.7   AST (SGOT) U/L  --  18   ALT (SGPT) U/L  --  21   ALK PHOS U/L  --  60   BILIRUBIN mg/dL  --  0.6   ANION GAP mmol/L 8.7 12.8       XR Abdomen 2+ VW with Chest 1 VW    Result Date: 6/30/2024  Impression: 1. Continued  progression of enteric contrast now entirely within the colon. There is a persistent distended loop of small bowel within the left hemiabdomen measuring 5.9 cm suggesting partial small bowel obstruction. Electronically Signed: Jadon Welsh  6/30/2024 10:59 AM EDT  Workstation ID: [id number]    FL Small Bowel Follow Through Single-Contrast    Result Date: 6/29/2024  Impression: 1. Contrast traverses the small bowel and is present within the colon by 4 hours 30 minutes. There is contrast distended small bowel which remains present which could represent ileus or partial small bowel obstruction. No evidence of high-grade obstruction is present. 2. Gastric lap band in expected position. Electronically Signed: Jadon Welsh  6/29/2024 4:48 PM EDT  Workstation ID: [id number]       I reviewed the patient's new clinical results.    Assessment/Plan:     Active and Resolved Problems  Active Hospital Problems    Diagnosis  POA    **Abdominal pain [R10.9]  Yes      Resolved Hospital Problems   No resolved problems to display.       Abdominal pain with nausea and vomiting  -Initial CT on admission showed questionable ileus versus gastroenteritis versus partial SBO given dilated small bowel loops  -Patient does have improvement in her symptoms today  -SBFT did show partial SBO and repeat x-rays today show further passage of contrast to the colon but still some mild dilated loops of bowel  -I am less inclined to think that her gastric lap band is playing any role in this given that she has not had any issues with it for the past 15 years  -Appreciate bariatric surgery evaluation as well  -Leukocytosis likely reactive and not a true sign of infection  -Advanced to full liquid diet today    DM type II, controlled  -Continue current medical therapy with sliding scale insulin    Hypertension  -BP well-controlled  -As needed hydralazine    Dyslipidemia  -Resume statin therapy when tolerating p.o.    Asthma  -Continue Symbicort and  albuterol inhalers    Morbid Obesity  -Counseled patient on diet and exercise to improve weight loss and comorbid conditions    GERD  -Resume PPI    Anxiety with depression  -Resume home medications      VTE Prophylaxis:  Pharmacologic & mechanical VTE prophylaxis orders are present.         Code status is   Code Status and Medical Interventions:   Ordered at: 24 1933     Code Status (Patient has no pulse and is not breathing):    CPR (Attempt to Resuscitate)     Medical Interventions (Patient has pulse or is breathing):    Full Support       Plan for disposition: Hopefully DC on  if continues to tolerate p.o. intake    Time: 30 minutes    Signature: Electronically signed by Gutierrez Lara MD, 24, 13:13 EDT.  [facility] Hospitalist Team    Electronically signed by Gutierrez Lara MD at 24 1314       Ciera Cota MD at 24 0831          Bariatric Surgery Progress Note    Name: Bernadine Arriaga ADMIT: 2024   : 1980  PCP: Berenice Jacobson MD    MRN: [id number] LOS: 0 days   AGE/SEX: 44 y.o. female  ROOM: [facility]   Subjective   2 small liquid bms, still feels bloated and full, had some clear liquids, still has some abdominal discomfort    Objective      Vital Signs  Vital Signs (range)  Temp:  [97.8 °F (36.6 °C)-98.3 °F (36.8 °C)] 98.2 °F (36.8 °C)  Heart Rate:  [71-88] 71  Resp:  [12-14] 14  BP: (128-163)/(73-98) 128/73    Physical Exam:    Awake and alert  Normal mental status  Normal pulmonary effort  Abdomen distended, tympanic, tender, no guarding  Incisions no erythema  Extremities no tenderness or swelling      CBC    Results from last 7 days   Lab Units 24  0423 24  1638   WBC 10*3/mm3 13.22* 17.94*   HEMOGLOBIN g/dL 13.6 14.6   PLATELETS 10*3/mm3 309 351     CMP   Results from last 7 days   Lab Units 24  0423 24  1638 24  1218   SODIUM mmol/L 137 133*  --    POTASSIUM mmol/L 3.6 3.7  --    CHLORIDE mmol/L 102 100  --    CO2  mmol/L 26.3 20.2*  --    BUN mg/dL 12 10  --    CREATININE mg/dL 0.56* 0.53* 0.40*   GLUCOSE mg/dL 164* 162*  --    ALBUMIN g/dL  --  3.9  --    BILIRUBIN mg/dL  --  0.6  --    ALK PHOS U/L  --  60  --    AST (SGOT) U/L  --  18  --    ALT (SGPT) U/L  --  21  --    LIPASE U/L  --  24  --        Assessment & Plan       Partial small bowel obstruction      44-year-old lady admitted with partial small bowel obstruction.  She has a history of  section.  Small bowel follow-through yesterday demonstrated contrast moved to the colon in 4-1/2 hours.  She has had 2 small liquid bowel movements.  However she is does still feel very bloated and not able to drink much.  She is having a lot of belching.    I will start Lovenox for DVT prophylaxis at 40 mg subcu every 12 hours.  I have encouraged her to try to ambulate in the hallway today and to continue to avoid narcotics.  I am adding sorbitol.  She is on Reglan and tolerating it. I will also reorder plain x-rays for today.    This note was created using Dragon Voice Recognition software.    Ciera Cota MD  24  08:31 EDT      Electronically signed by Ciera Cota MD at 24 0835       Gutierrez Lara MD at 24 [facility]              [facility] MEDICINE SERVICE  DAILY PROGRESS NOTE    NAME: Bernadine Arriaga  : 1980  MRN: [id number]      LOS: 0 days     PROVIDER OF SERVICE: Gutierrez Lara MD    Chief Complaint: Abdominal pain    Subjective:     Interval History: Patient states that her abdominal pain is better today although still present, and her nausea is also improved.  She still does not have any significant flatus and has not had a BM.    Review of Systems:   Review of Systems    Objective:     Vital Signs  Temp:  [97.4 °F (36.3 °C)-98.5 °F (36.9 °C)] 98.3 °F (36.8 °C)  Heart Rate:  [] 88  Resp:  [12-18] 12  BP: (123-177)/() 131/86   Body mass index is 46.68 kg/m².    Physical Exam  Physical Exam  General Appearance:  Alert,  cooperative, no distress, appears stated age  Head:  Normocephalic, without obvious abnormality, atraumatic  Eyes:  PERRL, conjunctiva/corneas clear, EOM's intact, fundi benign, both eyes  Ears:  Normal TM's and external ear canals, both ears  Nose: Nares normal, septum midline, mucosa normal, no drainage or sinus tenderness  Throat: Lips, mucosa, and tongue normal; teeth and gums normal  Neck: Supple, symmetrical, trachea midline, no adenopathy, thyroid: not enlarged, symmetric, no tenderness/mass/nodules, no carotid bruit or JVD  Lungs:   Clear to auscultation bilaterally, respirations unlabored  Heart:  Regular rate and rhythm, S1, S2 normal, no murmur, rub or gallop  Abdomen:  Soft, mild abdominal TTP, bowel sounds active all four quadrants,  no masses, no organomegaly  Extremities: Extremities normal, atraumatic, no cyanosis or edema  Pulses: 2+ and symmetric  Skin: Skin color, texture, turgor normal, no rashes or lesions  Neurologic: Normal      Scheduled Meds   budesonide-formoterol, 2 puff, Inhalation, BID - RT  cetirizine, 10 mg, Oral, Daily  famotidine, 20 mg, Oral, Daily  fenofibrate, 145 mg, Oral, Daily  insulin lispro, 2-9 Units, Subcutaneous, Q6H  losartan, 100 mg, Oral, Q24H  metoprolol succinate XL, 50 mg, Oral, Q12H  pantoprazole, 40 mg, Intravenous, Q AM  PARoxetine, 10 mg, Oral, Daily  sodium chloride, 10 mL, Intravenous, Q12H  sulfaSALAzine, 1,500 mg, Oral, BID       PRN Meds     albuterol    baclofen    senna-docusate sodium **AND** polyethylene glycol **AND** bisacodyl **AND** bisacodyl    cloNIDine    dextrose    dextrose    glucagon (human recombinant)    HYDROmorphone    hydrOXYzine    ketorolac    ondansetron    [COMPLETED] Insert Peripheral IV **AND** sodium chloride    sodium chloride    sodium chloride   Infusions  lactated ringers, 100 mL/hr, Last Rate: 100 mL/hr (06/29/24 0830)          Diagnostic Data    Results from last 7 days   Lab Units 06/29/24  0423 06/28/24  1638   WBC 10*3/mm3  13.22* 17.94*   HEMOGLOBIN g/dL 13.6 14.6   HEMATOCRIT % 40.4 43.1   PLATELETS 10*3/mm3 309 351   GLUCOSE mg/dL 164* 162*   CREATININE mg/dL 0.56* 0.53*   BUN mg/dL 12 10   SODIUM mmol/L 137 133*   POTASSIUM mmol/L 3.6 3.7   AST (SGOT) U/L  --  18   ALT (SGPT) U/L  --  21   ALK PHOS U/L  --  60   BILIRUBIN mg/dL  --  0.6   ANION GAP mmol/L 8.7 12.8       CT Abdomen Pelvis With Contrast    Result Date: 6/28/2024  Impression: Multiple dilated small bowel loops without a discrete transition point, which could represent ileus, gastroenteritis, or partial small bowel obstruction. Electronically Signed: Willie Groves MD  6/28/2024 1:06 PM EDT  Workstation ID: [id number]       I reviewed the patient's new clinical results.    Assessment/Plan:     Active and Resolved Problems  Active Hospital Problems    Diagnosis  POA    **Abdominal pain [R10.9]  Yes      Resolved Hospital Problems   No resolved problems to display.       Abdominal pain with nausea and vomiting  -Initial CT on admission showed questionable ileus versus gastroenteritis versus partial SBO given dilated small bowel loops  -Patient does have improvement in her symptoms today  -Check SBFT; if negative, can advance diet as tolerated however if there is evidence of high-grade obstruction, she may require further surgical intervention  -I am less inclined to think that her gastric lap band is playing any role in this given that she has not had any issues with it for the past 15 years  -Appreciate general surgery evaluation as well  -Leukocytosis likely reactive and not a true sign of infection    DM type II, controlled  -Continue current medical therapy with sliding scale insulin    Hypertension  -BP well-controlled  -As needed hydralazine    Dyslipidemia  -Resume statin therapy when tolerating p.o.    Asthma  -Continue Symbicort and albuterol inhalers    Morbid Obesity  -Counseled patient on diet and exercise to improve weight loss and comorbid  conditions    GERD  -Resume PPI    Anxiety with depression  -Resume home medications      VTE Prophylaxis:  Mechanical VTE prophylaxis orders are present.         Code status is   Code Status and Medical Interventions:   Ordered at: 24 1933     Code Status (Patient has no pulse and is not breathing):    CPR (Attempt to Resuscitate)     Medical Interventions (Patient has pulse or is breathing):    Full Support       Plan for disposition: Hopefully DC on     Time: 30 minutes    Signature: Electronically signed by Gutierrez Lara MD, 24, 12:14 EDT.  [facility] Hospitalist Team    Electronically signed by Gutierrez Lara MD at 24 1217          Consult Notes (last 72 hours)        Ciera Cota MD at 24 0953        Consult Orders    1. Inpatient Bariatric Surgery Consult [[id number]] ordered by Gutierrez Lara MD at 24 0751                 Bariatric  SURGERY CONSULT      Patient Care Team:  Berenice Jacobson MD as PCP - General  Berenice Jacobson MD as PCP - Family Medicine  Syed Zaragoza MD as Surgeon (General Surgery)    Chief complaint  abd pain and vomiting  Subjective     This is a 44-year-old lady who 3 days ago had an episode of severe reflux followed by vomiting and upper abdominal pain.  This was shortly after eating.  She had no hematemesis or coffee-ground emesis.  Her last bowel movement was 2 days ago and was normal.  She normally has a bowel movement about every day.  She is not prone to constipation.  She had a laparoscopic gastric band placed about 15 years ago and says she has not had any kind of band fill in many years.  She does not know how much fluid is in her band.  She says it really does not restrict her portion sizes very much.  Her BMI is 46.  Other past surgical history includes cholecystectomy appendectomy and  section.  She came to the emergency room yesterday and a CT scan demonstrated diffuse small bowel dilation consistent with  partial small bowel obstruction or gastroenteritis.  I reviewed the images of the CT scan and I believe she also has a hiatal hernia.  The patient also was found to have leukocytosis to 17,000 initially and it is also fallen to 13,000.  She says that she has a history of some type of autoimmune disorder and normally has elevation of her white blood cell count.  The patient is a nurse.    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    Anxiety     Arthritis of spine     psoriatic    Asthma     Diabetes mellitus     History of medical problems     Hidradenitis    Hydradenitis     Hyperlipidemia     slight elevation not treated    Hypertension     Low back pain     Migraine     history    MRSA carrier     Pneumonia     Psoriasis     Psoriatic arthritis     Sinus disorder      Past Surgical History:   Procedure Laterality Date    ADENOIDECTOMY      APPENDECTOMY      AXILLARY SURGERY      multiple due to hidradenitis    BREAST BIOPSY       SECTION      CHOLECYSTECTOMY      INCISION AND DRAINAGE OF WOUND      breast    KNEE ARTHRODESIS      MASS EXCISION Right 10/27/2021    Procedure: WIDE EXCISION TRUNK LESION/MASS, hidradenitis of right hip;  Surgeon: Serafin Centeno MD;  Location: [facility] OR;  Service: General;  Laterality: Right;    SINUS SURGERY      TONSILLECTOMY      WOUND DEBRIDEMENT Right 2019    Procedure: DEBRIDEMENT OF RECURRENT HIDRADENITIS AND VAC PLACEMENT RIGHT SIDE;  Surgeon: Alix Freire MD;  Location: [facility] OR;  Service: General     Family History   Problem Relation Age of Onset    Breast cancer Mother     Hypertension Mother     Cancer Mother     Diabetes Mother     Hyperlipidemia Mother     Asthma Mother     Hypertension Father     Hyperlipidemia Father     Stroke  Father     Cancer Brother     Breast cancer Maternal Aunt     Breast cancer Maternal Aunt     Heart disease Maternal Grandfather      Social History     Tobacco Use    Smoking status: Never     Passive exposure: Current    Smokeless tobacco: Never   Vaping Use    Vaping status: Never Used   Substance Use Topics    Alcohol use: No    Drug use: No     Medications Prior to Admission   Medication Sig Dispense Refill Last Dose    acetaminophen (TYLENOL) 325 MG tablet Take 1,000 mg by mouth As Needed for Mild Pain . dont take preop       albuterol sulfate  (90 Base) MCG/ACT inhaler Inhale 2 puffs Every 6 (Six) Hours As Needed for Wheezing. 18 g 5     baclofen (LIORESAL) 10 MG tablet Take 1 tablet by mouth 3 (Three) Times a Day As Needed for Muscle Spasms.       betamethasone dipropionate (DIPROLENE) 0.05 % lotion Apply 1 application topically to the appropriate area as directed 2 (Two) Times a Day As Needed. dont use after bathing started       betamethasone, augmented, (DIPROLENE) 0.05 % cream PLEASE SEE ATTACHED FOR DETAILED DIRECTIONS       budesonide-formoterol (SYMBICORT) 80-4.5 MCG/ACT inhaler Inhale 2 puffs 2 (Two) Times a Day. 3 each 1 6/28/2024    clindamycin (CLEOCIN T) 1 % lotion Apply 1 application topically to the appropriate area as directed 2 (Two) Times a Day As Needed. dont use once bathing started       cloNIDine (Catapres) 0.1 MG tablet Take 1 tablet by mouth 2 (Two) Times a Day. 180 tablet 1 6/28/2024    etodolac (LODINE) 500 MG tablet Take 1 tablet by mouth 2 (Two) Times a Day. 180 tablet 1 6/28/2024    ezetimibe (Zetia) 10 MG tablet Take 1 tablet by mouth Daily. 90 tablet 1 6/28/2024    famotidine (PEPCID) 20 MG tablet Administer FAMOTIDINE 20mg PO pre-infusion (REMICADE).   6/28/2024    fenofibrate 160 MG tablet Take 1 tablet by mouth Daily. 90 tablet 1 6/28/2024    furosemide (LASIX) 40 MG tablet Take 0.5 tablets by mouth 2 (Two) Times a Day. 30 tablet 1 6/28/2024    glimepiride (AMARYL) 4  MG tablet Take 1 tablet by mouth 2 (Two) Times a Day With Meals. 180 tablet 1 6/28/2024    hydrOXYzine (ATARAX) 25 MG tablet Take 1 tablet by mouth Every 6 (Six) Hours As Needed for Itching or Anxiety. 30 tablet 1     inFLIXimab (Remicade) 100 MG injection Administer REMICADE 5mg/kg IV every 4 weeks   Past Month    metoprolol succinate XL (TOPROL-XL) 50 MG 24 hr tablet TAKE TWO TABLETS BY MOUTH EVERY MORNING AND ONE EVERY NIGHT AT BEDTIME 270 tablet 1 6/28/2024    mupirocin (BACTROBAN) 2 % ointment Apply 1 Application topically to the appropriate area as directed 3 (Three) Times a Day As Needed.       olmesartan (BENICAR) 40 MG tablet Take 1 tablet by mouth Daily. 90 tablet 1 6/28/2024    PARoxetine (Paxil) 10 MG tablet Take 1 tablet by mouth Every Morning. 30 tablet 0 6/28/2024    Semaglutide, 2 MG/DOSE, (Ozempic, 2 MG/DOSE,) 8 MG/3ML solution pen-injector Inject 2 mg under the skin into the appropriate area as directed 1 (One) Time Per Week. 3 mL 1 6/21/2024    sulfaSALAzine (AZULFIDINE) 500 MG tablet Take 3 tablets by mouth 2 (Two) Times a Day. Last dose am 10/26   6/28/2024    EPINEPHrine (EPIPEN) 0.3 MG/0.3ML solution auto-injector injection Inject 0.3 mL into the appropriate muscle as directed by prescriber As Needed (allergic reaction). 1 each 5     loratadine (Alavert) 10 MG disintegrating tablet Administer ALAVERT 10mg PO with infusion (REMICADE).        Allergies:  Calcium channel blockers, Nifedipine, Tetanus antitoxin, Verapamil, Adhesive tape, Diltiazem, and Tape    Objective     Vital Signs  Temp:  [97.4 °F (36.3 °C)-98.5 °F (36.9 °C)] 97.4 °F (36.3 °C)  Heart Rate:  [] 84  Resp:  [12-18] 16  BP: (123-177)/() 160/97    Physical Exam:      General Appearance:    Alert, cooperative, in no acute distress   Head:    Normocephalic, without obvious abnormality, atraumatic,    Eyes:            Lids and lashes normal, conjunctivae and sclerae normal, no   icterus, no pallor, corneas clear, PERRLA    Ears:    Ears appear intact with no abnormalities noted   Throat:   No oral lesions, no thrush, oral mucosa moist   Neck:   No adenopathy, supple, trachea midline, no thyromegaly, no   carotid bruit, no JVD   Back:     No kyphosis present, no scoliosis present, no skin lesions,      erythema or scars, no tenderness to percussion or                   palpation,   range of motion normal   Lungs:     Clear to auscultation,respirations regular, even and                  unlabored    Heart:    Regular rhythm and normal rate, normal S1 and S2, no            murmur, no gallop, no rub, no click   Chest Wall:    No abnormalities observed   Abdomen:   Mild diffuse abdominal tenderness but no guarding and no rebound tenderness.  The Lap-Band port is palpable in the right mid abdomen.  I did access the port with a Weems needle under sterile technique and was not able to remove any fluid and therefore I believe this band port is empty.   Rectal:     Deferred   Extremities: No edema, good ROM   Pulses:   Pulses palpable and equal bilaterally   Skin:   No bleeding, bruising or rash   Lymph nodes:   No palpable adenopathy   Neurologic:   Cranial nerves 2 - 12 grossly intact, sensation intact, DTR       present and equal bilaterally     Lab Results (last 24 hours)       Procedure Component Value Units Date/Time    Hemoglobin A1c [[id number]]  (Abnormal) Collected: 06/29/24 0423    Specimen: Blood Updated: 06/29/24 0553     Hemoglobin A1C 7.24 %     CBC Auto Differential [[id number]]  (Abnormal) Collected: 06/29/24 0423    Specimen: Blood Updated: 06/29/24 0525     WBC 13.22 10*3/mm3      RBC 4.16 10*6/mm3      Hemoglobin 13.6 g/dL      Hematocrit 40.4 %      MCV 97.1 fL      MCH 32.7 pg      MCHC 33.7 g/dL      RDW 12.3 %      RDW-SD 44.0 fl      MPV 10.0 fL      Platelets 309 10*3/mm3     Narrative:      The previously reported component NRBC is no longer being reported. Previous result was 0.0 /100 WBC (Reference Range: 0.0-0.2  /100 WBC) on 6/29/2024 at 0434 EDT.    Scan Slide [[id number]] Collected: 06/29/24 0423    Specimen: Blood Updated: 06/29/24 0525     Scan Slide --     Comment: See Manual Differential Results       Manual Differential [[id number]]  (Abnormal) Collected: 06/29/24 0423    Specimen: Blood Updated: 06/29/24 0525     Neutrophil % 59.0 %      Lymphocyte % 22.0 %      Monocyte % 14.0 %      Eosinophil % 2.0 %      Bands %  2.0 %      Atypical Lymphocyte % 1.0 %      Neutrophils Absolute 8.06 10*3/mm3      Lymphocytes Absolute 3.04 10*3/mm3      Monocytes Absolute 1.85 10*3/mm3      Eosinophils Absolute 0.26 10*3/mm3      RBC Morphology Normal     Vacuolated Neutrophils Slight/1+     Giant Platelets Slight/1+    Basic Metabolic Panel [[id number]]  (Abnormal) Collected: 06/29/24 0423    Specimen: Blood Updated: 06/29/24 0455     Glucose 164 mg/dL      BUN 12 mg/dL      Creatinine 0.56 mg/dL      Sodium 137 mmol/L      Potassium 3.6 mmol/L      Chloride 102 mmol/L      CO2 26.3 mmol/L      Calcium 8.3 mg/dL      BUN/Creatinine Ratio 21.4     Anion Gap 8.7 mmol/L      eGFR 114.1 mL/min/1.73     Narrative:      GFR Normal >60  Chronic Kidney Disease <60  Kidney Failure <15      Urinalysis, Microscopic Only - Urine, Clean Catch [[id number]]  (Abnormal) Collected: 06/29/24 0136    Specimen: Urine, Clean Catch Updated: 06/29/24 0205     RBC, UA 0-2 /HPF      WBC, UA 6-10 /HPF      Bacteria, UA None Seen /HPF      Squamous Epithelial Cells, UA 3-6 /HPF      Hyaline Casts, UA None Seen /LPF      Amorphous Urate Crystals, UA Large/3+ /HPF      Mucus, UA Small/1+ /HPF      Methodology Manual Light Microscopy    Urinalysis With Microscopic If Indicated (No Culture) - Urine, Clean Catch [[id number]]  (Abnormal) Collected: 06/29/24 0136    Specimen: Urine, Clean Catch Updated: 06/29/24 0157     Color, UA Dark Yellow     Appearance, UA Turbid     pH, UA <=5.0     Specific Gravity, UA >=1.030     Glucose, UA Negative     Ketones, UA Negative      Bilirubin, UA Large (3+)     Comment: Confirmation testing is unavailable.  A serum bilirubin is recommended for further assessment.        Blood, UA Negative     Protein, UA 30 mg/dL (1+)     Leuk Esterase, UA Negative     Nitrite, UA Negative     Urobilinogen, UA 0.2 E.U./dL    POC Glucose 4x Daily Before Meals & at Bedtime [[id number]]  (Abnormal) Collected: 06/28/24 2323    Specimen: Blood Updated: 06/28/24 2325     Glucose 157 mg/dL      Comment: Serial Number: [id number]:  [id number]       POC Glucose Once [[id number]]  (Abnormal) Collected: 06/28/24 2049    Specimen: Blood Updated: 06/28/24 2052     Glucose 135 mg/dL      Comment: Serial Number: [id number]:  [id number]       Comprehensive Metabolic Panel [[id number]]  (Abnormal) Collected: 06/28/24 1638    Specimen: Blood Updated: 06/28/24 1746     Glucose 162 mg/dL      BUN 10 mg/dL      Creatinine 0.53 mg/dL      Sodium 133 mmol/L      Potassium 3.7 mmol/L      Comment: Slight hemolysis detected by analyzer. Result may be falsely elevated.        Chloride 100 mmol/L      CO2 20.2 mmol/L      Calcium 8.7 mg/dL      Total Protein 7.2 g/dL      Albumin 3.9 g/dL      ALT (SGPT) 21 U/L      AST (SGOT) 18 U/L      Alkaline Phosphatase 60 U/L      Total Bilirubin 0.6 mg/dL      Globulin 3.3 gm/dL      A/G Ratio 1.2 g/dL      BUN/Creatinine Ratio 18.9     Anion Gap 12.8 mmol/L      eGFR 115.7 mL/min/1.73     Narrative:      GFR Normal >60  Chronic Kidney Disease <60  Kidney Failure <15      Lipase [[id number]]  (Normal) Collected: 06/28/24 1638    Specimen: Blood Updated: 06/28/24 1745     Lipase 24 U/L     CBC & Differential [[id number]]  (Abnormal) Collected: 06/28/24 1638    Specimen: Blood Updated: 06/28/24 1712    Narrative:      The following orders were created for panel order CBC & Differential.  Procedure                               Abnormality         Status                     ---------                               -----------          ------                     CBC Auto Differential[[id number]]        Abnormal            Final result                 Please view results for these tests on the individual orders.    CBC Auto Differential [[id number]]  (Abnormal) Collected: 06/28/24 1638    Specimen: Blood Updated: 06/28/24 1712     WBC 17.94 10*3/mm3      RBC 4.54 10*6/mm3      Hemoglobin 14.6 g/dL      Hematocrit 43.1 %      MCV 94.9 fL      MCH 32.2 pg      MCHC 33.9 g/dL      RDW 12.1 %      RDW-SD 41.6 fl      MPV 10.1 fL      Platelets 351 10*3/mm3      Neutrophil % 72.7 %      Lymphocyte % 15.8 %      Monocyte % 10.0 %      Eosinophil % 0.7 %      Basophil % 0.2 %      Immature Grans % 0.6 %      Neutrophils, Absolute 13.05 10*3/mm3      Lymphocytes, Absolute 2.83 10*3/mm3      Monocytes, Absolute 1.79 10*3/mm3      Eosinophils, Absolute 0.12 10*3/mm3      Basophils, Absolute 0.04 10*3/mm3      Immature Grans, Absolute 0.11 10*3/mm3      nRBC 0.0 /100 WBC           Results Review:    I reviewed the patient's new clinical results.  I reviewed the patient's new imaging results and agree with the interpretation.    Assessment & Plan       Abdominal pain  History of adjustable gastric banding  Differential diagnosis of partial small bowel obstruction versus gastroenteritis versus constipation    44-year-old lady presents with vomiting and diffuse abdominal pain and CT scan describes diffuse small bowel dilation.  I reviewed the CT scan myself and I believe she likely has a small hiatal hernia and she also has a moderate stool burden of the right colon and transverse colon.  She did have an initial leukocytosis of 17,000 and is reduced to 13,000 and the patient says she has a history of leukocytosis.    A small bowel follow-through has been ordered and therefore I will await the results of this before making a decision on further treatment.  If she does not have a complete small bowel obstruction I believe we can start a clear liquid diet and advance  as tolerated.  If contrast fails to reach the colon today I would like to get another x-ray tomorrow morning.  My initial impression is that it is unlikely she has a complete small bowel obstruction based on the diffuse small bowel dilation and the considerable stool in the colon.  Also due to the fact that the band has no fluid in it I think it is unlikely that it is causing some type of obstruction at the level of the stomach.  The upper GI will help us get a better look at that as well.    After the patient is discharged I have offered to follow-up with her for ongoing management of her Lap-Band and her morbid obesity.        Ciera Cota MD  06/29/24  09:53 EDT          Electronically signed by Ciera Cota MD at 06/29/24 1000

## 2024-07-01 NOTE — PLAN OF CARE
Goal Outcome Evaluation:              Outcome Evaluation: pt doing well with diet toleration and no nauasea. Will discharge today.

## 2024-07-01 NOTE — CASE MANAGEMENT/SOCIAL WORK
Case Management Discharge Note      Final Note: HOME         Selected Continued Care - Discharged on 7/1/2024 Admission date: 6/28/2024 - Discharge disposition: Home or Self Care            Transportation Services  Private: Car    Final Discharge Disposition Code: 01 - home or self-care

## 2024-07-02 ENCOUNTER — TRANSITIONAL CARE MANAGEMENT TELEPHONE ENCOUNTER (OUTPATIENT)
Dept: CALL CENTER | Facility: HOSPITAL | Age: 44
End: 2024-07-02
Payer: COMMERCIAL

## 2024-07-02 ENCOUNTER — NURSE TRIAGE (OUTPATIENT)
Dept: CALL CENTER | Facility: HOSPITAL | Age: 44
End: 2024-07-02
Payer: COMMERCIAL

## 2024-07-02 RX ORDER — METOCLOPRAMIDE 10 MG/1
10 TABLET ORAL 2 TIMES DAILY
Qty: 10 TABLET | Refills: 0 | Status: SHIPPED | OUTPATIENT
Start: 2024-07-02 | End: 2024-07-07

## 2024-07-02 NOTE — OUTREACH NOTE
Call Center TCM Note      Flowsheet Row Responses   [facility] patient discharged from? [facility]   Does the patient have one of the following disease processes/diagnoses(primary or secondary)? Other   TCM attempt successful? No   Unsuccessful attempts Attempt 1            Brie Durant LPN    7/2/2024, 09:48 EDT

## 2024-07-02 NOTE — OUTREACH NOTE
Call Center TCM Note      Flowsheet Row Responses   [facility] patient discharged from? [facility]   Does the patient have one of the following disease processes/diagnoses(primary or secondary)? Other   TCM attempt successful? Yes   Call start time 1437   Call end time 1439   Discharge diagnosis Abdominal pain, SBO (small bowel obstruction)   Meds reviewed with patient/caregiver? Yes   Is the patient having any side effects they believe may be caused by any medication additions or changes? No   Does the patient have all medications ordered at discharge? No   What is keeping the patient from filling the prescriptions? Lost script/didn't receive   Is the patient taking all medications as directed (includes completed medication regime)? N/A   Medication comments States it was transfered to [facility] and she is waiting for them to have it ready.   Comments Pt states she is following up with Dr. Cota.   Does the patient have an appointment with their PCP within 7-14 days of discharge? No   Nursing Interventions Patient declined scheduling/rescheduling appointment at this time, Patient desires to follow up with specialty only   Psychosocial issues? No   Did the patient receive a copy of their discharge instructions? Yes   Nursing interventions Reviewed instructions with patient   What is the patient's perception of their health status since discharge? Same   Is the patient/caregiver able to teach back signs and symptoms related to disease process for when to call PCP? Yes   Is the patient/caregiver able to teach back signs and symptoms related to disease process for when to call 911? Yes   Is the patient/caregiver able to teach back the hierarchy of who to call/visit for symptoms/problems? PCP, Specialist, Home health nurse, Urgent Care, ED, 911 Yes   Additional teach back comments Has follow up with Dr. Cota on 7/15   TCM call completed? Yes   Wrap up additional comments Denies questions or needs at this time.   Call end  time 1436            Brie Durant LPN    7/2/2024, 14:41 EDT

## 2024-07-02 NOTE — TELEPHONE ENCOUNTER
Reason for Disposition  • [1] Prescription not at pharmacy AND [2] was prescribed by PCP recently (Exception: [facility] has access to EMR and prescription is recorded there. Go to Home Care and confirm for pharmacy.)    Additional Information  • Negative: [1] Intentional drug overdose AND [2] suicidal thoughts or ideas  • Negative: Drug overdose and triager unable to answer question  • Negative: Caller requesting a renewal or refill of a medicine patient is currently taking  • Negative: Caller requesting information unrelated to medicine  • Negative: Caller requesting information about COVID-19 Vaccine  • Negative: Caller requesting information about Emergency Contraception  • Negative: Caller requesting information about Combined Birth Control Pills  • Negative: Caller requesting information about Progestin Birth Control Pills  • Negative: Caller requesting information about Post-Op pain or medicines  • Negative: Caller requesting a prescription antibiotic (such as Penicillin) for Strep throat and has a positive culture result  • Negative: Caller requesting a prescription anti-viral med (such as Tamiflu) and has influenza (flu) symptoms  • Negative: Immunization reaction suspected  • Negative: Rash while taking a medicine or within 3 days of stopping it  • Negative: [1] Asthma and [2] having symptoms of asthma (cough, wheezing, etc.)  • Negative: [1] Symptom of illness (e.g., headache, abdominal pain, earache, vomiting) AND [2] more than mild  • Negative: Breastfeeding questions about mother's medicines and diet  • Negative: MORE THAN A DOUBLE DOSE of a prescription or over-the-counter (OTC) drug  • Negative: [1] DOUBLE DOSE (an extra dose or lesser amount) of prescription drug AND [2] any symptoms (e.g., dizziness, nausea, pain, sleepiness)  • Negative: [1] DOUBLE DOSE (an extra dose or lesser amount) of over-the-counter (OTC) drug AND [2] any symptoms (e.g., dizziness, nausea, pain, sleepiness)  • Negative: Took  another person's prescription drug  • Negative: [1] DOUBLE DOSE (an extra dose or lesser amount) of prescription drug AND [2] NO symptoms  (Exception: A double dose of antibiotics.)  • Negative: Diabetes drug error or overdose (e.g., took wrong type of insulin or took extra dose)    Protocols used: Medication Question Call-ADULT-

## 2024-07-02 NOTE — TELEPHONE ENCOUNTER
Pt called and states that the rx for Reglan is not at the pharmacy and they cannot get it.  She needs the Rx to be sent to [facility] in [place] instead.  Escripted that exact rx to [facility] at this time per protocol/guidelines.  Pt aware and will get with pharmacy asap.

## 2024-07-09 NOTE — PAYOR COMM NOTE
"RECONSIDERATION REQUEST:    ER ADMISSION    OBSERVATION ORDER 6/28/24 @ 1928  INPATIENT ORDER 6/30/24 @ 1325    DISCHARGED HOME ON 7/1/24.       Bernadine Arriaga (44 y.o. Female)       Date of Birth   1980    Social Security Number       Address   [address] [place] [address]    Home Phone   [phone]    MRN   [id number]       Anabaptism   Moravian    Marital Status                               Admission Date   6/28/24    Admission Type   Emergency    Admitting Provider   Attila Quesada MD    Attending Provider       Department, Room/Bed   [facility] SURGICAL INPATIENT, 4105/1       Discharge Date   7/1/2024    Discharge Disposition   Home or Self Care    Discharge Destination   Home                              Attending Provider: (none)   Allergies: Calcium Channel Blockers, Nifedipine, Tetanus Antitoxin, Verapamil, Adhesive Tape, Diltiazem, Tape    Isolation: None   Infection: MRSA (06/24/19)   Code Status: Prior    Ht: 170.2 cm (67\")   Wt: 135 kg (298 lb 1 oz)    Admission Cmt: None   Principal Problem: Abdominal pain [R10.9]                   Active Insurance as of 6/28/2024       Primary Coverage       Payor Plan Insurance Group Employer/Plan Group    [facility] BLUE [place] [id number]       Payor Plan Address Payor Plan Phone Number Payor Plan Fax Number Effective Dates    [address]   1/4/2020 - None Entered    [place] [id number]         Subscriber Name Subscriber Birth Date Member ID       FABIENNE ARRIAGA 6/20/1971 [id number]                     Emergency Contacts        (Rel.) Home Phone Work Phone Mobile Phone    FABIENNE ARRIAGA (Spouse) -- [phone]    ALESSIO ARRIAGAN (Son) [phone] -- [phone]    JONO ARRIAGA (Daughter) -- -- [phone]                 History & Physical        Soco Ochoa APRN at 06/28/24 1916       Attestation signed by Armen Warner MD at 06/29/24 3052    I have reviewed this " document and agree with this document                    [facility] Medicine Services    Hospitalist History and Physical     Bernadine Arriaga : 1980 MRN:[id number] LOS:0 ROOM:      Reason for admission: Abdominal pain     Assessment / Plan     Abdominal pain  Nausea and vomiting  -Hx lap band 15yrs ago   -CT abdomen and pelvis: Multiple dilated small bowel loops without a discrete transition point, which could represent ileus, gastroenteritis, or partial small bowel obstruction.   -WBC 17.94  - analgesics and antiemetics prn  - NPO  - general surgery consult    DM II  - appears controlled  - accucheck 6  - SS insulin     HTN  -currently controlled  - will order prn medication while pt NPO  - resume home dose when NPO restriction lifted   - will hold lasix     HLD  - resume home medication once able to take oral     Hx Hidradenitis suppurativa  S/p excisions and I&Ds    Morbid Obesity  - s/p lap band 15yrs ago     Asthma  -symbicort  - albuterol prn    Anxiety and depression  - resume home medication once able to take po    GERD  -PPI    Nutrition:   NPO Diet NPO Type: Strict NPO     VTE Prophylaxis:  Mechanical VTE prophylaxis orders are present.         History of Present illness     Bernadine Arriaga is a 44 y.o. female with PMH of lap band 15 years ago, diabetes, hypertension, hyperlipidemia, asthma, hidradenitis suppurativapresented to the hospital on 2024, and was admitted with a principal diagnosis of Abdominal pain.     She has seen her primary care this morning with complaint of upper abdominal pain that started couple days ago.  Patient progressed to entire upper abdomen.  She states symptoms began after eating a meal and thought initially was due to indigestion but pain progressed starting initially on the right side and then radiated across upper abdomen.  She denies any bowel movement and no flatus.  She was unable to even keep water down in any position was painful.  She  reports 1 normal stool yesterday but no bowel movement since.  Denies any melena or hematochezia.  Reports pain appears mostly above where the banding device is located.  She was ordered an outpatient CAT scan that was positive for multiple dilated small bowel loops with discrete transition point which could represent an ileus, gastroenteritis or partial bowel obstruction she was directed to go to the ER    In the ED laboratory values show sodium 133, CO2 20.2 creatinine 0.53 glucose 162, white blood cell 17.9.  She was given analgesics, antiemetics and IV fluids and has been admitted for further observation    Patient was seen and examined on 24 at 19:37 EDT .    Subjective / Review of systems     Review of Systems   Constitutional:  Positive for appetite change.   Gastrointestinal:  Positive for abdominal pain, constipation, nausea and vomiting.          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 and molds    Anxiety     Arthritis of spine     psoriatic    Asthma     Diabetes mellitus     History of medical problems     Hidradenitis    Hydradenitis     Hyperlipidemia     slight elevation not treated    Hypertension     Low back pain     Migraine     history    MRSA carrier     Pneumonia     Psoriasis     Psoriatic arthritis     Sinus disorder       Past Surgical History:   Procedure Laterality Date    ADENOIDECTOMY      APPENDECTOMY      AXILLARY SURGERY      multiple due to hidradenitis    BREAST BIOPSY       SECTION      CHOLECYSTECTOMY      INCISION AND DRAINAGE OF WOUND      breast    KNEE ARTHRODESIS      MASS EXCISION Right 10/27/2021    Procedure: WIDE EXCISION TRUNK LESION/MASS, hidradenitis of right hip;  Surgeon: Serafin Centeno MD;  Location: [facility] MAIN OR;  Service: General;  Laterality: Right;    SINUS SURGERY      TONSILLECTOMY      WOUND DEBRIDEMENT Right 2019     Procedure: DEBRIDEMENT OF RECURRENT HIDRADENITIS AND VAC PLACEMENT RIGHT SIDE;  Surgeon: Alix Freire MD;  Location: [facility] MAIN OR;  Service: General      Social History     Socioeconomic History    Marital status:    Tobacco Use    Smoking status: Never     Passive exposure: Current    Smokeless tobacco: Never   Vaping Use    Vaping status: Never Used   Substance and Sexual Activity    Alcohol use: No    Drug use: No    Sexual activity: Yes     Partners: Male     Birth control/protection: I.U.D.      Family History   Problem Relation Age of Onset    Breast cancer Mother     Hypertension Mother     Cancer Mother     Diabetes Mother     Hyperlipidemia Mother     Asthma Mother     Hypertension Father     Hyperlipidemia Father     Stroke Father     Cancer Brother     Breast cancer Maternal Aunt     Breast cancer Maternal Aunt     Heart disease Maternal Grandfather       Allergies   Allergen Reactions    Calcium Channel Blockers Palpitations, Other (See Comments) and Rash     Tachycardia and redness.    INCREASED HR   Tachycardia and redness.    Nifedipine Palpitations    Tetanus Antitoxin Swelling    Verapamil Palpitations    Adhesive Tape Other (See Comments)     Tape will cause skin breakdown rapidly   Can use silicone tape    Diltiazem Other (See Comments)    Tape Other (See Comments)     Tape will cause skin breakdown rapidly   Can use silicone tape      Social Determinants of Health     Tobacco Use: Medium Risk (6/28/2024)    Patient History     Smoking Tobacco Use: Never     Smokeless Tobacco Use: Never     Passive Exposure: Current   Alcohol Use: Not At Risk (2/8/2021)    AUDIT-C     Frequency of Alcohol Consumption: Never     Average Number of Drinks: Not on file     Frequency of Binge Drinking: Not on file   Financial Resource Strain: Not on file   Food Insecurity: Not on file   Transportation Needs: Not on file   Physical Activity: Not on file   Stress: Not on file   Social Connections: Unknown  (10/11/2023)    Family and Community Support     Help with Day-to-Day Activities: Not on file     Lonely or Isolated: Not on file   Interpersonal Safety: Not At Risk (6/28/2024)    Abuse Screen     Unsafe at Home or Work/School: no     Feels Threatened by Someone?: no     Does Anyone Keep You from Contacting Others or Doint Things Outside the Home?: no     Physical Sign of Abuse Present: no   Depression: Not at risk (5/31/2024)    PHQ-2     PHQ-2 Score: 0   Housing Stability: Unknown (10/11/2023)    Housing Stability     Current Living Arrangements: Not on file     Potentially Unsafe Housing Conditions: Not on file   Utilities: Not on file   Health Literacy: Unknown (10/11/2023)    Education     Help with school or training?: Not on file     Preferred Language: Not on file   Employment: Unknown (10/11/2023)    Employment     Do you want help finding or keeping work or a job?: Not on file   Disabilities: Unknown (10/11/2023)    Disabilities     Concentrating, Remembering, or Making Decisions Difficulty: Not on file     Doing Errands Independently Difficulty: Not on file        Home Medications     Prior to Admission medications    Medication Sig Start Date End Date Taking? Authorizing Provider   Accu-Chek Softclix Lancets lancets Use one daily 10/18/23   Jennifer Neal APRN   acetaminophen (TYLENOL) 325 MG tablet Take 1,000 mg by mouth As Needed for Mild Pain . dont take preop    ProviderSuresh MD   albuterol sulfate  (90 Base) MCG/ACT inhaler Inhale 2 puffs Every 6 (Six) Hours As Needed for Wheezing. 2/5/24   Berenice Jacobson MD   atorvastatin (LIPITOR) 10 MG tablet Take 1 tablet by mouth Daily.  Patient not taking: Reported on 6/28/2024 6/4/24   Jennifer Neal APRN   azelastine (ASTELIN) 0.1 % nasal spray  10/24/23   ProviderSuresh MD   baclofen (LIORESAL) 10 MG tablet Take 1 tablet by mouth 3 (Three) Times a Day. 4/27/24   Berenice Jacobson MD   betamethasone dipropionate  (DIPROLENE) 0.05 % lotion Apply 1 application topically to the appropriate area as directed 2 (Two) Times a Day As Needed. dont use after bathing started    Suresh Fraga MD   betamethasone, augmented, (DIPROLENE) 0.05 % cream PLEASE SEE ATTACHED FOR DETAILED DIRECTIONS 3/23/23   Suresh Fraga MD   bimatoprost (Latisse) 0.03 % ophthalmic solution Administer  to both eyes Every Night. Place 1 drop on applicator and apply along skin of upper eyelid at base of eyelashes daily at bedtime; rpt for 2nd eye with clean applicator 2/5/24   Berenice Jacobson MD   Blood Glucose Monitoring Suppl (Accu-Chek Guide) w/Device kit Use 1 each Daily. 10/18/23   Jennifer Neal APRN   budesonide-formoterol (SYMBICORT) 80-4.5 MCG/ACT inhaler Inhale 2 puffs 2 (Two) Times a Day. 5/31/24   Jennifer Neal APRN   clindamycin (CLEOCIN T) 1 % lotion Apply 1 application topically to the appropriate area as directed 2 (Two) Times a Day As Needed. dont use once bathing started    Suresh Fraga MD   cloNIDine (Catapres) 0.1 MG tablet Take 1 tablet by mouth 2 (Two) Times a Day. 4/27/24   Berenice Jacobson MD   diphenhydrAMINE (BENADRYL) 50 MG capsule Take 1 capsule by mouth.    Suresh Fraga MD   doxycycline (VIBRAMYCIN) 100 MG capsule Take 1 capsule by mouth.    Suresh Fraga MD   EPINEPHrine (EPIPEN) 0.3 MG/0.3ML solution auto-injector injection Inject 0.3 mL into the appropriate muscle as directed by prescriber As Needed (allergic reaction). 2/5/24   Berenice Jacobson MD   etodolac (LODINE) 500 MG tablet Take 1 tablet by mouth 2 (Two) Times a Day. 4/27/24   Berenice Jacobson MD   ezetimibe (Zetia) 10 MG tablet Take 1 tablet by mouth Daily. 6/4/24   Jennifer Neal APRN   famotidine (PEPCID) 20 MG tablet Administer FAMOTIDINE 20mg PO pre-infusion (REMICADE). 6/15/22   Emergency, Nurse Jackie, RN   fenofibrate 160 MG tablet Take 1 tablet by mouth Daily. 6/4/24   Jennifer Neal,  GERTRUDE   furosemide (LASIX) 40 MG tablet Take 0.5 tablets by mouth 2 (Two) Times a Day. 2/5/24   Berenice Jacobson MD   glimepiride (AMARYL) 4 MG tablet Take 1 tablet by mouth 2 (Two) Times a Day With Meals. 4/27/24   Berenice Jacobson MD   glucose blood (Accu-Chek Guide) test strip Use one daily 2/5/24   Berenice Jacobson MD   hydrOXYzine (ATARAX) 25 MG tablet Take 1 tablet by mouth Every 6 (Six) Hours As Needed for Itching or Anxiety. 2/5/24   Berenice Jacobson MD   inFLIXimab (Remicade) 100 MG injection Administer REMICADE 5mg/kg IV every 4 weeks 1/5/23   Suresh Fraga MD   loratadine (Alavert) 10 MG disintegrating tablet Administer ALAVERT 10mg PO with infusion (REMICADE). 6/15/22   Emergency, Nurse Epic, RN   metoprolol succinate XL (TOPROL-XL) 50 MG 24 hr tablet TAKE TWO TABLETS BY MOUTH EVERY MORNING AND ONE EVERY NIGHT AT BEDTIME 4/27/24   Berenice Jacobson MD   mupirocin (BACTROBAN) 2 % ointment Apply thin layer to the affected areas 3 times daily. 8/31/23   Suresh Fraga MD   olmesartan (BENICAR) 40 MG tablet Take 1 tablet by mouth Daily. 4/27/24   Berenice Jacobson MD   Omega-3 Fatty Acids (fish oil) 1000 MG capsule capsule Take 3 capsules by mouth.    Suresh Fraga MD   PARoxetine (Paxil) 10 MG tablet Take 1 tablet by mouth Every Morning. 5/31/24   Jennifer Neal APRN   Semaglutide, 2 MG/DOSE, (Ozempic, 2 MG/DOSE,) 8 MG/3ML solution pen-injector Inject 2 mg under the skin into the appropriate area as directed 1 (One) Time Per Week. 6/4/24   Jennifer Neal APRN   sulfaSALAzine (AZULFIDINE) 500 MG tablet Take 3 tablets by mouth 3 (Three) Times a Day. Last dose am 10/26 1/9/21   Suresh Fraga MD        Objective / Physical Exam     Vital signs:  Temp: 98.5 °F (36.9 °C)  BP: 156/96  Heart Rate: 99  Resp: 18  SpO2: 94 %  Weight: 135 kg (298 lb 1 oz)    Admission Weight: Weight: 135 kg (298 lb 1 oz)    Physical  Exam  Constitutional:       Appearance: She is obese.      Comments: Visually uncomfortable   Eyes:      Pupils: Pupils are equal, round, and reactive to light.   Cardiovascular:      Rate and Rhythm: Normal rate and regular rhythm.   Pulmonary:      Effort: Pulmonary effort is normal.      Breath sounds: Normal breath sounds.   Abdominal:      Palpations: Abdomen is soft.      Tenderness: There is abdominal tenderness.   Skin:     General: Skin is warm and dry.   Neurological:      Mental Status: She is alert and oriented to person, place, and time.   Psychiatric:         Behavior: Behavior normal.            Labs     Results from last 7 days   Lab Units 06/28/24  1638   WBC 10*3/mm3 17.94*   HEMOGLOBIN g/dL 14.6   HEMATOCRIT % 43.1   PLATELETS 10*3/mm3 351      Results from last 7 days   Lab Units 06/28/24  1638   ALK PHOS U/L 60   AST (SGOT) U/L 18   ALT (SGPT) U/L 21           Results from last 7 days   Lab Units 06/28/24  1638 06/28/24  1218   SODIUM mmol/L 133*  --    POTASSIUM mmol/L 3.7  --    CHLORIDE mmol/L 100  --    CO2 mmol/L 20.2*  --    BUN mg/dL 10  --    CREATININE mg/dL 0.53* 0.40*   GLUCOSE mg/dL 162*  --         Imaging     CT Abdomen Pelvis With Contrast    Result Date: 6/28/2024  CT ABDOMEN PELVIS W CONTRAST Date of Exam: 6/28/2024 11:58 AM EDT Indication: epigastric pain, n/v. hx of gastric lap band. Comparison: January 27, 2013 Technique: Axial CT images were obtained of the abdomen and pelvis following the uneventful intravenous administration of iodinated contrast. Sagittal and coronal reconstructions were performed.  Automated exposure control and iterative reconstruction methods were used. Findings: The lung bases are clear. The liver, adrenal glands, kidneys, spleen, and pancreas are unremarkable. The gallbladder is surgically absent. There are changes from gastric lap band procedure. The stomach is mildly distended. There are multiple dilated small bowel loops without a discrete  transition point. The colon appears normal. The appendix is surgically absent. There is no ascites or loculated collection. No abnormally enlarged lymph nodes are identified. The rectum and urinary bladder are unremarkable. An intrauterine device is present. No aggressive osseous lesions are identified.     Impression: Multiple dilated small bowel loops without a discrete transition point, which could represent ileus, gastroenteritis, or partial small bowel obstruction. Electronically Signed: Willie Groves MD  2024 1:06 PM EDT  Workstation ID: [id number]      No orders to display        Current Medications     Scheduled Meds:  insulin lispro, 2-9 Units, Subcutaneous, Q6H  [START ON 2024] pantoprazole, 40 mg, Intravenous, Q AM  sodium chloride, 10 mL, Intravenous, Q12H         Continuous Infusions:  lactated ringers, 100 mL/hr           Soco Ochoa [facility] Medicine  24   19:37 EDT       Electronically signed by Armen Warner MD at 24 0549       Operative/Procedure Notes (all)    No notes of this type exist for this encounter.          Physician Progress Notes (all)        Gutierrez Lara MD at 24 [facility]              [facility] MEDICINE SERVICE  DAILY PROGRESS NOTE    NAME: Bernadine Arriaga  : 1980  MRN: [id number]      LOS: 0 days     PROVIDER OF SERVICE: Gutierrez Lara MD    Chief Complaint: Abdominal pain    Subjective:     Interval History: Patient states that her abdominal pain is better today although still present, and her nausea is also improved.  She tolerated clear liquid diet and had a loose BM last night.    Review of Systems:   Review of Systems    Objective:     Vital Signs  Temp:  [97.7 °F (36.5 °C)-98.2 °F (36.8 °C)] 97.7 °F (36.5 °C)  Heart Rate:  [71-86] 75  Resp:  [12-14] 12  BP: (128-163)/(73-98) 152/97   Body mass index is 46.68 kg/m².    Physical Exam  Physical Exam  General Appearance:  Alert, cooperative, no distress, appears stated  age  Head:  Normocephalic, without obvious abnormality, atraumatic  Eyes:  PERRL, conjunctiva/corneas clear, EOM's intact, fundi benign, both eyes  Ears:  Normal TM's and external ear canals, both ears  Nose: Nares normal, septum midline, mucosa normal, no drainage or sinus tenderness  Throat: Lips, mucosa, and tongue normal; teeth and gums normal  Neck: Supple, symmetrical, trachea midline, no adenopathy, thyroid: not enlarged, symmetric, no tenderness/mass/nodules, no carotid bruit or JVD  Lungs:   Clear to auscultation bilaterally, respirations unlabored  Heart:  Regular rate and rhythm, S1, S2 normal, no murmur, rub or gallop  Abdomen:  Soft, mild abdominal TTP, bowel sounds active all four quadrants,  no masses, no organomegaly  Extremities: Extremities normal, atraumatic, no cyanosis or edema  Pulses: 2+ and symmetric  Skin: Skin color, texture, turgor normal, no rashes or lesions  Neurologic: Normal      Scheduled Meds   budesonide-formoterol, 2 puff, Inhalation, BID - RT  cetirizine, 10 mg, Oral, Daily  enoxaparin, 40 mg, Subcutaneous, BID  famotidine, 20 mg, Oral, Daily  fenofibrate, 145 mg, Oral, Daily  insulin lispro, 2-9 Units, Subcutaneous, Q6H  losartan, 100 mg, Oral, Q24H  metoclopramide, 10 mg, Intravenous, Q6H  metoprolol succinate XL, 50 mg, Oral, Q12H  pantoprazole, 40 mg, Intravenous, Q AM  PARoxetine, 10 mg, Oral, Daily  sodium chloride, 10 mL, Intravenous, Q12H  sorbitol, 30 mL, Oral, Daily  sulfaSALAzine, 1,500 mg, Oral, BID       PRN Meds     albuterol    baclofen    senna-docusate sodium **AND** polyethylene glycol **AND** bisacodyl **AND** bisacodyl    cloNIDine    dextrose    dextrose    glucagon (human recombinant)    HYDROmorphone    hydrOXYzine    ketorolac    ondansetron    [COMPLETED] Insert Peripheral IV **AND** sodium chloride    sodium chloride    sodium chloride   Infusions           Diagnostic Data    Results from last 7 days   Lab Units 06/29/24  0423 06/28/24  1638   WBC  10*3/mm3 13.22* 17.94*   HEMOGLOBIN g/dL 13.6 14.6   HEMATOCRIT % 40.4 43.1   PLATELETS 10*3/mm3 309 351   GLUCOSE mg/dL 164* 162*   CREATININE mg/dL 0.56* 0.53*   BUN mg/dL 12 10   SODIUM mmol/L 137 133*   POTASSIUM mmol/L 3.6 3.7   AST (SGOT) U/L  --  18   ALT (SGPT) U/L  --  21   ALK PHOS U/L  --  60   BILIRUBIN mg/dL  --  0.6   ANION GAP mmol/L 8.7 12.8       XR Abdomen 2+ VW with Chest 1 VW    Result Date: 6/30/2024  Impression: 1. Continued progression of enteric contrast now entirely within the colon. There is a persistent distended loop of small bowel within the left hemiabdomen measuring 5.9 cm suggesting partial small bowel obstruction. Electronically Signed: Jadon Welsh  6/30/2024 10:59 AM EDT  Workstation ID: [id number]    FL Small Bowel Follow Through Single-Contrast    Result Date: 6/29/2024  Impression: 1. Contrast traverses the small bowel and is present within the colon by 4 hours 30 minutes. There is contrast distended small bowel which remains present which could represent ileus or partial small bowel obstruction. No evidence of high-grade obstruction is present. 2. Gastric lap band in expected position. Electronically Signed: Jadon Welsh  6/29/2024 4:48 PM EDT  Workstation ID: [id number]       I reviewed the patient's new clinical results.    Assessment/Plan:     Active and Resolved Problems  Active Hospital Problems    Diagnosis  POA    **Abdominal pain [R10.9]  Yes      Resolved Hospital Problems   No resolved problems to display.       Abdominal pain with nausea and vomiting  -Initial CT on admission showed questionable ileus versus gastroenteritis versus partial SBO given dilated small bowel loops  -Patient does have improvement in her symptoms today  -SBFT did show partial SBO and repeat x-rays today show further passage of contrast to the colon but still some mild dilated loops of bowel  -I am less inclined to think that her gastric lap band is playing any role in this given that she  has not had any issues with it for the past 15 years  -Appreciate bariatric surgery evaluation as well  -Leukocytosis likely reactive and not a true sign of infection  -Advanced to full liquid diet today    DM type II, controlled  -Continue current medical therapy with sliding scale insulin    Hypertension  -BP well-controlled  -As needed hydralazine    Dyslipidemia  -Resume statin therapy when tolerating p.o.    Asthma  -Continue Symbicort and albuterol inhalers    Morbid Obesity  -Counseled patient on diet and exercise to improve weight loss and comorbid conditions    GERD  -Resume PPI    Anxiety with depression  -Resume home medications      VTE Prophylaxis:  Pharmacologic & mechanical VTE prophylaxis orders are present.         Code status is   Code Status and Medical Interventions:   Ordered at: 24 1933     Code Status (Patient has no pulse and is not breathing):    CPR (Attempt to Resuscitate)     Medical Interventions (Patient has pulse or is breathing):    Full Support       Plan for disposition: Hopefully DC on  if continues to tolerate p.o. intake    Time: 30 minutes    Signature: Electronically signed by Gutierrez Lara MD, 24, 13:13 EDT.  [facility] Hospitalist Team    Electronically signed by Gutierrez Lara MD at 24 1314       Ciera Cota MD at 24 0831          Bariatric Surgery Progress Note    Name: Bernadine Arriaga ADMIT: 2024   : 1980  PCP: Berenice Jacobson MD    MRN: [id number] LOS: 0 days   AGE/SEX: 44 y.o. female  ROOM: [facility]/   Subjective   2 small liquid bms, still feels bloated and full, had some clear liquids, still has some abdominal discomfort    Objective      Vital Signs  Vital Signs (range)  Temp:  [97.8 °F (36.6 °C)-98.3 °F (36.8 °C)] 98.2 °F (36.8 °C)  Heart Rate:  [71-88] 71  Resp:  [12-14] 14  BP: (128-163)/(73-98) 128/73    Physical Exam:    Awake and alert  Normal mental status  Normal pulmonary effort  Abdomen distended,  tympanic, tender, no guarding  Incisions no erythema  Extremities no tenderness or swelling      CBC    Results from last 7 days   Lab Units 24  0423 24  1638   WBC 10*3/mm3 13.22* 17.94*   HEMOGLOBIN g/dL 13.6 14.6   PLATELETS 10*3/mm3 309 351     CMP   Results from last 7 days   Lab Units 24  0423 24  1638 24  1218   SODIUM mmol/L 137 133*  --    POTASSIUM mmol/L 3.6 3.7  --    CHLORIDE mmol/L 102 100  --    CO2 mmol/L 26.3 20.2*  --    BUN mg/dL 12 10  --    CREATININE mg/dL 0.56* 0.53* 0.40*   GLUCOSE mg/dL 164* 162*  --    ALBUMIN g/dL  --  3.9  --    BILIRUBIN mg/dL  --  0.6  --    ALK PHOS U/L  --  60  --    AST (SGOT) U/L  --  18  --    ALT (SGPT) U/L  --  21  --    LIPASE U/L  --  24  --        Assessment & Plan       Partial small bowel obstruction      44-year-old lady admitted with partial small bowel obstruction.  She has a history of  section.  Small bowel follow-through yesterday demonstrated contrast moved to the colon in 4-1/2 hours.  She has had 2 small liquid bowel movements.  However she is does still feel very bloated and not able to drink much.  She is having a lot of belching.    I will start Lovenox for DVT prophylaxis at 40 mg subcu every 12 hours.  I have encouraged her to try to ambulate in the hallway today and to continue to avoid narcotics.  I am adding sorbitol.  She is on Reglan and tolerating it. I will also reorder plain x-rays for today.    This note was created using Dragon Voice Recognition software.    Ciera Cota MD  24  08:31 EDT      Electronically signed by Ciera Cota MD at 24 0835       Gutierrez Lara MD at 24 [facility]              [facility] MEDICINE SERVICE  DAILY PROGRESS NOTE    NAME: Bernadine Arriaga  : 1980  MRN: [id number]      LOS: 0 days     PROVIDER OF SERVICE: Gutierrez Lara MD    Chief Complaint: Abdominal pain    Subjective:     Interval History: Patient states that her abdominal pain is  better today although still present, and her nausea is also improved.  She still does not have any significant flatus and has not had a BM.    Review of Systems:   Review of Systems    Objective:     Vital Signs  Temp:  [97.4 °F (36.3 °C)-98.5 °F (36.9 °C)] 98.3 °F (36.8 °C)  Heart Rate:  [] 88  Resp:  [12-18] 12  BP: (123-177)/() 131/86   Body mass index is 46.68 kg/m².    Physical Exam  Physical Exam  General Appearance:  Alert, cooperative, no distress, appears stated age  Head:  Normocephalic, without obvious abnormality, atraumatic  Eyes:  PERRL, conjunctiva/corneas clear, EOM's intact, fundi benign, both eyes  Ears:  Normal TM's and external ear canals, both ears  Nose: Nares normal, septum midline, mucosa normal, no drainage or sinus tenderness  Throat: Lips, mucosa, and tongue normal; teeth and gums normal  Neck: Supple, symmetrical, trachea midline, no adenopathy, thyroid: not enlarged, symmetric, no tenderness/mass/nodules, no carotid bruit or JVD  Lungs:   Clear to auscultation bilaterally, respirations unlabored  Heart:  Regular rate and rhythm, S1, S2 normal, no murmur, rub or gallop  Abdomen:  Soft, mild abdominal TTP, bowel sounds active all four quadrants,  no masses, no organomegaly  Extremities: Extremities normal, atraumatic, no cyanosis or edema  Pulses: 2+ and symmetric  Skin: Skin color, texture, turgor normal, no rashes or lesions  Neurologic: Normal      Scheduled Meds   budesonide-formoterol, 2 puff, Inhalation, BID - RT  cetirizine, 10 mg, Oral, Daily  famotidine, 20 mg, Oral, Daily  fenofibrate, 145 mg, Oral, Daily  insulin lispro, 2-9 Units, Subcutaneous, Q6H  losartan, 100 mg, Oral, Q24H  metoprolol succinate XL, 50 mg, Oral, Q12H  pantoprazole, 40 mg, Intravenous, Q AM  PARoxetine, 10 mg, Oral, Daily  sodium chloride, 10 mL, Intravenous, Q12H  sulfaSALAzine, 1,500 mg, Oral, BID       PRN Meds     albuterol    baclofen    senna-docusate sodium **AND** polyethylene glycol  **AND** bisacodyl **AND** bisacodyl    cloNIDine    dextrose    dextrose    glucagon (human recombinant)    HYDROmorphone    hydrOXYzine    ketorolac    ondansetron    [COMPLETED] Insert Peripheral IV **AND** sodium chloride    sodium chloride    sodium chloride   Infusions  lactated ringers, 100 mL/hr, Last Rate: 100 mL/hr (06/29/24 0830)          Diagnostic Data    Results from last 7 days   Lab Units 06/29/24  0423 06/28/24  1638   WBC 10*3/mm3 13.22* 17.94*   HEMOGLOBIN g/dL 13.6 14.6   HEMATOCRIT % 40.4 43.1   PLATELETS 10*3/mm3 309 351   GLUCOSE mg/dL 164* 162*   CREATININE mg/dL 0.56* 0.53*   BUN mg/dL 12 10   SODIUM mmol/L 137 133*   POTASSIUM mmol/L 3.6 3.7   AST (SGOT) U/L  --  18   ALT (SGPT) U/L  --  21   ALK PHOS U/L  --  60   BILIRUBIN mg/dL  --  0.6   ANION GAP mmol/L 8.7 12.8       CT Abdomen Pelvis With Contrast    Result Date: 6/28/2024  Impression: Multiple dilated small bowel loops without a discrete transition point, which could represent ileus, gastroenteritis, or partial small bowel obstruction. Electronically Signed: Willie Groves MD  6/28/2024 1:06 PM EDT  Workstation ID: [id number]       I reviewed the patient's new clinical results.    Assessment/Plan:     Active and Resolved Problems  Active Hospital Problems    Diagnosis  POA    **Abdominal pain [R10.9]  Yes      Resolved Hospital Problems   No resolved problems to display.       Abdominal pain with nausea and vomiting  -Initial CT on admission showed questionable ileus versus gastroenteritis versus partial SBO given dilated small bowel loops  -Patient does have improvement in her symptoms today  -Check SBFT; if negative, can advance diet as tolerated however if there is evidence of high-grade obstruction, she may require further surgical intervention  -I am less inclined to think that her gastric lap band is playing any role in this given that she has not had any issues with it for the past 15 years  -Appreciate general surgery  evaluation as well  -Leukocytosis likely reactive and not a true sign of infection    DM type II, controlled  -Continue current medical therapy with sliding scale insulin    Hypertension  -BP well-controlled  -As needed hydralazine    Dyslipidemia  -Resume statin therapy when tolerating p.o.    Asthma  -Continue Symbicort and albuterol inhalers    Morbid Obesity  -Counseled patient on diet and exercise to improve weight loss and comorbid conditions    GERD  -Resume PPI    Anxiety with depression  -Resume home medications      VTE Prophylaxis:  Mechanical VTE prophylaxis orders are present.         Code status is   Code Status and Medical Interventions:   Ordered at: 06/28/24 1933     Code Status (Patient has no pulse and is not breathing):    CPR (Attempt to Resuscitate)     Medical Interventions (Patient has pulse or is breathing):    Full Support       Plan for disposition: Hopefully DC on 6/30    Time: 30 minutes    Signature: Electronically signed by Gutierrez Lara MD, 06/29/24, 12:14 EDT.  [facility] Hospitalist Team    Electronically signed by Gutierrez Lara MD at 06/29/24 1217          Consult Notes (all)        Ciera Cota MD at 06/29/24 0953        Consult Orders    1. Inpatient Bariatric Surgery Consult [[id number]] ordered by Gutierrez Lara MD at 06/29/24 0751                 Bariatric  SURGERY CONSULT      Patient Care Team:  Berenice Jacobson MD as PCP - General  Berenice Jacobson MD as PCP - Family Medicine  Syed Zaragoza MD as Surgeon (General Surgery)    Chief complaint  abd pain and vomiting  Subjective     This is a 44-year-old lady who 3 days ago had an episode of severe reflux followed by vomiting and upper abdominal pain.  This was shortly after eating.  She had no hematemesis or coffee-ground emesis.  Her last bowel movement was 2 days ago and was normal.  She normally has a bowel movement about every day.  She is not prone to constipation.  She had a laparoscopic gastric  band placed about 15 years ago and says she has not had any kind of band fill in many years.  She does not know how much fluid is in her band.  She says it really does not restrict her portion sizes very much.  Her BMI is 46.  Other past surgical history includes cholecystectomy appendectomy and  section.  She came to the emergency room yesterday and a CT scan demonstrated diffuse small bowel dilation consistent with partial small bowel obstruction or gastroenteritis.  I reviewed the images of the CT scan and I believe she also has a hiatal hernia.  The patient also was found to have leukocytosis to 17,000 initially and it is also fallen to 13,000.  She says that she has a history of some type of autoimmune disorder and normally has elevation of her white blood cell count.  The patient is a nurse.    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    Anxiety     Arthritis of spine     psoriatic    Asthma     Diabetes mellitus     History of medical problems     Hidradenitis    Hydradenitis     Hyperlipidemia     slight elevation not treated    Hypertension     Low back pain     Migraine     history    MRSA carrier     Pneumonia     Psoriasis     Psoriatic arthritis     Sinus disorder      Past Surgical History:   Procedure Laterality Date    ADENOIDECTOMY      APPENDECTOMY      AXILLARY SURGERY      multiple due to hidradenitis    BREAST BIOPSY       SECTION      CHOLECYSTECTOMY      INCISION AND DRAINAGE OF WOUND      breast    KNEE ARTHRODESIS      MASS EXCISION Right 10/27/2021    Procedure: WIDE EXCISION TRUNK LESION/MASS, hidradenitis of right hip;  Surgeon: Serafin Centeno MD;  Location: [facility] OR;  Service: General;  Laterality: Right;    SINUS SURGERY      TONSILLECTOMY      WOUND  DEBRIDEMENT Right 6/19/2019    Procedure: DEBRIDEMENT OF RECURRENT HIDRADENITIS AND VAC PLACEMENT RIGHT SIDE;  Surgeon: Alix Freire MD;  Location: [facility] MAIN OR;  Service: General     Family History   Problem Relation Age of Onset    Breast cancer Mother     Hypertension Mother     Cancer Mother     Diabetes Mother     Hyperlipidemia Mother     Asthma Mother     Hypertension Father     Hyperlipidemia Father     Stroke Father     Cancer Brother     Breast cancer Maternal Aunt     Breast cancer Maternal Aunt     Heart disease Maternal Grandfather      Social History     Tobacco Use    Smoking status: Never     Passive exposure: Current    Smokeless tobacco: Never   Vaping Use    Vaping status: Never Used   Substance Use Topics    Alcohol use: No    Drug use: No     Medications Prior to Admission   Medication Sig Dispense Refill Last Dose    acetaminophen (TYLENOL) 325 MG tablet Take 1,000 mg by mouth As Needed for Mild Pain . dont take preop       albuterol sulfate  (90 Base) MCG/ACT inhaler Inhale 2 puffs Every 6 (Six) Hours As Needed for Wheezing. 18 g 5     baclofen (LIORESAL) 10 MG tablet Take 1 tablet by mouth 3 (Three) Times a Day As Needed for Muscle Spasms.       betamethasone dipropionate (DIPROLENE) 0.05 % lotion Apply 1 application topically to the appropriate area as directed 2 (Two) Times a Day As Needed. dont use after bathing started       betamethasone, augmented, (DIPROLENE) 0.05 % cream PLEASE SEE ATTACHED FOR DETAILED DIRECTIONS       budesonide-formoterol (SYMBICORT) 80-4.5 MCG/ACT inhaler Inhale 2 puffs 2 (Two) Times a Day. 3 each 1 6/28/2024    clindamycin (CLEOCIN T) 1 % lotion Apply 1 application topically to the appropriate area as directed 2 (Two) Times a Day As Needed. dont use once bathing started       cloNIDine (Catapres) 0.1 MG tablet Take 1 tablet by mouth 2 (Two) Times a Day. 180 tablet 1 6/28/2024    etodolac (LODINE) 500 MG tablet Take 1 tablet by mouth 2 (Two) Times a  Day. 180 tablet 1 6/28/2024    ezetimibe (Zetia) 10 MG tablet Take 1 tablet by mouth Daily. 90 tablet 1 6/28/2024    famotidine (PEPCID) 20 MG tablet Administer FAMOTIDINE 20mg PO pre-infusion (REMICADE).   6/28/2024    fenofibrate 160 MG tablet Take 1 tablet by mouth Daily. 90 tablet 1 6/28/2024    furosemide (LASIX) 40 MG tablet Take 0.5 tablets by mouth 2 (Two) Times a Day. 30 tablet 1 6/28/2024    glimepiride (AMARYL) 4 MG tablet Take 1 tablet by mouth 2 (Two) Times a Day With Meals. 180 tablet 1 6/28/2024    hydrOXYzine (ATARAX) 25 MG tablet Take 1 tablet by mouth Every 6 (Six) Hours As Needed for Itching or Anxiety. 30 tablet 1     inFLIXimab (Remicade) 100 MG injection Administer REMICADE 5mg/kg IV every 4 weeks   Past Month    metoprolol succinate XL (TOPROL-XL) 50 MG 24 hr tablet TAKE TWO TABLETS BY MOUTH EVERY MORNING AND ONE EVERY NIGHT AT BEDTIME 270 tablet 1 6/28/2024    mupirocin (BACTROBAN) 2 % ointment Apply 1 Application topically to the appropriate area as directed 3 (Three) Times a Day As Needed.       olmesartan (BENICAR) 40 MG tablet Take 1 tablet by mouth Daily. 90 tablet 1 6/28/2024    PARoxetine (Paxil) 10 MG tablet Take 1 tablet by mouth Every Morning. 30 tablet 0 6/28/2024    Semaglutide, 2 MG/DOSE, (Ozempic, 2 MG/DOSE,) 8 MG/3ML solution pen-injector Inject 2 mg under the skin into the appropriate area as directed 1 (One) Time Per Week. 3 mL 1 6/21/2024    sulfaSALAzine (AZULFIDINE) 500 MG tablet Take 3 tablets by mouth 2 (Two) Times a Day. Last dose am 10/26   6/28/2024    EPINEPHrine (EPIPEN) 0.3 MG/0.3ML solution auto-injector injection Inject 0.3 mL into the appropriate muscle as directed by prescriber As Needed (allergic reaction). 1 each 5     loratadine (Alavert) 10 MG disintegrating tablet Administer ALAVERT 10mg PO with infusion (REMICADE).        Allergies:  Calcium channel blockers, Nifedipine, Tetanus antitoxin, Verapamil, Adhesive tape, Diltiazem, and Tape    Objective      Vital Signs  Temp:  [97.4 °F (36.3 °C)-98.5 °F (36.9 °C)] 97.4 °F (36.3 °C)  Heart Rate:  [] 84  Resp:  [12-18] 16  BP: (123-177)/() 160/97    Physical Exam:      General Appearance:    Alert, cooperative, in no acute distress   Head:    Normocephalic, without obvious abnormality, atraumatic,    Eyes:            Lids and lashes normal, conjunctivae and sclerae normal, no   icterus, no pallor, corneas clear, PERRLA   Ears:    Ears appear intact with no abnormalities noted   Throat:   No oral lesions, no thrush, oral mucosa moist   Neck:   No adenopathy, supple, trachea midline, no thyromegaly, no   carotid bruit, no JVD   Back:     No kyphosis present, no scoliosis present, no skin lesions,      erythema or scars, no tenderness to percussion or                   palpation,   range of motion normal   Lungs:     Clear to auscultation,respirations regular, even and                  unlabored    Heart:    Regular rhythm and normal rate, normal S1 and S2, no            murmur, no gallop, no rub, no click   Chest Wall:    No abnormalities observed   Abdomen:   Mild diffuse abdominal tenderness but no guarding and no rebound tenderness.  The Lap-Band port is palpable in the right mid abdomen.  I did access the port with a Weems needle under sterile technique and was not able to remove any fluid and therefore I believe this band port is empty.   Rectal:     Deferred   Extremities: No edema, good ROM   Pulses:   Pulses palpable and equal bilaterally   Skin:   No bleeding, bruising or rash   Lymph nodes:   No palpable adenopathy   Neurologic:   Cranial nerves 2 - 12 grossly intact, sensation intact, DTR       present and equal bilaterally     Lab Results (last 24 hours)       Procedure Component Value Units Date/Time    Hemoglobin A1c [[id number]]  (Abnormal) Collected: 06/29/24 0423    Specimen: Blood Updated: 06/29/24 0553     Hemoglobin A1C 7.24 %     CBC Auto Differential [[id number]]  (Abnormal) Collected:  06/29/24 0423    Specimen: Blood Updated: 06/29/24 0525     WBC 13.22 10*3/mm3      RBC 4.16 10*6/mm3      Hemoglobin 13.6 g/dL      Hematocrit 40.4 %      MCV 97.1 fL      MCH 32.7 pg      MCHC 33.7 g/dL      RDW 12.3 %      RDW-SD 44.0 fl      MPV 10.0 fL      Platelets 309 10*3/mm3     Narrative:      The previously reported component NRBC is no longer being reported. Previous result was 0.0 /100 WBC (Reference Range: 0.0-0.2 /100 WBC) on 6/29/2024 at 0434 EDT.    Scan Slide [[id number]] Collected: 06/29/24 0423    Specimen: Blood Updated: 06/29/24 0525     Scan Slide --     Comment: See Manual Differential Results       Manual Differential [[id number]]  (Abnormal) Collected: 06/29/24 0423    Specimen: Blood Updated: 06/29/24 0525     Neutrophil % 59.0 %      Lymphocyte % 22.0 %      Monocyte % 14.0 %      Eosinophil % 2.0 %      Bands %  2.0 %      Atypical Lymphocyte % 1.0 %      Neutrophils Absolute 8.06 10*3/mm3      Lymphocytes Absolute 3.04 10*3/mm3      Monocytes Absolute 1.85 10*3/mm3      Eosinophils Absolute 0.26 10*3/mm3      RBC Morphology Normal     Vacuolated Neutrophils Slight/1+     Giant Platelets Slight/1+    Basic Metabolic Panel [[id number]]  (Abnormal) Collected: 06/29/24 0423    Specimen: Blood Updated: 06/29/24 0455     Glucose 164 mg/dL      BUN 12 mg/dL      Creatinine 0.56 mg/dL      Sodium 137 mmol/L      Potassium 3.6 mmol/L      Chloride 102 mmol/L      CO2 26.3 mmol/L      Calcium 8.3 mg/dL      BUN/Creatinine Ratio 21.4     Anion Gap 8.7 mmol/L      eGFR 114.1 mL/min/1.73     Narrative:      GFR Normal >60  Chronic Kidney Disease <60  Kidney Failure <15      Urinalysis, Microscopic Only - Urine, Clean Catch [[id number]]  (Abnormal) Collected: 06/29/24 0136    Specimen: Urine, Clean Catch Updated: 06/29/24 0205     RBC, UA 0-2 /HPF      WBC, UA 6-10 /HPF      Bacteria, UA None Seen /HPF      Squamous Epithelial Cells, UA 3-6 /HPF      Hyaline Casts, UA None Seen /LPF      Amorphous Urate  Crystals, UA Large/3+ /HPF      Mucus, UA Small/1+ /HPF      Methodology Manual Light Microscopy    Urinalysis With Microscopic If Indicated (No Culture) - Urine, Clean Catch [[id number]]  (Abnormal) Collected: 06/29/24 0136    Specimen: Urine, Clean Catch Updated: 06/29/24 0157     Color, UA Dark Yellow     Appearance, UA Turbid     pH, UA <=5.0     Specific Gravity, UA >=1.030     Glucose, UA Negative     Ketones, UA Negative     Bilirubin, UA Large (3+)     Comment: Confirmation testing is unavailable.  A serum bilirubin is recommended for further assessment.        Blood, UA Negative     Protein, UA 30 mg/dL (1+)     Leuk Esterase, UA Negative     Nitrite, UA Negative     Urobilinogen, UA 0.2 E.U./dL    POC Glucose 4x Daily Before Meals & at Bedtime [[id number]]  (Abnormal) Collected: 06/28/24 2323    Specimen: Blood Updated: 06/28/24 2325     Glucose 157 mg/dL      Comment: Serial Number: [id number]:  [id number]       POC Glucose Once [[id number]]  (Abnormal) Collected: 06/28/24 2049    Specimen: Blood Updated: 06/28/24 2052     Glucose 135 mg/dL      Comment: Serial Number: [id number]:  [id number]       Comprehensive Metabolic Panel [[id number]]  (Abnormal) Collected: 06/28/24 1638    Specimen: Blood Updated: 06/28/24 1746     Glucose 162 mg/dL      BUN 10 mg/dL      Creatinine 0.53 mg/dL      Sodium 133 mmol/L      Potassium 3.7 mmol/L      Comment: Slight hemolysis detected by analyzer. Result may be falsely elevated.        Chloride 100 mmol/L      CO2 20.2 mmol/L      Calcium 8.7 mg/dL      Total Protein 7.2 g/dL      Albumin 3.9 g/dL      ALT (SGPT) 21 U/L      AST (SGOT) 18 U/L      Alkaline Phosphatase 60 U/L      Total Bilirubin 0.6 mg/dL      Globulin 3.3 gm/dL      A/G Ratio 1.2 g/dL      BUN/Creatinine Ratio 18.9     Anion Gap 12.8 mmol/L      eGFR 115.7 mL/min/1.73     Narrative:      GFR Normal >60  Chronic Kidney Disease <60  Kidney Failure <15      Lipase [[id number]]  (Normal) Collected:  06/28/24 1638    Specimen: Blood Updated: 06/28/24 1745     Lipase 24 U/L     CBC & Differential [[id number]]  (Abnormal) Collected: 06/28/24 1638    Specimen: Blood Updated: 06/28/24 1712    Narrative:      The following orders were created for panel order CBC & Differential.  Procedure                               Abnormality         Status                     ---------                               -----------         ------                     CBC Auto Differential[[id number]]        Abnormal            Final result                 Please view results for these tests on the individual orders.    CBC Auto Differential [[id number]]  (Abnormal) Collected: 06/28/24 1638    Specimen: Blood Updated: 06/28/24 1712     WBC 17.94 10*3/mm3      RBC 4.54 10*6/mm3      Hemoglobin 14.6 g/dL      Hematocrit 43.1 %      MCV 94.9 fL      MCH 32.2 pg      MCHC 33.9 g/dL      RDW 12.1 %      RDW-SD 41.6 fl      MPV 10.1 fL      Platelets 351 10*3/mm3      Neutrophil % 72.7 %      Lymphocyte % 15.8 %      Monocyte % 10.0 %      Eosinophil % 0.7 %      Basophil % 0.2 %      Immature Grans % 0.6 %      Neutrophils, Absolute 13.05 10*3/mm3      Lymphocytes, Absolute 2.83 10*3/mm3      Monocytes, Absolute 1.79 10*3/mm3      Eosinophils, Absolute 0.12 10*3/mm3      Basophils, Absolute 0.04 10*3/mm3      Immature Grans, Absolute 0.11 10*3/mm3      nRBC 0.0 /100 WBC           Results Review:    I reviewed the patient's new clinical results.  I reviewed the patient's new imaging results and agree with the interpretation.    Assessment & Plan       Abdominal pain  History of adjustable gastric banding  Differential diagnosis of partial small bowel obstruction versus gastroenteritis versus constipation    44-year-old lady presents with vomiting and diffuse abdominal pain and CT scan describes diffuse small bowel dilation.  I reviewed the CT scan myself and I believe she likely has a small hiatal hernia and she also has a moderate stool burden  of the right colon and transverse colon.  She did have an initial leukocytosis of 17,000 and is reduced to 13,000 and the patient says she has a history of leukocytosis.    A small bowel follow-through has been ordered and therefore I will await the results of this before making a decision on further treatment.  If she does not have a complete small bowel obstruction I believe we can start a clear liquid diet and advance as tolerated.  If contrast fails to reach the colon today I would like to get another x-ray tomorrow morning.  My initial impression is that it is unlikely she has a complete small bowel obstruction based on the diffuse small bowel dilation and the considerable stool in the colon.  Also due to the fact that the band has no fluid in it I think it is unlikely that it is causing some type of obstruction at the level of the stomach.  The upper GI will help us get a better look at that as well.    After the patient is discharged I have offered to follow-up with her for ongoing management of her Lap-Band and her morbid obesity.        Ciera Cota MD  24  09:53 EDT          Electronically signed by Ciera Cota MD at 24 1000          Discharge Summary        Gutierrez Lara MD at 24 1034                       [facility] Medicine Services  Discharge Summary    Date of Service: 2024  Patient Name: Bernadine Arriaga  : 1980  MRN: [id number]    Date of Admission: 2024  Discharge Diagnosis:   Mild partial SBO, possibly related to gastroenteritis  DM type II, controlled  Hypertension  Dyslipidemia  Asthma  Morbid obesity  GERD  Anxiety with depression    Date of Discharge: 2024  Primary Care Physician: Berenice Jacobson MD      Presenting Problem:   Dehydration [E86.0]  Ileus [K56.7]  Abdominal pain [R10.9]  Generalized abdominal pain [R10.84]  SBO (small bowel obstruction) [K56.609]    Active and Resolved Hospital Problems:  Active Hospital Problems     Diagnosis POA    **Abdominal pain [R10.9] Yes    SBO (small bowel obstruction) [K56.609] Yes      Resolved Hospital Problems   No resolved problems to display.         Hospital Course     HPI:  Patient is a 44-year-old female who presented to the hospital with complaints of abdominal pain and nausea.  Please see H&P for details.    Hospital Course:  The patient was admitted for questionable SBO versus ileus versus gastroenteritis based on CT scan findings.  She underwent small bowel follow-through on 6/29 which did show mild partial SBO versus mild ileus.  She was started on clear liquid diet and advanced to full liquid diet on the day of discharge which she tolerated fairly well.  Her abdominal pain was improved although she still had some bloating and mild belching but also had several loose BM the night prior to discharge.  Her symptoms are more consistent with probable gastroenteritis causing a mild small bowel obstruction as she states her symptoms began after eating a heavy meal of pizza.  I advised her to continue a full liquid diet for the next 2 to 3 days and then advance her diet as tolerated to her regular diabetic diet at home.  Repeat KUB on the day of discharge shows progression of contrast to the distal colon.  She is stable for discharge.        DISCHARGE Follow Up Recommendations for labs and diagnostics: Follow-up with your PCP and with bariatric surgery in the next 2 weeks.      Reasons For Change In Medications and Indications for New Medications:      Day of Discharge     Vital Signs:  Temp:  [97.7 °F (36.5 °C)-97.9 °F (36.6 °C)] 97.9 °F (36.6 °C)  Heart Rate:  [71-86] 80  Resp:  [12-16] 16  BP: (143-176)/(93-97) 143/93    Physical Exam:  Physical Exam   General Appearance:  Alert, cooperative, no distress, appears stated age  Head:  Normocephalic, without obvious abnormality, atraumatic  Eyes:  PERRL, conjunctiva/corneas clear, EOM's intact, fundi benign, both eyes  Ears:  Normal TM's and  external ear canals, both ears  Nose: Nares normal, septum midline, mucosa normal, no drainage or sinus tenderness  Throat: Lips, mucosa, and tongue normal; teeth and gums normal  Neck: Supple, symmetrical, trachea midline, no adenopathy, thyroid: not enlarged, symmetric, no tenderness/mass/nodules, no carotid bruit or JVD  Lungs:   Clear to auscultation bilaterally, respirations unlabored  Heart:  Regular rate and rhythm, S1, S2 normal, no murmur, rub or gallop  Abdomen:  Soft, non-tender, bowel sounds active all four quadrants,  no masses, no organomegaly  Extremities: Extremities normal, atraumatic, no cyanosis or edema  Pulses: 2+ and symmetric  Skin: Skin color, texture, turgor normal, no rashes or lesions  Neurologic: Normal        Pertinent  and/or Most Recent Results     LAB RESULTS:      Lab 06/29/24  0423 06/28/24  1638   WBC 13.22* 17.94*   HEMOGLOBIN 13.6 14.6   HEMATOCRIT 40.4 43.1   PLATELETS 309 351   NEUTROS ABS 8.06* 13.05*   IMMATURE GRANS (ABS)  --  0.11*   LYMPHS ABS  --  2.83   MONOS ABS  --  1.79*   EOS ABS 0.26 0.12   MCV 97.1* 94.9         Lab 06/29/24  0423 06/28/24  1638 06/28/24  1218   SODIUM 137 133*  --    POTASSIUM 3.6 3.7  --    CHLORIDE 102 100  --    CO2 26.3 20.2*  --    ANION GAP 8.7 12.8  --    BUN 12 10  --    CREATININE 0.56* 0.53* 0.40*   EGFR 114.1 115.7 123.8   GLUCOSE 164* 162*  --    CALCIUM 8.3* 8.7  --    HEMOGLOBIN A1C 7.24*  --   --          Lab 06/28/24  1638   TOTAL PROTEIN 7.2   ALBUMIN 3.9   GLOBULIN 3.3   ALT (SGPT) 21   AST (SGOT) 18   BILIRUBIN 0.6   ALK PHOS 60   LIPASE 24                     Brief Urine Lab Results  (Last result in the past 365 days)        Color   Clarity   Blood   Leuk Est   Nitrite   Protein   CREAT   Urine HCG        06/29/24 0136 Dark Yellow   Turbid   Negative   Negative   Negative   30 mg/dL (1+)                 Microbiology Results (last 10 days)       ** No results found for the last 240 hours. **            XR Abdomen KUB    Result  Date: 7/1/2024  Impression: Impression: Oral contrast is seen to the distal colon. Electronically Signed: Forest Buckley MD  7/1/2024 9:05 AM EDT  Workstation ID: [id number]    XR Abdomen 2+ VW with Chest 1 VW    Result Date: 6/30/2024  Impression: Impression: 1. Continued progression of enteric contrast now entirely within the colon. There is a persistent distended loop of small bowel within the left hemiabdomen measuring 5.9 cm suggesting partial small bowel obstruction. Electronically Signed: Jadon Welsh  6/30/2024 10:59 AM EDT  Workstation ID: [id number]    FL Small Bowel Follow Through Single-Contrast    Result Date: 6/29/2024  Impression: Impression: 1. Contrast traverses the small bowel and is present within the colon by 4 hours 30 minutes. There is contrast distended small bowel which remains present which could represent ileus or partial small bowel obstruction. No evidence of high-grade obstruction is present. 2. Gastric lap band in expected position. Electronically Signed: Jadon Welsh  6/29/2024 4:48 PM EDT  Workstation ID: [id number]    CT Abdomen Pelvis With Contrast    Result Date: 6/28/2024  Impression: Impression: Multiple dilated small bowel loops without a discrete transition point, which could represent ileus, gastroenteritis, or partial small bowel obstruction. Electronically Signed: Willie Groves MD  6/28/2024 1:06 PM EDT  Workstation ID: [id number]    Mammo Screening Digital Tomosynthesis Bilateral With CAD    Result Date: 6/11/2024  Impression: Benign findings. No mammographic evidence of malignancy. Recommend routine mammographic screening.  BI-RADS ASSESSMENT: BI-RADS 2. Benign findings.  The patient's information is entered into a computerized reminder system with a targeted due date for the next mammogram.  Note:  It has been reported that there is approximately a 15% false negative in mammography.  Therefore, management of a palpable abnormality should not be deferred because of a  negative mammogram.      Electronically Signed By-Jose Kulkarni MD On:6/11/2024 10:09 AM               Results for orders placed during the hospital encounter of 12/13/19    Transthoracic Echo Complete With Contrast if Necessary Per Protocol    Interpretation Summary  Normal LV size and contractility EF of 60 to 65%  Normal RV size  Normal atrial size  Aortic valve, mitral valve, tricuspid valve appears structurally normal, mild mitral regurgitation seen.  No pericardial effusion seen.  Proximal aorta appears normal in size.      Labs Pending at Discharge:      Procedures Performed           Consults:   Consults       Date and Time Order Name Status Description    6/29/2024  7:51 AM Inpatient Bariatric Surgery Consult Completed     6/28/2024  6:06 PM Hospitalist (on-call MD unless specified)                Discharge Details        Discharge Medications        New Medications        Instructions Start Date   metoclopramide 10 MG tablet  Commonly known as: Reglan   10 mg, Oral, 2 Times Daily             Continue These Medications        Instructions Start Date   acetaminophen 325 MG tablet  Commonly known as: TYLENOL   1,000 mg, Oral, As Needed, dont take preop      Alavert 10 MG disintegrating tablet  Generic drug: loratadine   Administer ALAVERT 10mg PO with infusion (REMICADE).      albuterol sulfate  (90 Base) MCG/ACT inhaler  Commonly known as: PROVENTIL HFA;VENTOLIN HFA;PROAIR HFA   2 puffs, Inhalation, Every 6 Hours PRN      baclofen 10 MG tablet  Commonly known as: LIORESAL   10 mg, Oral, 3 Times Daily PRN      betamethasone (augmented) 0.05 % cream  Commonly known as: DIPROLENE   PLEASE SEE ATTACHED FOR DETAILED DIRECTIONS      betamethasone dipropionate 0.05 % lotion   1 application , Topical, 2 Times Daily PRN, dont use after bathing started      budesonide-formoterol 80-4.5 MCG/ACT inhaler  Commonly known as: SYMBICORT   2 puffs, Inhalation, 2 Times Daily - RT      clindamycin 1 % lotion  Commonly  known as: CLEOCIN T   1 application , Topical, 2 Times Daily PRN, dont use once bathing started      cloNIDine 0.1 MG tablet  Commonly known as: Catapres   0.1 mg, Oral, 2 Times Daily      EPINEPHrine 0.3 MG/0.3ML solution auto-injector injection  Commonly known as: EPIPEN   0.3 mg, Intramuscular, As Needed      etodolac 500 MG tablet  Commonly known as: LODINE   500 mg, Oral, 2 Times Daily      ezetimibe 10 MG tablet  Commonly known as: Zetia   10 mg, Oral, Daily      famotidine 20 MG tablet  Commonly known as: PEPCID   Administer FAMOTIDINE 20mg PO pre-infusion (REMICADE).      fenofibrate 160 MG tablet   160 mg, Oral, Daily      furosemide 40 MG tablet  Commonly known as: LASIX   20 mg, Oral, 2 Times Daily      glimepiride 4 MG tablet  Commonly known as: AMARYL   4 mg, Oral, 2 Times Daily With Meals      hydrOXYzine 25 MG tablet  Commonly known as: ATARAX   25 mg, Oral, Every 6 Hours PRN      metoprolol succinate XL 50 MG 24 hr tablet  Commonly known as: TOPROL-XL   TAKE TWO TABLETS BY MOUTH EVERY MORNING AND ONE EVERY NIGHT AT BEDTIME      mupirocin 2 % ointment  Commonly known as: BACTROBAN   Apply 1 Application topically to the appropriate area as directed 3 (Three) Times a Day As Needed.      olmesartan 40 MG tablet  Commonly known as: BENICAR   40 mg, Oral, Daily      Ozempic (2 MG/DOSE) 8 MG/3ML solution pen-injector  Generic drug: Semaglutide (2 MG/DOSE)   2 mg, Subcutaneous, Weekly      PARoxetine 10 MG tablet  Commonly known as: Paxil   10 mg, Oral, Every Morning      Remicade 100 MG injection  Generic drug: inFLIXimab   Administer REMICADE 5mg/kg IV every 4 weeks      sulfaSALAzine 500 MG tablet  Commonly known as: AZULFIDINE   1,500 mg, Oral, 2 Times Daily, Last dose am 10/26               Allergies   Allergen Reactions    Calcium Channel Blockers Palpitations, Other (See Comments) and Rash     Tachycardia and redness.    INCREASED HR   Tachycardia and redness.    Nifedipine Palpitations    Tetanus  Antitoxin Swelling    Verapamil Palpitations    Adhesive Tape Other (See Comments)     Tape will cause skin breakdown rapidly   Can use silicone tape    Diltiazem Other (See Comments)    Tape Other (See Comments)     Tape will cause skin breakdown rapidly   Can use silicone tape         Discharge Disposition:     Home or Self Care    Diet:  Hospital:  Diet Order   Procedures    Diet: Regular/House; Texture: Soft to Chew (NDD 3); Soft to Chew: Chopped Meat; Fluid Consistency: Thin (IDDSI 0)         Discharge Activity:         CODE STATUS:  Code Status and Medical Interventions:   Ordered at: 06/28/24 1933     Code Status (Patient has no pulse and is not breathing):    CPR (Attempt to Resuscitate)     Medical Interventions (Patient has pulse or is breathing):    Full Support         Future Appointments   Date Time Provider Department Center   12/2/2024  8:00 AM Jennifer Neal APRN [facility]       Additional Instructions for the Follow-ups that You Need to Schedule       Discharge Follow-up with Specified Provider: Follow-up with bariatric surgery in 2 weeks.; 2 Weeks   As directed      To: Follow-up with bariatric surgery in 2 weeks.   Follow Up: 2 Weeks                Time spent on Discharge including face to face service:  >30 minutes    Signature: Electronically signed by Gutierrez Lara MD, 07/01/24, 10:37 EDT.  [facility] Hospitalist Team    Electronically signed by Gutierrez Lara MD at 07/01/24 4651

## 2024-07-11 ENCOUNTER — READMISSION MANAGEMENT (OUTPATIENT)
Dept: CALL CENTER | Facility: HOSPITAL | Age: 44
End: 2024-07-11
Payer: COMMERCIAL

## 2024-07-11 NOTE — OUTREACH NOTE
Medical Week 2 Survey      Flowsheet Row Responses   [facility] facility patient discharged from? [facility]   Does the patient have one of the following disease processes/diagnoses(primary or secondary)? Other   Week 2 attempt successful? No   Unsuccessful attempts Attempt 1            Amanda AGUDELO - Registered Nurse

## 2024-07-15 ENCOUNTER — READMISSION MANAGEMENT (OUTPATIENT)
Dept: CALL CENTER | Facility: HOSPITAL | Age: 44
End: 2024-07-15
Payer: COMMERCIAL

## 2024-07-15 ENCOUNTER — PREP FOR SURGERY (OUTPATIENT)
Dept: OTHER | Facility: HOSPITAL | Age: 44
End: 2024-07-15
Payer: COMMERCIAL

## 2024-07-15 ENCOUNTER — OFFICE VISIT (OUTPATIENT)
Dept: BARIATRICS/WEIGHT MGMT | Facility: CLINIC | Age: 44
End: 2024-07-15
Payer: COMMERCIAL

## 2024-07-15 VITALS
HEIGHT: 67 IN | RESPIRATION RATE: 18 BRPM | HEART RATE: 78 BPM | SYSTOLIC BLOOD PRESSURE: 115 MMHG | DIASTOLIC BLOOD PRESSURE: 79 MMHG | OXYGEN SATURATION: 97 % | WEIGHT: 293 LBS | BODY MASS INDEX: 45.99 KG/M2

## 2024-07-15 DIAGNOSIS — R10.13 EPIGASTRIC PAIN: Primary | ICD-10-CM

## 2024-07-15 DIAGNOSIS — E66.01 OBESITY, CLASS III, BMI 40-49.9 (MORBID OBESITY): Primary | ICD-10-CM

## 2024-07-15 PROCEDURE — 99213 OFFICE O/P EST LOW 20 MIN: CPT | Performed by: SURGERY

## 2024-07-15 RX ORDER — METOCLOPRAMIDE 5 MG/1
TABLET ORAL
COMMUNITY
Start: 2024-07-03

## 2024-07-15 RX ORDER — SODIUM CHLORIDE, SODIUM LACTATE, POTASSIUM CHLORIDE, CALCIUM CHLORIDE 600; 310; 30; 20 MG/100ML; MG/100ML; MG/100ML; MG/100ML
30 INJECTION, SOLUTION INTRAVENOUS CONTINUOUS
Status: CANCELLED | OUTPATIENT
Start: 2024-07-15

## 2024-07-15 RX ORDER — METOCLOPRAMIDE 10 MG/1
10 TABLET ORAL
Qty: 30 TABLET | Refills: 12 | Status: SHIPPED | OUTPATIENT
Start: 2024-07-15

## 2024-07-15 NOTE — OUTREACH NOTE
Medical Week 2 Survey      Flowsheet Row Responses   [facility] patient discharged from? [facility]   Does the patient have one of the following disease processes/diagnoses(primary or secondary)? Other   Week 2 attempt successful? Yes   Call start time 1616   Call end time 1620   List who call center can speak with pt   Meds reviewed with patient/caregiver? Yes   Does the patient have all medications ordered at discharge? Yes   Is the patient taking all medications as directed (includes completed medication regime)? Yes   Comments regarding appointments Pt has had GI f/u. Pt to see pcp on 7-.   Does the patient have a primary care provider?  Yes   Has the patient kept scheduled appointments due by today? Yes   What is the patient's perception of their health status since discharge? Improving   Week 2 Call Completed? Yes   Wrap up additional comments Pt reports improving. Pt continues to have abdominal discomfort. Pt is scheduled to have EGD. Pt to see pcp this week.   Call end time 1620            Amanda CABAN - Registered Nurse

## 2024-07-15 NOTE — H&P (VIEW-ONLY)
GENERAL SURGERY CONSULT      Patient Care Team:  Berenice Jacobson MD as PCP - General  Berenice Jacobson MD as PCP - Family Medicine  Syed Zaragoza MD as Surgeon (General Surgery)    Chief complaint  epigastric pain and fullness  Subjective     Is a 44-year-old lady with a history of laparoscopic gastric banding as well as other surgeries such as cholecystectomy and appendectomy.  She recent was hospitalized after having vomiting which later progressed to diarrhea and it was thought that she either had a partial small bowel obstruction or possibly gastroenteritis.  A CT scan was performed initially and then she had an upper GI with small bowel follow-through and since she was having bowel movements her diet was advanced and she was discharged home.  She says that since her hospitalization she has had no more vomiting but does still have significant fullness of the upper abdomen.  She has been on semaglutide for 2 years and has been on the same dose for about 6 months.  She has a past medical history significant for morbid obesity with a BMI of 46 and also diabetes.  She also has hyperlipidemia.    She was taking Reglan but was only given a few days supply and says that she was tolerating it fine and did think that it was helping some.    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    Anxiety     Arthritis of spine     psoriatic    Asthma     Diabetes mellitus     History of medical problems     Hidradenitis    Hydradenitis     Hyperlipidemia     slight elevation not treated    Hypertension     Low back pain     Migraine     history    MRSA carrier     Pneumonia     Psoriasis     Psoriatic arthritis     Sinus disorder      Past Surgical History:   Procedure Laterality Date    ADENOIDECTOMY       APPENDECTOMY      AXILLARY SURGERY      multiple due to hidradenitis    BREAST BIOPSY       SECTION      CHOLECYSTECTOMY      INCISION AND DRAINAGE OF WOUND      breast    KNEE ARTHRODESIS      MASS EXCISION Right 10/27/2021    Procedure: WIDE EXCISION TRUNK LESION/MASS, hidradenitis of right hip;  Surgeon: Serafin Centeno MD;  Location: [facility] MAIN OR;  Service: General;  Laterality: Right;    SINUS SURGERY      SMALL INTESTINE SURGERY      TONSILLECTOMY      WOUND DEBRIDEMENT Right 2019    Procedure: DEBRIDEMENT OF RECURRENT HIDRADENITIS AND VAC PLACEMENT RIGHT SIDE;  Surgeon: Alix Freire MD;  Location: [facility] MAIN OR;  Service: General     Family History   Problem Relation Age of Onset    Breast cancer Mother     Hypertension Mother     Cancer Mother     Diabetes Mother     Hyperlipidemia Mother     Asthma Mother     Hypertension Father     Hyperlipidemia Father     Stroke Father     Cancer Brother     Breast cancer Maternal Aunt     Breast cancer Maternal Aunt     Heart disease Maternal Grandfather      Social History     Tobacco Use    Smoking status: Never     Passive exposure: Current    Smokeless tobacco: Never   Vaping Use    Vaping status: Never Used   Substance Use Topics    Alcohol use: No    Drug use: No     (Not in a hospital admission)    Allergies:  Calcium channel blockers, Nifedipine, Tetanus antitoxin, Verapamil, Adhesive tape, Diltiazem, and Tape    Objective     Vital Signs  Heart Rate:  [78] 78  Resp:  [18] 18  BP: (115)/(79) 115/79    Physical Exam:      General Appearance:    Alert, cooperative, in no acute distress   Head:    Normocephalic, without obvious abnormality, atraumatic,    Eyes:            Lids and lashes normal, conjunctivae and sclerae normal, no   icterus, no pallor, corneas clear, PERRLA   Ears:    Ears appear intact with no abnormalities noted   Throat:   No oral lesions, no thrush, oral mucosa moist   Neck:   No adenopathy, supple, trachea midline, no  thyromegaly, no   carotid bruit, no JVD   Back:     No kyphosis present, no scoliosis present, no skin lesions,      erythema or scars, no tenderness to percussion or                   palpation,   range of motion normal   Lungs:     Clear to auscultation,respirations regular, even and                  unlabored    Heart:    Regular rhythm and normal rate, normal S1 and S2, no            murmur, no gallop, no rub, no click   Chest Wall:    No abnormalities observed   Abdomen:   Mildly tender in the epigastrium   Rectal:     Deferred   Extremities: No edema, good ROM   Pulses:   Pulses palpable and equal bilaterally   Skin:   No bleeding, bruising or rash   Lymph nodes:   No palpable adenopathy   Neurologic:   Cranial nerves 2 - 12 grossly intact, sensation intact, DTR       present and equal bilaterally     Lab Results (last 24 hours)       ** No results found for the last 24 hours. **            Imaging Results (Last 24 Hours)       ** No results found for the last 24 hours. **            Results Review:    I reviewed the patient's new clinical results.  I reviewed the patient's new imaging results and agree with the interpretation.    Assessment & Plan       Morbid obesity BMI 46  Hyperlipidemia diabetes    44-year-old lady presents with epigastric pain and recent hospitalization in which imaging demonstrated partial small bowel obstruction or possibly gastroenteritis.  She has a history of laparoscopic gastric banding.  I believe it is possible she could also have an element of gastroparesis.  She has had some abdominal surgeries so she could have adhesions that are interfering with the peristalsis of her bowel.      Plan for upper endoscopy to further evaluate.  Will prescribe Reglan.  Reevaluate in 1 month.            Ciera Cota MD  07/15/24  09:19 EDT

## 2024-07-15 NOTE — PROGRESS NOTES
GENERAL SURGERY CONSULT      Patient Care Team:  Berenice Jacobson MD as PCP - General  Berenice Jacobson MD as PCP - Family Medicine  Syed Zaragoza MD as Surgeon (General Surgery)    Chief complaint  epigastric pain and fullness  Subjective     Is a 44-year-old lady with a history of laparoscopic gastric banding as well as other surgeries such as cholecystectomy and appendectomy.  She recent was hospitalized after having vomiting which later progressed to diarrhea and it was thought that she either had a partial small bowel obstruction or possibly gastroenteritis.  A CT scan was performed initially and then she had an upper GI with small bowel follow-through and since she was having bowel movements her diet was advanced and she was discharged home.  She says that since her hospitalization she has had no more vomiting but does still have significant fullness of the upper abdomen.  She has been on semaglutide for 2 years and has been on the same dose for about 6 months.  She has a past medical history significant for morbid obesity with a BMI of 46 and also diabetes.  She also has hyperlipidemia.    She was taking Reglan but was only given a few days supply and says that she was tolerating it fine and did think that it was helping some.    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    Anxiety     Arthritis of spine     psoriatic    Asthma     Diabetes mellitus     History of medical problems     Hidradenitis    Hydradenitis     Hyperlipidemia     slight elevation not treated    Hypertension     Low back pain     Migraine     history    MRSA carrier     Pneumonia     Psoriasis     Psoriatic arthritis     Sinus disorder      Past Surgical History:   Procedure Laterality Date    ADENOIDECTOMY       APPENDECTOMY      AXILLARY SURGERY      multiple due to hidradenitis    BREAST BIOPSY       SECTION      CHOLECYSTECTOMY      INCISION AND DRAINAGE OF WOUND      breast    KNEE ARTHRODESIS      MASS EXCISION Right 10/27/2021    Procedure: WIDE EXCISION TRUNK LESION/MASS, hidradenitis of right hip;  Surgeon: Serafin Centeno MD;  Location: [facility] MAIN OR;  Service: General;  Laterality: Right;    SINUS SURGERY      SMALL INTESTINE SURGERY      TONSILLECTOMY      WOUND DEBRIDEMENT Right 2019    Procedure: DEBRIDEMENT OF RECURRENT HIDRADENITIS AND VAC PLACEMENT RIGHT SIDE;  Surgeon: Alix Freire MD;  Location: [facility] MAIN OR;  Service: General     Family History   Problem Relation Age of Onset    Breast cancer Mother     Hypertension Mother     Cancer Mother     Diabetes Mother     Hyperlipidemia Mother     Asthma Mother     Hypertension Father     Hyperlipidemia Father     Stroke Father     Cancer Brother     Breast cancer Maternal Aunt     Breast cancer Maternal Aunt     Heart disease Maternal Grandfather      Social History     Tobacco Use    Smoking status: Never     Passive exposure: Current    Smokeless tobacco: Never   Vaping Use    Vaping status: Never Used   Substance Use Topics    Alcohol use: No    Drug use: No     (Not in a hospital admission)    Allergies:  Calcium channel blockers, Nifedipine, Tetanus antitoxin, Verapamil, Adhesive tape, Diltiazem, and Tape    Objective     Vital Signs  Heart Rate:  [78] 78  Resp:  [18] 18  BP: (115)/(79) 115/79    Physical Exam:      General Appearance:    Alert, cooperative, in no acute distress   Head:    Normocephalic, without obvious abnormality, atraumatic,    Eyes:            Lids and lashes normal, conjunctivae and sclerae normal, no   icterus, no pallor, corneas clear, PERRLA   Ears:    Ears appear intact with no abnormalities noted   Throat:   No oral lesions, no thrush, oral mucosa moist   Neck:   No adenopathy, supple, trachea midline, no  thyromegaly, no   carotid bruit, no JVD   Back:     No kyphosis present, no scoliosis present, no skin lesions,      erythema or scars, no tenderness to percussion or                   palpation,   range of motion normal   Lungs:     Clear to auscultation,respirations regular, even and                  unlabored    Heart:    Regular rhythm and normal rate, normal S1 and S2, no            murmur, no gallop, no rub, no click   Chest Wall:    No abnormalities observed   Abdomen:   Mildly tender in the epigastrium   Rectal:     Deferred   Extremities: No edema, good ROM   Pulses:   Pulses palpable and equal bilaterally   Skin:   No bleeding, bruising or rash   Lymph nodes:   No palpable adenopathy   Neurologic:   Cranial nerves 2 - 12 grossly intact, sensation intact, DTR       present and equal bilaterally     Lab Results (last 24 hours)       ** No results found for the last 24 hours. **            Imaging Results (Last 24 Hours)       ** No results found for the last 24 hours. **            Results Review:    I reviewed the patient's new clinical results.  I reviewed the patient's new imaging results and agree with the interpretation.    Assessment & Plan       Morbid obesity BMI 46  Hyperlipidemia diabetes    44-year-old lady presents with epigastric pain and recent hospitalization in which imaging demonstrated partial small bowel obstruction or possibly gastroenteritis.  She has a history of laparoscopic gastric banding.  I believe it is possible she could also have an element of gastroparesis.  She has had some abdominal surgeries so she could have adhesions that are interfering with the peristalsis of her bowel.      Plan for upper endoscopy to further evaluate.  Will prescribe Reglan.  Reevaluate in 1 month.            Ciera Cota MD  07/15/24  09:19 EDT

## 2024-07-16 ENCOUNTER — PATIENT ROUNDING (BHMG ONLY) (OUTPATIENT)
Dept: BARIATRICS/WEIGHT MGMT | Facility: CLINIC | Age: 44
End: 2024-07-16
Payer: COMMERCIAL

## 2024-07-17 ENCOUNTER — ANESTHESIA EVENT (OUTPATIENT)
Dept: GASTROENTEROLOGY | Facility: HOSPITAL | Age: 44
End: 2024-07-17
Payer: COMMERCIAL

## 2024-07-17 RX ORDER — SODIUM CHLORIDE 9 MG/ML
9 INJECTION, SOLUTION INTRAVENOUS CONTINUOUS PRN
Status: CANCELLED | OUTPATIENT
Start: 2024-07-17

## 2024-07-17 RX ORDER — SODIUM CHLORIDE 0.9 % (FLUSH) 0.9 %
10 SYRINGE (ML) INJECTION EVERY 12 HOURS SCHEDULED
Status: CANCELLED | OUTPATIENT
Start: 2024-07-17

## 2024-07-17 RX ORDER — SODIUM CHLORIDE 0.9 % (FLUSH) 0.9 %
10 SYRINGE (ML) INJECTION AS NEEDED
Status: CANCELLED | OUTPATIENT
Start: 2024-07-17

## 2024-07-18 ENCOUNTER — HOSPITAL ENCOUNTER (OUTPATIENT)
Facility: HOSPITAL | Age: 44
Setting detail: HOSPITAL OUTPATIENT SURGERY
Discharge: HOME | End: 2024-07-18
Attending: SURGERY | Admitting: SURGERY
Payer: COMMERCIAL

## 2024-07-18 ENCOUNTER — ANESTHESIA (OUTPATIENT)
Dept: GASTROENTEROLOGY | Facility: HOSPITAL | Age: 44
End: 2024-07-18
Payer: COMMERCIAL

## 2024-07-18 VITALS
HEART RATE: 78 BPM | OXYGEN SATURATION: 99 % | BODY MASS INDEX: 47.09 KG/M2 | RESPIRATION RATE: 20 BRPM | WEIGHT: 293 LBS | HEIGHT: 66 IN | TEMPERATURE: 98.6 F | DIASTOLIC BLOOD PRESSURE: 76 MMHG | SYSTOLIC BLOOD PRESSURE: 142 MMHG

## 2024-07-18 LAB
B-HCG UR QL: NEGATIVE
GLUCOSE BLDC GLUCOMTR-MCNC: 117 MG/DL (ref 70–105)

## 2024-07-18 PROCEDURE — 82948 REAGENT STRIP/BLOOD GLUCOSE: CPT

## 2024-07-18 PROCEDURE — 81025 URINE PREGNANCY TEST: CPT | Performed by: SURGERY

## 2024-07-18 PROCEDURE — 25010000002 PROPOFOL 200 MG/20ML EMULSION: Performed by: NURSE ANESTHETIST, CERTIFIED REGISTERED

## 2024-07-18 PROCEDURE — 25810000003 SODIUM CHLORIDE 0.9 % SOLUTION: Performed by: NURSE ANESTHETIST, CERTIFIED REGISTERED

## 2024-07-18 RX ORDER — SODIUM CHLORIDE 9 MG/ML
INJECTION, SOLUTION INTRAVENOUS CONTINUOUS PRN
Status: DISCONTINUED | OUTPATIENT
Start: 2024-07-18 | End: 2024-07-18 | Stop reason: SURG

## 2024-07-18 RX ORDER — PROPOFOL 10 MG/ML
INJECTION, EMULSION INTRAVENOUS AS NEEDED
Status: DISCONTINUED | OUTPATIENT
Start: 2024-07-18 | End: 2024-07-18 | Stop reason: SURG

## 2024-07-18 RX ORDER — LIDOCAINE HYDROCHLORIDE 20 MG/ML
INJECTION, SOLUTION EPIDURAL; INFILTRATION; INTRACAUDAL; PERINEURAL AS NEEDED
Status: DISCONTINUED | OUTPATIENT
Start: 2024-07-18 | End: 2024-07-18 | Stop reason: SURG

## 2024-07-18 RX ADMIN — SODIUM CHLORIDE: 9 INJECTION, SOLUTION INTRAVENOUS at 14:03

## 2024-07-18 RX ADMIN — PROPOFOL 10 MG: 10 INJECTION, EMULSION INTRAVENOUS at 14:10

## 2024-07-18 RX ADMIN — PROPOFOL 20 MG: 10 INJECTION, EMULSION INTRAVENOUS at 14:11

## 2024-07-18 RX ADMIN — PROPOFOL 30 MG: 10 INJECTION, EMULSION INTRAVENOUS at 14:08

## 2024-07-18 RX ADMIN — LIDOCAINE HYDROCHLORIDE 100 MG: 20 INJECTION, SOLUTION EPIDURAL; INFILTRATION; INTRACAUDAL; PERINEURAL at 14:07

## 2024-07-18 RX ADMIN — PROPOFOL 100 MG: 10 INJECTION, EMULSION INTRAVENOUS at 14:07

## 2024-07-18 RX ADMIN — PROPOFOL 40 MG: 10 INJECTION, EMULSION INTRAVENOUS at 14:09

## 2024-07-18 NOTE — ANESTHESIA PREPROCEDURE EVALUATION
Anesthesia Evaluation     Patient summary reviewed and Nursing notes reviewed   no history of anesthetic complications:   NPO Solid Status: > 8 hours  NPO Liquid Status: > 2 hours           Airway   Dental      Pulmonary    (+) pneumonia , asthma,sleep apnea  Cardiovascular     ECG reviewed  Patient on routine beta blocker    (+) hypertension, valvular problems/murmurs MR, hyperlipidemia      Neuro/Psych  (+) headaches, psychiatric history Anxiety  GI/Hepatic/Renal/Endo    (+) morbid obesity, GERD, diabetes mellitus    Musculoskeletal     (+) back pain  Abdominal    Substance History      OB/GYN          Other   arthritis,     ROS/Med Hx Other: Additional History:  Hyperglycemia, abd pain, h/o SBO, allergies, hidradenitis    Semaglutide use    Echo:  Normal LV size and contractility EF of 60 to 65%  Normal RV size  Normal atrial size  Aortic valve, mitral valve, tricuspid valve appears structurally normal, mild mitral regurgitation seen.  No pericardial effusion seen.  Proximal aorta appears normal in size.      Stress Test:  · Findings consistent with a normal ECG stress test.  · Left ventricular ejection fraction is normal (Calculated EF = 63%).  · Myocardial perfusion imaging indicates a normal myocardial perfusion study with no evidence of ischemia.  · Impressions are consistent with a low risk study.        PSH:  APPENDECTOMY CHOLECYSTECTOMY  KNEE ARTHRODESIS  SECTION  TONSILLECTOMY ADENOIDECTOMY  INCISION AND DRAINAGE OF WOUND WOUND DEBRIDEMENT  BREAST BIOPSY SINUS SURGERY  AXILLARY SURGERY MASS EXCISION  SMALL INTESTINE SURGERY LAPAROSCOPIC GASTRIC BANDING                Anesthesia Plan    ASA 3     general   total IV anesthesia  (Patient identified; pre-operative vital signs, all relevant labs/studies, complete medical/surgical/anesthetic history, full medication list, full allergy list, and NPO status obtained/reviewed; physical assessment performed; anesthetic options, side effects, potential  complications, risks, and benefits discussed; questions answered; written anesthesia consent obtained; patient cleared for procedure; anesthesia machine and equipment checked and functioning)  intravenous induction     Anesthetic plan, risks, benefits, and alternatives have been provided, discussed and informed consent has been obtained with: patient.    Plan discussed with CRNA and CAA.    CODE STATUS:

## 2024-07-18 NOTE — OP NOTE
ESOPHAGOGASTRODUODENOSCOPY  Procedure Report    Patient Name:  Bernadine Arriaga  YOB: 1980    Date of Surgery:  7/18/2024     Indications: This is a 44-year-old lady with a history of laparoscopic gastric banding who was recently admitted to the hospital for abdominal pain and vomiting.  She continues to have some generalized fullness of her abdomen after she eats.  I have prescribed Reglan to her on an outpatient basis and this has improved some.    Pre-op Diagnosis:   Epigastric pain [R10.13]       Post-Op Diagnosis Codes:     * Epigastric pain [R10.13]    Procedure/CPT® Codes:      Procedure(s):  ESOPHAGOGASTRODUODENOSCOPY WITH BIOPSY,    Staff:  Surgeon(s):  Ciera Cota MD           was responsible for performing the following activities: Retraction, Suction, Irrigation, Suturing, Closing, and Placing Dressing and their skilled assistance was necessary for the success of this case.   Anesthesia: Monitored Anesthesia Care    Estimated Blood Loss: minimal    Implants:    Nothing was implanted during the procedure    Specimen:          None        Findings: Normal  none    Complications: none    Description of Procedure:   Sedation was administered and the scope was passed into the posterior pharynx and into the esophagus.  The esophagus appeared normal.  The scope was then passed into the stomach and the gastric band could be seen and looked to be in appropriate position with no evidence of erosion.  In the stomach there was no gastritis or any other abnormality seen.  The scope was then passed through the pylorus into the duodenum and no abnormality was seen.  The scope was then removed.    Ciera Cota MD     Date: 7/18/2024  Time: 14:13 EDT

## 2024-07-18 NOTE — ANESTHESIA POSTPROCEDURE EVALUATION
Patient: Bernadine Arriaga    Procedure Summary       Date: 07/18/24 Room / Location: [facility] ENDOSCOPY 4 / [facility] ENDOSCOPY    Anesthesia Start: 1403 Anesthesia Stop: 1417    Procedure: ESOPHAGOGASTRODUODENOSCOPY WITH BIOPSY, Diagnosis:       Epigastric pain      (Epigastric pain [R10.13])    Surgeons: Ciera Cota MD Provider: Franco Olson MD    Anesthesia Type: general ASA Status: 3            Anesthesia Type: general    Vitals  Vitals Value Taken Time   /76 07/18/24 1428   Temp     Pulse 80 07/18/24 1430   Resp 20 07/18/24 1428   SpO2 99 % 07/18/24 1430   Vitals shown include unfiled device data.        Post Anesthesia Care and Evaluation    Patient location during evaluation: PACU  Patient participation: complete - patient participated  Level of consciousness: awake  Pain scale: See nurse's notes for pain score.  Pain management: adequate    Airway patency: patent  Anesthetic complications: No anesthetic complications  PONV Status: none  Cardiovascular status: acceptable  Respiratory status: acceptable and spontaneous ventilation  Hydration status: acceptable    Comments: Patient seen and examined postoperatively; vital signs stable; SpO2 greater than or equal to 90%; cardiopulmonary status stable; nausea/vomiting adequately controlled; pain adequately controlled; no apparent anesthesia complications; patient discharged from anesthesia care when discharge criteria were met

## 2024-07-23 ENCOUNTER — OFFICE VISIT (OUTPATIENT)
Dept: FAMILY MEDICINE CLINIC | Facility: CLINIC | Age: 44
End: 2024-07-23
Payer: COMMERCIAL

## 2024-07-23 VITALS
HEIGHT: 66 IN | DIASTOLIC BLOOD PRESSURE: 84 MMHG | OXYGEN SATURATION: 98 % | HEART RATE: 76 BPM | TEMPERATURE: 98.2 F | SYSTOLIC BLOOD PRESSURE: 142 MMHG | BODY MASS INDEX: 47.09 KG/M2 | WEIGHT: 293 LBS

## 2024-07-23 DIAGNOSIS — E11.65 TYPE 2 DIABETES MELLITUS WITH HYPERGLYCEMIA, WITHOUT LONG-TERM CURRENT USE OF INSULIN: Primary | ICD-10-CM

## 2024-07-23 PROCEDURE — 99213 OFFICE O/P EST LOW 20 MIN: CPT | Performed by: NURSE PRACTITIONER

## 2024-07-23 RX ORDER — SECUKINUMAB 150 MG/ML
INJECTION SUBCUTANEOUS
COMMUNITY
Start: 2024-07-16

## 2024-07-23 RX ORDER — DOXYCYCLINE HYCLATE 100 MG/1
CAPSULE ORAL
COMMUNITY
Start: 2024-07-17

## 2024-07-23 NOTE — PROGRESS NOTES
Chief Complaint  medication  ( wanted to see if you could change the ozempic )    Subjective        Bernadine Arriaga presents to [facility]  History of Present Illness  Bernadine is a 44 year old female presenting today to discuss her diabetes medication. She recently was hospitalized for questionable SBO versus ileus versus gastroenteritis based on CT scan findings. Her bariatric surgeon voiced concern with Ozempic possibly being a factor in her recent illness. She is also taking glimepiride 4mg BID. She has tried metformin in the past and it caused severe GI upset.        The following portions of the patient's history were reviewed and updated as appropriate: allergies, current medications, past family history, past medical history, past social history, past surgical history and problem list.    Allergies   Allergen Reactions    Calcium Channel Blockers Palpitations, Other (See Comments) and Rash     Tachycardia and redness.    INCREASED HR   Tachycardia and redness.    Nifedipine Palpitations    Tetanus Antitoxin Swelling    Verapamil Palpitations    Adhesive Tape Other (See Comments)     Tape will cause skin breakdown rapidly   Can use silicone tape    Diltiazem Other (See Comments)    Tape Other (See Comments)     Tape will cause skin breakdown rapidly   Can use silicone tape       Patient Active Problem List   Diagnosis    Hidradenitis suppurativa    Asthma    Type 2 diabetes mellitus with hyperglycemia    Hyperlipidemia    Hypertension    Morbid obesity with BMI of 45.0-49.9, adult    Cutaneous abscess of buttock    Abdominal pain    SBO (small bowel obstruction)       Current Outpatient Medications   Medication Instructions    acetaminophen (TYLENOL) 1,000 mg, Oral, As Needed, dont take preop    albuterol sulfate  (90 Base) MCG/ACT inhaler 2 puffs, Inhalation, Every 6 Hours PRN    baclofen (LIORESAL) 10 mg, Oral, 3 Times Daily PRN    betamethasone  "dipropionate (DIPROLENE) 0.05 % lotion 1 application , Topical, 2 Times Daily PRN, dont use after bathing started    betamethasone, augmented, (DIPROLENE) 0.05 % cream PLEASE SEE ATTACHED FOR DETAILED DIRECTIONS    budesonide-formoterol (SYMBICORT) 80-4.5 MCG/ACT inhaler 2 puffs, Inhalation, 2 Times Daily - RT    clindamycin (CLEOCIN T) 1 % lotion 1 application , Topical, 2 Times Daily PRN, dont use once bathing started    cloNIDine (CATAPRES) 0.1 mg, Oral, 2 Times Daily    Cosentyx Sensoready Pen 150 MG/ML solution auto-injector     doxycycline (VIBRAMYCIN) 100 MG capsule     empagliflozin (JARDIANCE) 10 mg, Oral, Daily    EPINEPHrine (EPIPEN) 0.3 mg, Intramuscular, As Needed    etodolac (LODINE) 500 mg, Oral, 2 Times Daily    ezetimibe (ZETIA) 10 mg, Oral, Daily    fenofibrate 160 mg, Oral, Daily    furosemide (LASIX) 20 mg, Oral, 2 Times Daily    glimepiride (AMARYL) 4 mg, Oral, 2 Times Daily With Meals    hydrOXYzine (ATARAX) 25 mg, Oral, Every 6 Hours PRN    metoclopramide (REGLAN) 10 mg, Oral, 4 Times Daily Before Meals & Nightly    metoprolol succinate XL (TOPROL-XL) 50 MG 24 hr tablet TAKE TWO TABLETS BY MOUTH EVERY MORNING AND ONE EVERY NIGHT AT BEDTIME    mupirocin (BACTROBAN) 2 % ointment Apply 1 Application topically to the appropriate area as directed 3 (Three) Times a Day As Needed.    olmesartan (BENICAR) 40 mg, Oral, Daily    PARoxetine (PAXIL) 10 mg, Oral, Every Morning    sulfaSALAzine (AZULFIDINE) 1,500 mg, Oral, 2 Times Daily, Last dose am 10/26          Objective   Vital Signs:  /84 (BP Location: Left arm, Patient Position: Sitting, Cuff Size: Large Adult)   Pulse 76   Temp 98.2 °F (36.8 °C) (Temporal)   Ht 167.6 cm (66\")   Wt 135 kg (298 lb)   SpO2 98%   BMI 48.10 kg/m²   Estimated body mass index is 48.1 kg/m² as calculated from the following:    Height as of this encounter: 167.6 cm (66\").    Weight as of this encounter: 135 kg (298 lb).               Review of Systems "     Physical Exam  Constitutional:       Appearance: Normal appearance.   Cardiovascular:      Rate and Rhythm: Normal rate and regular rhythm.   Pulmonary:      Effort: Pulmonary effort is normal.      Breath sounds: Normal breath sounds.   Neurological:      Mental Status: She is alert and oriented to person, place, and time.   Psychiatric:         Mood and Affect: Mood normal.         Behavior: Behavior normal.         Thought Content: Thought content normal.         Judgment: Judgment normal.        Result Review :                   Assessment and Plan   Diagnoses and all orders for this visit:    1. Type 2 diabetes mellitus with hyperglycemia, without long-term current use of insulin (Primary)  Comments:  cont glimepiride 4mg BID  discontinue ozempic  start jardiance 10mg daily. may increase to 25mg if needed  cont monitor BS at home  call with any concerns    Other orders  -     empagliflozin (Jardiance) 10 MG tablet tablet; Take 1 tablet by mouth Daily.  Dispense: 30 tablet; Refill: 0             Follow Up   No follow-ups on file.  Patient was given instructions and counseling regarding her condition or for health maintenance advice. Please see specific information pulled into the AVS if appropriate.

## 2024-07-24 ENCOUNTER — TELEPHONE (OUTPATIENT)
Dept: FAMILY MEDICINE CLINIC | Facility: CLINIC | Age: 44
End: 2024-07-24
Payer: COMMERCIAL

## 2024-07-24 NOTE — TELEPHONE ENCOUNTER
Prior Authorization for Jardiance 10MG tablets APPROVED.     This is to inform you that your Prior Authorization request for the above member’s Jardiance 10 MG OR TABS has been approved. If you are changing the member's therapy the previously approved therapy will be canceled and replaced.    The authorization is valid from 06/24/2024 through 07/24/2025. A letter of explanation will also be mailed to the patient.    Approvals may be subject to dosing limits in accordance with FDA approved labeling, accepted compendia, evidence-based practice guidelines or your prescription drug plan benefits.      Faxed approval to pharmacy.

## 2024-07-24 NOTE — TELEPHONE ENCOUNTER
Prior Auth completed for Jardiance 10MG tablets via covermymeds. Pending determination within 72 hours minium.   (Key: [id number])  Rx #: [id number]  PA Case ID #: [id number]    Form  [employer] Electronic PA Form (2017 [employer])

## 2024-08-19 ENCOUNTER — OFFICE VISIT (OUTPATIENT)
Dept: BARIATRICS/WEIGHT MGMT | Facility: CLINIC | Age: 44
End: 2024-08-19
Payer: COMMERCIAL

## 2024-08-19 VITALS
OXYGEN SATURATION: 99 % | DIASTOLIC BLOOD PRESSURE: 80 MMHG | SYSTOLIC BLOOD PRESSURE: 118 MMHG | RESPIRATION RATE: 18 BRPM | WEIGHT: 293 LBS | BODY MASS INDEX: 47.09 KG/M2 | HEART RATE: 87 BPM | HEIGHT: 66 IN

## 2024-08-19 DIAGNOSIS — E66.01 OBESITY, CLASS III, BMI 40-49.9 (MORBID OBESITY): Primary | ICD-10-CM

## 2024-08-19 PROCEDURE — 99213 OFFICE O/P EST LOW 20 MIN: CPT | Performed by: SURGERY

## 2024-08-19 RX ORDER — PHENTERMINE HYDROCHLORIDE 37.5 MG/1
37.5 TABLET ORAL
Qty: 30 TABLET | Refills: 0 | Status: SHIPPED | OUTPATIENT
Start: 2024-08-19

## 2024-08-19 RX ORDER — HYDROCODONE BITARTRATE AND ACETAMINOPHEN 5; 325 MG/1; MG/1
TABLET ORAL
COMMUNITY
Start: 2024-08-12

## 2024-08-19 NOTE — PROGRESS NOTES
[facility] BARIATRIC SURGERY  [address] [place] [address]  [address] [place] [address]  Dept: [phone]  8/19/2024      Bernadine Arriaga.  [id number]  [id number]  1980  female    Date of last surgery: 7/18/2024Esophagogastroduodenoscopy With Biopsy,      Chief Complaint: BH Post-Op Bariatric Surgery:   Bernadine Arriaga is status post procedure listed above  HPI: s/p lap band in 2010. She never lost weight with it but never had fluid in the band.  She was hospitalized a couple of months ago for abdominal pain and partial bowel obstruction.  She was treated nonoperatively and stopped taking Ozempic and since then has really had no problems.  She is interested in assistance with weight loss.    Wt Readings from Last 10 Encounters:   08/19/24 135 kg (296 lb 11.2 oz)   07/23/24 135 kg (298 lb)   07/18/24 135 kg (296 lb 8.3 oz)   07/15/24 135 kg (297 lb 3.2 oz)   06/28/24 135 kg (298 lb 1 oz)   06/28/24 135 kg (298 lb 6.4 oz)   05/31/24 (!) 139 kg (307 lb)   12/05/23 133 kg (294 lb)   10/18/23 (!) 137 kg (301 lb)   09/13/23 (!) 139 kg (307 lb)          Review of Systems    Review of Systems   Constitutional: Negative.    HENT: Negative.    Eyes: Negative.    Respiratory: Negative.    Cardiovascular: Negative.    Gastrointestinal: Negative.    Endocrine: Negative.    Genitourinary: Negative.    Musculoskeletal: Negative.    Skin: Negative.    Allergic/Immunologic: Negative.    Neurological: Negative.    Hematological: Negative.    Psychiatric/Behavioral: Negative.    Patient Active Problem List   Diagnosis    Hidradenitis suppurativa    Asthma    Type 2 diabetes mellitus with hyperglycemia    Hyperlipidemia    Hypertension    Morbid obesity with BMI of 45.0-49.9, adult    Cutaneous abscess of buttock    Abdominal pain    SBO (small bowel obstruction)       Past Medical History:   Diagnosis Date    Allergic     Anaphylactic  reaction     to combo of ragweed and molds    Anxiety     Arthritis of spine     psoriatic    Asthma     Diabetes mellitus     History of medical problems     Hidradenitis    Hydradenitis     Hyperlipidemia     slight elevation not treated    Hypertension     Low back pain     Migraine     history    MRSA carrier     Pneumonia     Psoriasis     Psoriatic arthritis     Sinus disorder     Sleep apnea      Past Surgical History:   Procedure Laterality Date    LAPAROSCOPIC GASTRIC BANDING  2008    WOUND DEBRIDEMENT Right 2019    Procedure: DEBRIDEMENT OF RECURRENT HIDRADENITIS AND VAC PLACEMENT RIGHT SIDE;  Surgeon: Alix Freire MD;  Location: [facility] MAIN OR;  Service: General    MASS EXCISION Right 10/27/2021    Procedure: WIDE EXCISION TRUNK LESION/MASS, hidradenitis of right hip;  Surgeon: Serafin Centeno MD;  Location: [facility] MAIN OR;  Service: General;  Laterality: Right;    ENDOSCOPY N/A 2024    Procedure: ESOPHAGOGASTRODUODENOSCOPY WITH BIOPSY,;  Surgeon: Ciera Cota MD;  Location: [facility] ENDOSCOPY;  Service: General;  Laterality: N/A;  post: NORMALEGD    ADENOIDECTOMY      APPENDECTOMY      AXILLARY SURGERY      multiple due to hidradenitis    BREAST BIOPSY       SECTION      CHOLECYSTECTOMY      INCISION AND DRAINAGE OF WOUND      breast    KNEE ARTHRODESIS      SINUS SURGERY      SMALL INTESTINE SURGERY      TONSILLECTOMY        The following portions of the patient's history were reviewed and updated as appropriate: allergies, current medications, past family history, past medical history, past social history, past surgical history, and problem list.    Vitals:    24 0835   BP: 118/80   Pulse: 87   Resp: 18   SpO2: 99%       Physical Exam  Awake and alert  Normal mental status  Normal pulmonary effort  Abdomen appropriate tenderness  Incisions no erythema  Extremities no tenderness or swelling      Assessment:     Morbid obesity BMI 47 history of Lap-Band surgery  Diabetes  hypertension hypercholesterolemia  Metabolic syndrome disorder          Post-op, the patient is doing better with her abdominal pain since she has stopped Ozempic.    She continues to struggle with weight loss..     Plan:   I am going to prescribe phentermine and also today under sterile technique I inserted 3 cc of saline into her Lap-Band under sterile technique.    Encouraged patient to be sure to get plenty of lean protein per day through small frequent meals all with a protein source.   Activity restrictions: none.   Recommended patient be sure to get at least 70 grams of protein per day by eating small, frequent meals all with high lean protein choices. Be sure to limit/cut back on daily carbohydrate intake. Discussed with the patient the recommended amount of water per day to intake- half of body weight in ounces. Reviewed vitamin requirements. Be sure to do routine exercise, 150 minutes per week minimum, including both cardio and strength training.     Instructions / Recommendations: dietary counseling recommended, recommended a daily protein intake of  grams, vitamin supplement(s) recommended, recommended exercising at least 150 minutes per week, behavior modifications recommended and instructed to call the office for concerns, questions, or problems.     The patient was instructed to follow up in 1 months.     The patient was counseled regarding. Total time spent face to face was 15 minutes and 15 minutes was spent counseling.

## 2024-08-20 ENCOUNTER — TELEPHONE (OUTPATIENT)
Dept: BARIATRICS/WEIGHT MGMT | Facility: CLINIC | Age: 44
End: 2024-08-20
Payer: COMMERCIAL

## 2024-08-20 RX ORDER — EMPAGLIFLOZIN 10 MG/1
10 TABLET, FILM COATED ORAL DAILY
Qty: 30 TABLET | Refills: 0 | OUTPATIENT
Start: 2024-08-20

## 2024-08-21 RX ORDER — EMPAGLIFLOZIN 10 MG/1
10 TABLET, FILM COATED ORAL DAILY
Qty: 30 TABLET | Refills: 0 | OUTPATIENT
Start: 2024-08-21

## 2024-09-11 ENCOUNTER — TELEPHONE (OUTPATIENT)
Dept: BARIATRICS/WEIGHT MGMT | Facility: CLINIC | Age: 44
End: 2024-09-11
Payer: COMMERCIAL

## 2024-09-11 DIAGNOSIS — E11.65 TYPE 2 DIABETES MELLITUS WITH HYPERGLYCEMIA, WITHOUT LONG-TERM CURRENT USE OF INSULIN: Primary | ICD-10-CM

## 2024-09-11 NOTE — TELEPHONE ENCOUNTER
PT LEFT VM STATING SHE NEEDED TO RESCHEDULED 9.13.24 APPT     LEFT VM FOR PT TO CALL OFFICE PT NEEDS RESCHEDULED LAP BAND CLAUDINE /1MO FU WITH DR TRUJILLO

## 2024-09-12 DIAGNOSIS — E11.65 TYPE 2 DIABETES MELLITUS WITH HYPERGLYCEMIA, WITHOUT LONG-TERM CURRENT USE OF INSULIN: Primary | ICD-10-CM

## 2024-09-20 ENCOUNTER — OFFICE VISIT (OUTPATIENT)
Dept: BARIATRICS/WEIGHT MGMT | Facility: CLINIC | Age: 44
End: 2024-09-20
Payer: COMMERCIAL

## 2024-09-20 ENCOUNTER — PREP FOR SURGERY (OUTPATIENT)
Dept: OTHER | Facility: HOSPITAL | Age: 44
End: 2024-09-20
Payer: COMMERCIAL

## 2024-09-20 VITALS
OXYGEN SATURATION: 97 % | DIASTOLIC BLOOD PRESSURE: 83 MMHG | RESPIRATION RATE: 18 BRPM | SYSTOLIC BLOOD PRESSURE: 125 MMHG | WEIGHT: 293 LBS | HEART RATE: 91 BPM | HEIGHT: 66 IN | BODY MASS INDEX: 47.09 KG/M2

## 2024-09-20 DIAGNOSIS — E66.01 OBESITY, CLASS III, BMI 40-49.9 (MORBID OBESITY): Primary | ICD-10-CM

## 2024-09-20 PROBLEM — E66.813 OBESITY, CLASS III, BMI 40-49.9 (MORBID OBESITY): Status: ACTIVE | Noted: 2024-09-20

## 2024-09-20 RX ORDER — CLOBETASOL PROPIONATE 0.5 MG/G
OINTMENT TOPICAL
Status: ON HOLD | COMMUNITY
Start: 2024-08-23

## 2024-09-20 RX ORDER — FOLIC ACID 1 MG/1
1 TABLET ORAL DAILY
Status: ON HOLD | COMMUNITY
Start: 2024-09-16

## 2024-09-20 RX ORDER — SODIUM CHLORIDE, SODIUM LACTATE, POTASSIUM CHLORIDE, CALCIUM CHLORIDE 600; 310; 30; 20 MG/100ML; MG/100ML; MG/100ML; MG/100ML
30 INJECTION, SOLUTION INTRAVENOUS CONTINUOUS
OUTPATIENT
Start: 2024-09-20

## 2024-09-20 RX ORDER — DOXYCYCLINE 100 MG/1
TABLET ORAL
Status: ON HOLD | COMMUNITY
Start: 2024-08-21

## 2024-09-20 RX ORDER — METHOTREXATE 2.5 MG/1
TABLET ORAL
Status: ON HOLD | COMMUNITY
Start: 2024-09-16

## 2024-09-20 RX ORDER — PREDNISONE 20 MG/1
TABLET ORAL
Status: ON HOLD | COMMUNITY
Start: 2024-09-03

## 2024-09-20 NOTE — H&P (VIEW-ONLY)
[facility] BARIATRIC SURGERY  [address] [place] [address]  [address] [place] [address]  Dept: [phone]  9/20/2024      Bernadine Arriaga.  [id number]  [id number]  1980  female    Date of last surgery: 7/18/2024Esophagogastroduodenoscopy With Biopsy,      Chief Complaint: BH Post-Op Bariatric Surgery:   Bernadine Arriaga is status post procedure listed above  HPI:     Wt Readings from Last 10 Encounters:   09/20/24 134 kg (294 lb 8 oz)   08/19/24 135 kg (296 lb 11.2 oz)   07/23/24 135 kg (298 lb)   07/18/24 135 kg (296 lb 8.3 oz)   07/15/24 135 kg (297 lb 3.2 oz)   06/28/24 135 kg (298 lb 1 oz)   06/28/24 135 kg (298 lb 6.4 oz)   05/31/24 (!) 139 kg (307 lb)   12/05/23 133 kg (294 lb)   10/18/23 (!) 137 kg (301 lb)        Today's weight is 134 kg (294 lb 8 oz) pounds,  has a  loss of 2 pounds since the last visit and weight loss since surgery is 2 pounds. The patient reports a decreased portion size and loss of appetite.      Bernadine Arriaga denies reflux/heartburn     Diet and Exercise: Diet history reviewed and discussed with the patient. Weight loss/gains to date discussed with the patient.     She reports eating 2-3 meals per day, a typical portion size of 2 cup, eating 1 snacks per day, drinking 8 or more 8-oz. glasses of water per day, no carbonated beverage consumption and exercising regularly.       The patient states they are eating 60 grams of protein per day.     Diet     Struggling with stress eating  On prednisone    Works very early every day            Review of Systems    Review of Systems   Constitutional: Negative.    HENT: Negative.    Eyes: Negative.    Respiratory: Negative.    Cardiovascular: Negative.    Gastrointestinal: Negative.    Endocrine: Negative.    Genitourinary: Negative.    Musculoskeletal: Negative.    Skin: Negative.    Allergic/Immunologic: Negative.    Neurological: Negative.     Hematological: Negative.    Psychiatric/Behavioral: Negative.    Patient Active Problem List   Diagnosis    Hidradenitis suppurativa    Asthma    Type 2 diabetes mellitus with hyperglycemia    Hyperlipidemia    Hypertension    Morbid obesity with BMI of 45.0-49.9, adult    Cutaneous abscess of buttock    Abdominal pain    SBO (small bowel obstruction)       Past Medical History:   Diagnosis Date    Allergic     Anaphylactic reaction     to combo of ragweed and molds    Anxiety     Arthritis of spine     psoriatic    Asthma     Diabetes mellitus     History of medical problems     Hidradenitis    Hydradenitis     Hyperlipidemia     slight elevation not treated    Hypertension     Low back pain     Migraine     history    MRSA carrier     Pneumonia     Psoriasis     Psoriatic arthritis     Pyoderma     Sinus disorder     Sleep apnea      Past Surgical History:   Procedure Laterality Date    LAPAROSCOPIC GASTRIC BANDING  2008    WOUND DEBRIDEMENT Right 2019    Procedure: DEBRIDEMENT OF RECURRENT HIDRADENITIS AND VAC PLACEMENT RIGHT SIDE;  Surgeon: Alix Freire MD;  Location: [facility] MAIN OR;  Service: General    MASS EXCISION Right 10/27/2021    Procedure: WIDE EXCISION TRUNK LESION/MASS, hidradenitis of right hip;  Surgeon: Serafin Centeno MD;  Location: [facility] MAIN OR;  Service: General;  Laterality: Right;    ENDOSCOPY N/A 2024    Procedure: ESOPHAGOGASTRODUODENOSCOPY WITH BIOPSY,;  Surgeon: Ciera Cota MD;  Location: [facility] ENDOSCOPY;  Service: General;  Laterality: N/A;  post: NORMALEGD    ADENOIDECTOMY      APPENDECTOMY      AXILLARY SURGERY      multiple due to hidradenitis    BREAST BIOPSY       SECTION      CHOLECYSTECTOMY      INCISION AND DRAINAGE OF WOUND      breast    KNEE ARTHRODESIS      SINUS SURGERY      SMALL INTESTINE SURGERY      TONSILLECTOMY        The following portions of the patient's history were reviewed and updated as appropriate: allergies, current medications,  past family history, past medical history, past social history, past surgical history, and problem list.    Vitals:    09/20/24 0845   BP: 125/83   Pulse: 91   Resp: 18   SpO2: 97%       Physical Exam  Awake and alert  Normal mental status  Normal pulmonary effort  Abdomen appropriate tenderness  Incisions no erythema  Extremities no tenderness or swelling      Assessment:     Morbid obesity bmi 47  History lap band    Post-op, the patient  is stable.  I attempted to access the band to add fluids for today but I was unsuccessful.  I believe I was able to actually put the needle in the correct position by the way it felt, but was not able to pull back any fluid.  At 1 point I did inject 1 cc of fluid but then was not able to pull it back.  This makes me suspicious that the band could be malfunctioning.    Plan:     Access the port under fluoroscopy and at that time we will make an assessment if the port is holding fluid or not.  I would like to use contrast in the fluid to see if there is any leak or extravasation.

## 2024-09-21 ENCOUNTER — ANESTHESIA EVENT (OUTPATIENT)
Dept: GASTROENTEROLOGY | Facility: HOSPITAL | Age: 44
End: 2024-09-21
Payer: COMMERCIAL

## 2024-09-23 ENCOUNTER — APPOINTMENT (OUTPATIENT)
Dept: GENERAL RADIOLOGY | Facility: HOSPITAL | Age: 44
End: 2024-09-23
Payer: COMMERCIAL

## 2024-09-23 ENCOUNTER — HOSPITAL ENCOUNTER (OUTPATIENT)
Facility: HOSPITAL | Age: 44
Setting detail: HOSPITAL OUTPATIENT SURGERY
Discharge: HOME | End: 2024-09-23
Attending: SURGERY | Admitting: SURGERY
Payer: COMMERCIAL

## 2024-09-23 ENCOUNTER — ANESTHESIA (OUTPATIENT)
Dept: GASTROENTEROLOGY | Facility: HOSPITAL | Age: 44
End: 2024-09-23
Payer: COMMERCIAL

## 2024-09-23 ENCOUNTER — TELEPHONE (OUTPATIENT)
Dept: BARIATRICS/WEIGHT MGMT | Facility: CLINIC | Age: 44
End: 2024-09-23
Payer: COMMERCIAL

## 2024-09-23 VITALS
SYSTOLIC BLOOD PRESSURE: 149 MMHG | HEART RATE: 90 BPM | TEMPERATURE: 99.7 F | RESPIRATION RATE: 23 BRPM | DIASTOLIC BLOOD PRESSURE: 84 MMHG | WEIGHT: 293 LBS | OXYGEN SATURATION: 97 % | BODY MASS INDEX: 47.09 KG/M2 | HEIGHT: 66 IN

## 2024-09-23 PROCEDURE — 76000 FLUOROSCOPY <1 HR PHYS/QHP: CPT

## 2024-09-23 PROCEDURE — 43235 EGD DIAGNOSTIC BRUSH WASH: CPT | Performed by: SURGERY

## 2024-09-23 RX ORDER — LIDOCAINE HYDROCHLORIDE 20 MG/ML
INJECTION, SOLUTION EPIDURAL; INFILTRATION; INTRACAUDAL; PERINEURAL AS NEEDED
Status: DISCONTINUED | OUTPATIENT
Start: 2024-09-23 | End: 2024-09-23 | Stop reason: HOSPADM

## 2024-09-23 NOTE — OP NOTE
ESOPHAGOGASTRODUODENOSCOPY WITH GASTRIC BAND ADJUSTMENT WITH/WITHOUT FLUORO  Procedure Report    Patient Name:  Bernadine Arriaga  YOB: 1980    Date of Surgery:  9/23/2024     Indications: This is a patient who has a history of laparoscopic gastric banding who was having difficulty accessing the port in the office.  I wanted to try under fluoroscopy.  I was also suspicious that the band port was leaking.    Pre-op Diagnosis:   Laparoscopic gastric band dysfunction    Post-op Diagnosis:   Laparoscopic gastric band leak of catheter    Procedure/CPT® Codes:      Procedure(s):  GASTRIC BAND ADJUSTMENT WITH FLUORO with injection of contrast into the gastric band to diagnose gastric band leak.  Interpretation of fluoroscopic imaging.   Staff:  Surgeon(s):  Ciera Cota MD      Anesthesia: Local    Estimated Blood Loss: minimal    Implants:    Nothing was implanted during the procedure    Specimen:          None        Findings:  Leak of catheter of the gastric band.  It appeared to be in the left upper abdomen    Complications: none    Description of Procedure: The area of her abdomen was prepped and I then used fluoroscopy to identify the region of the port.  Local anesthetic was administered over this area and I was able to access the port.  I did this under fluoroscopic guidance.  I was not able to pull back any fluid out of the port.  I injected 1 cc into the port but then was not able to pull it back.  I then was suspicious that there was a leak.  I removed the needle and obtained a syringe full of contrast and injected the contrast into the port under fluoroscopic visualization.  I moved to the fluoroscopy to more the left upper quadrant region and could see the extravasation of contrast out of the catheter into the surrounding tissue.  I then remove the needle.      Ciera Cota MD     Date: 9/23/2024  Time: 15:52 EDT

## 2024-10-09 DIAGNOSIS — E11.65 TYPE 2 DIABETES MELLITUS WITH HYPERGLYCEMIA, WITHOUT LONG-TERM CURRENT USE OF INSULIN: ICD-10-CM

## 2024-10-09 RX ORDER — EZETIMIBE 10 MG/1
10 TABLET ORAL DAILY
Qty: 90 TABLET | Refills: 0 | Status: SHIPPED | OUTPATIENT
Start: 2024-10-09

## 2024-10-15 NOTE — PROGRESS NOTES
Chief Complaint  Knee Pain (Right knee , long zafar but has gotten worse last few weeks )    Subjective        Bernadine Arriaga presents to [facility]  History of Present Illness  Bernadine is a 44-year-old female presenting today with complaints of right knee pain. It has been an ongoing issue, but has progressively gotten worse.  Her right knee is swollen.  She reports pain that radiates down her shin and upper thigh.  When she walks she feels like her knee is going to come out of place.  She has decreased range of motion.  She reports crepitus.  She has been taking Lodine without relief.  She denies any recent injury.          The following portions of the patient's history were reviewed and updated as appropriate: allergies, current medications, past family history, past medical history, past social history, past surgical history and problem list.    Allergies   Allergen Reactions    Calcium Channel Blockers Palpitations, Other (See Comments) and Rash     Tachycardia and redness.    INCREASED HR   Tachycardia and redness.    Nifedipine Palpitations    Tetanus Antitoxin Swelling    Verapamil Palpitations    Adhesive Tape Other (See Comments)     Tape will cause skin breakdown rapidly   Can use silicone tape    Diltiazem Other (See Comments)    Tape Other (See Comments)     Tape will cause skin breakdown rapidly   Can use silicone tape       Patient Active Problem List   Diagnosis    Hidradenitis suppurativa    Asthma    Type 2 diabetes mellitus with hyperglycemia    Hyperlipidemia    Hypertension    Morbid obesity with BMI of 45.0-49.9, adult    Cutaneous abscess of buttock    Abdominal pain    SBO (small bowel obstruction)    Obesity, Class III, BMI 40-49.9 (morbid obesity)       Current Outpatient Medications   Medication Instructions    acetaminophen (TYLENOL) 1,000 mg, As Needed    albuterol sulfate  (90 Base) MCG/ACT inhaler 2 puffs, Inhalation, Every 6 Hours PRN     "baclofen (LIORESAL) 10 mg, 3 Times Daily PRN    betamethasone dipropionate (DIPROLENE) 0.05 % lotion 2 Times Daily PRN    betamethasone, augmented, (DIPROLENE) 0.05 % cream PLEASE SEE ATTACHED FOR DETAILED DIRECTIONS    budesonide-formoterol (SYMBICORT) 80-4.5 MCG/ACT inhaler 2 puffs, Inhalation, 2 Times Daily - RT    clindamycin (CLEOCIN T) 1 % lotion 2 Times Daily PRN    clobetasol (TEMOVATE) 0.05 % ointment     cloNIDine (CATAPRES) 0.1 mg, Oral, 2 Times Daily    diclofenac (VOLTAREN) 75 mg, Oral, 2 Times Daily    doxycycline (ADOXA) 100 MG tablet     empagliflozin (JARDIANCE) 25 mg, Oral, Daily    EPINEPHrine (EPIPEN) 0.3 mg, Intramuscular, As Needed    ezetimibe (ZETIA) 10 mg, Oral, Daily    fenofibrate 160 mg, Oral, Daily    folic acid (FOLVITE) 1 MG tablet 1 tablet, Daily    furosemide (LASIX) 20 mg, Oral, 2 Times Daily    glimepiride (AMARYL) 4 mg, Oral, 2 Times Daily With Meals    hydrOXYzine (ATARAX) 25 mg, Oral, Every 6 Hours PRN    methotrexate 2.5 MG tablet Every 7 Days    metoclopramide (REGLAN) 10 mg, Oral, 4 Times Daily Before Meals & Nightly    metoprolol succinate XL (TOPROL-XL) 50 MG 24 hr tablet TAKE TWO TABLETS BY MOUTH EVERY MORNING AND ONE EVERY NIGHT AT BEDTIME    mupirocin (BACTROBAN) 2 % ointment Apply 1 Application topically to the appropriate area as directed 3 (Three) Times a Day As Needed.    olmesartan (BENICAR) 40 mg, Oral, Daily    phentermine (ADIPEX-P) 37.5 mg, Oral, Every Morning Before Breakfast    SITagliptin (JANUVIA) 50 mg, Oral, Daily    sulfaSALAzine (AZULFIDINE) 1,500 mg, 2 Times Daily          Objective   Vital Signs:  /100 (BP Location: Left arm, Patient Position: Sitting, Cuff Size: Large Adult)   Pulse 85   Temp 97.5 °F (36.4 °C) (Temporal)   Ht 167.6 cm (66\")   Wt 133 kg (294 lb)   SpO2 95%   BMI 47.45 kg/m²   Estimated body mass index is 47.45 kg/m² as calculated from the following:    Height as of this encounter: 167.6 cm (66\").    Weight as of this " encounter: 133 kg (294 lb).               Review of Systems   Constitutional:  Negative for activity change, chills, fatigue, fever and unexpected weight change.   Cardiovascular:  Positive for leg swelling.   Musculoskeletal:  Positive for arthralgias and gait problem. Negative for back pain, joint swelling, myalgias, neck pain and neck stiffness.   Neurological:  Negative for weakness.        Physical Exam  Constitutional:       Appearance: Normal appearance.   Cardiovascular:      Rate and Rhythm: Normal rate and regular rhythm.   Pulmonary:      Effort: Pulmonary effort is normal.      Breath sounds: Normal breath sounds.   Musculoskeletal:         General: Normal range of motion.      Right knee: Swelling and crepitus present. Decreased range of motion. Tenderness present.   Neurological:      Mental Status: She is alert and oriented to person, place, and time.   Psychiatric:         Mood and Affect: Mood normal.         Behavior: Behavior normal.         Thought Content: Thought content normal.         Judgment: Judgment normal.        Result Review :                   Assessment and Plan   Diagnoses and all orders for this visit:    1. Right knee pain, unspecified chronicity (Primary)  Comments:  will get MRI to r/o tears  d/c lodine and start Voltaren.  Referral to physical therapy.  Orders:  -     MRI Knee Right Without Contrast; Future  -     Ambulatory Referral to Physical Therapy for Evaluation & Treatment    Other orders  -     diclofenac (VOLTAREN) 75 MG EC tablet; Take 1 tablet by mouth 2 (Two) Times a Day.  Dispense: 60 tablet; Refill: 3             Follow Up   No follow-ups on file.  Patient was given instructions and counseling regarding her condition or for health maintenance advice. Please see specific information pulled into the AVS if appropriate.

## 2024-10-18 ENCOUNTER — OFFICE VISIT (OUTPATIENT)
Dept: FAMILY MEDICINE CLINIC | Facility: CLINIC | Age: 44
End: 2024-10-18
Payer: COMMERCIAL

## 2024-10-18 VITALS
WEIGHT: 293 LBS | OXYGEN SATURATION: 95 % | DIASTOLIC BLOOD PRESSURE: 100 MMHG | HEIGHT: 66 IN | HEART RATE: 85 BPM | TEMPERATURE: 97.5 F | BODY MASS INDEX: 47.09 KG/M2 | SYSTOLIC BLOOD PRESSURE: 150 MMHG

## 2024-10-18 DIAGNOSIS — M25.561 RIGHT KNEE PAIN, UNSPECIFIED CHRONICITY: Primary | ICD-10-CM

## 2024-10-18 DIAGNOSIS — I10 HYPERTENSION, UNSPECIFIED TYPE: ICD-10-CM

## 2024-10-18 DIAGNOSIS — E11.65 TYPE 2 DIABETES MELLITUS WITH HYPERGLYCEMIA, WITHOUT LONG-TERM CURRENT USE OF INSULIN: ICD-10-CM

## 2024-10-18 PROCEDURE — 99214 OFFICE O/P EST MOD 30 MIN: CPT | Performed by: NURSE PRACTITIONER

## 2024-10-18 RX ORDER — DICLOFENAC SODIUM 75 MG/1
75 TABLET, DELAYED RELEASE ORAL 2 TIMES DAILY
Qty: 60 TABLET | Refills: 3 | Status: SHIPPED | OUTPATIENT
Start: 2024-10-18

## 2024-10-20 RX ORDER — CLONIDINE HYDROCHLORIDE 0.1 MG/1
0.1 TABLET ORAL 2 TIMES DAILY
Qty: 180 TABLET | Refills: 1 | Status: SHIPPED | OUTPATIENT
Start: 2024-10-20

## 2024-10-20 RX ORDER — GLIMEPIRIDE 4 MG/1
4 TABLET ORAL 2 TIMES DAILY WITH MEALS
Qty: 180 TABLET | Refills: 1 | Status: SHIPPED | OUTPATIENT
Start: 2024-10-20

## 2024-10-22 ENCOUNTER — TELEPHONE (OUTPATIENT)
Dept: FAMILY MEDICINE CLINIC | Facility: CLINIC | Age: 44
End: 2024-10-22
Payer: COMMERCIAL

## 2024-10-22 DIAGNOSIS — M25.561 RIGHT KNEE PAIN, UNSPECIFIED CHRONICITY: Primary | ICD-10-CM

## 2024-10-22 NOTE — TELEPHONE ENCOUNTER
Patient auth had denied with insurance When evaluating chronic anterior knee pain, advanced imaging may be indicated when the pain cannot be attributed to a specific injury, and there has been failure of at least six (6) weeks of conservative management and xray      Please advise

## 2024-10-30 NOTE — TELEPHONE ENCOUNTER
Lumb MBB was Denied  By Insurance due to No recent imaging such as a MRI to show Nerve Compression or Stenois. Please advise. Thx!  
Lumbar MBB is a diagnostic block for facet arthropathy. Patient had facet tenderness and facet loading positive on physical exam. We are not doing Lumbar MBB for nerve compression or stenosis. It is for the diagnosis of facet arthropathy. Can we please reapply? Happy to do peer to peer if needed.     Jonnathan
Okay got you set up for today 10/22/20, at 11:00 or 11:30 fr them to call you at [place]'s number and she is aware and said those times were good for you.   
Spoke to Dr. Robles with Insurance. He will be approving the MBB as requested.     Thanks,    Jonnathan 
Afib/irregular

## 2024-11-04 DIAGNOSIS — E11.65 TYPE 2 DIABETES MELLITUS WITH HYPERGLYCEMIA, WITHOUT LONG-TERM CURRENT USE OF INSULIN: ICD-10-CM

## 2024-11-04 RX ORDER — PAROXETINE 10 MG/1
10 TABLET, FILM COATED ORAL EVERY MORNING
Qty: 30 TABLET | Refills: 0 | OUTPATIENT
Start: 2024-11-04

## 2024-11-04 RX ORDER — SITAGLIPTIN 50 MG/1
50 TABLET, FILM COATED ORAL DAILY
Qty: 30 TABLET | Refills: 0 | Status: SHIPPED | OUTPATIENT
Start: 2024-11-04

## 2024-11-04 RX ORDER — EMPAGLIFLOZIN 25 MG/1
25 TABLET, FILM COATED ORAL DAILY
Qty: 30 TABLET | Refills: 0 | Status: SHIPPED | OUTPATIENT
Start: 2024-11-04

## 2024-11-11 RX ORDER — OLMESARTAN MEDOXOMIL 40 MG/1
40 TABLET ORAL DAILY
Qty: 90 TABLET | Refills: 0 | Status: SHIPPED | OUTPATIENT
Start: 2024-11-11

## 2024-11-12 ENCOUNTER — PREP FOR SURGERY (OUTPATIENT)
Dept: OTHER | Facility: HOSPITAL | Age: 44
End: 2024-11-12
Payer: COMMERCIAL

## 2024-11-12 ENCOUNTER — TELEPHONE (OUTPATIENT)
Dept: BARIATRICS/WEIGHT MGMT | Facility: CLINIC | Age: 44
End: 2024-11-12
Payer: COMMERCIAL

## 2024-11-12 DIAGNOSIS — E66.01 OBESITY, CLASS III, BMI 40-49.9 (MORBID OBESITY): Primary | ICD-10-CM

## 2024-11-12 RX ORDER — CHLORHEXIDINE GLUCONATE ORAL RINSE 1.2 MG/ML
15 SOLUTION DENTAL
OUTPATIENT
Start: 2024-11-12 | End: 2024-11-12

## 2024-11-12 RX ORDER — SODIUM CHLORIDE 0.9 % (FLUSH) 0.9 %
3 SYRINGE (ML) INJECTION EVERY 12 HOURS SCHEDULED
OUTPATIENT
Start: 2024-11-12

## 2024-11-12 RX ORDER — SODIUM CHLORIDE 0.9 % (FLUSH) 0.9 %
3-10 SYRINGE (ML) INJECTION AS NEEDED
OUTPATIENT
Start: 2024-11-12

## 2024-11-12 RX ORDER — SODIUM CHLORIDE 9 MG/ML
40 INJECTION, SOLUTION INTRAVENOUS AS NEEDED
OUTPATIENT
Start: 2024-11-12

## 2024-11-12 NOTE — TELEPHONE ENCOUNTER
Called and left message voicemail for patient. Ready to schedule gastric band removal surgery.    Patient called right back, scheduled surgery.

## 2024-11-13 RX ORDER — CHLORAL HYDRATE 500 MG
CAPSULE ORAL
COMMUNITY

## 2024-11-15 DIAGNOSIS — Z12.11 COLON CANCER SCREENING: Primary | ICD-10-CM

## 2024-11-19 ENCOUNTER — TELEPHONE (OUTPATIENT)
Dept: BARIATRICS/WEIGHT MGMT | Facility: CLINIC | Age: 44
End: 2024-11-19

## 2024-11-19 ENCOUNTER — LAB (OUTPATIENT)
Dept: LAB | Facility: HOSPITAL | Age: 44
End: 2024-11-19
Payer: COMMERCIAL

## 2024-11-19 ENCOUNTER — HOSPITAL ENCOUNTER (OUTPATIENT)
Dept: CARDIOLOGY | Facility: HOSPITAL | Age: 44
Discharge: HOME | End: 2024-11-19
Payer: COMMERCIAL

## 2024-11-19 ENCOUNTER — TELEPHONE (OUTPATIENT)
Dept: BARIATRICS/WEIGHT MGMT | Facility: CLINIC | Age: 44
End: 2024-11-19
Payer: COMMERCIAL

## 2024-11-19 ENCOUNTER — PATIENT ROUNDING (BHMG ONLY) (OUTPATIENT)
Dept: BARIATRICS/WEIGHT MGMT | Facility: CLINIC | Age: 44
End: 2024-11-19
Payer: COMMERCIAL

## 2024-11-19 ENCOUNTER — CONSULT (OUTPATIENT)
Dept: BARIATRICS/WEIGHT MGMT | Facility: CLINIC | Age: 44
End: 2024-11-19
Payer: COMMERCIAL

## 2024-11-19 VITALS
WEIGHT: 293 LBS | HEIGHT: 67 IN | SYSTOLIC BLOOD PRESSURE: 131 MMHG | HEART RATE: 76 BPM | DIASTOLIC BLOOD PRESSURE: 73 MMHG | OXYGEN SATURATION: 95 % | BODY MASS INDEX: 45.99 KG/M2 | RESPIRATION RATE: 18 BRPM

## 2024-11-19 DIAGNOSIS — E66.01 OBESITY, CLASS III, BMI 40-49.9 (MORBID OBESITY): ICD-10-CM

## 2024-11-19 DIAGNOSIS — Z98.84 H/O LAPAROSCOPIC ADJUSTABLE GASTRIC BANDING: ICD-10-CM

## 2024-11-19 DIAGNOSIS — Z01.818 PRE-OP EXAM: ICD-10-CM

## 2024-11-19 DIAGNOSIS — E66.01 OBESITY, CLASS III, BMI 40-49.9 (MORBID OBESITY): Primary | ICD-10-CM

## 2024-11-19 LAB
25(OH)D3 SERPL-MCNC: 22.8 NG/ML (ref 30–100)
ALBUMIN SERPL-MCNC: 4 G/DL (ref 3.5–5.2)
ALBUMIN/GLOB SERPL: 1.3 G/DL
ALP SERPL-CCNC: 79 U/L (ref 39–117)
ALT SERPL W P-5'-P-CCNC: 41 U/L (ref 1–33)
ANION GAP SERPL CALCULATED.3IONS-SCNC: 16 MMOL/L (ref 5–15)
AST SERPL-CCNC: 18 U/L (ref 1–32)
BASOPHILS # BLD AUTO: 0.04 10*3/MM3 (ref 0–0.2)
BASOPHILS NFR BLD AUTO: 0.4 % (ref 0–1.5)
BILIRUB SERPL-MCNC: 0.2 MG/DL (ref 0–1.2)
BUN SERPL-MCNC: 10 MG/DL (ref 6–20)
BUN/CREAT SERPL: 18.5 (ref 7–25)
CALCIUM SPEC-SCNC: 9.6 MG/DL (ref 8.6–10.5)
CHLORIDE SERPL-SCNC: 101 MMOL/L (ref 98–107)
CO2 SERPL-SCNC: 22 MMOL/L (ref 22–29)
CREAT SERPL-MCNC: 0.54 MG/DL (ref 0.57–1)
DEPRECATED RDW RBC AUTO: 48.2 FL (ref 37–54)
EGFRCR SERPLBLD CKD-EPI 2021: 115.2 ML/MIN/1.73
EOSINOPHIL # BLD AUTO: 0.31 10*3/MM3 (ref 0–0.4)
EOSINOPHIL NFR BLD AUTO: 2.9 % (ref 0.3–6.2)
ERYTHROCYTE [DISTWIDTH] IN BLOOD BY AUTOMATED COUNT: 13.6 % (ref 12.3–15.4)
GLOBULIN UR ELPH-MCNC: 3.2 GM/DL
GLUCOSE SERPL-MCNC: 130 MG/DL (ref 65–99)
HBA1C MFR BLD: 8.1 % (ref 4.8–5.6)
HCT VFR BLD AUTO: 45.6 % (ref 34–46.6)
HGB BLD-MCNC: 14.8 G/DL (ref 12–15.9)
IMM GRANULOCYTES # BLD AUTO: 0.02 10*3/MM3 (ref 0–0.05)
IMM GRANULOCYTES NFR BLD AUTO: 0.2 % (ref 0–0.5)
LYMPHOCYTES # BLD AUTO: 3.12 10*3/MM3 (ref 0.7–3.1)
LYMPHOCYTES NFR BLD AUTO: 29.1 % (ref 19.6–45.3)
MCH RBC QN AUTO: 31.7 PG (ref 26.6–33)
MCHC RBC AUTO-ENTMCNC: 32.5 G/DL (ref 31.5–35.7)
MCV RBC AUTO: 97.6 FL (ref 79–97)
MONOCYTES # BLD AUTO: 1.03 10*3/MM3 (ref 0.1–0.9)
MONOCYTES NFR BLD AUTO: 9.6 % (ref 5–12)
NEUTROPHILS NFR BLD AUTO: 57.8 % (ref 42.7–76)
NEUTROPHILS NFR BLD AUTO: 6.22 10*3/MM3 (ref 1.7–7)
NRBC BLD AUTO-RTO: 0 /100 WBC (ref 0–0.2)
PLATELET # BLD AUTO: 328 10*3/MM3 (ref 140–450)
PMV BLD AUTO: 11.1 FL (ref 6–12)
POTASSIUM SERPL-SCNC: 3.9 MMOL/L (ref 3.5–5.2)
PROT SERPL-MCNC: 7.2 G/DL (ref 6–8.5)
RBC # BLD AUTO: 4.67 10*6/MM3 (ref 3.77–5.28)
SODIUM SERPL-SCNC: 139 MMOL/L (ref 136–145)
TSH SERPL DL<=0.05 MIU/L-ACNC: 2.77 UIU/ML (ref 0.27–4.2)
WBC NRBC COR # BLD AUTO: 10.74 10*3/MM3 (ref 3.4–10.8)

## 2024-11-19 PROCEDURE — 36415 COLL VENOUS BLD VENIPUNCTURE: CPT

## 2024-11-19 PROCEDURE — 80050 GENERAL HEALTH PANEL: CPT

## 2024-11-19 PROCEDURE — 82306 VITAMIN D 25 HYDROXY: CPT

## 2024-11-19 PROCEDURE — 83036 HEMOGLOBIN GLYCOSYLATED A1C: CPT

## 2024-11-19 PROCEDURE — 93005 ELECTROCARDIOGRAM TRACING: CPT | Performed by: SURGERY

## 2024-11-19 PROCEDURE — 80305 DRUG TEST PRSMV DIR OPT OBS: CPT

## 2024-11-19 RX ORDER — METOPROLOL TARTRATE 50 MG
50 TABLET ORAL
COMMUNITY

## 2024-11-19 RX ORDER — FAMOTIDINE 20 MG/1
20 TABLET, FILM COATED ORAL 2 TIMES DAILY
COMMUNITY

## 2024-11-19 RX ORDER — INFLIXIMAB 100 MG/10ML
INJECTION, POWDER, LYOPHILIZED, FOR SOLUTION INTRAVENOUS
COMMUNITY
Start: 2024-09-30

## 2024-11-19 NOTE — PROGRESS NOTES
Nutrition Services    Patient Name: Bernadine Arriaga  YOB: 1980  MRN: [id number]  Date of Service: 24      ICD-10-CM ICD-9-CM   1. Obesity, Class III, BMI 40-49.9 (morbid obesity)  E66.01 278.01        RD Recommendation        Candidacy for surgery? Good   RD Comments Recommend patient for surgery    Procedure Patient is pursuing VSG     NUTRITION ASSESSMENT - BARIATRIC SURGERY      Reason for Visit RDN eval for surgery, SWL  days     H&P      Past Medical History:   Diagnosis Date    Allergic     Anaphylactic reaction     to combo of ragweed and molds    Anxiety     Arthritis of spine     psoriatic    Asthma     Diabetes mellitus     History of medical problems     Hidradenitis    Hydradenitis     Hyperlipidemia     slight elevation not treated    Hypertension     Low back pain     Migraine     history    MRSA carrier     Pneumonia     Psoriasis     Psoriatic arthritis     Pyoderma     Sinus disorder     Sleep apnea        Past Surgical History:   Procedure Laterality Date    ADENOIDECTOMY      APPENDECTOMY      AXILLARY SURGERY      multiple due to hidradenitis    BREAST BIOPSY       SECTION      CHOLECYSTECTOMY      ENDOSCOPY N/A 2024    Procedure: ESOPHAGOGASTRODUODENOSCOPY WITH BIOPSY,;  Surgeon: Ciera Cota MD;  Location: [facility] ENDOSCOPY;  Service: General;  Laterality: N/A;  post: NORMALEGD    GASTRIC BAND ADJUSTMENT N/A 2024    Procedure: ESOPHAGOGASTRODUODENOSCOPY WITH GASTRIC BAND ADJUSTMENT WITH FLUORO;  Surgeon: Ciera Cota MD;  Location: [facility] ENDOSCOPY;  Service: Gastroenterology;  Laterality: N/A;  gastric band leak    INCISION AND DRAINAGE OF WOUND      breast    KNEE ARTHRODESIS      LAPAROSCOPIC GASTRIC BANDING  2008    MASS EXCISION Right 10/27/2021    Procedure: WIDE EXCISION TRUNK LESION/MASS, hidradenitis of right hip;  Surgeon: Serafin Centeno MD;  Location: [facility] MAIN OR;  Service: General;  Laterality: Right;    SINUS SURGERY      SMALL  "INTESTINE SURGERY      TONSILLECTOMY      WOUND DEBRIDEMENT Right 06/19/2019    Procedure: DEBRIDEMENT OF RECURRENT HIDRADENITIS AND VAC PLACEMENT RIGHT SIDE;  Surgeon: Alix Freire MD;  Location: [facility] MAIN OR;  Service: General        Previous Goals          Encounter Information        Visit Narrative     Patient currently has lap band and is planning to get it removed. Patient currently drinks carbonated beverages. Patient reports she does not always plan meals with protein sources.     Diet Recall:   Breakfast: omelet with veggies and meat or eggs and shaw  Lunch: packs lunch (chicken nuggets and chips) or picks up something (Mc[employer] or [employer])  Dinner: lunch meat or goes out  Snacks: chips  Beverages: sodas (2/day), water    Exercise: no current exercise    Supplements: MV, fish oil, folic acid    Self Monitoring:          Anthropometrics        Current Height, Weight Height: 170.2 cm (67\")  Weight: 136 kg (299 lb 12.8 oz) (11/19/24 0903)       Trending Weight Hx      Wt Readings from Last 30 Encounters:   11/19/24 0903 136 kg (299 lb 12.8 oz)   10/18/24 0725 133 kg (294 lb)   09/29/24 0746 132 kg (290 lb 3.2 oz)   09/23/24 1351 134 kg (296 lb)   09/20/24 0942 134 kg (295 lb)   09/20/24 0845 134 kg (294 lb 8 oz)   08/19/24 0835 135 kg (296 lb 11.2 oz)   07/23/24 1252 135 kg (298 lb)   07/18/24 1224 135 kg (296 lb 8.3 oz)   07/15/24 1004 135 kg (297 lb)   07/15/24 0907 135 kg (297 lb 3.2 oz)   06/28/24 1510 135 kg (298 lb 1 oz)   06/28/24 0756 135 kg (298 lb 6.4 oz)   05/31/24 1430 (!) 139 kg (307 lb)   12/05/23 1452 133 kg (294 lb)   10/18/23 1300 (!) 137 kg (301 lb)   09/13/23 0939 (!) 139 kg (307 lb)   04/26/23 0800 (!) 138 kg (305 lb)   08/30/22 1356 135 kg (297 lb)   07/22/22 1317 136 kg (300 lb)   12/08/21 0844 (!) 141 kg (310 lb)   11/11/21 1409 (!) 140 kg (309 lb 9.6 oz)   10/27/21 1234 (!) 137 kg (302 lb 7.5 oz)   10/21/21 1233 136 kg (300 lb)   10/13/21 0800 (!) 139 kg (306 lb 3.2 oz) "   09/29/21 0838 (!) 137 kg (301 lb 12.8 oz)   09/28/21 0847 (!) 137 kg (303 lb)   05/24/21 0855 136 kg (300 lb)   04/26/21 0804 136 kg (300 lb)   03/09/21 1353 136 kg (300 lb)   02/08/21 0817 136 kg (300 lb)   12/29/20 1427 136 kg (300 lb)   12/22/20 1425 136 kg (300 lb)      BMI kg/m2 Body mass index is 46.96 kg/m².       Nutrition Diagnosis         Nutrition Dx Statement Overweight/obesity RT multifactorial biochemical, behavioral and environmental contributors to disease AEB BMI 46.96 kg/m^2         Nutrition Intervention         Nutrition Intervention Nutrition education related to diet modification and physical activity        Monitor/Evaluation        New Goals Patient to cut back on sodas and aim for 1/day  Patient to eat and drink separately with 1 meal  Patient to plan meals/snacks with protein and add color       Total time spent with pt 15 minutes of which 15 minutes were spent on education.       Electronically signed by:  Agusto Aguayo RD  11/19/24 10:18 EST

## 2024-11-19 NOTE — PROGRESS NOTES
"Bernadine Arriaga is a 44 y.o. female with morbid obesity with co-morbidities including COMORBIDITIES: HIGH BLOOD PRESSURE, HIGH CHOLESTEROL, and DIABETES    [facility] BAR SURG [facility] BARIATRIC SURGERY  [address] [place] [address]  [address] [place] [address]  Dept: [phone]  11/19/2024      Bernadine Arriaga.  [id number]  [id number]  1980  female      Chief Complaint of weight gain; unable to maintain weight loss    History of Present Illness:   Bernadine is a 44 y.o. female who presents today for evaluation, education and consultation regarding weight loss surgery. The patient is interested in the sleeve gastrectomy. She currently has a lap band is scheduled to have this removed on 12/4/24 with DR. Graham.       Diet History:See dietician/RN/MA documentation for complete history of weight and diet.     Bariatric Surgery Evaluation: The patient is being seen for an initial visit for bariatric surgery evaluation.      Past weight loss attempts: high protein, low fat diet, atkins, low carb diet, leelee godwin, weight watchers, amphetamines, xenical,       STOP-Bang Score  Have you been diagnosed with Sleep Apnea?: yes  Snoring?: yes  Tired?: yes  Observed?: no  Pressure?: yes  Stop Score: 3  Body Mass Index more than 35 kg/m2?: yes  Age older than 50 year old?: no  Neck large? \">17\"/43cm-M, >16\"/41cm-F: no  Gender=Male?: no  Total Stop-Bang Score: 4     Past Medical History:   Diagnosis Date    Allergic     Anaphylactic reaction     to combo of ragweed and molds    Anxiety     Arthritis of spine     psoriatic    Asthma     Diabetes mellitus     History of medical problems     Hidradenitis    Hydradenitis     Hyperlipidemia     slight elevation not treated    Hypertension     Low back pain     Migraine     history    MRSA carrier     Pneumonia     Psoriasis     Psoriatic arthritis     Pyoderma     Sinus disorder     Sleep apnea        Past " Surgical History:   Procedure Laterality Date    ADENOIDECTOMY      APPENDECTOMY      AXILLARY SURGERY      multiple due to hidradenitis    BREAST BIOPSY       SECTION      CHOLECYSTECTOMY      ENDOSCOPY N/A 2024    Procedure: ESOPHAGOGASTRODUODENOSCOPY WITH BIOPSY,;  Surgeon: Ciera Cota MD;  Location: [facility] ENDOSCOPY;  Service: General;  Laterality: N/A;  post: NORMALEGD    GASTRIC BAND ADJUSTMENT N/A 2024    Procedure: ESOPHAGOGASTRODUODENOSCOPY WITH GASTRIC BAND ADJUSTMENT WITH FLUORO;  Surgeon: Ciera Cota MD;  Location: [facility] ENDOSCOPY;  Service: Gastroenterology;  Laterality: N/A;  gastric band leak    INCISION AND DRAINAGE OF WOUND      breast    KNEE ARTHRODESIS      LAPAROSCOPIC GASTRIC BANDING  2008    MASS EXCISION Right 10/27/2021    Procedure: WIDE EXCISION TRUNK LESION/MASS, hidradenitis of right hip;  Surgeon: Serafin Centeno MD;  Location: [facility] MAIN OR;  Service: General;  Laterality: Right;    SINUS SURGERY      SMALL INTESTINE SURGERY      TONSILLECTOMY      WOUND DEBRIDEMENT Right 2019    Procedure: DEBRIDEMENT OF RECURRENT HIDRADENITIS AND VAC PLACEMENT RIGHT SIDE;  Surgeon: Alix Freire MD;  Location: [facility] MAIN OR;  Service: General   Lap band placed 15 years ago   No colon or intestine surgery     Allergies   Allergen Reactions    Calcium Channel Blockers Palpitations, Other (See Comments) and Rash     Tachycardia and redness.    INCREASED HR   Tachycardia and redness.    Nifedipine Palpitations    Tetanus Antitoxin Swelling    Verapamil Palpitations    Adhesive Tape Other (See Comments)     Tape will cause skin breakdown rapidly   Can use silicone tape    Diltiazem Other (See Comments)    Tape Other (See Comments)     Tape will cause skin breakdown rapidly   Can use silicone tape         Current Outpatient Medications:     acetaminophen (TYLENOL) 325 MG tablet, Take 1,000 mg by mouth As Needed for Mild Pain . dont take preop, Disp: , Rfl:     albuterol  sulfate  (90 Base) MCG/ACT inhaler, Inhale 2 puffs Every 6 (Six) Hours As Needed for Wheezing., Disp: 18 g, Rfl: 5    baclofen (LIORESAL) 10 MG tablet, Take 1 tablet by mouth 3 (Three) Times a Day As Needed for Muscle Spasms., Disp: , Rfl:     betamethasone dipropionate (DIPROLENE) 0.05 % lotion, Apply 1 application topically to the appropriate area as directed 2 (Two) Times a Day As Needed. dont use after bathing started, Disp: , Rfl:     betamethasone, augmented, (DIPROLENE) 0.05 % cream, PLEASE SEE ATTACHED FOR DETAILED DIRECTIONS, Disp: , Rfl:     budesonide-formoterol (SYMBICORT) 80-4.5 MCG/ACT inhaler, Inhale 2 puffs 2 (Two) Times a Day., Disp: 3 each, Rfl: 1    clindamycin (CLEOCIN T) 1 % lotion, Apply 1 application topically to the appropriate area as directed 2 (Two) Times a Day As Needed. dont use once bathing started, Disp: , Rfl:     clobetasol (TEMOVATE) 0.05 % ointment, , Disp: , Rfl:     cloNIDine (Catapres) 0.1 MG tablet, Take 1 tablet by mouth 2 (Two) Times a Day., Disp: 180 tablet, Rfl: 1    diclofenac (VOLTAREN) 75 MG EC tablet, Take 1 tablet by mouth 2 (Two) Times a Day., Disp: 60 tablet, Rfl: 3    doxycycline (ADOXA) 100 MG tablet, , Disp: , Rfl:     EPINEPHrine (EPIPEN) 0.3 MG/0.3ML solution auto-injector injection, Inject 0.3 mL into the appropriate muscle as directed by prescriber As Needed (allergic reaction)., Disp: 1 each, Rfl: 5    ezetimibe (Zetia) 10 MG tablet, Take 1 tablet by mouth Daily., Disp: 90 tablet, Rfl: 0    famotidine (PEPCID) 20 MG tablet, Take 1 tablet by mouth 2 (Two) Times a Day., Disp: , Rfl:     fenofibrate 160 MG tablet, Take 1 tablet by mouth Daily., Disp: 90 tablet, Rfl: 1    folic acid (FOLVITE) 1 MG tablet, Take 1 tablet by mouth Daily., Disp: , Rfl:     furosemide (LASIX) 40 MG tablet, Take 0.5 tablets by mouth 2 (Two) Times a Day., Disp: 30 tablet, Rfl: 1    glimepiride (AMARYL) 4 MG tablet, Take 1 tablet by mouth 2 (Two) Times a Day With Meals., Disp: 180  tablet, Rfl: 1    hydrOXYzine (ATARAX) 25 MG tablet, Take 1 tablet by mouth Every 6 (Six) Hours As Needed for Itching or Anxiety., Disp: 30 tablet, Rfl: 1    inFLIXimab (REMICADE) 100 MG injection, , Disp: , Rfl:     Januvia 50 MG tablet, TAKE 1 TABLET BY MOUTH DAILY, Disp: 30 tablet, Rfl: 0    Jardiance 25 MG tablet tablet, TAKE 1 TABLET BY MOUTH DAILY, Disp: 30 tablet, Rfl: 0    methotrexate 2.5 MG tablet, Take  by mouth Every 7 (Seven) Days., Disp: , Rfl:     metoclopramide (Reglan) 10 MG tablet, Take 1 tablet by mouth 4 (Four) Times a Day Before Meals & at Bedtime., Disp: 30 tablet, Rfl: 12    metoprolol succinate XL (TOPROL-XL) 50 MG 24 hr tablet, TAKE TWO TABLETS BY MOUTH EVERY MORNING AND ONE EVERY NIGHT AT BEDTIME, Disp: 270 tablet, Rfl: 1    olmesartan (BENICAR) 40 MG tablet, TAKE 1 TABLET BY MOUTH DAILY, Disp: 90 tablet, Rfl: 0    Omega-3 Fatty Acids (fish oil) 1000 MG capsule capsule, Take  by mouth Daily With Breakfast., Disp: , Rfl:     sulfaSALAzine (AZULFIDINE) 500 MG tablet, Take 3 tablets by mouth 2 (Two) Times a Day. Last dose am 10/26, Disp: , Rfl:     mupirocin (BACTROBAN) 2 % ointment, Apply 1 Application topically to the appropriate area as directed 3 (Three) Times a Day As Needed. (Patient not taking: Reported on 11/19/2024), Disp: , Rfl:     Social History     Socioeconomic History    Marital status:    Tobacco Use    Smoking status: Never     Passive exposure: Current    Smokeless tobacco: Never   Vaping Use    Vaping status: Never Used   Substance and Sexual Activity    Alcohol use: Never    Drug use: Never    Sexual activity: Yes     Partners: Male     Birth control/protection: I.U.D.       Family History   Problem Relation Age of Onset    Breast cancer Mother     Hypertension Mother     Cancer Mother     Diabetes Mother     Hyperlipidemia Mother     Asthma Mother     Hypertension Father     Hyperlipidemia Father     Stroke Father     Cancer Brother     Breast cancer Maternal Aunt      Breast cancer Maternal Aunt     Diabetes Maternal Grandmother     Heart attack Maternal Grandfather     Obesity Paternal Grandmother     Diabetes Paternal Grandmother     Cancer Paternal Grandfather          Review of Systems:  Review of Systems   Constitutional:  Positive for fatigue.   HENT:          Sinus drainage, allergies    Respiratory:          Asthma, hx of pneumonia    Cardiovascular:         Chest pain with activity, HTN, shortness of breath on exertion, HLD   Gastrointestinal: Negative.    Endocrine:        DMII   Genitourinary:         Leaking urine with laughing, coughing , sneezing    Musculoskeletal:         Hip, foot, neck, wrist, knee, back pain, psoriatic arthritis    Skin: Negative.    Neurological: Negative.    Hematological: Negative.    Psychiatric/Behavioral: Negative.         Physical Exam:  Vital Signs:  Weight: 136 kg (299 lb 12.8 oz)   Body mass index is 46.96 kg/m².  Temp: (not recorded)   Heart Rate: 76   BP: 131/73     Physical Exam  Constitutional:       Appearance: Normal appearance. She is obese.   Cardiovascular:      Rate and Rhythm: Normal rate.   Pulmonary:      Effort: Pulmonary effort is normal.      Breath sounds: Normal breath sounds.   Abdominal:      General: Abdomen is flat.      Palpations: Abdomen is soft.   Skin:     General: Skin is warm and dry.   Neurological:      General: No focal deficit present.      Mental Status: She is alert and oriented to person, place, and time.   Psychiatric:         Mood and Affect: Mood normal.         Behavior: Behavior normal.         Thought Content: Thought content normal.         Judgment: Judgment normal.            Assessment:         Bernadine Arriaga is a 44 y.o. year old female with medically complicated severe obesity. Weight: 136 kg (299 lb 12.8 oz), Body mass index is 46.96 kg/m². and weight related problems.    I explained in detail the procedures that we are performing.  All of those procedures can be performed  laparoscopically but there is a chance to convert to open if any technical challenges or complications do occur.  Bariatric surgery is not cosmetic surgery but rather a tool to help a patient make a life-long commitment lifestyle changes including diet, exercise, behavior changes, and taking supplemental vitamins and minerals.    Due to the patient's BMI and co-morbidities they are at a high risk for surgery and will obtain the following:   A psychological evaluation will be arranged for this patient. CBC, CMP,  TSH and HgbA1C will be drawn.     Bernadine Arriaga will be set up for a pre-operative diagnostic esophagogastroduodenoscopy with biopsy for evaluation. The risks and benefits of the procedure were discussed with the patient in detail and all questions were answered.  Possibility of perforation, bleeding, aspiration, anoxic brain injury, respiratory and/or cardiac arrest and death were discussed.   She received handouts regarding, all questions were answered and informed consent was obtained.     The risks, benefits, alternatives, and potential complications of all of the procedures were explained in detail including, but not limited to death, anesthesia and medication adverse effect/DVT, pulmonary embolism, trocar site/incisional hernia, wound infection, abdominal infection, bleeding, failure to lose weight or gain weight and change in body image, metabolic complications with calcium, thiamine, vitamin B12, folate, iron, and anemia.    The patient was advised to start a high protein, low fat and low carbohydrate diet. The patient was given individualized information  along with general group information and handouts.     The patient was encouraged to start routine exercise including but not limited to 150 minutes per week.     The consultation plan was reviewed with the patient.    The patient understands the surgical procedures and the different surgical options that are available.  She understands the  lifestyle changes that would be required after surgery and has agreed to participate in a pre-operative and postoperative weight management program.  She also expressed understanding of possible risks, had several questions answered and desires to proceed.    I think she is a good candidate for this surgery, and is interested in a sleeve gastrectomy. Pt currently has a lap band and has this scheduled to be removed on 12/4/24. PT would like to see if Dr. Cota would consider taking her lap band out and doing the gastric sleeve sx at the same time since she has met her deductible this year. Her  is also taking new insurance next year and is unsure how this will affect her sx. Will discuss with DR. Cota to see if this can be done at the same time ( both surgeries).     Encounter Diagnosis   Name Primary?    Obesity, Class III, BMI 40-49.9 (morbid obesity) Yes       Plan:    Patient will have evaluations and follow up with bariatric dieticians and a psychologist before undergoing a multidisciplinary review of her candidacy.  We also discussed the weight loss requirement and rationale, and other program requirements.    PT had an EGD with DR Galvez already due to hx of lap band. She is scheduled to have her lap band removed on 12/4/24. Would like to consider doing lap band removal and gastric sleeve at the same time if ok with DR Galvez since she has met her deductible. Will discuss with DR. Cota to see if this is a possibility. Plan to follow up for her lap band removal on 12/4/24.     Total time spent with pt 60 minutes of which 45 minutes were spent on education.     Diana Small, APRN  11/19/2024

## 2024-11-19 NOTE — TELEPHONE ENCOUNTER
----- Message from Dianaalis Small sent at 11/19/2024  1:42 PM EST -----  Can you please let her know to hold her remicade 6-8 weeks before her band removal and 1 week hold on the methotrexate before surgery? She has a lap band removal scheduled on 12/4/24. Thanks

## 2024-11-19 NOTE — TELEPHONE ENCOUNTER
Called patient after speaking with Dr. Cota. Per Dr. Cota, gastric band removal and gastric sleeve needs to be 2 separate surgeries. Patient's insurance is changing Jan 1 2025 and patient will need to submit new insurance info once receives it.

## 2024-11-20 LAB
COTININE UR QL SCN: NEGATIVE NG/ML
Lab: NORMAL

## 2024-11-20 RX ORDER — ERGOCALCIFEROL 1.25 MG/1
50000 CAPSULE, LIQUID FILLED ORAL WEEKLY
Qty: 12 CAPSULE | Refills: 1 | Status: SHIPPED | OUTPATIENT
Start: 2024-11-20

## 2024-11-20 NOTE — PAT
Message sent to Dr. Cota about A1c = 8.10.  Awaiting response.    Forgot Dr. Cota didn't want to know about elevated A1c.  Sent message to Dr. Bland (anesthesia)    Dr. Bland said to have the pt to make an appt with pcp asap.  Notified the pt and added Dr.C Jacobson to the chat as well.

## 2024-11-22 ENCOUNTER — OFFICE VISIT (OUTPATIENT)
Dept: FAMILY MEDICINE CLINIC | Facility: CLINIC | Age: 44
End: 2024-11-22
Payer: COMMERCIAL

## 2024-11-22 VITALS
SYSTOLIC BLOOD PRESSURE: 133 MMHG | OXYGEN SATURATION: 97 % | HEART RATE: 73 BPM | BODY MASS INDEX: 45.99 KG/M2 | TEMPERATURE: 98 F | DIASTOLIC BLOOD PRESSURE: 88 MMHG | HEIGHT: 67 IN | WEIGHT: 293 LBS

## 2024-11-22 DIAGNOSIS — E11.65 TYPE 2 DIABETES MELLITUS WITH HYPERGLYCEMIA, WITHOUT LONG-TERM CURRENT USE OF INSULIN: Primary | ICD-10-CM

## 2024-11-22 DIAGNOSIS — R06.83 SNORING: ICD-10-CM

## 2024-11-22 LAB
QT INTERVAL: 392 MS
QTC INTERVAL: 430 MS

## 2024-11-22 PROCEDURE — 99214 OFFICE O/P EST MOD 30 MIN: CPT | Performed by: NURSE PRACTITIONER

## 2024-11-22 NOTE — PROGRESS NOTES
Chief Complaint  Diabetes (F/u ), bloodwork  (Labs were done for pre-op and A1C was back up ), and referral (sleep study )    Subjective        Bernadine Arriaga presents to [facility]  History of Present Illness  Bernadine is a 44-year-old female presenting today to follow-up on her diabetes.  She currently takes glimepiride 4 mg twice daily, Januvia 50 mg daily, Jardiance 25 mg daily. She is also having bariatric surgery revision with Dr. Cota.  During her intake, psych recommended she get a sleep study.  She reports snoring.  She reports fatigue when she wakes up in the morning.  She denies any apneic episodes.          The following portions of the patient's history were reviewed and updated as appropriate: allergies, current medications, past family history, past medical history, past social history, past surgical history and problem list.    Allergies   Allergen Reactions    Calcium Channel Blockers Palpitations, Other (See Comments) and Rash     Tachycardia and redness.    INCREASED HR   Tachycardia and redness.    Nifedipine Palpitations    Tetanus Antitoxin Swelling    Verapamil Palpitations    Adhesive Tape Other (See Comments)     Tape will cause skin breakdown rapidly   Can use silicone tape    Tape Other (See Comments)     Tape will cause skin breakdown rapidly   Can use silicone tape    Diltiazem Palpitations     Rash        Patient Active Problem List   Diagnosis    Hidradenitis suppurativa    Asthma    Type 2 diabetes mellitus with hyperglycemia    Hyperlipidemia    Hypertension    Morbid obesity with BMI of 45.0-49.9, adult    Cutaneous abscess of buttock    Abdominal pain    SBO (small bowel obstruction)    Obesity, Class III, BMI 40-49.9 (morbid obesity)       Current Outpatient Medications   Medication Instructions    acetaminophen (TYLENOL) 1,000 mg, As Needed    albuterol sulfate  (90 Base) MCG/ACT inhaler 2 puffs, Inhalation, Every 6 Hours PRN     "baclofen (LIORESAL) 10 mg, 3 Times Daily PRN    betamethasone dipropionate (DIPROLENE) 0.05 % lotion 2 Times Daily PRN    betamethasone, augmented, (DIPROLENE) 0.05 % cream PLEASE SEE ATTACHED FOR DETAILED DIRECTIONS    budesonide-formoterol (SYMBICORT) 80-4.5 MCG/ACT inhaler 2 puffs, Inhalation, 2 Times Daily - RT    clindamycin (CLEOCIN T) 1 % lotion 2 Times Daily PRN    clobetasol (TEMOVATE) 0.05 % ointment     cloNIDine (CATAPRES) 0.1 mg, Oral, 2 Times Daily    diclofenac (VOLTAREN) 75 mg, Oral, 2 Times Daily    doxycycline (ADOXA) 100 MG tablet Take 1 tablet by mouth As Needed.    EPINEPHrine (EPIPEN) 0.3 mg, Intramuscular, As Needed    ezetimibe (ZETIA) 10 mg, Oral, Daily    famotidine (PEPCID) 20 mg, 2 Times Daily    fenofibrate 160 mg, Oral, Daily    fish oil 1,000 mg, Daily With Breakfast    folic acid (FOLVITE) 1 MG tablet 1 tablet, Daily    furosemide (LASIX) 20 mg, Oral, 2 Times Daily    glimepiride (AMARYL) 4 mg, Oral, 2 Times Daily With Meals    hydrOXYzine (ATARAX) 25 mg, Oral, Every 6 Hours PRN    inFLIXimab (REMICADE) 100 MG injection monthly    Jardiance 25 mg, Oral, Daily    methotrexate 2.5 MG tablet Every 7 Days    metoclopramide (REGLAN) 10 mg, Oral, 4 Times Daily Before Meals & Nightly    metoprolol succinate XL (TOPROL-XL) 50 MG 24 hr tablet TAKE TWO TABLETS BY MOUTH EVERY MORNING AND ONE EVERY NIGHT AT BEDTIME    metoprolol tartrate (LOPRESSOR) 50 mg, Every Night at Bedtime    olmesartan (BENICAR) 40 mg, Oral, Daily    SITagliptin (JANUVIA) 100 mg, Oral, Daily    sulfaSALAzine (AZULFIDINE) 1,500 mg, 2 Times Daily    vitamin D (ERGOCALCIFEROL) 50,000 Units, Oral, Weekly          Objective   Vital Signs:  /88 (BP Location: Left arm, Patient Position: Sitting, Cuff Size: Large Adult)   Pulse 73   Temp 98 °F (36.7 °C) (Temporal)   Ht 170.2 cm (67\")   Wt 134 kg (296 lb)   SpO2 97%   BMI 46.36 kg/m²   Estimated body mass index is 46.36 kg/m² as calculated from the following:    Height " "as of this encounter: 170.2 cm (67\").    Weight as of this encounter: 134 kg (296 lb).               Review of Systems   Constitutional:  Negative for appetite change, diaphoresis, fatigue and unexpected weight change.   Eyes:  Negative for visual disturbance.   Respiratory:  Negative for cough, chest tightness, shortness of breath and wheezing.    Cardiovascular:  Negative for chest pain, palpitations and leg swelling.   Gastrointestinal:  Negative for nausea and vomiting.   Musculoskeletal:  Negative for back pain and gait problem.   Neurological:  Negative for dizziness, syncope, weakness, light-headedness, numbness and headaches.   Psychiatric/Behavioral:  Negative for confusion. The patient is not nervous/anxious.         Physical Exam  Constitutional:       Appearance: Normal appearance.   Cardiovascular:      Rate and Rhythm: Normal rate and regular rhythm.      Pulses: Normal pulses.      Heart sounds: Normal heart sounds.   Pulmonary:      Effort: Pulmonary effort is normal.      Breath sounds: Normal breath sounds.   Skin:     General: Skin is warm and dry.      Capillary Refill: Capillary refill takes less than 2 seconds.   Neurological:      Mental Status: She is alert and oriented to person, place, and time.   Psychiatric:         Mood and Affect: Mood normal.         Behavior: Behavior normal.         Thought Content: Thought content normal.         Judgment: Judgment normal.        Result Review :                   Assessment and Plan   Diagnoses and all orders for this visit:    1. Type 2 diabetes mellitus with hyperglycemia, without long-term current use of insulin (Primary)  Comments:  Increase Januvia to 100 mg daily.  Continue other medications.  Labs ordered for next visit.  Continue monitoring at home  Orders:  -     SITagliptin (JANUVIA) 100 MG tablet; Take 1 tablet by mouth Daily.  Dispense: 30 tablet; Refill: 0  -     Comprehensive Metabolic Panel; Future  -     Hemoglobin A1c; Future  -     " CBC & Differential; Future  -     Lipid Panel; Future  -     Microalbumin / Creatinine Urine Ratio - Urine, Clean Catch; Future  -     TSH; Future  -     T4, Free; Future    2. Snoring  Comments:  Referral to sleep medicine for sleep study.  Orders:  -     Ambulatory Referral to Sleep Medicine             Follow Up   Return in about 4 months (around 3/22/2025) for Annual physical, Recheck.  Patient was given instructions and counseling regarding her condition or for health maintenance advice. Please see specific information pulled into the AVS if appropriate.

## 2024-12-03 ENCOUNTER — ANESTHESIA EVENT (OUTPATIENT)
Dept: PERIOP | Facility: HOSPITAL | Age: 44
End: 2024-12-03
Payer: COMMERCIAL

## 2024-12-03 NOTE — ANESTHESIA PREPROCEDURE EVALUATION
Anesthesia Evaluation     Patient summary reviewed and Nursing notes reviewed   NPO Solid Status: > 8 hours  NPO Liquid Status: > 8 hours           Airway   Mallampati: II  TM distance: >3 FB  Neck ROM: full  No difficulty expected  Dental - normal exam     Pulmonary - normal exam   (+) ,sleep apnea  Cardiovascular - normal exam    ECG reviewed    (+) hypertension, hyperlipidemia      Neuro/Psych  (+) headaches, psychiatric history  GI/Hepatic/Renal/Endo    (+) morbid obesity, GERD, diabetes mellitus    Musculoskeletal     Abdominal  - normal exam    Bowel sounds: normal.   Substance History      OB/GYN          Other   arthritis,     ROS/Med Hx Other: SR     2019  Neg stress, neg tte                Anesthesia Plan    ASA 3     general and ERAS Protocol   total IV anesthesia  intravenous induction     Anesthetic plan, risks, benefits, and alternatives have been provided, discussed and informed consent has been obtained with: patient.    Plan discussed with CRNA.    CODE STATUS:

## 2024-12-04 ENCOUNTER — ANESTHESIA (OUTPATIENT)
Dept: PERIOP | Facility: HOSPITAL | Age: 44
End: 2024-12-04
Payer: COMMERCIAL

## 2024-12-04 ENCOUNTER — HOSPITAL ENCOUNTER (INPATIENT)
Facility: HOSPITAL | Age: 44
LOS: 1 days | Discharge: HOME | DRG: 328 | End: 2024-12-04
Attending: SURGERY | Admitting: SURGERY
Payer: COMMERCIAL

## 2024-12-04 VITALS
SYSTOLIC BLOOD PRESSURE: 142 MMHG | RESPIRATION RATE: 16 BRPM | TEMPERATURE: 98.7 F | DIASTOLIC BLOOD PRESSURE: 80 MMHG | HEART RATE: 81 BPM | OXYGEN SATURATION: 93 %

## 2024-12-04 DIAGNOSIS — G89.18 POST-OP PAIN: Primary | ICD-10-CM

## 2024-12-04 DIAGNOSIS — E66.01 OBESITY, CLASS III, BMI 40-49.9 (MORBID OBESITY): ICD-10-CM

## 2024-12-04 PROBLEM — E66.9 OBESITY: Status: ACTIVE | Noted: 2024-12-04

## 2024-12-04 LAB
B-HCG UR QL: NEGATIVE
GLUCOSE BLDC GLUCOMTR-MCNC: 134 MG/DL (ref 70–105)
GLUCOSE BLDC GLUCOMTR-MCNC: 139 MG/DL (ref 70–105)

## 2024-12-04 PROCEDURE — 82948 REAGENT STRIP/BLOOD GLUCOSE: CPT

## 2024-12-04 PROCEDURE — 25010000002 BUPIVACAINE (PF) 0.25 % SOLUTION: Performed by: SURGERY

## 2024-12-04 PROCEDURE — 25010000002 CEFAZOLIN 3 G RECONSTITUTED SOLUTION 1 EACH VIAL: Performed by: SURGERY

## 2024-12-04 PROCEDURE — 0DP64CZ REMOVAL OF EXTRALUMINAL DEVICE FROM STOMACH, PERCUTANEOUS ENDOSCOPIC APPROACH: ICD-10-PCS | Performed by: SURGERY

## 2024-12-04 PROCEDURE — 25810000003 LACTATED RINGERS PER 1000 ML: Performed by: SURGERY

## 2024-12-04 PROCEDURE — S0260 H&P FOR SURGERY: HCPCS | Performed by: SURGERY

## 2024-12-04 PROCEDURE — 25810000003 LACTATED RINGERS PER 1000 ML: Performed by: NURSE ANESTHETIST, CERTIFIED REGISTERED

## 2024-12-04 PROCEDURE — 25010000002 DEXAMETHASONE PER 1 MG: Performed by: NURSE ANESTHETIST, CERTIFIED REGISTERED

## 2024-12-04 PROCEDURE — 25010000002 FENTANYL CITRATE (PF) 50 MCG/ML SOLUTION: Performed by: NURSE ANESTHETIST, CERTIFIED REGISTERED

## 2024-12-04 PROCEDURE — 25010000002 FENTANYL CITRATE (PF) 100 MCG/2ML SOLUTION: Performed by: NURSE ANESTHETIST, CERTIFIED REGISTERED

## 2024-12-04 PROCEDURE — 43774 LAP RMVL GASTR ADJ ALL PARTS: CPT | Performed by: SURGERY

## 2024-12-04 PROCEDURE — 82948 REAGENT STRIP/BLOOD GLUCOSE: CPT | Performed by: SURGERY

## 2024-12-04 PROCEDURE — 43774 LAP RMVL GASTR ADJ ALL PARTS: CPT | Performed by: SPECIALIST/TECHNOLOGIST, OTHER

## 2024-12-04 PROCEDURE — 25010000002 LIDOCAINE PF 2% 2 % SOLUTION: Performed by: NURSE ANESTHETIST, CERTIFIED REGISTERED

## 2024-12-04 PROCEDURE — 81025 URINE PREGNANCY TEST: CPT | Performed by: SURGERY

## 2024-12-04 PROCEDURE — 25010000002 PROPOFOL 200 MG/20ML EMULSION: Performed by: NURSE ANESTHETIST, CERTIFIED REGISTERED

## 2024-12-04 PROCEDURE — 25010000002 MAGNESIUM SULFATE PER 500 MG OF MAGNESIUM: Performed by: NURSE ANESTHETIST, CERTIFIED REGISTERED

## 2024-12-04 PROCEDURE — 25010000002 MIDAZOLAM PER 1 MG: Performed by: NURSE ANESTHETIST, CERTIFIED REGISTERED

## 2024-12-04 RX ORDER — SODIUM CHLORIDE 9 MG/ML
40 INJECTION, SOLUTION INTRAVENOUS AS NEEDED
Status: DISCONTINUED | OUTPATIENT
Start: 2024-12-04 | End: 2024-12-04 | Stop reason: HOSPADM

## 2024-12-04 RX ORDER — SODIUM CHLORIDE, SODIUM LACTATE, POTASSIUM CHLORIDE, CALCIUM CHLORIDE 600; 310; 30; 20 MG/100ML; MG/100ML; MG/100ML; MG/100ML
INJECTION, SOLUTION INTRAVENOUS CONTINUOUS PRN
Status: DISCONTINUED | OUTPATIENT
Start: 2024-12-04 | End: 2024-12-04 | Stop reason: SURG

## 2024-12-04 RX ORDER — ONDANSETRON 8 MG/1
8 TABLET, ORALLY DISINTEGRATING ORAL EVERY 8 HOURS PRN
Qty: 30 TABLET | Refills: 12 | Status: SHIPPED | OUTPATIENT
Start: 2024-12-04 | End: 2025-01-03

## 2024-12-04 RX ORDER — MIDAZOLAM HYDROCHLORIDE 1 MG/ML
INJECTION, SOLUTION INTRAMUSCULAR; INTRAVENOUS AS NEEDED
Status: DISCONTINUED | OUTPATIENT
Start: 2024-12-04 | End: 2024-12-04 | Stop reason: SURG

## 2024-12-04 RX ORDER — OXYCODONE HYDROCHLORIDE 5 MG/1
10 TABLET ORAL ONCE AS NEEDED
Status: COMPLETED | OUTPATIENT
Start: 2024-12-04 | End: 2024-12-04

## 2024-12-04 RX ORDER — PROPOFOL 10 MG/ML
INJECTION, EMULSION INTRAVENOUS AS NEEDED
Status: DISCONTINUED | OUTPATIENT
Start: 2024-12-04 | End: 2024-12-04 | Stop reason: SURG

## 2024-12-04 RX ORDER — SODIUM CHLORIDE 0.9 % (FLUSH) 0.9 %
3-10 SYRINGE (ML) INJECTION AS NEEDED
Status: DISCONTINUED | OUTPATIENT
Start: 2024-12-04 | End: 2024-12-04 | Stop reason: HOSPADM

## 2024-12-04 RX ORDER — PROMETHAZINE HYDROCHLORIDE 25 MG/1
25 TABLET ORAL ONCE AS NEEDED
Status: DISCONTINUED | OUTPATIENT
Start: 2024-12-04 | End: 2024-12-04 | Stop reason: HOSPADM

## 2024-12-04 RX ORDER — MAGNESIUM SULFATE HEPTAHYDRATE 500 MG/ML
INJECTION, SOLUTION INTRAMUSCULAR; INTRAVENOUS AS NEEDED
Status: DISCONTINUED | OUTPATIENT
Start: 2024-12-04 | End: 2024-12-04 | Stop reason: SURG

## 2024-12-04 RX ORDER — ALBUTEROL SULFATE 0.83 MG/ML
SOLUTION RESPIRATORY (INHALATION) AS NEEDED
Status: DISCONTINUED | OUTPATIENT
Start: 2024-12-04 | End: 2024-12-04 | Stop reason: SURG

## 2024-12-04 RX ORDER — FENTANYL CITRATE 50 UG/ML
INJECTION, SOLUTION INTRAMUSCULAR; INTRAVENOUS AS NEEDED
Status: DISCONTINUED | OUTPATIENT
Start: 2024-12-04 | End: 2024-12-04 | Stop reason: SURG

## 2024-12-04 RX ORDER — ACETAMINOPHEN 325 MG/1
650 TABLET ORAL ONCE AS NEEDED
Status: DISCONTINUED | OUTPATIENT
Start: 2024-12-04 | End: 2024-12-04 | Stop reason: HOSPADM

## 2024-12-04 RX ORDER — FENTANYL CITRATE 50 UG/ML
50 INJECTION, SOLUTION INTRAMUSCULAR; INTRAVENOUS
Status: DISCONTINUED | OUTPATIENT
Start: 2024-12-04 | End: 2024-12-04 | Stop reason: HOSPADM

## 2024-12-04 RX ORDER — EPHEDRINE SULFATE 5 MG/ML
INJECTION INTRAVENOUS AS NEEDED
Status: DISCONTINUED | OUTPATIENT
Start: 2024-12-04 | End: 2024-12-04 | Stop reason: SURG

## 2024-12-04 RX ORDER — LIDOCAINE HYDROCHLORIDE 10 MG/ML
0.5 INJECTION, SOLUTION EPIDURAL; INFILTRATION; INTRACAUDAL; PERINEURAL ONCE AS NEEDED
Status: DISCONTINUED | OUTPATIENT
Start: 2024-12-04 | End: 2024-12-04 | Stop reason: HOSPADM

## 2024-12-04 RX ORDER — DEXAMETHASONE SODIUM PHOSPHATE 4 MG/ML
INJECTION, SOLUTION INTRA-ARTICULAR; INTRALESIONAL; INTRAMUSCULAR; INTRAVENOUS; SOFT TISSUE AS NEEDED
Status: DISCONTINUED | OUTPATIENT
Start: 2024-12-04 | End: 2024-12-04 | Stop reason: SURG

## 2024-12-04 RX ORDER — LIDOCAINE HYDROCHLORIDE 20 MG/ML
INJECTION, SOLUTION EPIDURAL; INFILTRATION; INTRACAUDAL; PERINEURAL AS NEEDED
Status: DISCONTINUED | OUTPATIENT
Start: 2024-12-04 | End: 2024-12-04 | Stop reason: SURG

## 2024-12-04 RX ORDER — SODIUM CHLORIDE 0.9 % (FLUSH) 0.9 %
3 SYRINGE (ML) INJECTION EVERY 12 HOURS SCHEDULED
Status: DISCONTINUED | OUTPATIENT
Start: 2024-12-04 | End: 2024-12-04 | Stop reason: HOSPADM

## 2024-12-04 RX ORDER — SODIUM CHLORIDE, SODIUM LACTATE, POTASSIUM CHLORIDE, CALCIUM CHLORIDE 600; 310; 30; 20 MG/100ML; MG/100ML; MG/100ML; MG/100ML
20 INJECTION, SOLUTION INTRAVENOUS CONTINUOUS
Status: DISCONTINUED | OUTPATIENT
Start: 2024-12-04 | End: 2024-12-04 | Stop reason: HOSPADM

## 2024-12-04 RX ORDER — ACETAMINOPHEN 650 MG/1
650 SUPPOSITORY RECTAL EVERY 4 HOURS PRN
Status: DISCONTINUED | OUTPATIENT
Start: 2024-12-04 | End: 2024-12-04 | Stop reason: HOSPADM

## 2024-12-04 RX ORDER — SODIUM CHLORIDE 0.9 % (FLUSH) 0.9 %
10 SYRINGE (ML) INJECTION AS NEEDED
Status: DISCONTINUED | OUTPATIENT
Start: 2024-12-04 | End: 2024-12-04 | Stop reason: HOSPADM

## 2024-12-04 RX ORDER — PHENYLEPHRINE HCL IN 0.9% NACL 1 MG/10 ML
SYRINGE (ML) INTRAVENOUS AS NEEDED
Status: DISCONTINUED | OUTPATIENT
Start: 2024-12-04 | End: 2024-12-04 | Stop reason: SURG

## 2024-12-04 RX ORDER — CHLORHEXIDINE GLUCONATE ORAL RINSE 1.2 MG/ML
15 SOLUTION DENTAL
Status: ACTIVE | OUTPATIENT
Start: 2024-12-04 | End: 2024-12-04

## 2024-12-04 RX ORDER — PROCHLORPERAZINE EDISYLATE 5 MG/ML
10 INJECTION INTRAMUSCULAR; INTRAVENOUS ONCE AS NEEDED
Status: DISCONTINUED | OUTPATIENT
Start: 2024-12-04 | End: 2024-12-04 | Stop reason: HOSPADM

## 2024-12-04 RX ORDER — PROMETHAZINE HYDROCHLORIDE 25 MG/1
25 SUPPOSITORY RECTAL ONCE AS NEEDED
Status: DISCONTINUED | OUTPATIENT
Start: 2024-12-04 | End: 2024-12-04 | Stop reason: HOSPADM

## 2024-12-04 RX ORDER — HYDROCODONE BITARTRATE AND ACETAMINOPHEN 5; 325 MG/1; MG/1
1 TABLET ORAL EVERY 6 HOURS PRN
Qty: 18 TABLET | Refills: 0 | Status: SHIPPED | OUTPATIENT
Start: 2024-12-04

## 2024-12-04 RX ORDER — ROCURONIUM BROMIDE 10 MG/ML
INJECTION, SOLUTION INTRAVENOUS AS NEEDED
Status: DISCONTINUED | OUTPATIENT
Start: 2024-12-04 | End: 2024-12-04 | Stop reason: SURG

## 2024-12-04 RX ORDER — BUPIVACAINE HYDROCHLORIDE 2.5 MG/ML
INJECTION, SOLUTION EPIDURAL; INFILTRATION; INTRACAUDAL AS NEEDED
Status: DISCONTINUED | OUTPATIENT
Start: 2024-12-04 | End: 2024-12-04 | Stop reason: HOSPADM

## 2024-12-04 RX ADMIN — OXYCODONE 10 MG: 5 TABLET ORAL at 12:49

## 2024-12-04 RX ADMIN — EPHEDRINE SULFATE 10 MG: 5 INJECTION INTRAVENOUS at 11:13

## 2024-12-04 RX ADMIN — FENTANYL CITRATE 50 MCG: 50 INJECTION, SOLUTION INTRAMUSCULAR; INTRAVENOUS at 10:42

## 2024-12-04 RX ADMIN — DEXAMETHASONE SODIUM PHOSPHATE 8 MG: 4 INJECTION, SOLUTION INTRAMUSCULAR; INTRAVENOUS at 10:55

## 2024-12-04 RX ADMIN — EPHEDRINE SULFATE 5 MG: 5 INJECTION INTRAVENOUS at 11:22

## 2024-12-04 RX ADMIN — SODIUM CHLORIDE, POTASSIUM CHLORIDE, SODIUM LACTATE AND CALCIUM CHLORIDE 20 ML/HR: 600; 310; 30; 20 INJECTION, SOLUTION INTRAVENOUS at 10:31

## 2024-12-04 RX ADMIN — LIDOCAINE HYDROCHLORIDE 100 MG: 20 INJECTION, SOLUTION EPIDURAL; INFILTRATION; INTRACAUDAL; PERINEURAL at 10:42

## 2024-12-04 RX ADMIN — PROPOFOL 200 MG: 10 INJECTION, EMULSION INTRAVENOUS at 10:42

## 2024-12-04 RX ADMIN — MAGNESIUM SULFATE HEPTAHYDRATE 1 G: 500 INJECTION, SOLUTION INTRAMUSCULAR; INTRAVENOUS at 10:50

## 2024-12-04 RX ADMIN — ROCURONIUM BROMIDE 20 MG: 10 INJECTION, SOLUTION INTRAVENOUS at 11:20

## 2024-12-04 RX ADMIN — MIDAZOLAM 2 MG: 1 INJECTION INTRAMUSCULAR; INTRAVENOUS at 10:36

## 2024-12-04 RX ADMIN — SODIUM CHLORIDE, SODIUM LACTATE, POTASSIUM CHLORIDE, AND CALCIUM CHLORIDE: .6; .31; .03; .02 INJECTION, SOLUTION INTRAVENOUS at 10:36

## 2024-12-04 RX ADMIN — FENTANYL CITRATE 50 MCG: 50 INJECTION, SOLUTION INTRAMUSCULAR; INTRAVENOUS at 12:21

## 2024-12-04 RX ADMIN — PROPOFOL 150 MCG/KG/MIN: 10 INJECTION, EMULSION INTRAVENOUS at 10:46

## 2024-12-04 RX ADMIN — CEFAZOLIN 3000 MG: 3 INJECTION, POWDER, FOR SOLUTION INTRAVENOUS at 10:32

## 2024-12-04 RX ADMIN — ROCURONIUM BROMIDE 60 MG: 10 INJECTION, SOLUTION INTRAVENOUS at 10:43

## 2024-12-04 RX ADMIN — ALBUTEROL SULFATE 2.5 MG: 2.5 SOLUTION RESPIRATORY (INHALATION) at 11:43

## 2024-12-04 RX ADMIN — FENTANYL CITRATE 50 MCG: 50 INJECTION, SOLUTION INTRAMUSCULAR; INTRAVENOUS at 11:20

## 2024-12-04 RX ADMIN — Medication 100 MCG: at 11:22

## 2024-12-04 NOTE — OP NOTE
GASTRIC BAND REMOVAL LAPAROSCOPIC WITH DAVINCI ROBOT  Procedure Report    Patient Name:  Bernadine Arriaga  YOB: 1980    Date of Surgery:  12/4/2024     Indications: Dysphagia after laparoscopic gastric band    Pre-op Diagnosis: Dysphagia     Post op Diagnosis:same    Procedure/CPT® Codes:      Procedure(s):  GASTRIC BAND REMOVAL LAPAROSCOPIC WITH DAVINCI ROBOT    Staff:  Surgeon(s):  Ciera Cota MD    Assistant: Augusto Nielsen CSA  Assistant: Augusto Nielsen CSA was responsible for performing the following activities: Retraction, Suction, Irrigation, Suturing, Closing, and Placing Dressing and their skilled assistance was necessary for the success of this case.   Anesthesia: General    Estimated Blood Loss: none    Implants:  none    Specimen:    none        Findings:      Complications: None    Description of Procedure: General endotracheal anesthesia was provided.  The abdomen was prepped and draped in a sterile fashion.  I started the procedure by removing the LAP-BAND port by open technique.  An incision was made just over the port.  I incised through the subcutaneous fat and identified the port and used cautery to excise the fibrous tissue around it.  After it was completely freed I identified the tubing and any cuff if present and this was removed as well.  I cut the tubing with the suture scissors.     I then began the laparoscopic portion of the procedure.  An incision was made at the left midclavicular line left about 15 cm inferior to the xiphoid.  Optiview technique was used to bring an 8 mm trocar into the peritoneum and insufflation commenced to 15 mmHg.  2 more 8 mm trochars were placed.  A 12 mm trocar was brought in through the port site.  The liver was retracted with the #1 Caudia instrument and then the LAP-BAND was identified and monopolar scissors were used to take down the fibrinous tissue around the LAP-BAND.  LAP-BAND was unbuckled and then removed from the stomach.  It  was then removed through the 12 port.  I reinspected the area of the scar tissue around the stomach and did excise some of the fibrinous scar.    Local anesthetic was administered and 4-0 Monocryl was used to close all the skin incisions.    Ciera Cota MD     Date: 12/4/2024  Time: 11:54 EST

## 2024-12-04 NOTE — ANESTHESIA POSTPROCEDURE EVALUATION
Patient: Bernadine Arriaga    Procedure Summary       Date: 12/04/24 Room / Location: [facility] OR 08 / [facility] MAIN OR    Anesthesia Start: 1036 Anesthesia Stop: 1207    Procedure: GASTRIC BAND REMOVAL LAPAROSCOPIC WITH DAVINCI ROBOT (Abdomen) Diagnosis:       Obesity, Class III, BMI 40-49.9 (morbid obesity)      (Obesity, Class III, BMI 40-49.9 (morbid obesity) [E66.01])    Surgeons: Ciera Cota MD Provider: Long Thomas MD    Anesthesia Type: general, ERAS Protocol ASA Status: 3            Anesthesia Type: general, ERAS Protocol    Vitals  Vitals Value Taken Time   /66 12/04/24 1215   Temp 98.1 °F (36.7 °C) 12/04/24 1205   Pulse 77 12/04/24 1220   Resp 11 12/04/24 1215   SpO2 94 % 12/04/24 1220   Vitals shown include unfiled device data.        Post Anesthesia Care and Evaluation    Patient location during evaluation: PACU  Patient participation: complete - patient participated  Level of consciousness: awake  Pain score: 0  Pain management: adequate  Anesthetic complications: No anesthetic complications  PONV Status: none  Cardiovascular status: acceptable  Respiratory status: acceptable  Hydration status: acceptable

## 2024-12-04 NOTE — DISCHARGE INSTRUCTIONS
Dr. Ciera Cota  [address]  [place] [address]    Discharge Instructions for Surgery      Go home, rest and take it easy today; however, you should get up and move about several times today to reduce the risk of developing a clot in your legs.      You may experience some dizziness or memory loss from the anesthesia.  This may last for the next 24 hours.  Someone should plan on staying with you for the first 24 hours for your safety.    Do not make any important legal decisions or sign any legal papers for the next 24 hours.      Eat and drink lightly today.  Start off with liquids, jello, soup, crackers or other bland foods at first. You may advance your diet tomorrow as tolerated as long as you do not experience any nausea or vomiting.     If skin glue (Dermabond) was used, your incisions are protected and covered.  The invisible glue will dissolve on its own as your incision heals. If you have dressings, you may remove your outer dressings in 3 days.  The white tapes called steri-strips should stay in place.  They will fall off on their own in 1-2 weeks.  Do not worry if they come off sooner.      If you have dressings, you may notice some bleeding/drainage on your outer dressings. A little bloody drainage is normal. If the bleeding/drainage is such that the bandage cannot absorb it, remove the dressing, apply clean gauze and apply firm pressure for a full 15 minutes.  If the bleeding continues, please contact the office.    You may shower tomorrow allowing water to run over the incisions; however, do not scrub the incisions.   No tub baths until your incisions are completely healed.         You have received a prescription for a narcotic pain medicine, as you will have some pain following surgery.   You will not be totally pain free, but your pain medicine should make the pain tolerable.  Please take your pain medicine as prescribed and always take your pills with food to prevent nausea. If you  are having severe pain that cannot be controlled by the pain medicine, please contact the office.      You have also received a prescription for an anti-nausea medicine.  Please take this as prescribed for any nausea or vomiting.  Nausea could be a result of the anesthesia or a result of the narcotic pain medicine.  If you experience severe nausea and vomiting that cannot be controlled by the nausea medicine, please contact the office.      It is not unusual to experience pain/discomfort in your shoulders or under your ribs after surgery.  It is from the gas used during the laparoscopic procedure and usually lasts 1-3 days.  The prescription pain medicine is used to treat the surgical pain and does not typically alleviate this “gassy” pain.     No driving for 24 hours and for as long as you are taking your prescription pain medicine.      You will need to call the office at [phone] to schedule a follow-up appointment in 6-10 days. This can be a telephone or video visit if desired.     Remember to contact the office for any of the following:    Fever > 101 degrees  Severe pain that cannot be controlled by taking your pain pills  Severe nausea or vomiting that cannot be controlled by taking your nausea pills  Significant bleeding of your incisions  Drainage that has a bad smell or is yellow or green in appearance  Any other questions or concerns

## 2024-12-04 NOTE — ANESTHESIA PROCEDURE NOTES
Airway  Urgency: elective    Date/Time: 12/4/2024 10:45 AM    General Information and Staff    Patient location during procedure: OR  Anesthesiologist: Long Thomas MD  CRNA/CAA: Shelby Phillips CRNA  SRNA: Jairo Stacy SRNA  Indications and Patient Condition  Indications for airway management: airway protection    Preoxygenated: yes  MILS not maintained throughout  Mask difficulty assessment: 2 - vent by mask + OA or adjuvant +/- NMBA    Final Airway Details  Final airway type: endotracheal airway      Successful airway: ETT  Cuffed: yes   Successful intubation technique: video laryngoscopy  Facilitating devices/methods: intubating stylet and cricoid pressure  Endotracheal tube insertion site: oral  Blade: Burns  Blade size: 3  ETT size (mm): 7.0  Cormack-Lehane Classification: grade IIb - view of arytenoids or posterior of glottis only  Placement verified by: chest auscultation and capnometry   Cuff volume (mL): 8  Measured from: lips  ETT/EBT  to lips (cm): 22  Number of attempts at approach: 1  Assessment: lips, teeth, and gum same as pre-op and atraumatic intubation

## 2024-12-05 ENCOUNTER — TRANSITIONAL CARE MANAGEMENT TELEPHONE ENCOUNTER (OUTPATIENT)
Dept: CALL CENTER | Facility: HOSPITAL | Age: 44
End: 2024-12-05
Payer: COMMERCIAL

## 2024-12-05 ENCOUNTER — PREP FOR SURGERY (OUTPATIENT)
Dept: OTHER | Facility: HOSPITAL | Age: 44
End: 2024-12-05
Payer: COMMERCIAL

## 2024-12-05 ENCOUNTER — READMISSION MANAGEMENT (OUTPATIENT)
Dept: CALL CENTER | Facility: HOSPITAL | Age: 44
End: 2024-12-05
Payer: COMMERCIAL

## 2024-12-05 DIAGNOSIS — E66.01 OBESITY, CLASS III, BMI 40-49.9 (MORBID OBESITY): Primary | ICD-10-CM

## 2024-12-05 RX ORDER — PANTOPRAZOLE SODIUM 40 MG/10ML
40 INJECTION, POWDER, LYOPHILIZED, FOR SOLUTION INTRAVENOUS ONCE
OUTPATIENT
Start: 2024-12-05 | End: 2024-12-05

## 2024-12-05 RX ORDER — ACETAMINOPHEN 10 MG/ML
1000 INJECTION, SOLUTION INTRAVENOUS ONCE
OUTPATIENT
Start: 2024-12-05 | End: 2024-12-05

## 2024-12-05 RX ORDER — SODIUM CHLORIDE 0.9 % (FLUSH) 0.9 %
3-10 SYRINGE (ML) INJECTION AS NEEDED
OUTPATIENT
Start: 2024-12-05

## 2024-12-05 RX ORDER — CHLORHEXIDINE GLUCONATE ORAL RINSE 1.2 MG/ML
15 SOLUTION DENTAL SEE ADMIN INSTRUCTIONS
OUTPATIENT
Start: 2024-12-05

## 2024-12-05 RX ORDER — SODIUM CHLORIDE 0.9 % (FLUSH) 0.9 %
3 SYRINGE (ML) INJECTION EVERY 12 HOURS SCHEDULED
OUTPATIENT
Start: 2024-12-05

## 2024-12-05 RX ORDER — SCOLOPAMINE TRANSDERMAL SYSTEM 1 MG/1
1 PATCH, EXTENDED RELEASE TRANSDERMAL CONTINUOUS
OUTPATIENT
Start: 2024-12-05 | End: 2024-12-08

## 2024-12-05 RX ORDER — SODIUM CHLORIDE, SODIUM LACTATE, POTASSIUM CHLORIDE, CALCIUM CHLORIDE 600; 310; 30; 20 MG/100ML; MG/100ML; MG/100ML; MG/100ML
100 INJECTION, SOLUTION INTRAVENOUS CONTINUOUS
OUTPATIENT
Start: 2024-12-05 | End: 2024-12-07

## 2024-12-05 NOTE — H&P
HISTORY AND PHYSICAL      Patient Care Team:  Berenice Jacobson MD as PCP - General  Berenice Jacobson MD as PCP - Family Medicine  Syed Zaragoza MD as Surgeon (General Surgery)  Ciera Cota MD as Consulting Physician (Bariatrics)    Chief complaint dysphagia and intolerance of gastric band    Subjective     Patient is a 44 y.o. female presents with dysphagia and intolerance of gastric band.  I been seeing her for about a year now and we have tried to increase the fluid in her band but she is actually developed symptoms of obstruction and reflux whenever we try to put fluid in the band.  Review of Systems   Pertinent items are noted in HPI    History  Past Medical History:   Diagnosis Date    Allergic     Anaphylactic reaction     to combo of ragweed and molds    Anxiety     Arthritis of spine     psoriatic    Asthma     Diabetes mellitus     GERD (gastroesophageal reflux disease)     History of medical problems     Hidradenitis    Hydradenitis     Hyperlipidemia     slight elevation not treated    Hypertension     Low back pain     Migraine     history    MRSA carrier     Pneumonia     Psoriasis     Psoriatic arthritis     Pyoderma     Sinus disorder     Sleep apnea     no machine     Past Surgical History:   Procedure Laterality Date    ADENOIDECTOMY      APPENDECTOMY      AXILLARY SURGERY      multiple due to hidradenitis    BREAST BIOPSY       SECTION      CHOLECYSTECTOMY      ENDOSCOPY N/A 2024    Procedure: ESOPHAGOGASTRODUODENOSCOPY WITH BIOPSY,;  Surgeon: Ciera Cota MD;  Location: [facility] ENDOSCOPY;  Service: General;  Laterality: N/A;  post: NORMALEGD    GASTRIC BAND ADJUSTMENT N/A 2024    Procedure: ESOPHAGOGASTRODUODENOSCOPY WITH GASTRIC BAND ADJUSTMENT WITH FLUORO;  Surgeon: Ciera Cota MD;  Location: [facility] ENDOSCOPY;  Service: Gastroenterology;  Laterality: N/A;  gastric band leak    INCISION AND DRAINAGE OF WOUND      breast    KNEE ARTHRODESIS       LAPAROSCOPIC GASTRIC BANDING  2008    MASS EXCISION Right 10/27/2021    Procedure: WIDE EXCISION TRUNK LESION/MASS, hidradenitis of right hip;  Surgeon: Serafin Centeno MD;  Location: [facility] MAIN OR;  Service: General;  Laterality: Right;    SINUS SURGERY      SMALL INTESTINE SURGERY      TONSILLECTOMY      WOUND DEBRIDEMENT Right 06/19/2019    Procedure: DEBRIDEMENT OF RECURRENT HIDRADENITIS AND VAC PLACEMENT RIGHT SIDE;  Surgeon: Alix Freire MD;  Location: [facility] MAIN OR;  Service: General     Family History   Problem Relation Age of Onset    Breast cancer Mother     Hypertension Mother     Cancer Mother     Diabetes Mother     Hyperlipidemia Mother     Asthma Mother     Hypertension Father     Hyperlipidemia Father     Stroke Father     Cancer Brother     Breast cancer Maternal Aunt     Breast cancer Maternal Aunt     Diabetes Maternal Grandmother     Heart attack Maternal Grandfather     Obesity Paternal Grandmother     Diabetes Paternal Grandmother     Cancer Paternal Grandfather      Social History     Tobacco Use    Smoking status: Never     Passive exposure: Current    Smokeless tobacco: Never   Vaping Use    Vaping status: Never Used   Substance Use Topics    Alcohol use: Never    Drug use: Never     No medications prior to admission.     Allergies:  Calcium channel blockers, Nifedipine, Tetanus antitoxin, Verapamil, Adhesive tape, Tape, and Diltiazem    Objective     Vital Signs  Temp:  [98.1 °F (36.7 °C)-98.7 °F (37.1 °C)] 98.7 °F (37.1 °C)  Heart Rate:  [70-81] 81  Resp:  [11-17] 16  BP: (119-142)/(47-80) 142/80    Physical Exam:      General Appearance:    Alert, cooperative, in no acute distress   Head:    Normocephalic, without obvious abnormality, atraumatic   Eyes:            Lids and lashes normal, conjunctivae and sclerae normal, no   icterus, no pallor, corneas clear, PERRLA   Ears:    Ears appear intact with no abnormalities noted   Throat:   No oral lesions, no thrush, oral mucosa moist    Neck:   No adenopathy, supple, trachea midline, no thyromegaly, no   carotid bruit, no JVD   Back:     No kyphosis present, no scoliosis present, no skin lesions,      erythema or scars, no tenderness to percussion or                   palpation,   range of motion normal   Lungs:     Clear to auscultation,respirations regular, even and                  unlabored    Heart:    Regular rhythm and normal rate, normal S1 and S2, no            murmur, no gallop, no rub, no click   Chest Wall:    No abnormalities observed   Abdomen:     Normal bowel sounds, no masses, no organomegaly, soft        non-tender, non-distended, no guarding, no rebound                tenderness   Rectal:     Deferred   Extremities:   Moves all extremities well, no edema, no cyanosis, no             redness   Pulses:   Pulses palpable and equal bilaterally   Skin:   No bleeding, bruising or rash   Lymph nodes:   No palpable adenopathy   Neurologic:   Cranial nerves 2 - 12 grossly intact, sensation intact, DTR       present and equal bilaterally     Results Review:    I reviewed the patient's new clinical results.  I reviewed the patient's new imaging results and agree with the interpretation.    Assessment & Plan       Obesity, Class III, BMI 40-49.9 (morbid obesity)    Obesity  Dysphagia and intolerance of the gastric band      44-year-old lady has had a gastric band for some time and has had evidence of dysphagia and reflux and even bowel obstruction related to the band.  Will plan for laparoscopic removal of the gastric band.  She is interested in further bariatric surgery in the future.      Ciera Cota MD  12/04/24  22:17 EST

## 2024-12-05 NOTE — OUTREACH NOTE
Prep Survey      Flowsheet Row Responses   [facility] patient discharged from? [facility]   Is LACE score < 7 ? Yes   Eligibility [facility]   Date of Admission 12/04/24   Date of Discharge 12/04/24   Discharge Disposition Home or Self Care   Discharge diagnosis GASTRIC BAND REMOVAL LAPAROSCOPIC WITH DAVINCI ROBOT   Does the patient have one of the following disease processes/diagnoses(primary or secondary)? General Surgery   Does the patient have Home health ordered? No   Is there a DME ordered? No   Prep survey completed? Yes            Shelby CAMARGO - Registered Nurse

## 2024-12-05 NOTE — OUTREACH NOTE
Call Center TCM Note      Flowsheet Row Responses   [facility] patient discharged from? [facility]   Does the patient have one of the following disease processes/diagnoses(primary or secondary)? General Surgery   TCM attempt successful? Yes   Call start time 1520   Call end time 1521   Discharge diagnosis GASTRIC BAND REMOVAL LAPAROSCOPIC WITH DAVINCI ROBOT   Person spoke with today (if not patient) and relationship pt   Meds reviewed with patient/caregiver? Yes   Is the patient having any side effects they believe may be caused by any medication additions or changes? No   Does the patient have all medications related to this admission filled (includes all antibiotics, pain medications, etc.) Yes   Is the patient taking all medications as directed (includes completed medication regime)? Yes   Comments Post-op  12/16/2024  8:30 AM   Does the patient have an appointment with their PCP within 7-14 days of discharge? No   Nursing Interventions Patient desires to follow up with specialty only   Psychosocial issues? No   Did the patient receive a copy of their discharge instructions? Yes   Nursing interventions Reviewed instructions with patient   What is the patient's perception of their health status since discharge? Improving   Nursing interventions Nurse provided patient education   Is the patient /caregiver able to teach back basic post-op care? Take showers only when approved by MD-sponge bathe until then, No tub bath, swimming, or hot tub until instructed by MD, Keep incision areas clean,dry and protected, Lifting as instructed by MD in discharge instructions   Is the patient/caregiver able to teach back signs and symptoms of incisional infection? Increased redness, swelling or pain at the incisonal site, Incisional warmth, Fever, Pus or odor from incision, Increased drainage or bleeding   Is the patient/caregiver able to teach back steps to recovery at home? Rest and rebuild strength, gradually increase activity,  Eat a well-balance diet   If the patient is a current smoker, are they able to teach back resources for cessation? Not a smoker   Is the patient/caregiver able to teach back the hierarchy of who to call/visit for symptoms/problems? PCP, Specialist, Home health nurse, Urgent Care, ED, 911 Yes   TCM call completed? Yes   Wrap up additional comments Pt educated on when to call surgeon's office to report fever, or increased redness, swelling, drainage. Reviewed AVS/meds/instructions with pt. Pt verified Post-op appt   Call end time 1521   Would this patient benefit from a Referral to Amb Social Work? No   Is the patient interested in additional calls from an ambulatory ? No            Ann Lewis RN    12/5/2024, 15:22 EST

## 2024-12-06 ENCOUNTER — TELEPHONE (OUTPATIENT)
Dept: BARIATRICS/WEIGHT MGMT | Facility: CLINIC | Age: 44
End: 2024-12-06
Payer: COMMERCIAL

## 2024-12-13 ENCOUNTER — OFFICE VISIT (OUTPATIENT)
Dept: BARIATRICS/WEIGHT MGMT | Facility: CLINIC | Age: 44
End: 2024-12-13
Payer: COMMERCIAL

## 2024-12-13 VITALS
OXYGEN SATURATION: 97 % | SYSTOLIC BLOOD PRESSURE: 142 MMHG | DIASTOLIC BLOOD PRESSURE: 81 MMHG | HEIGHT: 67 IN | HEART RATE: 75 BPM | RESPIRATION RATE: 16 BRPM | WEIGHT: 293 LBS | BODY MASS INDEX: 45.99 KG/M2

## 2024-12-13 DIAGNOSIS — E66.01 OBESITY, CLASS III, BMI 40-49.9 (MORBID OBESITY): ICD-10-CM

## 2024-12-13 DIAGNOSIS — Z98.84 HISTORY OF REMOVAL OF LAPAROSCOPIC GASTRIC BANDING DEVICE: Primary | ICD-10-CM

## 2024-12-13 RX ORDER — METHYLPREDNISOLONE 4 MG/1
TABLET ORAL
Qty: 21 TABLET | Refills: 0 | Status: SHIPPED | OUTPATIENT
Start: 2024-12-13

## 2024-12-13 NOTE — PROGRESS NOTES
"Subjective:    Band removal on 12/4/24. Tolerating po fluids - at least 80 oz a day. Minimal abd pain . 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 No nausea or vomiting reported. Currently scheduled for gastric sleeve sx in Feb. Getting new insurance 1/1/25.   Objective:      /81   Pulse 75   Resp 16   Ht 170.2 cm (67\")   Wt 134 kg (294 lb 9.6 oz)   LMP  (LMP Unknown)   SpO2 97%   BMI 46.14 kg/m²     General:  alert, appears stated age, and cooperative   Abdomen: soft, appropriate tenderness   Incision:   healing well, no drainage,  no hernia, no seroma, no swelling, no dehiscence, incision well approximated. Erythema noted around each incision, likely from allergic reaction to skin glue, itching reported        Lungs: No labored breathing noted              Assessment/ plan:         44 year old female status post lap band removal on 124/24. Post op pt is doing well. She is not having any nausea or vomiting. Tolerating po fluids and foods well. Minimal abdominal pain. Returned to work day after surgery and reports doing well with this. Pt does have significant erythema noted around each incision. Likely reaction to skin glue. Noted for gastric sleeve sx which is scheduled for 2 months from now.Will send in steroid dose pack for pt to take. Encouraged pt to monitor for weakened immune system as she is already taking a biologic medication. Ok to try triamcinolone cream as well.     Pt is getting new insurance as of 1/1/25. Encourage pt to get the office her new insurance card copy to run to verify bariatric surgery benefits. Need to see if SWL will be needed. Will need pre op class before gastric sleeve sx. Plan to follow up for this in Feb.     Diana Small APRDOLLY  [facility] bariatrics   "

## 2024-12-25 RX ORDER — EZETIMIBE 10 MG/1
10 TABLET ORAL DAILY
Qty: 90 TABLET | Refills: 0 | Status: SHIPPED | OUTPATIENT
Start: 2024-12-25

## 2024-12-26 DIAGNOSIS — E11.65 TYPE 2 DIABETES MELLITUS WITH HYPERGLYCEMIA, WITHOUT LONG-TERM CURRENT USE OF INSULIN: ICD-10-CM

## 2024-12-26 RX ORDER — EMPAGLIFLOZIN 25 MG/1
25 TABLET, FILM COATED ORAL DAILY
Qty: 90 TABLET | Refills: 1 | Status: SHIPPED | OUTPATIENT
Start: 2024-12-26

## 2024-12-26 RX ORDER — SITAGLIPTIN 100 MG/1
100 TABLET, FILM COATED ORAL DAILY
Qty: 90 TABLET | Refills: 1 | Status: SHIPPED | OUTPATIENT
Start: 2024-12-26

## 2025-01-02 ENCOUNTER — PATIENT MESSAGE (OUTPATIENT)
Dept: BARIATRICS/WEIGHT MGMT | Facility: CLINIC | Age: 45
End: 2025-01-02
Payer: COMMERCIAL

## 2025-01-13 NOTE — TELEPHONE ENCOUNTER
Received a voicemail from Dougie oliver [place] informing that patients upcoming scheduled surgery has been denied for inpatient level of care due to this being an ambulatory surgery. Ref # [id number]. Ph# [phone], fax # [phone].  Dougie did not provided any DOS on this voicemail    Jennifer VAZQUEZ RN  [facility]  PH# [phone]  Fax# [phone]

## 2025-01-24 ENCOUNTER — CONSULT (OUTPATIENT)
Dept: BARIATRICS/WEIGHT MGMT | Facility: CLINIC | Age: 45
End: 2025-01-24
Payer: COMMERCIAL

## 2025-01-24 VITALS
HEIGHT: 67 IN | HEART RATE: 70 BPM | BODY MASS INDEX: 45.99 KG/M2 | SYSTOLIC BLOOD PRESSURE: 119 MMHG | RESPIRATION RATE: 18 BRPM | OXYGEN SATURATION: 94 % | DIASTOLIC BLOOD PRESSURE: 73 MMHG | WEIGHT: 293 LBS

## 2025-01-24 DIAGNOSIS — E66.01 OBESITY, CLASS III, BMI 40-49.9 (MORBID OBESITY): Primary | ICD-10-CM

## 2025-01-24 NOTE — H&P (VIEW-ONLY)
Bariatric Consult:    Chief Complaint: Morbid Obesity  Referred by Berenice Jacobson MD    Bernadine Arriaga is here today for consult on Morbid Obesity    History of Present Illness:     Bernadine Arriaga is a 44 y.o. female with morbid obesity with co-morbidities including  has a past medical history of Allergic, Allergic rhinitis, Anaphylactic reaction, Anxiety, Arthritis of spine, Asthma, Diabetes mellitus, GERD (gastroesophageal reflux disease), History of medical problems, Hydradenitis, Hyperlipidemia, Hypertension, Low back pain, Migraine, MRSA carrier, Pneumonia, Psoriasis, Psoriatic arthritis, Pyoderma, Sinus disorder, and Sleep apnea.  who presents for surgical consultation for the above procedure. Bernadine has completed the initial intake visit and has been examined by our nurse practitioner, dietician, psychologist and underwent the extensive educational teaching process under the guidance of our bariatric coordinator and myself. Bernadine also has seen the educational video RAMU on the surgical procedure if available. Bernadine attended today more educational teaching from our bariatric coordinator and myself. Bernadine has had an extensive medical workup including a visit with their primary care physician, EKG, chest radiograph, blood work, EGD or UGI and possibly further testing. These have been reviewed by me and discussed with the patient. Bernadine is now ready to proceed with surgery. Bernadine presently denies nausea, vomiting, fever, chills, chest pain, shortness of air, melena, hematochezia, hemetemesis, dysuria, frequency, hematuria, jaundice or abdominal pain.     Wt Readings from Last 10 Encounters:   01/24/25 136 kg (299 lb 6.4 oz)   12/13/24 134 kg (294 lb 9.6 oz)   11/22/24 134 kg (296 lb)   11/19/24 136 kg (299 lb 12.8 oz)   10/18/24 133 kg (294 lb)   09/29/24 132 kg (290 lb 3.2 oz)   09/23/24 134 kg (296 lb)   09/20/24 134 kg (294 lb 8 oz)   08/19/24 135 kg (296 lb 11.2 oz)    24 135 kg (298 lb)       The  pre-op EGD shows   Gastric band (has had removed since, I reviewed CT and she does have hiatal hernia but no symptoms, we discussed a possible repair)    Past Medical History:   Diagnosis Date    Allergic     Allergic rhinitis     Anaphylactic reaction     to combo of ragweed and molds    Anxiety     Arthritis of spine     psoriatic    Asthma     Diabetes mellitus     GERD (gastroesophageal reflux disease)     History of medical problems     Hidradenitis    Hydradenitis     Hyperlipidemia     slight elevation not treated    Hypertension     Low back pain     Migraine     history    MRSA carrier     Pneumonia     Psoriasis     Psoriatic arthritis     Pyoderma     Sinus disorder     Sleep apnea     no machine       No diagnosis found.    Past Surgical History:   Procedure Laterality Date    ADENOIDECTOMY      APPENDECTOMY      AXILLARY SURGERY      multiple due to hidradenitis    BREAST BIOPSY       SECTION      CHOLECYSTECTOMY      ENDOSCOPY N/A 2024    Procedure: ESOPHAGOGASTRODUODENOSCOPY WITH BIOPSY,;  Surgeon: Ciera Cota MD;  Location: [facility] ENDOSCOPY;  Service: General;  Laterality: N/A;  post: NORMALEGD    GASTRIC BAND ADJUSTMENT N/A 2024    Procedure: ESOPHAGOGASTRODUODENOSCOPY WITH GASTRIC BAND ADJUSTMENT WITH FLUORO;  Surgeon: Ciera Cota MD;  Location: [facility] ENDOSCOPY;  Service: Gastroenterology;  Laterality: N/A;  gastric band leak    GASTRIC BANDING REMOVAL N/A 2024    Procedure: GASTRIC BAND REMOVAL LAPAROSCOPIC WITH DAVINCI ROBOT;  Surgeon: Ciera Cota MD;  Location: [facility] MAIN OR;  Service: Robotics - DaVinci;  Laterality: N/A;    INCISION AND DRAINAGE OF WOUND      breast    KNEE ARTHRODESIS      LAPAROSCOPIC GASTRIC BANDING  2008    MASS EXCISION Right 10/27/2021    Procedure: WIDE EXCISION TRUNK LESION/MASS, hidradenitis of right hip;  Surgeon: Serafin Centeno MD;  Location: [facility] MAIN OR;  Service: General;  Laterality: Right;     SINUS SURGERY      SMALL INTESTINE SURGERY      TONSILLECTOMY      WOUND DEBRIDEMENT Right 06/19/2019    Procedure: DEBRIDEMENT OF RECURRENT HIDRADENITIS AND VAC PLACEMENT RIGHT SIDE;  Surgeon: Alix Freire MD;  Location: [facility] MAIN OR;  Service: General         * No active hospital problems. *      Allergies   Allergen Reactions    Calcium Channel Blockers Palpitations, Other (See Comments) and Rash     Tachycardia and redness.    INCREASED HR   Tachycardia and redness.    Nifedipine Palpitations    Tetanus Antitoxin Swelling    Verapamil Palpitations    Adhesive Tape Other (See Comments)     Tape will cause skin breakdown rapidly   Can use silicone tape    Tape Other (See Comments)     Tape will cause skin breakdown rapidly   Can use silicone tape    Diltiazem Palpitations     Rash          Current Outpatient Medications:     acetaminophen (TYLENOL) 325 MG tablet, Take 1,000 mg by mouth As Needed for Mild Pain . dont take preop, Disp: , Rfl:     albuterol sulfate  (90 Base) MCG/ACT inhaler, Inhale 2 puffs Every 6 (Six) Hours As Needed for Wheezing., Disp: 18 g, Rfl: 5    baclofen (LIORESAL) 10 MG tablet, Take 1 tablet by mouth 3 (Three) Times a Day As Needed for Muscle Spasms., Disp: , Rfl:     betamethasone dipropionate (DIPROLENE) 0.05 % lotion, Apply 1 application topically to the appropriate area as directed 2 (Two) Times a Day As Needed. dont use after bathing started, Disp: , Rfl:     betamethasone, augmented, (DIPROLENE) 0.05 % cream, PLEASE SEE ATTACHED FOR DETAILED DIRECTIONS, Disp: , Rfl:     budesonide-formoterol (SYMBICORT) 80-4.5 MCG/ACT inhaler, Inhale 2 puffs 2 (Two) Times a Day., Disp: 3 each, Rfl: 1    clindamycin (CLEOCIN T) 1 % lotion, Apply 1 application topically to the appropriate area as directed 2 (Two) Times a Day As Needed. dont use once bathing started, Disp: , Rfl:     clobetasol (TEMOVATE) 0.05 % ointment, , Disp: , Rfl:     cloNIDine (Catapres) 0.1 MG tablet, Take 1  tablet by mouth 2 (Two) Times a Day., Disp: 180 tablet, Rfl: 1    diclofenac (VOLTAREN) 75 MG EC tablet, Take 1 tablet by mouth 2 (Two) Times a Day., Disp: 60 tablet, Rfl: 3    doxycycline (ADOXA) 100 MG tablet, Take 1 tablet by mouth As Needed., Disp: , Rfl:     empagliflozin (Jardiance) 25 MG tablet tablet, TAKE 1 TABLET BY MOUTH DAILY, Disp: 90 tablet, Rfl: 1    EPINEPHrine (EPIPEN) 0.3 MG/0.3ML solution auto-injector injection, Inject 0.3 mL into the appropriate muscle as directed by prescriber As Needed (allergic reaction)., Disp: 1 each, Rfl: 5    ezetimibe (Zetia) 10 MG tablet, Take 1 tablet by mouth Daily., Disp: 90 tablet, Rfl: 0    famotidine (PEPCID) 20 MG tablet, Take 1 tablet by mouth 2 (Two) Times a Day. On with Remicade inj, Disp: , Rfl:     fenofibrate 160 MG tablet, Take 1 tablet by mouth Daily., Disp: 90 tablet, Rfl: 1    folic acid (FOLVITE) 1 MG tablet, Take 1 tablet by mouth Daily., Disp: , Rfl:     furosemide (LASIX) 40 MG tablet, Take 0.5 tablets by mouth 2 (Two) Times a Day., Disp: 30 tablet, Rfl: 1    glimepiride (AMARYL) 4 MG tablet, Take 1 tablet by mouth 2 (Two) Times a Day With Meals., Disp: 180 tablet, Rfl: 1    hydrOXYzine (ATARAX) 25 MG tablet, Take 1 tablet by mouth Every 6 (Six) Hours As Needed for Itching or Anxiety., Disp: 30 tablet, Rfl: 1    inFLIXimab (REMICADE) 100 MG injection, monthly, Disp: , Rfl:     methotrexate 2.5 MG tablet, Take  by mouth Every 7 (Seven) Days. ( 8 pills total ), Disp: , Rfl:     metoprolol succinate XL (TOPROL-XL) 50 MG 24 hr tablet, TAKE TWO TABLETS BY MOUTH EVERY MORNING AND ONE EVERY NIGHT AT BEDTIME (Patient taking differently: Take 2 tablets by mouth Every Morning. TAKE TWO TABLETS BY MOUTH EVERY MORNING AND ONE EVERY NIGHT AT BEDTIME), Disp: 270 tablet, Rfl: 1    metoprolol tartrate (LOPRESSOR) 50 MG tablet, Take 1 tablet by mouth every night at bedtime., Disp: , Rfl:     olmesartan (BENICAR) 40 MG tablet, TAKE 1 TABLET BY MOUTH DAILY, Disp: 90  tablet, Rfl: 0    Omega-3 Fatty Acids (fish oil) 1000 MG capsule capsule, Take 1 capsule by mouth Daily With Breakfast., Disp: , Rfl:     SITagliptin (Januvia) 100 MG tablet, TAKE 1 TABLET BY MOUTH DAILY., Disp: 90 tablet, Rfl: 1    sulfaSALAzine (AZULFIDINE) 500 MG tablet, Take 3 tablets by mouth 2 (Two) Times a Day., Disp: , Rfl:     vitamin D (ERGOCALCIFEROL) 1.25 MG (70913 UT) capsule capsule, Take 1 capsule by mouth 1 (One) Time Per Week., Disp: 12 capsule, Rfl: 1    Social History     Socioeconomic History    Marital status:    Tobacco Use    Smoking status: Never     Passive exposure: Current    Smokeless tobacco: Never   Vaping Use    Vaping status: Never Used   Substance and Sexual Activity    Alcohol use: Never    Drug use: Never    Sexual activity: Yes     Partners: Male     Birth control/protection: I.U.D.       Family History   Problem Relation Age of Onset    Breast cancer Mother     Hypertension Mother     Cancer Mother     Diabetes Mother     Hyperlipidemia Mother     Asthma Mother     Hypertension Father     Hyperlipidemia Father     Stroke Father     Cancer Brother     Breast cancer Maternal Aunt     Breast cancer Maternal Aunt     Diabetes Maternal Grandmother     Heart attack Maternal Grandfather     Obesity Paternal Grandmother     Diabetes Paternal Grandmother     Cancer Paternal Grandfather        Review of Systems:  Review of Systems    Review of Systems   Constitutional: Negative.    HENT: Negative.    Eyes: Negative.    Respiratory: Negative.    Cardiovascular: Negative.    Gastrointestinal: Negative.    Endocrine: Negative.    Genitourinary: Negative.    Musculoskeletal: Negative.    Skin: Negative.    Allergic/Immunologic: Negative.    Neurological: Negative.    Hematological: Negative.    Psychiatric/Behavioral: Negative.      Physical Exam:  Body mass index is 46.89 kg/m².   Vital Signs:  Weight: 136 kg (299 lb 6.4 oz)   Body mass index is 46.89 kg/m².  Temp: (not recorded)    Heart Rate: 70   BP: 119/73     Awake and alert  Normal mental status  Normal pulmonary effort  Abdomen appropriate tenderness  Incisions no erythema  Extremities no tenderness or swelling      Assessment:    Bernadine Arriaga is a 44 y.o. year old female with medically complicated severe obesity with a BMI of Body mass index is 46.89 kg/m². and multiple co-morbidities listed in the encounter diagnosis.    I think she is an appropriate candidate for this surgery, and is ready to proceed.      The patient has returned to the office for a surgical consultation and has requested to proceed with a laparoscopic gastric sleeve.  I have had the opportunity to obtain a history, examine the patient and review the patient's chart.    The patient understands that surgery is a tool and that weight loss is not guaranteed but only seen in the context of appropriate use, regular follow up, exercise and making appropriate food choices.     I personally discussed the potential complications of the laparoscopic gastric sleeve with this patient.  The patient is well aware of potential complications of the surgery that include but not limited to bleeding, infections, deep vein thrombosis, pulmonary embolism, pulmonary complications such as pneumonia, cardiac event, hernias, small bowel obstruction, damage to the spleen or other organs, bowel injury, disfiguring scars, failure to lose weight, need for additional surgery, conversion to an open procedure and death.  The patient is also aware of complications which apply in particular to the gastric sleeve and can include but not limited to the leakage of gastric contents at the staple line, the development of an intra-abdominal abscess, gastroesophageal reflux disease, Ahrris's esophagus, ulcers, vitamin/mineral deficiencies, strictures, and the possibility of converting this procedure to a Pollo-en-Y gastric bypass. The patient also understands the possibility of requiring an acid  reducer medication for the rest of their life.    The risks, benefits, potential complications and alternative therapies were discussed at great length as outlined in our extensive consent forms, online consent and educational teaching processes.    The patient has confirmed the participation in the programs extensive educational activities.    All questions and concerns were answered to patient's satisfaction.  The patient now wishes to proceed with surgery.    Patient has [default value] the pre-operative insertion of an IVC filter.     The patient has [default value] a postoperative course of anitcoagulant therapy.      Plan/Discussion/Summary:        I instructed patient to start on a H2 blocker or proton pump inhibitor if not already on one of these medications.    I explained in detail the procedures that we perform.  All of these procedures have a chance to convert to open if any technical challenges or complications do occur.  Bariatric surgery is not cosmetic surgery but rather a tool to help a patient make a life-long commitment lifestyle change including diet, exercise, behavior changes, and taking supplemental vitamins and minerals.    Problems after surgery may require more operations to correct them.    The risks, benefits, alternatives, and potential complications of all of the procedures were explained in detail including, but not limited to death, anesthesia and medication adverse effect, deep venous thrombosis, pulmonary embolism, trocar site/incisional hernia, wound infection, abdominal infection, bleeding, failure to lose weight, gain weight, a change in body image, metabolic complications with vitamin deficiences and anemia.    Weight loss expectations were discussed with the patient in detail. The weight loss operations most commonly performed are the sleeve gastrectomy and the Pollo-en-Y gastric bypass. These operations result in weight losses up to approximately 25-35% of initial body weight  12 to 24 months after surgery with the gastric bypass usually the higher percent of weight loss but depends on patient using the tool.    For the gastric bypass and loop duodenal switch (CHAD-S) the risks include but not limited to the following early complications:  Anastomotic leak/peritonitis, Pollo/Alimentary/biliopancreatic limb obstruction, severe & minor wound infection/seroma, and nausea/vomiting.  Late complications can include but are not limited to malnutrition, vitamin deficiencies, frequent loose stools,  stomal stenosis, marginal ulcer, bowel obstruction, intussusception, internal, and incisional hernia.    Regarding the gastric sleeve, there is less long-term outcome data and higher risk of dysphagia and reflux compared to a gastric bypass, as well as risk of internal visceral/organ injury, splenectomy, bleeding, infection, leak (which could require further intervention possible conversion to Pollo-en-Y gastric bypass), stenosis and possibility of regaining weight.    Bernadine was counseled regarding diagnostic results, instructions for management, risk factor reductions, prognosis, patient and family education, impressions, risks and benefits of treatment options and importance of compliance with treatment. Total face to face time of the encounter was over 45 minutes and over 30 minutes was spent counseling.     Yanique Report   As part of this patient's treatment plan I am prescribing controlled substances. The patient has been made aware of appropriate use of such medications, including potential risk of somnolence, limited ability to drive and /or work safely, and potential for dependence or overdose. It has also been made clear that these medications are for use by this patient only, without concomitant use of alcohol or other substances unless prescribed.    Bernadine has completed prescribing agreement detailing terms of continued prescribing of controlled substances, including monitoring YANIQUE  reports, urine drug screening, and pill counts if necessary. Bernadine is aware that inappropriate use will result in cessation of prescribing such medications.    YANIQUE report has been reviewed      History and physical exam exhibit continued safe and appropriate use of controlled substances.      Bernadine understands the surgical procedures and the different surgical options that are available.  She understands the lifestyle changes that are required after surgery and has agreed to follow the guidelines outlined in the weight management program.  She also expressed understanding of the risks involved and had all of female questions answered and desires to proceed.      Ciera Cota MD  1/24/2025

## 2025-01-24 NOTE — PROGRESS NOTES
Bariatric Consult:    Chief Complaint: Morbid Obesity  Referred by Berenice Jacobson MD    Bernadine Arriaga is here today for consult on Morbid Obesity    History of Present Illness:     Bernadine Arriaga is a 44 y.o. female with morbid obesity with co-morbidities including  has a past medical history of Allergic, Allergic rhinitis, Anaphylactic reaction, Anxiety, Arthritis of spine, Asthma, Diabetes mellitus, GERD (gastroesophageal reflux disease), History of medical problems, Hydradenitis, Hyperlipidemia, Hypertension, Low back pain, Migraine, MRSA carrier, Pneumonia, Psoriasis, Psoriatic arthritis, Pyoderma, Sinus disorder, and Sleep apnea.  who presents for surgical consultation for the above procedure. Bernadine has completed the initial intake visit and has been examined by our nurse practitioner, dietician, psychologist and underwent the extensive educational teaching process under the guidance of our bariatric coordinator and myself. Bernadine also has seen the educational video RAMU on the surgical procedure if available. Bernadine attended today more educational teaching from our bariatric coordinator and myself. Bernadine has had an extensive medical workup including a visit with their primary care physician, EKG, chest radiograph, blood work, EGD or UGI and possibly further testing. These have been reviewed by me and discussed with the patient. Bernadine is now ready to proceed with surgery. Bernadine presently denies nausea, vomiting, fever, chills, chest pain, shortness of air, melena, hematochezia, hemetemesis, dysuria, frequency, hematuria, jaundice or abdominal pain.     Wt Readings from Last 10 Encounters:   01/24/25 136 kg (299 lb 6.4 oz)   12/13/24 134 kg (294 lb 9.6 oz)   11/22/24 134 kg (296 lb)   11/19/24 136 kg (299 lb 12.8 oz)   10/18/24 133 kg (294 lb)   09/29/24 132 kg (290 lb 3.2 oz)   09/23/24 134 kg (296 lb)   09/20/24 134 kg (294 lb 8 oz)   08/19/24 135 kg (296 lb 11.2 oz)    24 135 kg (298 lb)       The  pre-op EGD shows   Gastric band (has had removed since, I reviewed CT and she does have hiatal hernia but no symptoms, we discussed a possible repair)    Past Medical History:   Diagnosis Date    Allergic     Allergic rhinitis     Anaphylactic reaction     to combo of ragweed and molds    Anxiety     Arthritis of spine     psoriatic    Asthma     Diabetes mellitus     GERD (gastroesophageal reflux disease)     History of medical problems     Hidradenitis    Hydradenitis     Hyperlipidemia     slight elevation not treated    Hypertension     Low back pain     Migraine     history    MRSA carrier     Pneumonia     Psoriasis     Psoriatic arthritis     Pyoderma     Sinus disorder     Sleep apnea     no machine       No diagnosis found.    Past Surgical History:   Procedure Laterality Date    ADENOIDECTOMY      APPENDECTOMY      AXILLARY SURGERY      multiple due to hidradenitis    BREAST BIOPSY       SECTION      CHOLECYSTECTOMY      ENDOSCOPY N/A 2024    Procedure: ESOPHAGOGASTRODUODENOSCOPY WITH BIOPSY,;  Surgeon: Ciera Cota MD;  Location: [facility] ENDOSCOPY;  Service: General;  Laterality: N/A;  post: NORMALEGD    GASTRIC BAND ADJUSTMENT N/A 2024    Procedure: ESOPHAGOGASTRODUODENOSCOPY WITH GASTRIC BAND ADJUSTMENT WITH FLUORO;  Surgeon: Ciera Cota MD;  Location: [facility] ENDOSCOPY;  Service: Gastroenterology;  Laterality: N/A;  gastric band leak    GASTRIC BANDING REMOVAL N/A 2024    Procedure: GASTRIC BAND REMOVAL LAPAROSCOPIC WITH DAVINCI ROBOT;  Surgeon: Ciera Cota MD;  Location: [facility] MAIN OR;  Service: Robotics - DaVinci;  Laterality: N/A;    INCISION AND DRAINAGE OF WOUND      breast    KNEE ARTHRODESIS      LAPAROSCOPIC GASTRIC BANDING  2008    MASS EXCISION Right 10/27/2021    Procedure: WIDE EXCISION TRUNK LESION/MASS, hidradenitis of right hip;  Surgeon: Serafin Centeno MD;  Location: [facility] MAIN OR;  Service: General;  Laterality: Right;     SINUS SURGERY      SMALL INTESTINE SURGERY      TONSILLECTOMY      WOUND DEBRIDEMENT Right 06/19/2019    Procedure: DEBRIDEMENT OF RECURRENT HIDRADENITIS AND VAC PLACEMENT RIGHT SIDE;  Surgeon: Alix Freire MD;  Location: [facility] MAIN OR;  Service: General         * No active hospital problems. *      Allergies   Allergen Reactions    Calcium Channel Blockers Palpitations, Other (See Comments) and Rash     Tachycardia and redness.    INCREASED HR   Tachycardia and redness.    Nifedipine Palpitations    Tetanus Antitoxin Swelling    Verapamil Palpitations    Adhesive Tape Other (See Comments)     Tape will cause skin breakdown rapidly   Can use silicone tape    Tape Other (See Comments)     Tape will cause skin breakdown rapidly   Can use silicone tape    Diltiazem Palpitations     Rash          Current Outpatient Medications:     acetaminophen (TYLENOL) 325 MG tablet, Take 1,000 mg by mouth As Needed for Mild Pain . dont take preop, Disp: , Rfl:     albuterol sulfate  (90 Base) MCG/ACT inhaler, Inhale 2 puffs Every 6 (Six) Hours As Needed for Wheezing., Disp: 18 g, Rfl: 5    baclofen (LIORESAL) 10 MG tablet, Take 1 tablet by mouth 3 (Three) Times a Day As Needed for Muscle Spasms., Disp: , Rfl:     betamethasone dipropionate (DIPROLENE) 0.05 % lotion, Apply 1 application topically to the appropriate area as directed 2 (Two) Times a Day As Needed. dont use after bathing started, Disp: , Rfl:     betamethasone, augmented, (DIPROLENE) 0.05 % cream, PLEASE SEE ATTACHED FOR DETAILED DIRECTIONS, Disp: , Rfl:     budesonide-formoterol (SYMBICORT) 80-4.5 MCG/ACT inhaler, Inhale 2 puffs 2 (Two) Times a Day., Disp: 3 each, Rfl: 1    clindamycin (CLEOCIN T) 1 % lotion, Apply 1 application topically to the appropriate area as directed 2 (Two) Times a Day As Needed. dont use once bathing started, Disp: , Rfl:     clobetasol (TEMOVATE) 0.05 % ointment, , Disp: , Rfl:     cloNIDine (Catapres) 0.1 MG tablet, Take 1  tablet by mouth 2 (Two) Times a Day., Disp: 180 tablet, Rfl: 1    diclofenac (VOLTAREN) 75 MG EC tablet, Take 1 tablet by mouth 2 (Two) Times a Day., Disp: 60 tablet, Rfl: 3    doxycycline (ADOXA) 100 MG tablet, Take 1 tablet by mouth As Needed., Disp: , Rfl:     empagliflozin (Jardiance) 25 MG tablet tablet, TAKE 1 TABLET BY MOUTH DAILY, Disp: 90 tablet, Rfl: 1    EPINEPHrine (EPIPEN) 0.3 MG/0.3ML solution auto-injector injection, Inject 0.3 mL into the appropriate muscle as directed by prescriber As Needed (allergic reaction)., Disp: 1 each, Rfl: 5    ezetimibe (Zetia) 10 MG tablet, Take 1 tablet by mouth Daily., Disp: 90 tablet, Rfl: 0    famotidine (PEPCID) 20 MG tablet, Take 1 tablet by mouth 2 (Two) Times a Day. On with Remicade inj, Disp: , Rfl:     fenofibrate 160 MG tablet, Take 1 tablet by mouth Daily., Disp: 90 tablet, Rfl: 1    folic acid (FOLVITE) 1 MG tablet, Take 1 tablet by mouth Daily., Disp: , Rfl:     furosemide (LASIX) 40 MG tablet, Take 0.5 tablets by mouth 2 (Two) Times a Day., Disp: 30 tablet, Rfl: 1    glimepiride (AMARYL) 4 MG tablet, Take 1 tablet by mouth 2 (Two) Times a Day With Meals., Disp: 180 tablet, Rfl: 1    hydrOXYzine (ATARAX) 25 MG tablet, Take 1 tablet by mouth Every 6 (Six) Hours As Needed for Itching or Anxiety., Disp: 30 tablet, Rfl: 1    inFLIXimab (REMICADE) 100 MG injection, monthly, Disp: , Rfl:     methotrexate 2.5 MG tablet, Take  by mouth Every 7 (Seven) Days. ( 8 pills total ), Disp: , Rfl:     metoprolol succinate XL (TOPROL-XL) 50 MG 24 hr tablet, TAKE TWO TABLETS BY MOUTH EVERY MORNING AND ONE EVERY NIGHT AT BEDTIME (Patient taking differently: Take 2 tablets by mouth Every Morning. TAKE TWO TABLETS BY MOUTH EVERY MORNING AND ONE EVERY NIGHT AT BEDTIME), Disp: 270 tablet, Rfl: 1    metoprolol tartrate (LOPRESSOR) 50 MG tablet, Take 1 tablet by mouth every night at bedtime., Disp: , Rfl:     olmesartan (BENICAR) 40 MG tablet, TAKE 1 TABLET BY MOUTH DAILY, Disp: 90  tablet, Rfl: 0    Omega-3 Fatty Acids (fish oil) 1000 MG capsule capsule, Take 1 capsule by mouth Daily With Breakfast., Disp: , Rfl:     SITagliptin (Januvia) 100 MG tablet, TAKE 1 TABLET BY MOUTH DAILY., Disp: 90 tablet, Rfl: 1    sulfaSALAzine (AZULFIDINE) 500 MG tablet, Take 3 tablets by mouth 2 (Two) Times a Day., Disp: , Rfl:     vitamin D (ERGOCALCIFEROL) 1.25 MG (11209 UT) capsule capsule, Take 1 capsule by mouth 1 (One) Time Per Week., Disp: 12 capsule, Rfl: 1    Social History     Socioeconomic History    Marital status:    Tobacco Use    Smoking status: Never     Passive exposure: Current    Smokeless tobacco: Never   Vaping Use    Vaping status: Never Used   Substance and Sexual Activity    Alcohol use: Never    Drug use: Never    Sexual activity: Yes     Partners: Male     Birth control/protection: I.U.D.       Family History   Problem Relation Age of Onset    Breast cancer Mother     Hypertension Mother     Cancer Mother     Diabetes Mother     Hyperlipidemia Mother     Asthma Mother     Hypertension Father     Hyperlipidemia Father     Stroke Father     Cancer Brother     Breast cancer Maternal Aunt     Breast cancer Maternal Aunt     Diabetes Maternal Grandmother     Heart attack Maternal Grandfather     Obesity Paternal Grandmother     Diabetes Paternal Grandmother     Cancer Paternal Grandfather        Review of Systems:  Review of Systems    Review of Systems   Constitutional: Negative.    HENT: Negative.    Eyes: Negative.    Respiratory: Negative.    Cardiovascular: Negative.    Gastrointestinal: Negative.    Endocrine: Negative.    Genitourinary: Negative.    Musculoskeletal: Negative.    Skin: Negative.    Allergic/Immunologic: Negative.    Neurological: Negative.    Hematological: Negative.    Psychiatric/Behavioral: Negative.      Physical Exam:  Body mass index is 46.89 kg/m².   Vital Signs:  Weight: 136 kg (299 lb 6.4 oz)   Body mass index is 46.89 kg/m².  Temp: (not recorded)    Heart Rate: 70   BP: 119/73     Awake and alert  Normal mental status  Normal pulmonary effort  Abdomen appropriate tenderness  Incisions no erythema  Extremities no tenderness or swelling      Assessment:    Bernadine Arriaga is a 44 y.o. year old female with medically complicated severe obesity with a BMI of Body mass index is 46.89 kg/m². and multiple co-morbidities listed in the encounter diagnosis.    I think she is an appropriate candidate for this surgery, and is ready to proceed.      The patient has returned to the office for a surgical consultation and has requested to proceed with a laparoscopic gastric sleeve.  I have had the opportunity to obtain a history, examine the patient and review the patient's chart.    The patient understands that surgery is a tool and that weight loss is not guaranteed but only seen in the context of appropriate use, regular follow up, exercise and making appropriate food choices.     I personally discussed the potential complications of the laparoscopic gastric sleeve with this patient.  The patient is well aware of potential complications of the surgery that include but not limited to bleeding, infections, deep vein thrombosis, pulmonary embolism, pulmonary complications such as pneumonia, cardiac event, hernias, small bowel obstruction, damage to the spleen or other organs, bowel injury, disfiguring scars, failure to lose weight, need for additional surgery, conversion to an open procedure and death.  The patient is also aware of complications which apply in particular to the gastric sleeve and can include but not limited to the leakage of gastric contents at the staple line, the development of an intra-abdominal abscess, gastroesophageal reflux disease, Harris's esophagus, ulcers, vitamin/mineral deficiencies, strictures, and the possibility of converting this procedure to a Pollo-en-Y gastric bypass. The patient also understands the possibility of requiring an acid  reducer medication for the rest of their life.    The risks, benefits, potential complications and alternative therapies were discussed at great length as outlined in our extensive consent forms, online consent and educational teaching processes.    The patient has confirmed the participation in the programs extensive educational activities.    All questions and concerns were answered to patient's satisfaction.  The patient now wishes to proceed with surgery.    Patient has [default value] the pre-operative insertion of an IVC filter.     The patient has [default value] a postoperative course of anitcoagulant therapy.      Plan/Discussion/Summary:        I instructed patient to start on a H2 blocker or proton pump inhibitor if not already on one of these medications.    I explained in detail the procedures that we perform.  All of these procedures have a chance to convert to open if any technical challenges or complications do occur.  Bariatric surgery is not cosmetic surgery but rather a tool to help a patient make a life-long commitment lifestyle change including diet, exercise, behavior changes, and taking supplemental vitamins and minerals.    Problems after surgery may require more operations to correct them.    The risks, benefits, alternatives, and potential complications of all of the procedures were explained in detail including, but not limited to death, anesthesia and medication adverse effect, deep venous thrombosis, pulmonary embolism, trocar site/incisional hernia, wound infection, abdominal infection, bleeding, failure to lose weight, gain weight, a change in body image, metabolic complications with vitamin deficiences and anemia.    Weight loss expectations were discussed with the patient in detail. The weight loss operations most commonly performed are the sleeve gastrectomy and the Pollo-en-Y gastric bypass. These operations result in weight losses up to approximately 25-35% of initial body weight  12 to 24 months after surgery with the gastric bypass usually the higher percent of weight loss but depends on patient using the tool.    For the gastric bypass and loop duodenal switch (CHAD-S) the risks include but not limited to the following early complications:  Anastomotic leak/peritonitis, Pollo/Alimentary/biliopancreatic limb obstruction, severe & minor wound infection/seroma, and nausea/vomiting.  Late complications can include but are not limited to malnutrition, vitamin deficiencies, frequent loose stools,  stomal stenosis, marginal ulcer, bowel obstruction, intussusception, internal, and incisional hernia.    Regarding the gastric sleeve, there is less long-term outcome data and higher risk of dysphagia and reflux compared to a gastric bypass, as well as risk of internal visceral/organ injury, splenectomy, bleeding, infection, leak (which could require further intervention possible conversion to Pollo-en-Y gastric bypass), stenosis and possibility of regaining weight.    Bernadine was counseled regarding diagnostic results, instructions for management, risk factor reductions, prognosis, patient and family education, impressions, risks and benefits of treatment options and importance of compliance with treatment. Total face to face time of the encounter was over 45 minutes and over 30 minutes was spent counseling.     Yanique Report   As part of this patient's treatment plan I am prescribing controlled substances. The patient has been made aware of appropriate use of such medications, including potential risk of somnolence, limited ability to drive and /or work safely, and potential for dependence or overdose. It has also been made clear that these medications are for use by this patient only, without concomitant use of alcohol or other substances unless prescribed.    Bernadine has completed prescribing agreement detailing terms of continued prescribing of controlled substances, including monitoring YANIQUE  reports, urine drug screening, and pill counts if necessary. Bernadine is aware that inappropriate use will result in cessation of prescribing such medications.    YANIQUE report has been reviewed      History and physical exam exhibit continued safe and appropriate use of controlled substances.      Bernadine understands the surgical procedures and the different surgical options that are available.  She understands the lifestyle changes that are required after surgery and has agreed to follow the guidelines outlined in the weight management program.  She also expressed understanding of the risks involved and had all of female questions answered and desires to proceed.      Ciera Cota MD  1/24/2025

## 2025-01-30 ENCOUNTER — PATIENT MESSAGE (OUTPATIENT)
Dept: FAMILY MEDICINE CLINIC | Facility: CLINIC | Age: 45
End: 2025-01-30
Payer: COMMERCIAL

## 2025-01-30 DIAGNOSIS — E11.65 TYPE 2 DIABETES MELLITUS WITH HYPERGLYCEMIA, WITHOUT LONG-TERM CURRENT USE OF INSULIN: Primary | ICD-10-CM

## 2025-01-31 ENCOUNTER — HOSPITAL ENCOUNTER (OUTPATIENT)
Dept: GENERAL RADIOLOGY | Facility: HOSPITAL | Age: 45
Discharge: HOME | End: 2025-01-31
Payer: COMMERCIAL

## 2025-01-31 ENCOUNTER — LAB (OUTPATIENT)
Dept: LAB | Facility: HOSPITAL | Age: 45
End: 2025-01-31
Payer: COMMERCIAL

## 2025-01-31 DIAGNOSIS — E66.01 OBESITY, CLASS III, BMI 40-49.9 (MORBID OBESITY): ICD-10-CM

## 2025-01-31 PROBLEM — Z67.10 TYPE A BLOOD, RH POSITIVE: Status: ACTIVE | Noted: 2025-01-31

## 2025-01-31 LAB
ABO GROUP BLD: NORMAL
ANION GAP SERPL CALCULATED.3IONS-SCNC: 12 MMOL/L (ref 5–15)
BASOPHILS # BLD AUTO: 0.03 10*3/MM3 (ref 0–0.2)
BASOPHILS NFR BLD AUTO: 0.3 % (ref 0–1.5)
BLD GP AB SCN SERPL QL: NEGATIVE
BUN SERPL-MCNC: 16 MG/DL (ref 6–20)
BUN/CREAT SERPL: 30.2 (ref 7–25)
CALCIUM SPEC-SCNC: 9.4 MG/DL (ref 8.6–10.5)
CHLORIDE SERPL-SCNC: 92 MMOL/L (ref 98–107)
CO2 SERPL-SCNC: 29 MMOL/L (ref 22–29)
CREAT SERPL-MCNC: 0.53 MG/DL (ref 0.57–1)
DEPRECATED RDW RBC AUTO: 43.9 FL (ref 37–54)
EGFRCR SERPLBLD CKD-EPI 2021: 115.7 ML/MIN/1.73
EOSINOPHIL # BLD AUTO: 0.07 10*3/MM3 (ref 0–0.4)
EOSINOPHIL NFR BLD AUTO: 0.7 % (ref 0.3–6.2)
ERYTHROCYTE [DISTWIDTH] IN BLOOD BY AUTOMATED COUNT: 13 % (ref 12.3–15.4)
GLUCOSE SERPL-MCNC: 154 MG/DL (ref 65–99)
HBA1C MFR BLD: 7.8 % (ref 4.8–5.6)
HCT VFR BLD AUTO: 44.6 % (ref 34–46.6)
HGB BLD-MCNC: 15.8 G/DL (ref 12–15.9)
IMM GRANULOCYTES # BLD AUTO: 0.06 10*3/MM3 (ref 0–0.05)
IMM GRANULOCYTES NFR BLD AUTO: 0.6 % (ref 0–0.5)
LYMPHOCYTES # BLD AUTO: 2.97 10*3/MM3 (ref 0.7–3.1)
LYMPHOCYTES NFR BLD AUTO: 30.7 % (ref 19.6–45.3)
MCH RBC QN AUTO: 33.1 PG (ref 26.6–33)
MCHC RBC AUTO-ENTMCNC: 35.4 G/DL (ref 31.5–35.7)
MCV RBC AUTO: 93.5 FL (ref 79–97)
MONOCYTES # BLD AUTO: 1.27 10*3/MM3 (ref 0.1–0.9)
MONOCYTES NFR BLD AUTO: 13.1 % (ref 5–12)
NEUTROPHILS NFR BLD AUTO: 5.28 10*3/MM3 (ref 1.7–7)
NEUTROPHILS NFR BLD AUTO: 54.6 % (ref 42.7–76)
NRBC BLD AUTO-RTO: 0 /100 WBC (ref 0–0.2)
PLATELET # BLD AUTO: 303 10*3/MM3 (ref 140–450)
PMV BLD AUTO: 11 FL (ref 6–12)
POTASSIUM SERPL-SCNC: 3.1 MMOL/L (ref 3.5–5.2)
RBC # BLD AUTO: 4.77 10*6/MM3 (ref 3.77–5.28)
RH BLD: POSITIVE
SODIUM SERPL-SCNC: 133 MMOL/L (ref 136–145)
T&S EXPIRATION DATE: NORMAL
WBC NRBC COR # BLD AUTO: 9.68 10*3/MM3 (ref 3.4–10.8)

## 2025-01-31 PROCEDURE — 86901 BLOOD TYPING SEROLOGIC RH(D): CPT

## 2025-01-31 PROCEDURE — 86900 BLOOD TYPING SEROLOGIC ABO: CPT

## 2025-01-31 PROCEDURE — 80048 BASIC METABOLIC PNL TOTAL CA: CPT | Performed by: SURGERY

## 2025-01-31 PROCEDURE — 86850 RBC ANTIBODY SCREEN: CPT

## 2025-01-31 PROCEDURE — 71046 X-RAY EXAM CHEST 2 VIEWS: CPT

## 2025-01-31 PROCEDURE — 83036 HEMOGLOBIN GLYCOSYLATED A1C: CPT | Performed by: SURGERY

## 2025-01-31 PROCEDURE — 36415 COLL VENOUS BLD VENIPUNCTURE: CPT | Performed by: SURGERY

## 2025-01-31 PROCEDURE — 85025 COMPLETE CBC W/AUTO DIFF WBC: CPT | Performed by: SURGERY

## 2025-01-31 RX ORDER — BLOOD SUGAR DIAGNOSTIC
STRIP MISCELLANEOUS
Qty: 100 EACH | Refills: 1 | Status: SHIPPED | OUTPATIENT
Start: 2025-01-31

## 2025-02-03 NOTE — PAT
K+ = 3.1.  Advised pt to add K+ to her diet, and to call PCP to see if they want to add a supplement.  Will re check level day of surgery.

## 2025-02-04 ENCOUNTER — ANESTHESIA EVENT (OUTPATIENT)
Dept: PERIOP | Facility: HOSPITAL | Age: 45
End: 2025-02-04
Payer: COMMERCIAL

## 2025-02-04 NOTE — ANESTHESIA PREPROCEDURE EVALUATION
Anesthesia Evaluation     Patient summary reviewed, Nursing notes reviewed and pregnancy test completed   NPO Solid Status: > 8 hours  NPO Liquid Status: > 8 hours           Airway   Mallampati: II  TM distance: >3 FB  Neck ROM: full  No difficulty expected  Dental - normal exam     Pulmonary - normal exam   (+) asthma,sleep apnea  Cardiovascular - normal exam    ECG reviewed    (+) hypertension, hyperlipidemia      Neuro/Psych  (+) headaches, psychiatric history  GI/Hepatic/Renal/Endo    (+) morbid obesity, GERD, diabetes mellitus    Musculoskeletal     Abdominal  - normal exam    Bowel sounds: normal.   Substance History      OB/GYN          Other        ROS/Med Hx Other: SR     Neg cxr                Anesthesia Plan    ASA 3     general and ERAS Protocol   total IV anesthesia  intravenous induction     Anesthetic plan, risks, benefits, and alternatives have been provided, discussed and informed consent has been obtained with: patient.    Plan discussed with CRNA.    CODE STATUS:

## 2025-02-05 ENCOUNTER — HOSPITAL ENCOUNTER (OUTPATIENT)
Facility: HOSPITAL | Age: 45
Discharge: HOME | End: 2025-02-06
Attending: SURGERY | Admitting: SURGERY
Payer: COMMERCIAL

## 2025-02-05 ENCOUNTER — ANESTHESIA (OUTPATIENT)
Dept: PERIOP | Facility: HOSPITAL | Age: 45
End: 2025-02-05
Payer: COMMERCIAL

## 2025-02-05 DIAGNOSIS — G89.18 POST-OP PAIN: Primary | ICD-10-CM

## 2025-02-05 DIAGNOSIS — E66.01 OBESITY, CLASS III, BMI 40-49.9 (MORBID OBESITY): ICD-10-CM

## 2025-02-05 LAB
ALBUMIN SERPL-MCNC: 3.8 G/DL (ref 3.5–5.2)
ALBUMIN/GLOB SERPL: 1.3 G/DL
ALP SERPL-CCNC: 48 U/L (ref 39–117)
ALT SERPL W P-5'-P-CCNC: 107 U/L (ref 1–33)
ANION GAP SERPL CALCULATED.3IONS-SCNC: 11.6 MMOL/L (ref 5–15)
AST SERPL-CCNC: 93 U/L (ref 1–32)
B-HCG UR QL: NEGATIVE
BASE DEFICIT: ABNORMAL
BASE EXCESS BLDV CALC-SCNC: 6 MMOL/L (ref 0–3)
BASOPHILS # BLD AUTO: 0.03 10*3/MM3 (ref 0–0.2)
BASOPHILS NFR BLD AUTO: 0.2 % (ref 0–1.5)
BILIRUB SERPL-MCNC: 0.2 MG/DL (ref 0–1.2)
BUN SERPL-MCNC: 13 MG/DL (ref 6–20)
BUN/CREAT SERPL: 21.7 (ref 7–25)
CA-I BLDA-SCNC: 1.12 MMOL/L (ref 1.12–1.32)
CALCIUM SPEC-SCNC: 8.5 MG/DL (ref 8.6–10.5)
CHLORIDE SERPL-SCNC: 96 MMOL/L (ref 98–107)
CO2 CONTENT VENOUS: 31 MMOL/L (ref 24–29)
CO2 SERPL-SCNC: 25.4 MMOL/L (ref 22–29)
CREAT SERPL-MCNC: 0.6 MG/DL (ref 0.57–1)
DEPRECATED RDW RBC AUTO: 44.6 FL (ref 37–54)
EGFRCR SERPLBLD CKD-EPI 2021: 112.3 ML/MIN/1.73
EOSINOPHIL # BLD AUTO: 0 10*3/MM3 (ref 0–0.4)
EOSINOPHIL NFR BLD AUTO: 0 % (ref 0.3–6.2)
ERYTHROCYTE [DISTWIDTH] IN BLOOD BY AUTOMATED COUNT: 12.5 % (ref 12.3–15.4)
GLOBULIN UR ELPH-MCNC: 3 GM/DL
GLUCOSE BLDC GLUCOMTR-MCNC: 160 MG/DL (ref 70–105)
GLUCOSE BLDC GLUCOMTR-MCNC: 189 MG/DL (ref 70–105)
GLUCOSE BLDC GLUCOMTR-MCNC: 227 MG/DL (ref 70–105)
GLUCOSE SERPL-MCNC: 250 MG/DL (ref 65–99)
HCO3 BLDV-SCNC: 29.8 MMOL/L (ref 23–28)
HCT VFR BLD AUTO: 40.2 % (ref 34–46.6)
HCT VFR BLDA CALC: 39 % (ref 38–51)
HGB BLD-MCNC: 13.5 G/DL (ref 12–15.9)
HGB BLDA-MCNC: 13.3 G/DL (ref 12–17)
IMM GRANULOCYTES # BLD AUTO: 0.12 10*3/MM3 (ref 0–0.05)
IMM GRANULOCYTES NFR BLD AUTO: 0.7 % (ref 0–0.5)
LYMPHOCYTES # BLD AUTO: 1.62 10*3/MM3 (ref 0.7–3.1)
LYMPHOCYTES NFR BLD AUTO: 9.4 % (ref 19.6–45.3)
MAGNESIUM SERPL-MCNC: 1.9 MG/DL (ref 1.6–2.6)
MCH RBC QN AUTO: 32.6 PG (ref 26.6–33)
MCHC RBC AUTO-ENTMCNC: 33.6 G/DL (ref 31.5–35.7)
MCV RBC AUTO: 97.1 FL (ref 79–97)
MONOCYTES # BLD AUTO: 0.7 10*3/MM3 (ref 0.1–0.9)
MONOCYTES NFR BLD AUTO: 4 % (ref 5–12)
NEUTROPHILS NFR BLD AUTO: 14.85 10*3/MM3 (ref 1.7–7)
NEUTROPHILS NFR BLD AUTO: 85.7 % (ref 42.7–76)
NRBC BLD AUTO-RTO: 0 /100 WBC (ref 0–0.2)
PCO2 BLDV: 44.9 MM HG (ref 41–51)
PH BLDV: 7.43 PH UNITS (ref 7.31–7.41)
PHOSPHATE SERPL-MCNC: 2.9 MG/DL (ref 2.5–4.5)
PLATELET # BLD AUTO: 293 10*3/MM3 (ref 140–450)
PMV BLD AUTO: 10.5 FL (ref 6–12)
PO2 BLDV: 46 MM HG (ref 35–42)
POTASSIUM BLDA-SCNC: 3.1 MMOL/L (ref 3.5–4.9)
POTASSIUM SERPL-SCNC: 3.9 MMOL/L (ref 3.5–5.2)
PROT SERPL-MCNC: 6.8 G/DL (ref 6–8.5)
RBC # BLD AUTO: 4.14 10*6/MM3 (ref 3.77–5.28)
SAO2 % BLDCOV: ABNORMAL %
SODIUM BLD-SCNC: 139 MMOL/L (ref 138–146)
SODIUM SERPL-SCNC: 133 MMOL/L (ref 136–145)
WBC NRBC COR # BLD AUTO: 17.32 10*3/MM3 (ref 3.4–10.8)

## 2025-02-05 PROCEDURE — 94799 UNLISTED PULMONARY SVC/PX: CPT

## 2025-02-05 PROCEDURE — 25010000002 THIAMINE PER 100 MG: Performed by: SURGERY

## 2025-02-05 PROCEDURE — 25010000002 CEFAZOLIN 3 G RECONSTITUTED SOLUTION 1 EACH VIAL: Performed by: SURGERY

## 2025-02-05 PROCEDURE — 25010000002 SUCCINYLCHOLINE PER 20 MG: Performed by: NURSE ANESTHETIST, CERTIFIED REGISTERED

## 2025-02-05 PROCEDURE — 25010000002 MAGNESIUM SULFATE PER 500 MG OF MAGNESIUM: Performed by: NURSE ANESTHETIST, CERTIFIED REGISTERED

## 2025-02-05 PROCEDURE — 25010000002 DIPHENHYDRAMINE PER 50 MG: Performed by: NURSE ANESTHETIST, CERTIFIED REGISTERED

## 2025-02-05 PROCEDURE — 25010000002 ONDANSETRON PER 1 MG: Performed by: NURSE ANESTHETIST, CERTIFIED REGISTERED

## 2025-02-05 PROCEDURE — 25010000002 ONDANSETRON PER 1 MG: Performed by: SURGERY

## 2025-02-05 PROCEDURE — 25010000002 EPINEPHRINE 1 MG/ML SOLUTION: Performed by: SURGERY

## 2025-02-05 PROCEDURE — 25810000003 LACTATED RINGERS PER 1000 ML: Performed by: NURSE ANESTHETIST, CERTIFIED REGISTERED

## 2025-02-05 PROCEDURE — 84132 ASSAY OF SERUM POTASSIUM: CPT

## 2025-02-05 PROCEDURE — 82803 BLOOD GASES ANY COMBINATION: CPT

## 2025-02-05 PROCEDURE — 25010000002 CLONIDINE PER 1 MG: Performed by: SURGERY

## 2025-02-05 PROCEDURE — 82947 ASSAY GLUCOSE BLOOD QUANT: CPT

## 2025-02-05 PROCEDURE — 25810000003 LACTATED RINGERS PER 1000 ML: Performed by: SURGERY

## 2025-02-05 PROCEDURE — 84100 ASSAY OF PHOSPHORUS: CPT | Performed by: SURGERY

## 2025-02-05 PROCEDURE — 25010000002 LIDOCAINE PF 2% 2 % SOLUTION: Performed by: NURSE ANESTHETIST, CERTIFIED REGISTERED

## 2025-02-05 PROCEDURE — 82948 REAGENT STRIP/BLOOD GLUCOSE: CPT

## 2025-02-05 PROCEDURE — 25010000002 HYDROMORPHONE 1 MG/ML SOLUTION: Performed by: NURSE ANESTHETIST, CERTIFIED REGISTERED

## 2025-02-05 PROCEDURE — 82330 ASSAY OF CALCIUM: CPT

## 2025-02-05 PROCEDURE — 25010000002 SUGAMMADEX 200 MG/2ML SOLUTION: Performed by: NURSE ANESTHETIST, CERTIFIED REGISTERED

## 2025-02-05 PROCEDURE — 80053 COMPREHEN METABOLIC PANEL: CPT | Performed by: SURGERY

## 2025-02-05 PROCEDURE — 25010000002 ACETAMINOPHEN 10 MG/ML SOLUTION: Performed by: SURGERY

## 2025-02-05 PROCEDURE — 25010000002 AMISULPRIDE (ANTIEMETIC) 5 MG/2ML SOLUTION: Performed by: ANESTHESIOLOGY

## 2025-02-05 PROCEDURE — 25010000002 KETOROLAC TROMETHAMINE PER 15 MG: Performed by: SURGERY

## 2025-02-05 PROCEDURE — 25010000002 METOCLOPRAMIDE PER 10 MG: Performed by: SURGERY

## 2025-02-05 PROCEDURE — 88307 TISSUE EXAM BY PATHOLOGIST: CPT | Performed by: SURGERY

## 2025-02-05 PROCEDURE — 43775 LAP SLEEVE GASTRECTOMY: CPT | Performed by: SURGERY

## 2025-02-05 PROCEDURE — 63710000001 INSULIN REGULAR HUMAN PER 5 UNITS: Performed by: SURGERY

## 2025-02-05 PROCEDURE — 84295 ASSAY OF SERUM SODIUM: CPT

## 2025-02-05 PROCEDURE — 25010000002 PROPOFOL 10 MG/ML EMULSION: Performed by: NURSE ANESTHETIST, CERTIFIED REGISTERED

## 2025-02-05 PROCEDURE — 25010000002 POTASSIUM CHLORIDE 10 MEQ/100ML SOLUTION: Performed by: ANESTHESIOLOGY

## 2025-02-05 PROCEDURE — 25010000002 ROPIVACAINE PER 1 MG: Performed by: SURGERY

## 2025-02-05 PROCEDURE — 85025 COMPLETE CBC W/AUTO DIFF WBC: CPT | Performed by: SURGERY

## 2025-02-05 PROCEDURE — 81025 URINE PREGNANCY TEST: CPT | Performed by: SURGERY

## 2025-02-05 PROCEDURE — 25010000002 FENTANYL CITRATE (PF) 100 MCG/2ML SOLUTION: Performed by: NURSE ANESTHETIST, CERTIFIED REGISTERED

## 2025-02-05 PROCEDURE — 85014 HEMATOCRIT: CPT

## 2025-02-05 PROCEDURE — 83735 ASSAY OF MAGNESIUM: CPT | Performed by: SURGERY

## 2025-02-05 PROCEDURE — 25010000002 DEXAMETHASONE PER 1 MG: Performed by: NURSE ANESTHETIST, CERTIFIED REGISTERED

## 2025-02-05 DEVICE — STAPLER 60 RELOAD GREEN
Type: IMPLANTABLE DEVICE | Site: STOMACH | Status: FUNCTIONAL
Brand: SUREFORM

## 2025-02-05 DEVICE — STAPLER 60 RELOAD BLUE
Type: IMPLANTABLE DEVICE | Site: STOMACH | Status: FUNCTIONAL
Brand: SUREFORM

## 2025-02-05 DEVICE — STAPLER 60 RELOAD WHITE
Type: IMPLANTABLE DEVICE | Site: ABDOMEN | Status: FUNCTIONAL
Brand: SUREFORM

## 2025-02-05 DEVICE — ABSORBABLE WOUND CLOSURE DEVICE
Type: IMPLANTABLE DEVICE | Site: STOMACH | Status: FUNCTIONAL
Brand: V-LOC 180

## 2025-02-05 RX ORDER — METOCLOPRAMIDE HYDROCHLORIDE 5 MG/ML
10 INJECTION INTRAMUSCULAR; INTRAVENOUS EVERY 6 HOURS PRN
Status: DISCONTINUED | OUTPATIENT
Start: 2025-02-05 | End: 2025-02-06 | Stop reason: HOSPADM

## 2025-02-05 RX ORDER — CYANOCOBALAMIN 1000 UG/ML
1000 INJECTION, SOLUTION INTRAMUSCULAR; SUBCUTANEOUS ONCE
Status: COMPLETED | OUTPATIENT
Start: 2025-02-06 | End: 2025-02-06

## 2025-02-05 RX ORDER — GLIPIZIDE 5 MG/1
10 TABLET ORAL
Status: DISCONTINUED | OUTPATIENT
Start: 2025-02-06 | End: 2025-02-06 | Stop reason: HOSPADM

## 2025-02-05 RX ORDER — BACLOFEN 10 MG/1
10 TABLET ORAL 3 TIMES DAILY PRN
Status: DISCONTINUED | OUTPATIENT
Start: 2025-02-05 | End: 2025-02-06 | Stop reason: HOSPADM

## 2025-02-05 RX ORDER — CHLORHEXIDINE GLUCONATE ORAL RINSE 1.2 MG/ML
15 SOLUTION DENTAL SEE ADMIN INSTRUCTIONS
Status: COMPLETED | OUTPATIENT
Start: 2025-02-05 | End: 2025-02-05

## 2025-02-05 RX ORDER — SCOPOLAMINE 1 MG/3D
1 PATCH, EXTENDED RELEASE TRANSDERMAL ONCE
Status: DISCONTINUED | OUTPATIENT
Start: 2025-02-05 | End: 2025-02-05 | Stop reason: SDUPTHER

## 2025-02-05 RX ORDER — HYDROMORPHONE HYDROCHLORIDE 2 MG/1
2 TABLET ORAL EVERY 4 HOURS PRN
Status: DISCONTINUED | OUTPATIENT
Start: 2025-02-05 | End: 2025-02-06 | Stop reason: HOSPADM

## 2025-02-05 RX ORDER — CLONIDINE HYDROCHLORIDE 0.1 MG/1
0.1 TABLET ORAL 2 TIMES DAILY
Status: DISCONTINUED | OUTPATIENT
Start: 2025-02-05 | End: 2025-02-06 | Stop reason: HOSPADM

## 2025-02-05 RX ORDER — NALOXONE HCL 0.4 MG/ML
0.1 VIAL (ML) INJECTION
Status: DISCONTINUED | OUTPATIENT
Start: 2025-02-05 | End: 2025-02-06 | Stop reason: HOSPADM

## 2025-02-05 RX ORDER — SODIUM CHLORIDE, SODIUM LACTATE, POTASSIUM CHLORIDE, CALCIUM CHLORIDE 600; 310; 30; 20 MG/100ML; MG/100ML; MG/100ML; MG/100ML
100 INJECTION, SOLUTION INTRAVENOUS CONTINUOUS
Status: DISCONTINUED | OUTPATIENT
Start: 2025-02-05 | End: 2025-02-06 | Stop reason: HOSPADM

## 2025-02-05 RX ORDER — METOPROLOL TARTRATE 50 MG
50 TABLET ORAL NIGHTLY
Status: DISCONTINUED | OUTPATIENT
Start: 2025-02-05 | End: 2025-02-06 | Stop reason: HOSPADM

## 2025-02-05 RX ORDER — ONDANSETRON 2 MG/ML
4 INJECTION INTRAMUSCULAR; INTRAVENOUS ONCE AS NEEDED
Status: DISCONTINUED | OUTPATIENT
Start: 2025-02-05 | End: 2025-02-05 | Stop reason: HOSPADM

## 2025-02-05 RX ORDER — SUCCINYLCHOLINE CHLORIDE 20 MG/ML
INJECTION INTRAMUSCULAR; INTRAVENOUS AS NEEDED
Status: DISCONTINUED | OUTPATIENT
Start: 2025-02-05 | End: 2025-02-05 | Stop reason: SURG

## 2025-02-05 RX ORDER — METOPROLOL SUCCINATE 50 MG/1
100 TABLET, EXTENDED RELEASE ORAL DAILY
Status: DISCONTINUED | OUTPATIENT
Start: 2025-02-06 | End: 2025-02-06 | Stop reason: HOSPADM

## 2025-02-05 RX ORDER — ONDANSETRON 2 MG/ML
8 INJECTION INTRAMUSCULAR; INTRAVENOUS EVERY 6 HOURS PRN
Status: DISCONTINUED | OUTPATIENT
Start: 2025-02-05 | End: 2025-02-06 | Stop reason: HOSPADM

## 2025-02-05 RX ORDER — DEXMEDETOMIDINE HYDROCHLORIDE 100 UG/ML
INJECTION, SOLUTION INTRAVENOUS AS NEEDED
Status: DISCONTINUED | OUTPATIENT
Start: 2025-02-05 | End: 2025-02-05 | Stop reason: SURG

## 2025-02-05 RX ORDER — FENTANYL CITRATE 50 UG/ML
INJECTION, SOLUTION INTRAMUSCULAR; INTRAVENOUS AS NEEDED
Status: DISCONTINUED | OUTPATIENT
Start: 2025-02-05 | End: 2025-02-05 | Stop reason: SURG

## 2025-02-05 RX ORDER — ACETAMINOPHEN 160 MG/5ML
1000 SOLUTION ORAL EVERY 6 HOURS
Status: DISCONTINUED | OUTPATIENT
Start: 2025-02-05 | End: 2025-02-06 | Stop reason: HOSPADM

## 2025-02-05 RX ORDER — SODIUM CHLORIDE, SODIUM LACTATE, POTASSIUM CHLORIDE, CALCIUM CHLORIDE 600; 310; 30; 20 MG/100ML; MG/100ML; MG/100ML; MG/100ML
INJECTION, SOLUTION INTRAVENOUS CONTINUOUS PRN
Status: DISCONTINUED | OUTPATIENT
Start: 2025-02-05 | End: 2025-02-05 | Stop reason: SURG

## 2025-02-05 RX ORDER — SODIUM CHLORIDE, SODIUM LACTATE, POTASSIUM CHLORIDE, CALCIUM CHLORIDE 600; 310; 30; 20 MG/100ML; MG/100ML; MG/100ML; MG/100ML
100 INJECTION, SOLUTION INTRAVENOUS CONTINUOUS
Status: DISCONTINUED | OUTPATIENT
Start: 2025-02-05 | End: 2025-02-05 | Stop reason: SDUPTHER

## 2025-02-05 RX ORDER — HYDRALAZINE HYDROCHLORIDE 20 MG/ML
20-30 INJECTION INTRAMUSCULAR; INTRAVENOUS
Status: DISCONTINUED | OUTPATIENT
Start: 2025-02-05 | End: 2025-02-06 | Stop reason: HOSPADM

## 2025-02-05 RX ORDER — OXYCODONE HYDROCHLORIDE 5 MG/1
10 TABLET ORAL EVERY 4 HOURS PRN
Status: COMPLETED | OUTPATIENT
Start: 2025-02-05 | End: 2025-02-05

## 2025-02-05 RX ORDER — FAMOTIDINE 10 MG/ML
20 INJECTION, SOLUTION INTRAVENOUS EVERY 12 HOURS SCHEDULED
Status: DISCONTINUED | OUTPATIENT
Start: 2025-02-05 | End: 2025-02-06 | Stop reason: HOSPADM

## 2025-02-05 RX ORDER — SODIUM CHLORIDE 0.9 % (FLUSH) 0.9 %
3-10 SYRINGE (ML) INJECTION AS NEEDED
Status: DISCONTINUED | OUTPATIENT
Start: 2025-02-05 | End: 2025-02-05 | Stop reason: HOSPADM

## 2025-02-05 RX ORDER — PROMETHAZINE HYDROCHLORIDE 12.5 MG/1
12.5 SUPPOSITORY RECTAL EVERY 6 HOURS PRN
Status: DISCONTINUED | OUTPATIENT
Start: 2025-02-05 | End: 2025-02-06 | Stop reason: HOSPADM

## 2025-02-05 RX ORDER — PROPOFOL 10 MG/ML
VIAL (ML) INTRAVENOUS AS NEEDED
Status: DISCONTINUED | OUTPATIENT
Start: 2025-02-05 | End: 2025-02-05 | Stop reason: SURG

## 2025-02-05 RX ORDER — DIPHENHYDRAMINE HYDROCHLORIDE 50 MG/ML
12.5 INJECTION INTRAMUSCULAR; INTRAVENOUS
Status: DISCONTINUED | OUTPATIENT
Start: 2025-02-05 | End: 2025-02-05 | Stop reason: HOSPADM

## 2025-02-05 RX ORDER — ONDANSETRON 2 MG/ML
INJECTION INTRAMUSCULAR; INTRAVENOUS AS NEEDED
Status: DISCONTINUED | OUTPATIENT
Start: 2025-02-05 | End: 2025-02-05 | Stop reason: SURG

## 2025-02-05 RX ORDER — PANTOPRAZOLE SODIUM 40 MG/10ML
40 INJECTION, POWDER, LYOPHILIZED, FOR SOLUTION INTRAVENOUS ONCE
Status: COMPLETED | OUTPATIENT
Start: 2025-02-05 | End: 2025-02-05

## 2025-02-05 RX ORDER — NALOXONE HCL 0.4 MG/ML
0.4 VIAL (ML) INJECTION AS NEEDED
Status: DISCONTINUED | OUTPATIENT
Start: 2025-02-05 | End: 2025-02-05 | Stop reason: HOSPADM

## 2025-02-05 RX ORDER — METOCLOPRAMIDE 10 MG/1
10 TABLET ORAL EVERY 6 HOURS PRN
Status: DISCONTINUED | OUTPATIENT
Start: 2025-02-05 | End: 2025-02-06 | Stop reason: HOSPADM

## 2025-02-05 RX ORDER — ROCURONIUM BROMIDE 10 MG/ML
INJECTION, SOLUTION INTRAVENOUS AS NEEDED
Status: DISCONTINUED | OUTPATIENT
Start: 2025-02-05 | End: 2025-02-05 | Stop reason: SURG

## 2025-02-05 RX ORDER — ACETAMINOPHEN 10 MG/ML
1000 INJECTION, SOLUTION INTRAVENOUS ONCE
Status: COMPLETED | OUTPATIENT
Start: 2025-02-05 | End: 2025-02-05

## 2025-02-05 RX ORDER — DEXAMETHASONE SODIUM PHOSPHATE 4 MG/ML
INJECTION, SOLUTION INTRA-ARTICULAR; INTRALESIONAL; INTRAMUSCULAR; INTRAVENOUS; SOFT TISSUE AS NEEDED
Status: DISCONTINUED | OUTPATIENT
Start: 2025-02-05 | End: 2025-02-05 | Stop reason: SURG

## 2025-02-05 RX ORDER — ACETAMINOPHEN 500 MG
1000 TABLET ORAL EVERY 6 HOURS
Status: DISCONTINUED | OUTPATIENT
Start: 2025-02-05 | End: 2025-02-06 | Stop reason: HOSPADM

## 2025-02-05 RX ORDER — THIAMINE HYDROCHLORIDE 100 MG/ML
100 INJECTION, SOLUTION INTRAMUSCULAR; INTRAVENOUS ONCE
Status: COMPLETED | OUTPATIENT
Start: 2025-02-05 | End: 2025-02-05

## 2025-02-05 RX ORDER — PHENYLEPHRINE HCL IN 0.9% NACL 1 MG/10 ML
SYRINGE (ML) INTRAVENOUS AS NEEDED
Status: DISCONTINUED | OUTPATIENT
Start: 2025-02-05 | End: 2025-02-05 | Stop reason: SURG

## 2025-02-05 RX ORDER — LIDOCAINE HYDROCHLORIDE 20 MG/ML
INJECTION, SOLUTION EPIDURAL; INFILTRATION; INTRACAUDAL; PERINEURAL AS NEEDED
Status: DISCONTINUED | OUTPATIENT
Start: 2025-02-05 | End: 2025-02-05 | Stop reason: SURG

## 2025-02-05 RX ORDER — HYDRALAZINE HYDROCHLORIDE 20 MG/ML
5 INJECTION INTRAMUSCULAR; INTRAVENOUS
Status: DISCONTINUED | OUTPATIENT
Start: 2025-02-05 | End: 2025-02-05 | Stop reason: HOSPADM

## 2025-02-05 RX ORDER — FLUMAZENIL 0.1 MG/ML
0.2 INJECTION INTRAVENOUS AS NEEDED
Status: DISCONTINUED | OUTPATIENT
Start: 2025-02-05 | End: 2025-02-05 | Stop reason: HOSPADM

## 2025-02-05 RX ORDER — EPHEDRINE SULFATE 5 MG/ML
5 INJECTION INTRAVENOUS ONCE AS NEEDED
Status: DISCONTINUED | OUTPATIENT
Start: 2025-02-05 | End: 2025-02-05 | Stop reason: HOSPADM

## 2025-02-05 RX ORDER — PROMETHAZINE HYDROCHLORIDE 12.5 MG/1
12.5 TABLET ORAL EVERY 6 HOURS PRN
Status: DISCONTINUED | OUTPATIENT
Start: 2025-02-05 | End: 2025-02-06 | Stop reason: HOSPADM

## 2025-02-05 RX ORDER — EPHEDRINE SULFATE 5 MG/ML
INJECTION INTRAVENOUS AS NEEDED
Status: DISCONTINUED | OUTPATIENT
Start: 2025-02-05 | End: 2025-02-05 | Stop reason: SURG

## 2025-02-05 RX ORDER — HYDROXYZINE HYDROCHLORIDE 25 MG/1
25 TABLET, FILM COATED ORAL EVERY 6 HOURS PRN
Status: DISCONTINUED | OUTPATIENT
Start: 2025-02-05 | End: 2025-02-06 | Stop reason: HOSPADM

## 2025-02-05 RX ORDER — BUDESONIDE AND FORMOTEROL FUMARATE DIHYDRATE 160; 4.5 UG/1; UG/1
2 AEROSOL RESPIRATORY (INHALATION)
Status: DISCONTINUED | OUTPATIENT
Start: 2025-02-05 | End: 2025-02-06 | Stop reason: HOSPADM

## 2025-02-05 RX ORDER — ONDANSETRON 4 MG/1
4 TABLET, ORALLY DISINTEGRATING ORAL EVERY 6 HOURS PRN
Status: DISCONTINUED | OUTPATIENT
Start: 2025-02-05 | End: 2025-02-06 | Stop reason: HOSPADM

## 2025-02-05 RX ORDER — DIPHENHYDRAMINE HYDROCHLORIDE 50 MG/ML
12.5 INJECTION INTRAMUSCULAR; INTRAVENOUS ONCE AS NEEDED
Status: COMPLETED | OUTPATIENT
Start: 2025-02-05 | End: 2025-02-05

## 2025-02-05 RX ORDER — ALBUTEROL SULFATE 90 UG/1
2 INHALANT RESPIRATORY (INHALATION) EVERY 6 HOURS PRN
Status: DISCONTINUED | OUTPATIENT
Start: 2025-02-05 | End: 2025-02-06 | Stop reason: HOSPADM

## 2025-02-05 RX ORDER — IPRATROPIUM BROMIDE AND ALBUTEROL SULFATE 2.5; .5 MG/3ML; MG/3ML
3 SOLUTION RESPIRATORY (INHALATION) ONCE AS NEEDED
Status: DISCONTINUED | OUTPATIENT
Start: 2025-02-05 | End: 2025-02-05 | Stop reason: HOSPADM

## 2025-02-05 RX ORDER — LABETALOL HYDROCHLORIDE 5 MG/ML
5 INJECTION, SOLUTION INTRAVENOUS
Status: DISCONTINUED | OUTPATIENT
Start: 2025-02-05 | End: 2025-02-05 | Stop reason: HOSPADM

## 2025-02-05 RX ORDER — POTASSIUM CHLORIDE 7.45 MG/ML
10 INJECTION INTRAVENOUS ONCE
Status: COMPLETED | OUTPATIENT
Start: 2025-02-05 | End: 2025-02-05

## 2025-02-05 RX ORDER — SCOPOLAMINE 1 MG/3D
1 PATCH, EXTENDED RELEASE TRANSDERMAL CONTINUOUS
Status: DISCONTINUED | OUTPATIENT
Start: 2025-02-05 | End: 2025-02-06 | Stop reason: HOSPADM

## 2025-02-05 RX ORDER — OXYCODONE HYDROCHLORIDE 5 MG/1
5 TABLET ORAL ONCE AS NEEDED
Status: DISCONTINUED | OUTPATIENT
Start: 2025-02-05 | End: 2025-02-05 | Stop reason: HOSPADM

## 2025-02-05 RX ORDER — MAGNESIUM SULFATE HEPTAHYDRATE 500 MG/ML
INJECTION, SOLUTION INTRAMUSCULAR; INTRAVENOUS AS NEEDED
Status: DISCONTINUED | OUTPATIENT
Start: 2025-02-05 | End: 2025-02-05 | Stop reason: SURG

## 2025-02-05 RX ADMIN — FENTANYL CITRATE 100 MCG: 50 INJECTION, SOLUTION INTRAMUSCULAR; INTRAVENOUS at 10:33

## 2025-02-05 RX ADMIN — SUGAMMADEX 200 MG: 100 INJECTION, SOLUTION INTRAVENOUS at 11:56

## 2025-02-05 RX ADMIN — HYDROMORPHONE HYDROCHLORIDE 1 MG: 1 INJECTION, SOLUTION INTRAMUSCULAR; INTRAVENOUS; SUBCUTANEOUS at 12:12

## 2025-02-05 RX ADMIN — POTASSIUM CHLORIDE 10 MEQ: 7.46 INJECTION, SOLUTION INTRAVENOUS at 09:37

## 2025-02-05 RX ADMIN — ACETAMINOPHEN 1000 MG: 500 TABLET, FILM COATED ORAL at 20:47

## 2025-02-05 RX ADMIN — ACETAMINOPHEN 1000 MG: 1000 INJECTION INTRAVENOUS at 08:57

## 2025-02-05 RX ADMIN — METOCLOPRAMIDE 10 MG: 5 INJECTION, SOLUTION INTRAMUSCULAR; INTRAVENOUS at 13:14

## 2025-02-05 RX ADMIN — DEXAMETHASONE SODIUM PHOSPHATE 4 MG: 4 INJECTION, SOLUTION INTRAMUSCULAR; INTRAVENOUS at 10:54

## 2025-02-05 RX ADMIN — PROPOFOL 150 MCG/KG/MIN: 10 INJECTION, EMULSION INTRAVENOUS at 10:37

## 2025-02-05 RX ADMIN — SCOPOLAMINE 1 PATCH: 1.5 PATCH, EXTENDED RELEASE TRANSDERMAL at 08:58

## 2025-02-05 RX ADMIN — CHLORHEXIDINE GLUCONATE, 0.12% ORAL RINSE 15 ML: 1.2 SOLUTION DENTAL at 08:58

## 2025-02-05 RX ADMIN — OXYCODONE HYDROCHLORIDE 10 MG: 5 TABLET ORAL at 13:59

## 2025-02-05 RX ADMIN — DEXMEDETOMIDINE HYDROCHLORIDE 6 MCG: 100 INJECTION, SOLUTION INTRAVENOUS at 11:54

## 2025-02-05 RX ADMIN — ONDANSETRON 8 MG: 2 INJECTION, SOLUTION INTRAMUSCULAR; INTRAVENOUS at 16:36

## 2025-02-05 RX ADMIN — DEXMEDETOMIDINE HYDROCHLORIDE 10 MCG: 100 INJECTION, SOLUTION INTRAVENOUS at 10:42

## 2025-02-05 RX ADMIN — ROCURONIUM BROMIDE 40 MG: 10 INJECTION INTRAVENOUS at 10:41

## 2025-02-05 RX ADMIN — ROCURONIUM BROMIDE 10 MG: 10 INJECTION INTRAVENOUS at 10:33

## 2025-02-05 RX ADMIN — HYDROMORPHONE HYDROCHLORIDE 2 MG: 2 TABLET ORAL at 16:36

## 2025-02-05 RX ADMIN — Medication 100 MCG: at 10:48

## 2025-02-05 RX ADMIN — HYDROXYZINE HYDROCHLORIDE 25 MG: 25 TABLET, FILM COATED ORAL at 22:26

## 2025-02-05 RX ADMIN — BUDESONIDE AND FORMOTEROL FUMARATE DIHYDRATE 2 PUFF: 160; 4.5 AEROSOL RESPIRATORY (INHALATION) at 18:23

## 2025-02-05 RX ADMIN — FAMOTIDINE 20 MG: 10 INJECTION INTRAVENOUS at 16:36

## 2025-02-05 RX ADMIN — ROCURONIUM BROMIDE 30 MG: 10 INJECTION INTRAVENOUS at 11:39

## 2025-02-05 RX ADMIN — SUCCINYLCHOLINE CHLORIDE 160 MG: 20 INJECTION, SOLUTION INTRAMUSCULAR; INTRAVENOUS at 10:33

## 2025-02-05 RX ADMIN — SODIUM CHLORIDE, SODIUM LACTATE, POTASSIUM CHLORIDE, AND CALCIUM CHLORIDE: .6; .31; .03; .02 INJECTION, SOLUTION INTRAVENOUS at 11:48

## 2025-02-05 RX ADMIN — EPHEDRINE SULFATE 10 MG: 5 INJECTION INTRAVENOUS at 11:00

## 2025-02-05 RX ADMIN — MAGNESIUM SULFATE HEPTAHYDRATE 2 G: 500 INJECTION, SOLUTION INTRAMUSCULAR; INTRAVENOUS at 11:02

## 2025-02-05 RX ADMIN — LIDOCAINE HYDROCHLORIDE 100 MG: 20 INJECTION, SOLUTION EPIDURAL; INFILTRATION; INTRACAUDAL; PERINEURAL at 10:33

## 2025-02-05 RX ADMIN — HYDROMORPHONE HYDROCHLORIDE 0.5 MG: 1 INJECTION, SOLUTION INTRAMUSCULAR; INTRAVENOUS; SUBCUTANEOUS at 13:17

## 2025-02-05 RX ADMIN — ROCURONIUM BROMIDE 20 MG: 10 INJECTION INTRAVENOUS at 11:17

## 2025-02-05 RX ADMIN — INSULIN HUMAN 3 UNITS: 100 INJECTION, SOLUTION PARENTERAL at 18:20

## 2025-02-05 RX ADMIN — Medication 100 MCG: at 11:30

## 2025-02-05 RX ADMIN — PROPOFOL 200 MG: 10 INJECTION, EMULSION INTRAVENOUS at 10:33

## 2025-02-05 RX ADMIN — HYOSCYAMINE SULFATE 125 MCG: 0.12 TABLET SUBLINGUAL at 16:36

## 2025-02-05 RX ADMIN — DEXMEDETOMIDINE HYDROCHLORIDE 4 MCG: 100 INJECTION, SOLUTION INTRAVENOUS at 11:57

## 2025-02-05 RX ADMIN — ACETAMINOPHEN 1000 MG: 500 TABLET, FILM COATED ORAL at 16:36

## 2025-02-05 RX ADMIN — THIAMINE HYDROCHLORIDE 100 MG: 100 INJECTION, SOLUTION INTRAMUSCULAR; INTRAVENOUS at 16:35

## 2025-02-05 RX ADMIN — SODIUM CHLORIDE, SODIUM LACTATE, POTASSIUM CHLORIDE, AND CALCIUM CHLORIDE 100 ML/HR: .6; .31; .03; .02 INJECTION, SOLUTION INTRAVENOUS at 15:56

## 2025-02-05 RX ADMIN — PANTOPRAZOLE SODIUM 40 MG: 40 INJECTION, POWDER, FOR SOLUTION INTRAVENOUS at 08:58

## 2025-02-05 RX ADMIN — AMISULPRIDE 5 MG: 2.5 INJECTION, SOLUTION INTRAVENOUS at 12:54

## 2025-02-05 RX ADMIN — Medication 30 MG: at 11:03

## 2025-02-05 RX ADMIN — DIPHENHYDRAMINE HYDROCHLORIDE 12.5 MG: 50 INJECTION, SOLUTION INTRAMUSCULAR; INTRAVENOUS at 12:37

## 2025-02-05 RX ADMIN — ONDANSETRON 4 MG: 2 INJECTION, SOLUTION INTRAMUSCULAR; INTRAVENOUS at 10:54

## 2025-02-05 RX ADMIN — CLONIDINE HYDROCHLORIDE 0.1 MG: 0.1 TABLET ORAL at 20:47

## 2025-02-05 RX ADMIN — SODIUM CHLORIDE, SODIUM LACTATE, POTASSIUM CHLORIDE, AND CALCIUM CHLORIDE: .6; .31; .03; .02 INJECTION, SOLUTION INTRAVENOUS at 10:24

## 2025-02-05 RX ADMIN — Medication 20 MG: at 11:17

## 2025-02-05 RX ADMIN — SODIUM CHLORIDE, POTASSIUM CHLORIDE, SODIUM LACTATE AND CALCIUM CHLORIDE 100 ML/HR: 600; 310; 30; 20 INJECTION, SOLUTION INTRAVENOUS at 08:57

## 2025-02-05 RX ADMIN — ONDANSETRON 8 MG: 2 INJECTION, SOLUTION INTRAMUSCULAR; INTRAVENOUS at 22:26

## 2025-02-05 RX ADMIN — ONDANSETRON 4 MG: 2 INJECTION, SOLUTION INTRAMUSCULAR; INTRAVENOUS at 12:20

## 2025-02-05 RX ADMIN — HYDROMORPHONE HYDROCHLORIDE 2 MG: 2 TABLET ORAL at 20:47

## 2025-02-05 RX ADMIN — HYDROMORPHONE HYDROCHLORIDE 0.5 MG: 1 INJECTION, SOLUTION INTRAMUSCULAR; INTRAVENOUS; SUBCUTANEOUS at 13:50

## 2025-02-05 RX ADMIN — SODIUM CHLORIDE 3 G: 900 INJECTION INTRAVENOUS at 10:26

## 2025-02-05 RX ADMIN — HYDROMORPHONE HYDROCHLORIDE 0.5 MG: 1 INJECTION, SOLUTION INTRAMUSCULAR; INTRAVENOUS; SUBCUTANEOUS at 13:35

## 2025-02-05 RX ADMIN — SUGAMMADEX 200 MG: 100 INJECTION, SOLUTION INTRAVENOUS at 11:54

## 2025-02-05 NOTE — ANESTHESIA POSTPROCEDURE EVALUATION
Patient: Bernadine Arriaga    Procedure Summary       Date: 02/05/25 Room / Location: [facility] OR 08 / [facility] MAIN OR    Anesthesia Start: 1024 Anesthesia Stop: 1220    Procedure: GASTRIC SLEEVE LAPAROSCOPIC WITH DAVINCI ROBOT (Abdomen) Diagnosis:       Obesity, Class III, BMI 40-49.9 (morbid obesity)      (Obesity, Class III, BMI 40-49.9 (morbid obesity) [E66.01])    Surgeons: Ciera Cota MD Provider: Franco Olson MD    Anesthesia Type: general, ERAS Protocol ASA Status: 3            Anesthesia Type: general, ERAS Protocol    Vitals  Vitals Value Taken Time   /61 02/05/25 1243   Temp 97.8 °F (36.6 °C) 02/05/25 1216   Pulse 73 02/05/25 1244   Resp 12 02/05/25 1226   SpO2 94 % 02/05/25 1244   Vitals shown include unfiled device data.        Post Anesthesia Care and Evaluation    Patient location during evaluation: PACU  Patient participation: complete - patient participated  Level of consciousness: awake  Pain scale: See nurse's notes for pain score.  Pain management: adequate    Airway patency: patent  Anesthetic complications: No anesthetic complications  PONV Status: none  Cardiovascular status: acceptable  Respiratory status: acceptable and spontaneous ventilation  Hydration status: acceptable    Comments: Patient seen and examined postoperatively; vital signs stable; SpO2 greater than or equal to 90%; cardiopulmonary status stable; nausea/vomiting adequately controlled; pain adequately controlled; no apparent anesthesia complications; patient discharged from anesthesia care when discharge criteria were met

## 2025-02-05 NOTE — OP NOTE
PREOPERATIVE DIAGNOSIS:  Morbid obesity with multiple comorbidities as referenced in the most recent history and physical. Body mass index is 47.39 kg/m².      POSTOPERATIVE DIAGNOSIS:  Morbid obesity with multiple comorbidities as referenced in the most recent history and physical.Body mass index is 47.39 kg/m².      PROCEDURES PERFORMED:  1.  Robotic assisted laparoscopic sleeve gastrectomy   SURGEON:  Ciera Cota MD FACS, FAMBS    ASSISTANT:  Assistant: Augusto Nielsen CSA    Surgery assisted and facilitated by a certified assistant, who directly resulted in a decreased operative time, anesthetic time, wound exposure, and possibly of an operative wound infection, thereby decreasing patient morbidity and ultimately total expenditures.  The surgical assistant assisted in placement of trochars, take down of the gastrocolic omentum, short gastric vessels and dissection at the angle of His.  Also assisted in retraction of the stomach during stapling so as not to kink the gastric sleeve.  Also assisted in removing of the gastric specimen, closure of the fascial defect as well as closure of the skin incisions.    ANESTHESIA:  General endotracheal.    ESTIMATED BLOOD LOSS:   Less than 25 mL unless dictated below.    FLUIDS:  Crystalloids.    SPECIMENS:  Gastric remnant    DRAINS:  None.    Implant Name Type Inv. Item Serial No.  Lot No. LRB No. Used Action   DEV CLS WND VLOC/180 LEONCIO ABS 1/2CIR V20 SZ3/0 26MM 30CM GRN - [id number] Implant DEV CLS WND VLOC/180 LEONCIO ABS 1/2CIR V20 SZ3/0 26MM 30CM GRN  COVIDIEN [id number] N/A 1 Implanted   RELOAD STPLR SUREFORM 60 DAVINCI/X/XI 6ROW 2.5 WHT 1P/U - [id number] Implant RELOAD STPLR SUREFORM 60 DAVINCI/X/XI 6ROW 2.5 WHT 1P/U  INTUITIVE SURGICAL [id number] N/A 1 Implanted   RELOAD STPLR SUREFORM 60 DAVINCI/X/XI 6ROW 3.5 MERYL 1P/U - [id number] Implant RELOAD STPLR SUREFORM 60 DAVINCI/X/XI 6ROW 3.5 MERYL 1P/U  INTUITIVE SURGICAL [id number] N/A 5 Implanted   RELOAD STPLR  SUREFORM 60 DAVINCI/X/XI 6ROW 4.3 GRN 1P/U - [id number] Implant RELOAD STPLR SUREFORM 60 DAVINCI/X/XI 6ROW 4.3 GRN 1P/U  INTUITIVE SURGICAL [id number] N/A 1 Implanted       COUNTS:  Correct.    COMPLICATIONS:  None.    INDICATIONS:  This patient with morbid obesity and associated comorbidities presents for elective laparoscopic, possible open sleeve gastrectomy.  The patient has received medical clearance to proceed.  The patient has undergone our extensive educational process and consent process and wishes to proceed.        DESCRIPTION OF PROCEDURE:  The patient was brought to the operating room and placed supine upon the operating room table. SCD hose were placed.  The patient underwent uneventful general endotracheal anesthesia per the anesthesiology staff. The abdomen was prepped with ChloraPrep and draped in the usual sterile fashion.  An Ioban was used as well if not allergic.  Depending on the technique to size the gastric sleeve, anesthesia staff either passed a 40-Yoruba ViSiGi bougie into the stomach to decompress and then was pulled back to the esophagus.     A small incision was made in the left upper quadrant near the costal margin at Hardy's point for the Veress needle.  The Veress needle was inserted into the peritoneum and insufflation commenced to 15 mmHg.       An 8 mm transverse incision was made at the left midclavicular line about 15 cm inferior to the xiphoid.  The peritoneal cavity was entered under direct camera visualization using a 5  mm 0° laparoscope and an Optiview trocar.  The abdomen was then insufflated to a pressure of 15-16 mmHg with CO2 gas.  Exploratory laparoscopy revealed no evidence of injury from the entrance technique and no significant abnormalities unless addendum dictated below.    The patient was placed in reverse Trendelenburg position.   A 12 mm trocar was placed in the right abdomen.  I then changed the scope to a 30 degree da Irvin scope.  Under direct camera  visualization two 8 mm trocar was placed in the left lateral midabdominal position.        Next the 30 degree 8 mm scope was brought into the 8 mm port just to the left of the umbilicus and the corresponding trocar was docked to the da Irvin robot.  Targeting then took place and then the rest of the trochars were docked to the robot and angled into position.  Instruments were brought in through the trochars in place and into view.          I then took control of the surgical console.The gastrocolic omentum was divided with the Vessel Sealer and this proceeded superiorly to the angle of His taking down the short gastric vessels.  All posterior attachments of the lesser sac and posterior aspect of the stomach to the pancreas were taken down as well.          Dissection then proceeded medially taking down the greater curvature with the vessel sealer to just proximal to the pylorus.  The 40-Chinese orogastric tube was passed back down into the stomach for decompression and later to aid in sizing the sleeve.       I then used the Surefire stapler and a blue load was used to create the gastric sleeve.   There were additional firings to complete the gastric sleeve near the angle of Hiss, see above.  ICG was used for a leak test by insufflating ICG into the orogastric tube and the staple line was closely inspected under firefly and there was no evidence of leak.    An omentopexy was performed of the entire staple line of the gastric sleeve using a 3-0 V-Loc 180.  The specimen was removed through the 12 mm port site.   The fascia at the 12 mm trocar site incision that was used for extraction was closed with a single 0 Vicryl suture in a figure-of-eight fashion placed under direct laparoscopic camera visualization with a suture passer and tying the knot extracorporeally.  The fascia in the area was infiltrated with local anesthesia. All incisions were then infiltrated with local anesthetic. The remaining trocars were removed  under direct camera visualization with no bleeding noted from their sites.  The abdomen was desufflated of gas. The skin in each incision was closed using 4-0 antibiotic impregnated Monocryl in a subcuticular fashion followed by Dermabond.  The patient tolerated the procedure well without complication and was taken to the recovery room in stable condition.  All sponge, needle and instrument counts were correct.

## 2025-02-05 NOTE — PLAN OF CARE
Goal Outcome Evaluation:  Plan of Care Reviewed With: patient, spouse        Progress: improving        Up ambulating in the sanchez every 2 hours, tolerating po clears. VSS. Pain controlled with po meds. Plan of care on going.

## 2025-02-05 NOTE — ANESTHESIA PROCEDURE NOTES
Airway  Urgency: elective    Date/Time: 2/5/2025 10:36 AM    General Information and Staff    Patient location during procedure: OR  CRNA/CAA: Maria Teresa Hamlin CRNA    Indications and Patient Condition  Indications for airway management: airway protection    Preoxygenated: yes  Mask difficulty assessment: 1 - vent by mask    Final Airway Details  Final airway type: endotracheal airway      Successful airway: ETT  Cuffed: yes   Successful intubation technique: video laryngoscopy  Facilitating devices/methods: intubating stylet  Endotracheal tube insertion site: oral  Blade: Burns  Blade size: 3  ETT size (mm): 7.0  Cormack-Lehane Classification: grade I - full view of glottis  Placement verified by: chest auscultation and capnometry   Measured from: lips  ETT/EBT  to lips (cm): 21  Number of attempts at approach: 1  Assessment: lips, teeth, and gum same as pre-op and atraumatic intubation             Xenograft Text: The defect edges were debeveled with a #15 scalpel blade. Given the location of the defect, shape of the defect and the proximity to free margins a xenograft was deemed most appropriate.  The graft was then trimmed to fit the size of the defect.  The graft was then placed in the primary defect and oriented appropriately.

## 2025-02-05 NOTE — ANESTHESIA PROCEDURE NOTES
Peripheral IV    Patient location during procedure: OR  Start time: 2/5/2025 10:37 AM  End time: 2/5/2025 10:40 AM  Line placed for Fluids/Medication Admin.  Performed By   Anesthesiologist: Franco Olson MD  Preanesthetic Checklist  Completed: patient identified, IV checked, site marked, risks and benefits discussed, surgical consent, monitors and equipment checked, pre-op evaluation and timeout performed  Peripheral IV Prep   Patient position: supine   Prep: ChloraPrep  Patient monitoring: heart rate, cardiac monitor and continuous pulse ox  Peripheral IV Procedure   Laterality:left  Location:  Wrist  Catheter size: 20 G          Post Assessment   Dressing Type: transparent and tape.    IV Dressing/Site: clean, dry and intact

## 2025-02-06 VITALS
TEMPERATURE: 98.4 F | WEIGHT: 293 LBS | HEART RATE: 75 BPM | HEIGHT: 66 IN | DIASTOLIC BLOOD PRESSURE: 74 MMHG | OXYGEN SATURATION: 97 % | SYSTOLIC BLOOD PRESSURE: 117 MMHG | BODY MASS INDEX: 47.09 KG/M2 | RESPIRATION RATE: 16 BRPM

## 2025-02-06 LAB
GLUCOSE BLDC GLUCOMTR-MCNC: 137 MG/DL (ref 70–105)
GLUCOSE BLDC GLUCOMTR-MCNC: 145 MG/DL (ref 70–105)
GLUCOSE BLDC GLUCOMTR-MCNC: 150 MG/DL (ref 70–105)
GLUCOSE BLDC GLUCOMTR-MCNC: 163 MG/DL (ref 70–105)

## 2025-02-06 PROCEDURE — 63710000001 INSULIN REGULAR HUMAN PER 5 UNITS: Performed by: SURGERY

## 2025-02-06 PROCEDURE — 25010000002 HYDRALAZINE PER 20 MG: Performed by: SURGERY

## 2025-02-06 PROCEDURE — 25010000002 CYANOCOBALAMIN PER 1000 MCG: Performed by: SURGERY

## 2025-02-06 PROCEDURE — 82948 REAGENT STRIP/BLOOD GLUCOSE: CPT

## 2025-02-06 PROCEDURE — 25010000002 ONDANSETRON PER 1 MG: Performed by: SURGERY

## 2025-02-06 PROCEDURE — 82948 REAGENT STRIP/BLOOD GLUCOSE: CPT | Performed by: SURGERY

## 2025-02-06 PROCEDURE — 99024 POSTOP FOLLOW-UP VISIT: CPT | Performed by: SURGERY

## 2025-02-06 PROCEDURE — 25010000002 METOCLOPRAMIDE PER 10 MG: Performed by: SURGERY

## 2025-02-06 RX ORDER — HYDROCODONE BITARTRATE AND ACETAMINOPHEN 5; 325 MG/1; MG/1
1 TABLET ORAL EVERY 4 HOURS PRN
Qty: 10 TABLET | Refills: 0 | Status: SHIPPED | OUTPATIENT
Start: 2025-02-06

## 2025-02-06 RX ORDER — SCOPOLAMINE 1 MG/3D
1 PATCH, EXTENDED RELEASE TRANSDERMAL
Status: DISCONTINUED | OUTPATIENT
Start: 2025-02-06 | End: 2025-02-06 | Stop reason: SDUPTHER

## 2025-02-06 RX ORDER — ONDANSETRON 8 MG/1
8 TABLET, ORALLY DISINTEGRATING ORAL EVERY 8 HOURS PRN
Qty: 30 TABLET | Refills: 12 | Status: SHIPPED | OUTPATIENT
Start: 2025-02-06 | End: 2025-03-08

## 2025-02-06 RX ADMIN — GLIPIZIDE 10 MG: 5 TABLET ORAL at 08:41

## 2025-02-06 RX ADMIN — HYDROMORPHONE HYDROCHLORIDE 2 MG: 2 TABLET ORAL at 08:41

## 2025-02-06 RX ADMIN — CYANOCOBALAMIN 1000 MCG: 1000 INJECTION, SOLUTION INTRAMUSCULAR; SUBCUTANEOUS at 08:41

## 2025-02-06 RX ADMIN — METOPROLOL SUCCINATE 100 MG: 50 TABLET, EXTENDED RELEASE ORAL at 08:40

## 2025-02-06 RX ADMIN — ACETAMINOPHEN 1000 MG: 500 TABLET, FILM COATED ORAL at 04:09

## 2025-02-06 RX ADMIN — HYDRALAZINE HYDROCHLORIDE 20 MG: 20 INJECTION INTRAMUSCULAR; INTRAVENOUS at 00:24

## 2025-02-06 RX ADMIN — BUDESONIDE AND FORMOTEROL FUMARATE DIHYDRATE 2 PUFF: 160; 4.5 AEROSOL RESPIRATORY (INHALATION) at 08:56

## 2025-02-06 RX ADMIN — HYOSCYAMINE SULFATE 125 MCG: 0.12 TABLET SUBLINGUAL at 08:41

## 2025-02-06 RX ADMIN — FAMOTIDINE 20 MG: 10 INJECTION INTRAVENOUS at 04:09

## 2025-02-06 RX ADMIN — ONDANSETRON 8 MG: 2 INJECTION, SOLUTION INTRAMUSCULAR; INTRAVENOUS at 08:41

## 2025-02-06 RX ADMIN — INSULIN HUMAN 2 UNITS: 100 INJECTION, SOLUTION PARENTERAL at 06:29

## 2025-02-06 RX ADMIN — HYOSCYAMINE SULFATE 125 MCG: 0.12 TABLET SUBLINGUAL at 00:24

## 2025-02-06 RX ADMIN — CLONIDINE HYDROCHLORIDE 0.1 MG: 0.1 TABLET ORAL at 08:40

## 2025-02-06 RX ADMIN — ACETAMINOPHEN 1000 MG: 500 TABLET, FILM COATED ORAL at 08:46

## 2025-02-06 RX ADMIN — METOCLOPRAMIDE 10 MG: 5 INJECTION, SOLUTION INTRAMUSCULAR; INTRAVENOUS at 04:09

## 2025-02-06 NOTE — DISCHARGE SUMMARY
"Discharge Summary    Patient name: Bernadine Arriaga    Medical record number: [id number]    Admission date: 2/5/2025  Discharge date:      Attending physician: Dr. Ciera Cota    Primary care physician: Berenice Jacobson MD    Referring physician: Ciera Cota MD  [address] [place] [address]    Condition on discharge: Stable    Primary Diagnoses:  Morbid obesity with co-morbidities    Operative Procedure: Robotic Laparoscopic sleeve gastrectomy     Bernadine Arriaga  is post op day one status post procedure listed. Patient denies shortness of air and lower extremity pain. Feels better than yesterday. No vomiting this am. Ambulating well and using incentive spirometer.          /85 (BP Location: Left arm, Patient Position: Lying)   Pulse 95   Temp 98.8 °F (37.1 °C) (Oral)   Resp 16   Ht 167.6 cm (66\")   Wt 133 kg (293 lb 9.6 oz)   LMP  (LMP Unknown)   SpO2 98%   BMI 47.39 kg/m²     General:  alert, appears stated age and cooperative   Abdomen: soft, bowel sounds active, appropriate tenderness   Incision:   healing well, no drainage, no erythema, no hernia, no seroma, no swelling, no dehiscence, incision well approximated   Heart: Regular rate   Lungs: Clear to auscultation bilaterally     I reviewed the patient's new clinical results.     Lab Results (last 24 hours)       Procedure Component Value Units Date/Time    POC Glucose TID AC [[id number]]  (Abnormal) Collected: 02/06/25 1126    Specimen: Blood Updated: 02/06/25 1128     Glucose 163 mg/dL      Comment: Serial Number: [id number]:  [id number]       POC Glucose TID AC [[id number]]  (Abnormal) Collected: 02/06/25 0740    Specimen: Blood Updated: 02/06/25 0743     Glucose 145 mg/dL      Comment: Serial Number: [id number]:  [id number]       POC Glucose Once [[id number]]  (Abnormal) Collected: 02/06/25 0625    Specimen: Blood Updated: 02/06/25 0627     Glucose 150 mg/dL      Comment: Serial Number: " [id number]:  [id number]       POC Glucose Once [[id number]]  (Abnormal) Collected: 02/06/25 0014    Specimen: Blood Updated: 02/06/25 0016     Glucose 137 mg/dL      Comment: Serial Number: [id number]:  [id number]       Comprehensive Metabolic Panel [[id number]]  (Abnormal) Collected: 02/05/25 1805    Specimen: Blood from Arm, Left Updated: 02/05/25 1842     Glucose 250 mg/dL      BUN 13 mg/dL      Creatinine 0.60 mg/dL      Sodium 133 mmol/L      Potassium 3.9 mmol/L      Comment: Slight hemolysis detected by analyzer. Result may be falsely elevated.        Chloride 96 mmol/L      CO2 25.4 mmol/L      Calcium 8.5 mg/dL      Total Protein 6.8 g/dL      Albumin 3.8 g/dL      ALT (SGPT) 107 U/L      AST (SGOT) 93 U/L      Comment: Slight hemolysis detected by analyzer. Result may be falsely elevated.        Alkaline Phosphatase 48 U/L      Total Bilirubin 0.2 mg/dL      Globulin 3.0 gm/dL      A/G Ratio 1.3 g/dL      BUN/Creatinine Ratio 21.7     Anion Gap 11.6 mmol/L      eGFR 112.3 mL/min/1.73     Narrative:      GFR Categories in Chronic Kidney Disease (CKD)      GFR Category          GFR (mL/min/1.73)    Interpretation  G1                     90 or greater         Normal or high (1)  G2                      60-89                Mild decrease (1)  G3a                   45-59                Mild to moderate decrease  G3b                   30-44                Moderate to severe decrease  G4                    15-29                Severe decrease  G5                    14 or less           Kidney failure          (1)In the absence of evidence of kidney disease, neither GFR category G1 or G2 fulfill the criteria for CKD.    eGFR calculation 2021 CKD-EPI creatinine equation, which does not include race as a factor    Magnesium [[id number]]  (Normal) Collected: 02/05/25 1805    Specimen: Blood from Arm, Left Updated: 02/05/25 1842     Magnesium 1.9 mg/dL     Phosphorus [[id number]]  (Normal) Collected: 02/05/25  1805    Specimen: Blood from Arm, Left Updated: 02/05/25 1841     Phosphorus 2.9 mg/dL     CBC & Differential [[id number]]  (Abnormal) Collected: 02/05/25 1805    Specimen: Blood from Arm, Left Updated: 02/05/25 1810    Narrative:      The following orders were created for panel order CBC & Differential.  Procedure                               Abnormality         Status                     ---------                               -----------         ------                     CBC Auto Differential[[id number]]        Abnormal            Final result                 Please view results for these tests on the individual orders.    CBC Auto Differential [[id number]]  (Abnormal) Collected: 02/05/25 1805    Specimen: Blood from Arm, Left Updated: 02/05/25 1810     WBC 17.32 10*3/mm3      RBC 4.14 10*6/mm3      Hemoglobin 13.5 g/dL      Hematocrit 40.2 %      MCV 97.1 fL      MCH 32.6 pg      MCHC 33.6 g/dL      RDW 12.5 %      RDW-SD 44.6 fl      MPV 10.5 fL      Platelets 293 10*3/mm3      Neutrophil % 85.7 %      Lymphocyte % 9.4 %      Monocyte % 4.0 %      Eosinophil % 0.0 %      Basophil % 0.2 %      Immature Grans % 0.7 %      Neutrophils, Absolute 14.85 10*3/mm3      Lymphocytes, Absolute 1.62 10*3/mm3      Monocytes, Absolute 0.70 10*3/mm3      Eosinophils, Absolute 0.00 10*3/mm3      Basophils, Absolute 0.03 10*3/mm3      Immature Grans, Absolute 0.12 10*3/mm3      nRBC 0.0 /100 WBC     POC Glucose Once [[id number]]  (Abnormal) Collected: 02/05/25 1720    Specimen: Blood Updated: 02/05/25 1723     Glucose 227 mg/dL      Comment: Serial Number: [id number]:  [id number]       Tissue Pathology Exam [[id number]] Collected: 02/05/25 1106    Specimen: Tissue from Stomach, Greater curvature Updated: 02/05/25 1342    POC Glucose Once [[id number]]  (Abnormal) Collected: 02/05/25 1220    Specimen: Blood Updated: 02/05/25 1222     Glucose 189 mg/dL      Comment: Serial Number: [id number]:  [id number]                   Assessment:      Doing well postoperatively.      Plan:   1. Continue Stage 1 diet  2. Continue with ambulation and Incentive spirometry  3. Plan for d/c home      Hospital Course: The patient is a very pleasant 44 y.o. female that was admitted to the hospital with with morbid obesity underwent a laparoscopic gastric sleeve.  The next morning the patient was able to tolerate liquids and was ambulating and vital signs and labs were stable.  The patient is discharged home with follow-up in my office next week.      Discharge medications:      Discharge Medications        New Medications        Instructions Start Date   apixaban 2.5 MG tablet tablet  Commonly known as: ELIQUIS   2.5 mg, Oral, 2 Times Daily      HYDROcodone-acetaminophen 5-325 MG per tablet  Commonly known as: NORCO   1 tablet, Oral, Every 4 Hours PRN      ondansetron ODT 8 MG disintegrating tablet  Commonly known as: ZOFRAN-ODT   8 mg, Translingual, Every 8 Hours PRN             Changes to Medications        Instructions Start Date   metoprolol succinate XL 50 MG 24 hr tablet  Commonly known as: TOPROL-XL  What changed:   how much to take  how to take this  when to take this   TAKE TWO TABLETS BY MOUTH EVERY MORNING AND ONE EVERY NIGHT AT BEDTIME             Continue These Medications        Instructions Start Date   Accu-Chek Guide Test test strip  Generic drug: glucose blood   Use as instructed      acetaminophen 325 MG tablet  Commonly known as: TYLENOL   1,000 mg, As Needed      albuterol sulfate  (90 Base) MCG/ACT inhaler  Commonly known as: PROVENTIL HFA;VENTOLIN HFA;PROAIR HFA   2 puffs, Inhalation, Every 6 Hours PRN      baclofen 10 MG tablet  Commonly known as: LIORESAL   10 mg, 3 Times Daily PRN      betamethasone (augmented) 0.05 % cream  Commonly known as: DIPROLENE   PLEASE SEE ATTACHED FOR DETAILED DIRECTIONS      betamethasone dipropionate 0.05 % lotion   2 Times Daily PRN      budesonide-formoterol 80-4.5 MCG/ACT  inhaler  Commonly known as: SYMBICORT   2 puffs, Inhalation, 2 Times Daily - RT      clindamycin 1 % lotion  Commonly known as: CLEOCIN T   2 Times Daily PRN      clobetasol 0.05 % ointment  Commonly known as: TEMOVATE       cloNIDine 0.1 MG tablet  Commonly known as: Catapres   0.1 mg, Oral, 2 Times Daily      doxycycline 100 MG tablet  Commonly known as: ADOXA   Take 1 tablet by mouth As Needed.      EPINEPHrine 0.3 MG/0.3ML solution auto-injector injection  Commonly known as: EPIPEN   0.3 mg, Intramuscular, As Needed      ezetimibe 10 MG tablet  Commonly known as: Zetia   10 mg, Oral, Daily      famotidine 20 MG tablet  Commonly known as: PEPCID   20 mg, 2 Times Daily      fenofibrate 160 MG tablet   160 mg, Oral, Daily      fish oil 1000 MG capsule capsule   1,000 mg, Daily With Breakfast      folic acid 1 MG tablet  Commonly known as: FOLVITE   1 tablet, Daily      glimepiride 4 MG tablet  Commonly known as: AMARYL   4 mg, Oral, 2 Times Daily With Meals      hydrOXYzine 25 MG tablet  Commonly known as: ATARAX   25 mg, Oral, Every 6 Hours PRN      inFLIXimab 100 MG injection  Commonly known as: REMICADE   monthly      Januvia 100 MG tablet  Generic drug: SITagliptin   100 mg, Oral, Daily      metoprolol tartrate 50 MG tablet  Commonly known as: LOPRESSOR   50 mg, Every Night at Bedtime      olmesartan 40 MG tablet  Commonly known as: BENICAR   40 mg, Oral, Daily      sulfaSALAzine 500 MG tablet  Commonly known as: AZULFIDINE   1,500 mg, 2 Times Daily      vitamin D 1.25 MG (82040 UT) capsule capsule  Commonly known as: ERGOCALCIFEROL   50,000 Units, Oral, Weekly             Stop These Medications      diclofenac 75 MG EC tablet  Commonly known as: VOLTAREN     furosemide 40 MG tablet  Commonly known as: LASIX     Jardiance 25 MG tablet tablet  Generic drug: empagliflozin     methotrexate 2.5 MG tablet              Discharge instructions:  Per Bariatric manual; per our protocol      Follow-up appointment: Follow  up with Dr. Cota in the office as scheduled.  If not already scheduled call for appointment at [phone]

## 2025-02-06 NOTE — CASE MANAGEMENT/SOCIAL WORK
Case Management Discharge Note      Final Note: HOME  DISCHARGED PRIOR TO CASE MANAGEMENT SEEING PATIENT         Selected Continued Care - Discharged on 2/6/2025 Admission date: 2/5/2025 - Discharge disposition: Home or Self Care            Transportation Services  Private: Car    Final Discharge Disposition Code: 01 - home or self-care

## 2025-02-06 NOTE — PROGRESS NOTES
Nutrition Services    Patient Name: Bernadine Arriaga  YOB: 1980  MRN: [id number]  Admission date: 2/5/2025    PROGRESS NOTE      Encounter Information: POD 1 VSG. Patient reports minimal pain but consistent nausea when drinking. Patient has not been drinking well d/t nausea. Patient stated she had a patch for nausea on behind her ear but it is no longer there. Patient has been up walking. Encouraged patient to continue drinking more. Patient with no questions over diet advance and aware to continue CLLIQ with advance to maintain hydration.        PO Diet: Diet: Liquid; Clear Liquid; Bariatric Stage 1; Fluid Consistency: Thin (IDDSI 0)   PO Supplements:    PO Intake:  Not currently drinking well       Current nutrition support:    Nutrition support review:        Labs (reviewed below): Labs reviewed        GI Function:         Nutrition Intervention Updates:        Results from last 7 days   Lab Units 02/05/25  1805 01/31/25  1421   SODIUM mmol/L 133* 133*   POTASSIUM mmol/L 3.9 3.1*   CHLORIDE mmol/L 96* 92*   CO2 mmol/L 25.4 29.0   BUN mg/dL 13 16   CREATININE mg/dL 0.60 0.53*   CALCIUM mg/dL 8.5* 9.4   BILIRUBIN mg/dL 0.2  --    ALK PHOS U/L 48  --    ALT (SGPT) U/L 107*  --    AST (SGOT) U/L 93*  --    GLUCOSE mg/dL 250* 154*     Results from last 7 days   Lab Units 02/05/25  1805   MAGNESIUM mg/dL 1.9   PHOSPHORUS mg/dL 2.9   HEMOGLOBIN g/dL 13.5   HEMATOCRIT % 40.2     COVID19   Date Value Ref Range Status   10/24/2022 Not Detected Not Detected - Ref. Range Final     Lab Results   Component Value Date    HGBA1C 7.80 (H) 01/31/2025       Wt Readings from Last 10 Encounters:   02/05/25 0749 133 kg (293 lb 9.6 oz)   01/29/25 1102 136 kg (299 lb)   01/24/25 0751 136 kg (299 lb 6.4 oz)   12/13/24 1404 134 kg (294 lb 9.6 oz)   11/22/24 0746 134 kg (296 lb)   11/19/24 0903 136 kg (299 lb 12.8 oz)   10/18/24 0725 133 kg (294 lb)   09/29/24 0746 132 kg (290 lb 3.2 oz)   09/23/24 1351 134 kg (296 lb)    09/20/24 0942 134 kg (295 lb)   09/20/24 0845 134 kg (294 lb 8 oz)   08/19/24 0835 135 kg (296 lb 11.2 oz)       Patient to follow up in office next week.     RD to follow up per protocol.    Electronically signed by:  Agusto Aguayo RD  02/06/25 08:47 EST

## 2025-02-06 NOTE — DISCHARGE INSTRUCTIONS
GOING HOME AFTER GASTRIC SLEEVE/ GASTRIC BYPASS SURGERY  [facility] Weight Loss: Post-Operative Information/Instructions  Ciera Cota MD  General Patient Instructions for Discharge   - Call Surgeon's office at [phone] for follow-up appointment.    - Be sure you, the patient, have a follow-up appointment to be seen within seven (7) days after discharge. If not, please call [phone] to schedule an appointment. If you are discharged on a Saturday or Sunday, please call Monday to schedule the appointment.  - Contact the Surgeon at [phone] for any questions or concerns, including temperature greater than or equal to 101F, shortness of breath, leg swelling, redness at incision sites, nausea, vomiting, chills, or problems or questions.      - You may shower. No tub bath for 2 weeks.  - No lifting, pushing, pulling, or tugging >25 pounds for 3 weeks.  - Ambulate every 3 hours while awake minimum for seven (7) days, increase distance daily.  - For the next several weeks, you are at an increased risk for blood clot formation. Therefore, you should walk regularly. You should not sit for prolonged periods of time, more than 45 minutes, without getting up and walking for 5-10 minutes. This includes any car rides, including the drive home from the hospital. If driving any distance greater than 30 miles over the next two (2) weeks, stop every 30-45 minutes and walk for 5-10 minutes each time.  - Continue using Incentive Spirometer and coughing exercises at least every two (2) hours while awake for one week.  - Continue use of CPAP/BIPAP for diagnosis of sleep apnea as directed.  - No driving or operating machinery allowed while taking narcotic (prescription) pain medication, and until you feel comfortable forcefully applying the brakes if needed. (This usually takes more than 3 days.)    - Make an appointment with your Primary Care Physician within one week post-op to look at your home medications for  possible changes or discontinuity.   Medications  - The nurse will provide a list of medications for you to continue at home   - If you received a Lovenox (Enoxaparin) or Apixiban (Eliquis) prescription at pre-op visit with Surgeon, start taking the medicine the evening of discharge unless directed otherwise.    - If you were prescribed Lovenox (Enoxaparin), review the education/teaching material/video with the nurse.    - Take post op pain meds as prescribed as needed.   - Begin taking Actigall (Ursodiol) one (1) week after surgery if you still have a gallbladder. Contact the office if you do not have the prescription.   - Resume bariatric vitamin one week after surgery.    - If you had a gastric bypass, take omeprazole or other proton pump inhibitor by mouth once daily for four (4) weeks unless you are already taking a proton pump inhibitor as home medication. Follow dosing instructions on package.   Nausea/Vomiting:  The following are possible causes for nausea/vomiting:  - Drinking too much or too fast.  - Sinus drainage/post nasal drip for allergy sufferers (you may take Sudafed, Claritin, Tylenol Sinus/Allergy, or other decongestants and nose sprays to help with this discomfort).  - Low blood sugar (sweating, shaky, irritable, weakness, dizzy or tunnel-vision) - treatment is to sip 100% fruit juice - no sugar added until symptoms subside.  - Acid in fruit juice - (may dilute with water or avoid).  - Eating or drinking something that is not on clear liquid (stage 1) diet.  Any nausea/vomiting that prohibits you from keeping fluids down for greater than 24 hours requires a call to the surgeon's office.  Urine:  Use your urine color as a guide to determine if you are drinking enough fluid. The darker the urine, the more fluids you need to drink. Urine should be clear to light yellow if you are getting enough fluid. If you should experience frequency, burning or pain with urination, blood in urine, contact us or  your primary care physician for possible UTI (urinary tract infection), which could require antibiotics (liquid preferred).  Bowel Movements:  You may not have a bowel movement for 2-5 days after going home. You may then experience liquid, runny or loose stools for approximately 3-4 weeks following surgery. This would require you to drink even more fluids to prevent dehydration. Some patients may experience constipation, which can be treated with increased fluids, drinking warm liquids, increased activity and the use of a Fleets Enema, Milk of Magnesia, or suppositories. The first couple of bowel movements could be bloody, tarry black or dark maroon in color. This is OK as long as the stool returns to a normal color in 1-2 days. If however, you have frequent or a large amount of bloody or tarry black stools and/or become light-headed or dizzy, you may be bleeding and require urgent attention. Please call us right away.  Abdominal Incisions:  You will have small incisions. Do not scrub incisions, but allow the warm, soapy water to run over the incisions, rinse well, and pat dry. You may use any brand of anti-bacterial soap. Do not use Peroxide or Neosporin type ointments on sites, unless instructed to do so by a surgeon or nurse. Monitor daily for signs/symptoms of infection, which might include: drainage with a foul odor, pain, redness, swelling or heat at the incision sight; fever, body aches and chills. If you suspect infection or have a fever, give us a call.  Pain:  You will be given a prescription for pain medication to control your pain. If you feel the dose is too strong, you may take half the ordered dose, or you may take Tylenol adult liquid per package instructions for minor pain. Do not take any medications that contain aspirin or aspirin products.  Do not take medications like: Motrin, Aleve, Ibuprofen, Advil, Naproxen, Celebrex, Daypro, Bextra, Meloxicam or other medications commonly used for arthritis  or joint pain.  No steroids or cortisone injections. There may be pain, which should improve every few days. Pain should not suddenly get worse or more intense. Pain that suddenly changes and is constant and severe should be called in to the surgeon's office. Any sudden pain in the lower extremities with associated warmth and redness should be called in to the surgeon's office immediately. Do not rub or massage this area, as it could be a blood clot.  Diet:    POST OP DIET PROGRESSION FOR SLEEVE AND BYPASS  Stage 1 Clear Liquids - Days 0-1    Gatorade Zero, PowerAde Zero, Sugar free Jell-O, Sugar free popsicles, broth, water, decaf tea, decaf coffee, Crystal light, McGregor, Dasani drops, sugar free Compa-Aid, Vitamin Water zero, Propel zero   Stage 2 Full Liquids - Days 2-14   Continue with stage 1 and add:  Skim milk or unsweetened milk alternative such as soymilk, almond milk or oat milk   Chobani less sugar Greek yogurt without chunks, Yoplait light yogurt, Dannon Light & fit original Greek yogurt, Dannon light & fit original or any blended low-fat yogurt with less than 12g of carbs  Low fat cream soups - must be strained, not blended (no chunks)  Sugar free low-fat pudding, no sugar added applesauce   Mashed Potatoes VERY THIN made with skim milk or broth (soup consistency)  Cream of Wheat or Girts - VERY THIN made with skim milk (soup consistency)  Protein powders made with milks listed above or any ready to drink protein shakes that meets calories and protein guidelines   Stage 3 Semi Solid - Days 15-21   Continue with stages 1-2 and add:  Scrambled eggs, low-fat or fat free cottage cheese, low- fat ricotta cheese, fat-free refried beans, oatmeal, canned peaches and pears (packed in water), soft banana, PB2 powdered peanut butter  Protein smoothies: can add fruit to shakes    Stage 4 Soft Foods - Days 22-28   Continue with stages 1 - 3 and add:  Soft cooked vegetables, cooked beans, bananas, canned fruit (fork  mashed) in juice or water, Canned chicken or tuna (fork mashed) canned in water, lean ground turkey or chicken, low-fat cheese   Stage 5 Regular Diet - Days 29+   Continue with stages 1 - 4 and add:  Chicken, turkey, ham, turkey shaw/sausage, lean ground beef, crackers, tortillas, toast, fresh fruit, salad, vegetables, jerky, nuts. Slowly add in small portions of new foods.                                                                                                                                                                 It may take longer to tolerate some foods. You may need to try to avoid the following until 3 months post op: untoasted bread, dry chicken or other meat, pasta, rice, stringy vegetables such as celery or asparagus, fruit with skin     Medications:  The nurse will let you know which medications you will need to continue once you go home. Do not take any medications that are extended or time released if you had the gastric bypass procedure, OK to take if you had the gastric sleeve procedure. Large capsules can be opened and diluted with clear liquids. Check with your physician or pharmacist as to which pills may be crushed and which capsules may be opened and diluted safely. If you still have your gall bladder and were prescribed Actigall (Ursodiol), you may resume this medication one week after your surgery. You will remain on Actigall (Ursodiol) for approximately 6 months. The dose is 1 pill, 2 times each day for 6 months.  Activity:  Continue your deep breathing and coughing exercises with your Incentive Spirometer breathing device at least every 3 hours while awake (10 repetitions each time) for one week. May use CPAP. This will help to prevent respiratory problems such as pneumonia. No lifting, pulling or tugging anything over 25 pounds for 3 weeks after surgery. You may shower but no tub baths, hot tubs or swimming for 2 weeks. Moderate walking is recommended every 3 hours while awake  minimum, increase distance daily. Further exercise will be discussed at the first post-op visit. No driving or operating machinery allow until off narcotic pain medication and until you feel comfortable forcefully applying the brakes (usually takes 3 or more days). For the next few weeks you are at an increased risk for blood clot formation. Therefore you should walk regularly and you should not sit for prolonged periods of time, more than 45 minutes without getting up and walking for 5-10 minutes. This includes car rides. Including riding home from the hospital. If riding a distance greater than 30 miles over the next 2 weeks stop every 30-45 minutes and walk 5-10 mintues each time. No tanning bed use for 8 weeks after surgery and in general, not recommended due to the increased risk for skin cancer. Incisions will burn/blister very badly with tanning bed use.  Illness:  Your primary care physician should treat general illness such as ear infections, sinus infections, and viral type illnesses, etc. Medications prescribed should be liquid/elixir form when possible, for the first 30 days.  General:  In general, it is recommended that you weigh yourself no more than once per week. Let the weight come off you and concentrate on more important things. Remember the weight was not gained overnight, nor will it be lost overnight. Gastric Bypass/ Gastric Sleeve weight loss will continue over a period of 12-18 months. Do not  yourself according to how others are doing after surgery, as this will cause unnecessary discouragement.  THE ABOVE ARE GENERAL GUIDELINES TO ASSIST YOU ONCE HOME, IF YOU ARE IN DOUBT, OR YOU HAVE ANY QUESTIONS, CALL US AT THE NUMBERS LISTED BELOW.  IN THE EVENT OF SUDDEN CHEST PAIN, SHORTNESS OF BREATH, OR ANY LIFE THREATENING CONDITION, CALL 911.  Any time you are evaluated or admitted to another facility, please have someone notify the surgeon's office.  Supplements:  70 grams of protein taken  EVERY DAY. Remember to drink at least 64 ounces of fluid a day, sipping slowly early on. Increase this amount during the summertime. Sipping slowly will not stretch your new stomach. Drinking too fast or gulping liquids will cause brief discomfort and early could cause staple line disruption (leak). With eating, tiny bites, then chew, chew, chew, and swallow. Lay your fork/spoon down for 2-3 minutes, and then take your next bite. Your pouch will tell you within 1-2 bites if it is going to tolerate what you are eating.   Protein Vendors:  Refer to protein vendors' handout from consult class. You can always find protein drinks at the bariatric office, grocery stores, [employer], drug Less[employer], [employer], health food Less[employer], and on the Internet. Find one high in protein (15-30 grams per serving) and low carb (less than 18 grams per serving).  Now is a great time to re-read your . Please review specific instructions given to you at discharge by your physician (surgeon).  HOW/WHEN TO CONTACT US:  It is imperative that you contact us with any of the following:    Ÿ fever greater than 101 degrees  Ÿ shortness of breath  Ÿ leg swelling  Ÿ body aches  Ÿ shaking chills  Ÿ nausea and vomitting  Ÿ pain that has worsened  Ÿ redness at incision sites  Ÿ pus or foul smelling drainage from an incision or wound  Ÿ inability to keep fluids down for more than a day  Ÿ any other condition you feel needs our attention.  [facility] - Bariatric: [phone] call this number anytime 24 hours a day / 7 days a week.  Teach-back Questions to be answered by the patient prior to discharge.   What complications would prompt you to call your doctor when you return home? _________________    What is the purpose of your prescribed medication? ________________  What are some potential side effects of the medications you will be taking at home? _______________

## 2025-02-06 NOTE — PLAN OF CARE
Goal Outcome Evaluation:            Ambulating many times in hallway per self without difficulty. Able to make needs known. IVF infusing as ordered. Call light in reach. Dressings CDI, ABD binder in place.

## 2025-02-07 LAB
LAB AP CASE REPORT: NORMAL
PATH REPORT.FINAL DX SPEC: NORMAL
PATH REPORT.GROSS SPEC: NORMAL

## 2025-02-12 ENCOUNTER — OFFICE VISIT (OUTPATIENT)
Dept: BARIATRICS/WEIGHT MGMT | Facility: CLINIC | Age: 45
End: 2025-02-12
Payer: COMMERCIAL

## 2025-02-12 VITALS
RESPIRATION RATE: 20 BRPM | HEIGHT: 66 IN | BODY MASS INDEX: 45.83 KG/M2 | HEART RATE: 65 BPM | DIASTOLIC BLOOD PRESSURE: 91 MMHG | SYSTOLIC BLOOD PRESSURE: 161 MMHG | OXYGEN SATURATION: 98 % | WEIGHT: 285.2 LBS

## 2025-02-12 DIAGNOSIS — Z90.3 S/P GASTRIC SLEEVE PROCEDURE: ICD-10-CM

## 2025-02-12 DIAGNOSIS — Z51.89 VISIT FOR WOUND CHECK: ICD-10-CM

## 2025-02-12 DIAGNOSIS — E66.01 OBESITY, CLASS III, BMI 40-49.9 (MORBID OBESITY): Primary | ICD-10-CM

## 2025-02-12 NOTE — PROGRESS NOTES
[facility] BARIATRIC SURGERY  [address] [place] [address]  [address] [place] [address]  Dept: [phone]  2/12/2025      Bernadine Arriaga.  [id number]  [id number]  1980  female      1 week gastric sleeve   Gastric Sleeve Laparoscopic With Davinci Robot  2/5/2025     Post-Op Bariatric Surgery:     HPI:   Weight loss in the last week:8 pounds    Wt Readings from Last 10 Encounters:   02/12/25 129 kg (285 lb 3.2 oz)   02/05/25 133 kg (293 lb 9.6 oz)   01/24/25 136 kg (299 lb 6.4 oz)   12/13/24 134 kg (294 lb 9.6 oz)   11/22/24 134 kg (296 lb)   11/19/24 136 kg (299 lb 12.8 oz)   10/18/24 133 kg (294 lb)   09/29/24 132 kg (290 lb 3.2 oz)   09/23/24 134 kg (296 lb)   09/20/24 134 kg (294 lb 8 oz)     Some nausea no vomiting for a few days, some abd pain at right incision - getting better overall   Mushy soup, water, gaterade 12 oz a day, 1 protein shake a day       Review of Systems   Constitutional:  Positive for activity change, appetite change and fatigue.   Respiratory: Negative.     Cardiovascular: Negative.    Gastrointestinal:  Positive for abdominal pain and nausea.   Musculoskeletal: Negative.          Past Medical History:   Diagnosis Date    Allergic     Allergic rhinitis     Anaphylactic reaction     to combo of ragweed and molds    Anxiety     Arthritis of spine     psoriatic    Asthma     Diabetes mellitus     GERD (gastroesophageal reflux disease)     History of medical problems     Hidradenitis    Hydradenitis     Hyperlipidemia     slight elevation not treated    Hypertension     Low back pain     Migraine     history    MRSA carrier     Pneumonia     Psoriasis     Psoriatic arthritis     Pyoderma     Sinus disorder     Sleep apnea     no machine     Past Surgical History:   Procedure Laterality Date    ADENOIDECTOMY      APPENDECTOMY      AXILLARY SURGERY      multiple due to hidradenitis    BREAST BIOPSY        SECTION      CHOLECYSTECTOMY      ENDOSCOPY N/A 2024    Procedure: ESOPHAGOGASTRODUODENOSCOPY WITH BIOPSY,;  Surgeon: Ciera Cota MD;  Location: [facility] ENDOSCOPY;  Service: General;  Laterality: N/A;  post: NORMALEGD    GASTRIC BAND ADJUSTMENT N/A 2024    Procedure: ESOPHAGOGASTRODUODENOSCOPY WITH GASTRIC BAND ADJUSTMENT WITH FLUORO;  Surgeon: Ciera Cota MD;  Location: [facility] ENDOSCOPY;  Service: Gastroenterology;  Laterality: N/A;  gastric band leak    GASTRIC BANDING REMOVAL N/A 2024    Procedure: GASTRIC BAND REMOVAL LAPAROSCOPIC WITH DAVINCI ROBOT;  Surgeon: Ciera Cota MD;  Location: [facility] MAIN OR;  Service: Robotics - DaVinci;  Laterality: N/A;    GASTRIC SLEEVE LAPAROSCOPIC N/A 2025    Procedure: GASTRIC SLEEVE LAPAROSCOPIC WITH DAVINCI ROBOT;  Surgeon: Ciera Cota MD;  Location: [facility] MAIN OR;  Service: Robotics - DaVinci;  Laterality: N/A;    INCISION AND DRAINAGE OF WOUND      breast    KNEE ARTHRODESIS      LAPAROSCOPIC GASTRIC BANDING  2008    MASS EXCISION Right 10/27/2021    Procedure: WIDE EXCISION TRUNK LESION/MASS, hidradenitis of right hip;  Surgeon: Serafin Centeno MD;  Location: [facility] MAIN OR;  Service: General;  Laterality: Right;    SINUS SURGERY      SMALL INTESTINE SURGERY      TONSILLECTOMY      WOUND DEBRIDEMENT Right 2019    Procedure: DEBRIDEMENT OF RECURRENT HIDRADENITIS AND VAC PLACEMENT RIGHT SIDE;  Surgeon: Alix Freire MD;  Location: [facility] MAIN OR;  Service: General         The following portions of the patient's history were reviewed and updated as appropriate: allergies, current medications, past family history, past medical history, past social history, past surgical history, and problem list.    Vitals:    25 1118   BP: 161/91   Pulse: 65   Resp: 20   SpO2: 98%       Physical Exam  Constitutional:       Appearance: Normal appearance. She is obese.   Pulmonary:      Effort: Pulmonary effort is normal.   Abdominal:       General: Abdomen is flat.   Neurological:      General: No focal deficit present.      Mental Status: She is alert and oriented to person, place, and time.   Psychiatric:         Mood and Affect: Mood normal.         Behavior: Behavior normal.         Thought Content: Thought content normal.         Judgment: Judgment normal.           Assessment:   Patient Active Problem List   Diagnosis    Hidradenitis suppurativa    Asthma    Type 2 diabetes mellitus with hyperglycemia    Hyperlipidemia    Hypertension    Morbid obesity with BMI of 45.0-49.9, adult    Cutaneous abscess of buttock    Abdominal pain    SBO (small bowel obstruction)    Obesity, Class III, BMI 40-49.9 (morbid obesity)    Obesity    Type A blood, Rh positive       Post-op, the patient is doing well. She is not having any vomiting. Some nausea in the beginning but states this is getting better overall. Some right sided abdominal pain with movement but also improving. Tolerating po fluids well. Diet progression reviewed with pt today and handout given. Plan to follow up in 1 month with PANDA Liz. Ok to return to work but lifting restrictions 25 pounds until 2/26/25.     Plan:   Reviewed with patient the importance of following the manual for diet progression. Increase activity as tolerated. Continue increasing daily intake of protein and water.   Return to work: depending on pt's job and job requirements - this was discussed individually with each patient  Activity restrictions: no lifting, pushing or pulling over 25lbs for 3 weeks.   Recommended patient be sure to get at least 70 grams of protein per day. Discussed with the patient the recommended amount of water per day to intake. Reviewed vitamin requirements. Be sure to do routine exercise and increase activity as tolerated. No asa, nsaids or steroids for 8 weeks. Patient may use miralax as needed if necessary.     Instructions / Recommendations: dietary counseling recommended, recommended a  daily protein intake of  grams, vitamin supplement(s) recommended, recommended exercising at least 150 minutes per week, behavior modifications recommended and instructed to call the office for concerns, questions, or problems.     The patient was instructed to follow up at one month follow up appt.     The patient was counseled regarding post op bariatric manual      GERTRUDE Monzon  [facility] bariatrics

## 2025-02-18 RX ORDER — METHOTREXATE 2.5 MG/1
TABLET ORAL
COMMUNITY
Start: 2024-12-17

## 2025-02-18 RX ORDER — DICLOFENAC SODIUM 75 MG/1
TABLET, DELAYED RELEASE ORAL EVERY 12 HOURS
COMMUNITY

## 2025-02-24 ENCOUNTER — OFFICE VISIT (OUTPATIENT)
Dept: FAMILY MEDICINE CLINIC | Facility: CLINIC | Age: 45
End: 2025-02-24
Payer: COMMERCIAL

## 2025-02-24 VITALS
DIASTOLIC BLOOD PRESSURE: 88 MMHG | TEMPERATURE: 97.8 F | OXYGEN SATURATION: 100 % | BODY MASS INDEX: 44.45 KG/M2 | HEART RATE: 77 BPM | HEIGHT: 66 IN | WEIGHT: 276.6 LBS | SYSTOLIC BLOOD PRESSURE: 134 MMHG

## 2025-02-24 DIAGNOSIS — Z09 POSTOPERATIVE FOLLOW-UP: Primary | ICD-10-CM

## 2025-02-24 RX ORDER — CLOBETASOL PROPIONATE 0.5 MG/G
OINTMENT TOPICAL 2 TIMES DAILY
Qty: 45 G | Refills: 3 | Status: SHIPPED | OUTPATIENT
Start: 2025-02-24

## 2025-02-24 RX ORDER — ERGOCALCIFEROL 1.25 MG/1
50000 CAPSULE, LIQUID FILLED ORAL WEEKLY
Qty: 12 CAPSULE | Refills: 1 | Status: SHIPPED | OUTPATIENT
Start: 2025-02-24

## 2025-02-24 NOTE — PROGRESS NOTES
Chief Complaint  Post-op Follow-up ( from bariatrics on 02/05/25/), med check (diabetes), and Med Refill    Subjective        Bernadine Arriaga presents to [facility]  History of Present Illness  Bernadine is a 44-year-old female presenting today for postop follow-up.  She had a robotic laparoscopic sleeve gastrectomy on 2/5/2025 with Dr. Cota. She is doing well post-op. Reports some right-sided abdominal pain, but she was told that is where her stomach was cut.  She is lost 20 pounds since surgery.  Her appetite comes and goes.  She denies any vomiting.  She has intermittent nausea and takes Zofran daily.  Her bowel movements have been normal.  She has not been taking her diabetic medications because she is not eating much.  She is checking her blood sugar daily and the highest it has been is 175.  She will slowly introduce her medications once she starts eating more.  She denies any concerns today.        The following portions of the patient's history were reviewed and updated as appropriate: allergies, current medications, past family history, past medical history, past social history, past surgical history and problem list.    Allergies   Allergen Reactions    Calcium Channel Blockers Palpitations, Other (See Comments) and Rash     Tachycardia and redness.    INCREASED HR   Tachycardia and redness.    Nifedipine Palpitations    Tetanus Antitoxin Swelling    Verapamil Palpitations    Adhesive Tape Other (See Comments)     Tape will cause skin breakdown rapidly   Can use silicone tape    Tape Other (See Comments)     Tape will cause skin breakdown rapidly   Can use silicone tape    Diltiazem Palpitations     Rash     Glue [Wound Dressing Adhesive] Rash       Patient Active Problem List   Diagnosis    Hidradenitis suppurativa    Asthma    Type 2 diabetes mellitus with hyperglycemia    Hyperlipidemia    Hypertension    Morbid obesity with BMI of 45.0-49.9, adult    Cutaneous  abscess of buttock    Abdominal pain    SBO (small bowel obstruction)    Obesity, Class III, BMI 40-49.9 (morbid obesity)    Obesity    Type A blood, Rh positive       Current Outpatient Medications   Medication Instructions    acetaminophen (TYLENOL) 1,000 mg, As Needed    albuterol sulfate  (90 Base) MCG/ACT inhaler 2 puffs, Inhalation, Every 6 Hours PRN    baclofen (LIORESAL) 10 mg, 3 Times Daily PRN    betamethasone dipropionate (DIPROLENE) 0.05 % lotion 2 Times Daily PRN    betamethasone, augmented, (DIPROLENE) 0.05 % cream PLEASE SEE ATTACHED FOR DETAILED DIRECTIONS    budesonide-formoterol (SYMBICORT) 80-4.5 MCG/ACT inhaler 2 puffs, Inhalation, 2 Times Daily - RT    clindamycin (CLEOCIN T) 1 % lotion 2 Times Daily PRN    clobetasol (TEMOVATE) 0.05 % ointment Topical, 2 Times Daily    cloNIDine (CATAPRES) 0.1 mg, Oral, 2 Times Daily    diclofenac (VOLTAREN) 75 MG EC tablet Every 12 Hours    doxycycline (ADOXA) 100 MG tablet Take 1 tablet by mouth As Needed.    EPINEPHrine (EPIPEN) 0.3 mg, Intramuscular, As Needed    ezetimibe (ZETIA) 10 mg, Oral, Daily    famotidine (PEPCID) 20 mg, 2 Times Daily    fenofibrate 160 mg, Oral, Daily    fish oil 1,000 mg, Daily With Breakfast    folic acid (FOLVITE) 1 MG tablet 1 tablet, Daily    glimepiride (AMARYL) 4 mg, Oral, 2 Times Daily With Meals    glucose blood (Accu-Chek Guide Test) test strip Use as instructed    HYDROcodone-acetaminophen (NORCO) 5-325 MG per tablet 1 tablet, Oral, Every 4 Hours PRN    hydrOXYzine (ATARAX) 25 mg, Oral, Every 6 Hours PRN    inFLIXimab (REMICADE) 100 MG injection monthly    Januvia 100 mg, Oral, Daily    methotrexate 2.5 MG tablet Every 7 Days    metoprolol succinate XL (TOPROL-XL) 50 MG 24 hr tablet TAKE TWO TABLETS BY MOUTH EVERY MORNING AND ONE EVERY NIGHT AT BEDTIME    metoprolol tartrate (LOPRESSOR) 50 mg, Every Night at Bedtime    olmesartan (BENICAR) 40 mg, Oral, Daily    ondansetron ODT (ZOFRAN-ODT) 8 mg, Translingual,  "Every 8 Hours PRN    sulfaSALAzine (AZULFIDINE) 1,500 mg, 2 Times Daily    vitamin D (ERGOCALCIFEROL) 50,000 Units, Oral, Weekly          Objective   Vital Signs:  /88 (BP Location: Left arm, Patient Position: Sitting, Cuff Size: Large Adult)   Pulse 77   Temp 97.8 °F (36.6 °C) (Temporal)   Ht 167.6 cm (65.98\")   Wt 125 kg (276 lb 9.6 oz)   SpO2 100%   BMI 44.67 kg/m²   Estimated body mass index is 44.67 kg/m² as calculated from the following:    Height as of this encounter: 167.6 cm (65.98\").    Weight as of this encounter: 125 kg (276 lb 9.6 oz).               Review of Systems   Constitutional:  Negative for appetite change, chills, fatigue, fever and unexpected weight change.   Respiratory:  Negative for cough, choking and shortness of breath.    Gastrointestinal:  Positive for abdominal pain. Negative for abdominal distention, blood in stool, constipation, diarrhea, nausea and vomiting.   Allergic/Immunologic: Negative for food allergies.        Physical Exam  Constitutional:       Appearance: Normal appearance.   Cardiovascular:      Rate and Rhythm: Normal rate and regular rhythm.   Pulmonary:      Effort: Pulmonary effort is normal.      Breath sounds: Normal breath sounds.   Abdominal:      General: Abdomen is flat. Bowel sounds are normal.      Palpations: Abdomen is soft.      Tenderness: There is abdominal tenderness in the right upper quadrant.      Comments: Abdominal incisions clean dry and intact.   Skin:     General: Skin is warm and dry.   Neurological:      Mental Status: She is alert and oriented to person, place, and time.   Psychiatric:         Mood and Affect: Mood normal.         Behavior: Behavior normal.         Thought Content: Thought content normal.         Judgment: Judgment normal.        Result Review :                   Assessment and Plan   Diagnoses and all orders for this visit:    1. Postoperative follow-up (Primary)  Comments:  Improving.  May hold diabetic " medications until she is eating more.  Monitor BS daily.  Incisions clean dry and intact.    Other orders  -     clobetasol (TEMOVATE) 0.05 % ointment; Apply  topically to the appropriate area as directed 2 (Two) Times a Day.  Dispense: 45 g; Refill: 3  -     vitamin D (ERGOCALCIFEROL) 1.25 MG (90808 UT) capsule capsule; Take 1 capsule by mouth 1 (One) Time Per Week.  Dispense: 12 capsule; Refill: 1             Follow Up   No follow-ups on file.  Patient was given instructions and counseling regarding her condition or for health maintenance advice. Please see specific information pulled into the AVS if appropriate.

## 2025-02-26 RX ORDER — PROMETHAZINE HYDROCHLORIDE 25 MG/1
25 TABLET ORAL EVERY 6 HOURS PRN
Qty: 20 TABLET | Refills: 1 | Status: SHIPPED | OUTPATIENT
Start: 2025-02-26

## 2025-03-12 ENCOUNTER — OFFICE VISIT (OUTPATIENT)
Dept: BARIATRICS/WEIGHT MGMT | Facility: CLINIC | Age: 45
End: 2025-03-12
Payer: COMMERCIAL

## 2025-03-12 VITALS — BODY MASS INDEX: 42.64 KG/M2 | WEIGHT: 271.7 LBS | HEIGHT: 67 IN

## 2025-03-12 DIAGNOSIS — E66.01 OBESITY, CLASS III, BMI 40-49.9 (MORBID OBESITY): Primary | ICD-10-CM

## 2025-03-12 NOTE — PROGRESS NOTES
Nutrition Services    Patient Name: Bernadine Arriaga  YOB: 1980  MRN: [id number]  Date of Service: 25      ICD-10-CM ICD-9-CM   1. Obesity, Class III, BMI 40-49.9 (morbid obesity)  E66.01 278.01          NUTRITION ASSESSMENT - BARIATRIC SURGERY      Reason for Visit 1 month post op VSG     H&P      Past Medical History:   Diagnosis Date    Allergic     Allergic rhinitis     Anaphylactic reaction     to combo of ragweed and molds    Anxiety     Arthritis of spine     psoriatic    Asthma     Diabetes mellitus     GERD (gastroesophageal reflux disease)     History of medical problems     Hidradenitis    Hydradenitis     Hyperlipidemia     slight elevation not treated    Hypertension     Low back pain     Migraine     history    MRSA carrier     Pneumonia     Psoriasis     Psoriatic arthritis     Pyoderma     Sinus disorder     Sleep apnea     no machine       Past Surgical History:   Procedure Laterality Date    ADENOIDECTOMY      APPENDECTOMY      AXILLARY SURGERY      multiple due to hidradenitis    BREAST BIOPSY       SECTION      CHOLECYSTECTOMY      ENDOSCOPY N/A 2024    Procedure: ESOPHAGOGASTRODUODENOSCOPY WITH BIOPSY,;  Surgeon: Ciera Cota MD;  Location: [facility] ENDOSCOPY;  Service: General;  Laterality: N/A;  post: NORMALEGD    GASTRIC BAND ADJUSTMENT N/A 2024    Procedure: ESOPHAGOGASTRODUODENOSCOPY WITH GASTRIC BAND ADJUSTMENT WITH FLUORO;  Surgeon: Ciera Cota MD;  Location: [facility] ENDOSCOPY;  Service: Gastroenterology;  Laterality: N/A;  gastric band leak    GASTRIC BANDING REMOVAL N/A 2024    Procedure: GASTRIC BAND REMOVAL LAPAROSCOPIC WITH DAVINCI ROBOT;  Surgeon: Ciera Cota MD;  Location: [facility] MAIN OR;  Service: Robotics - DaVinci;  Laterality: N/A;    GASTRIC SLEEVE LAPAROSCOPIC N/A 2025    Procedure: GASTRIC SLEEVE LAPAROSCOPIC WITH DAVINCI ROBOT;  Surgeon: Ciera Cota MD;  Location: [facility] MAIN OR;  Service: Robotics - DaVinci;  Laterality:  "N/A;    INCISION AND DRAINAGE OF WOUND      breast    KNEE ARTHRODESIS      LAPAROSCOPIC GASTRIC BANDING  2008    MASS EXCISION Right 10/27/2021    Procedure: WIDE EXCISION TRUNK LESION/MASS, hidradenitis of right hip;  Surgeon: Serafin Centeno MD;  Location: [facility];  Service: General;  Laterality: Right;    SINUS SURGERY      SMALL INTESTINE SURGERY      TONSILLECTOMY      WOUND DEBRIDEMENT Right 06/19/2019    Procedure: DEBRIDEMENT OF RECURRENT HIDRADENITIS AND VAC PLACEMENT RIGHT SIDE;  Surgeon: Alix Freire MD;  Location: [facility] OR;  Service: General        Previous Goals          Encounter Information        Visit Narrative     Patient reports she has been doing well post surgery. Patient does report occasional heart burn and nausea but not with specific foods. Patient to monitor symptoms and come back in for follow up if needed. Patient also reports some constipation. Encouraged patient to add in miralax every other day if needed. Patient has been trying new foods without issues.     Diet Recall:   Breakfast: eggs and protein shake  Lunch: chicken salad, chicken or burger patties  Dinner: taco bowl, chicken, chili  Snacks: cheese stick  Beverages: water, tea, coffee    Exercise: patient has been exercising 1-2 days each week    Supplements: taking bariatric MV    Self Monitoring:          Anthropometrics        Current Height, Weight Height: 170.2 cm (67\")  Weight: 123 kg (271 lb 11.2 oz) (03/12/25 1435)       Trending Weight Hx  9.5% weight loss in 1 month    Wt Readings from Last 30 Encounters:   03/12/25 1435 123 kg (271 lb 11.2 oz)   02/24/25 0736 125 kg (276 lb 9.6 oz)   02/12/25 1118 129 kg (285 lb 3.2 oz)   02/05/25 0749 133 kg (293 lb 9.6 oz)   01/29/25 1102 136 kg (299 lb)   01/24/25 0751 136 kg (299 lb 6.4 oz)   12/13/24 1404 134 kg (294 lb 9.6 oz)   11/22/24 0746 134 kg (296 lb)   11/19/24 0903 136 kg (299 lb 12.8 oz)   10/18/24 0725 133 kg (294 lb)   09/29/24 0746 132 kg (290 lb " 3.2 oz)   09/23/24 1351 134 kg (296 lb)   09/20/24 0942 134 kg (295 lb)   09/20/24 0845 134 kg (294 lb 8 oz)   08/19/24 0835 135 kg (296 lb 11.2 oz)   07/23/24 1252 135 kg (298 lb)   07/18/24 1224 135 kg (296 lb 8.3 oz)   07/15/24 1004 135 kg (297 lb)   07/15/24 0907 135 kg (297 lb 3.2 oz)   06/28/24 1510 135 kg (298 lb 1 oz)   06/28/24 0756 135 kg (298 lb 6.4 oz)   05/31/24 1430 (!) 139 kg (307 lb)   12/05/23 1452 133 kg (294 lb)   10/18/23 1300 (!) 137 kg (301 lb)   09/13/23 0939 (!) 139 kg (307 lb)   04/26/23 0800 (!) 138 kg (305 lb)   08/30/22 1356 135 kg (297 lb)   07/22/22 1317 136 kg (300 lb)   12/08/21 0844 (!) 141 kg (310 lb)   11/11/21 1409 (!) 140 kg (309 lb 9.6 oz)   10/27/21 1234 (!) 137 kg (302 lb 7.5 oz)   10/21/21 1233 136 kg (300 lb)   10/13/21 0800 (!) 139 kg (306 lb 3.2 oz)   09/29/21 0838 (!) 137 kg (301 lb 12.8 oz)      BMI kg/m2 Body mass index is 42.55 kg/m².       Nutrition Diagnosis         Nutrition Dx Statement Overweight/obesity RT multifactorial biochemical, behavioral and environmental contributors to disease AEB BMI 42.55 kg/m^2         Nutrition Intervention         Nutrition Intervention Nutrition education related to diet modification and physical activity        Monitor/Evaluation        New Goals Patient to continue adding in new foods  Patient to add in miralax every other day if needed       Total time spent with pt 15 minutes of which 15 minutes were spent on education.       Electronically signed by:  Agusto Aguayo RD  03/12/25 15:29 EDT

## 2025-03-15 DIAGNOSIS — I10 ESSENTIAL HYPERTENSION: ICD-10-CM

## 2025-03-16 RX ORDER — METOPROLOL SUCCINATE 50 MG/1
TABLET, EXTENDED RELEASE ORAL
Qty: 270 TABLET | Refills: 0 | Status: SHIPPED | OUTPATIENT
Start: 2025-03-16

## 2025-05-02 RX ORDER — OMEPRAZOLE 40 MG/1
40 CAPSULE, DELAYED RELEASE ORAL DAILY
Qty: 30 CAPSULE | Refills: 6 | Status: SHIPPED | OUTPATIENT
Start: 2025-05-02

## 2025-05-19 RX ORDER — FENOFIBRATE 145 MG/1
145 TABLET, FILM COATED ORAL DAILY
Qty: 90 TABLET | Refills: 0 | Status: SHIPPED | OUTPATIENT
Start: 2025-05-19

## 2025-06-09 RX ORDER — EZETIMIBE 10 MG/1
10 TABLET ORAL DAILY
Qty: 90 TABLET | Refills: 0 | Status: SHIPPED | OUTPATIENT
Start: 2025-06-09

## 2025-06-21 DIAGNOSIS — E11.65 TYPE 2 DIABETES MELLITUS WITH HYPERGLYCEMIA, WITHOUT LONG-TERM CURRENT USE OF INSULIN: ICD-10-CM

## 2025-06-21 DIAGNOSIS — I10 HYPERTENSION, UNSPECIFIED TYPE: ICD-10-CM

## 2025-06-21 DIAGNOSIS — I10 ESSENTIAL HYPERTENSION: ICD-10-CM

## 2025-06-22 RX ORDER — METOPROLOL SUCCINATE 50 MG/1
TABLET, EXTENDED RELEASE ORAL
Qty: 270 TABLET | Refills: 0 | Status: SHIPPED | OUTPATIENT
Start: 2025-06-22

## 2025-06-22 RX ORDER — SITAGLIPTIN 100 MG/1
100 TABLET, FILM COATED ORAL DAILY
Qty: 30 TABLET | Refills: 0 | Status: SHIPPED | OUTPATIENT
Start: 2025-06-22

## 2025-06-22 RX ORDER — EMPAGLIFLOZIN 25 MG/1
25 TABLET, FILM COATED ORAL DAILY
Qty: 90 TABLET | Refills: 0 | Status: SHIPPED | OUTPATIENT
Start: 2025-06-22

## 2025-06-22 RX ORDER — CLONIDINE HYDROCHLORIDE 0.1 MG/1
0.1 TABLET ORAL 2 TIMES DAILY
Qty: 180 TABLET | Refills: 0 | Status: SHIPPED | OUTPATIENT
Start: 2025-06-22

## 2025-07-10 ENCOUNTER — TELEPHONE (OUTPATIENT)
Dept: FAMILY MEDICINE CLINIC | Facility: CLINIC | Age: 45
End: 2025-07-10
Payer: COMMERCIAL

## 2025-07-10 NOTE — TELEPHONE ENCOUNTER
Prior Auth completed for Jardiance 25MG tablets via covermymeds. Pending determination within 72 hours minium.     (Key: [id number])  PA Case ID #: [id number]    [employer] Electronic PA Form (2017 [employer])

## 2025-07-10 NOTE — TELEPHONE ENCOUNTER
Prior Authorization for Jardiance 25MG tablets APPROVED.     This is to inform you that your Prior Authorization request for the above member’s Jardiance 25 MG OR TABS has been approved. If you are changing the member's therapy the previously approved therapy will be canceled and replaced.    The authorization is valid from 06/10/2025 through 07/10/2026. A letter of explanation will also be mailed to the patient.        Faxed approval to pharmacy.

## 2025-07-19 DIAGNOSIS — E11.65 TYPE 2 DIABETES MELLITUS WITH HYPERGLYCEMIA, WITHOUT LONG-TERM CURRENT USE OF INSULIN: ICD-10-CM

## 2025-07-20 RX ORDER — SITAGLIPTIN 100 MG/1
100 TABLET, FILM COATED ORAL DAILY
Qty: 30 TABLET | Refills: 0 | Status: SHIPPED | OUTPATIENT
Start: 2025-07-20

## 2025-07-20 RX ORDER — EMPAGLIFLOZIN 25 MG/1
25 TABLET, FILM COATED ORAL DAILY
Qty: 30 TABLET | Refills: 0 | Status: SHIPPED | OUTPATIENT
Start: 2025-07-20

## 2025-07-20 NOTE — TELEPHONE ENCOUNTER
I sent refills on her meds but she MUST make an appt soon for follow up on dm  She cancelled with Jennifer and with me

## 2025-08-08 RX ORDER — METHYLPREDNISOLONE 4 MG/1
TABLET ORAL
COMMUNITY
Start: 2025-07-14

## 2025-08-13 ENCOUNTER — OFFICE VISIT (OUTPATIENT)
Dept: BARIATRICS/WEIGHT MGMT | Facility: CLINIC | Age: 45
End: 2025-08-13
Payer: COMMERCIAL

## 2025-08-13 VITALS
DIASTOLIC BLOOD PRESSURE: 73 MMHG | HEART RATE: 65 BPM | WEIGHT: 242 LBS | SYSTOLIC BLOOD PRESSURE: 111 MMHG | OXYGEN SATURATION: 97 % | BODY MASS INDEX: 37.98 KG/M2 | HEIGHT: 67 IN

## 2025-08-13 DIAGNOSIS — E66.812 OBESITY, CLASS II, BMI 35-39.9: ICD-10-CM

## 2025-08-13 DIAGNOSIS — Z90.3 H/O GASTRIC SLEEVE: Primary | ICD-10-CM

## 2025-08-13 DIAGNOSIS — E11.9 TYPE 2 DIABETES MELLITUS WITHOUT COMPLICATION, UNSPECIFIED WHETHER LONG TERM INSULIN USE: ICD-10-CM

## 2025-08-13 RX ORDER — DEXLANSOPRAZOLE 60 MG/1
60 CAPSULE, DELAYED RELEASE ORAL DAILY
Qty: 30 CAPSULE | Refills: 5 | Status: SHIPPED | OUTPATIENT
Start: 2025-08-13

## 2025-08-13 RX ORDER — TOBRAMYCIN AND DEXAMETHASONE 3; 1 MG/ML; MG/ML
SUSPENSION/ DROPS OPHTHALMIC
COMMUNITY
Start: 2025-08-11

## 2025-08-16 ENCOUNTER — HOSPITAL ENCOUNTER (EMERGENCY)
Facility: HOSPITAL | Age: 45
Discharge: HOME | End: 2025-08-17
Attending: EMERGENCY MEDICINE | Admitting: EMERGENCY MEDICINE
Payer: COMMERCIAL

## 2025-08-16 ENCOUNTER — APPOINTMENT (OUTPATIENT)
Dept: CT IMAGING | Facility: HOSPITAL | Age: 45
End: 2025-08-16
Payer: COMMERCIAL

## 2025-08-16 ENCOUNTER — APPOINTMENT (OUTPATIENT)
Dept: GENERAL RADIOLOGY | Facility: HOSPITAL | Age: 45
End: 2025-08-16
Payer: COMMERCIAL

## 2025-08-16 DIAGNOSIS — J18.9 PNEUMONIA DUE TO INFECTIOUS ORGANISM, UNSPECIFIED LATERALITY, UNSPECIFIED PART OF LUNG: Primary | ICD-10-CM

## 2025-08-16 DIAGNOSIS — R00.2 PALPITATIONS: ICD-10-CM

## 2025-08-16 LAB
ALBUMIN SERPL-MCNC: 4.4 G/DL (ref 3.5–5.2)
ALBUMIN/GLOB SERPL: 1.3 G/DL
ALP SERPL-CCNC: 59 U/L (ref 39–117)
ALT SERPL W P-5'-P-CCNC: 18 U/L (ref 1–33)
ANION GAP SERPL CALCULATED.3IONS-SCNC: 13.5 MMOL/L (ref 5–15)
AST SERPL-CCNC: 18 U/L (ref 1–32)
BASOPHILS # BLD AUTO: 0.06 10*3/MM3 (ref 0–0.2)
BASOPHILS NFR BLD AUTO: 0.5 % (ref 0–1.5)
BILIRUB SERPL-MCNC: 0.3 MG/DL (ref 0–1.2)
BUN SERPL-MCNC: 20.4 MG/DL (ref 6–20)
BUN/CREAT SERPL: 37.1 (ref 7–25)
CALCIUM SPEC-SCNC: 9.5 MG/DL (ref 8.6–10.5)
CHLORIDE SERPL-SCNC: 103 MMOL/L (ref 98–107)
CO2 SERPL-SCNC: 24.5 MMOL/L (ref 22–29)
CREAT SERPL-MCNC: 0.55 MG/DL (ref 0.57–1)
D DIMER PPP FEU-MCNC: 0.51 MCGFEU/ML (ref 0–0.5)
DEPRECATED RDW RBC AUTO: 43.8 FL (ref 37–54)
EGFRCR SERPLBLD CKD-EPI 2021: 113.9 ML/MIN/1.73
EOSINOPHIL # BLD AUTO: 0.32 10*3/MM3 (ref 0–0.4)
EOSINOPHIL NFR BLD AUTO: 2.8 % (ref 0.3–6.2)
ERYTHROCYTE [DISTWIDTH] IN BLOOD BY AUTOMATED COUNT: 12.5 % (ref 12.3–15.4)
GEN 5 1HR TROPONIN T REFLEX: 7 NG/L
GLOBULIN UR ELPH-MCNC: 3.4 GM/DL
GLUCOSE SERPL-MCNC: 100 MG/DL (ref 65–99)
HCT VFR BLD AUTO: 40.8 % (ref 34–46.6)
HGB BLD-MCNC: 13.2 G/DL (ref 12–15.9)
HOLD SPECIMEN: NORMAL
HOLD SPECIMEN: NORMAL
IMM GRANULOCYTES # BLD AUTO: 0.02 10*3/MM3 (ref 0–0.05)
IMM GRANULOCYTES NFR BLD AUTO: 0.2 % (ref 0–0.5)
LYMPHOCYTES # BLD AUTO: 3.63 10*3/MM3 (ref 0.7–3.1)
LYMPHOCYTES NFR BLD AUTO: 32.3 % (ref 19.6–45.3)
MAGNESIUM SERPL-MCNC: 2 MG/DL (ref 1.6–2.6)
MCH RBC QN AUTO: 31.1 PG (ref 26.6–33)
MCHC RBC AUTO-ENTMCNC: 32.4 G/DL (ref 31.5–35.7)
MCV RBC AUTO: 96.2 FL (ref 79–97)
MONOCYTES # BLD AUTO: 1.06 10*3/MM3 (ref 0.1–0.9)
MONOCYTES NFR BLD AUTO: 9.4 % (ref 5–12)
NEUTROPHILS NFR BLD AUTO: 54.8 % (ref 42.7–76)
NEUTROPHILS NFR BLD AUTO: 6.15 10*3/MM3 (ref 1.7–7)
NRBC BLD AUTO-RTO: 0 /100 WBC (ref 0–0.2)
PLATELET # BLD AUTO: 286 10*3/MM3 (ref 140–450)
PMV BLD AUTO: 10.5 FL (ref 6–12)
POTASSIUM SERPL-SCNC: 3.7 MMOL/L (ref 3.5–5.2)
PROT SERPL-MCNC: 7.8 G/DL (ref 6–8.5)
RBC # BLD AUTO: 4.24 10*6/MM3 (ref 3.77–5.28)
SODIUM SERPL-SCNC: 141 MMOL/L (ref 136–145)
TROPONIN T NUMERIC DELTA: NORMAL
TROPONIN T SERPL HS-MCNC: <6 NG/L
TSH SERPL DL<=0.05 MIU/L-ACNC: 3.06 UIU/ML (ref 0.27–4.2)
WBC NRBC COR # BLD AUTO: 11.24 10*3/MM3 (ref 3.4–10.8)

## 2025-08-16 PROCEDURE — 84484 ASSAY OF TROPONIN QUANT: CPT | Performed by: EMERGENCY MEDICINE

## 2025-08-16 PROCEDURE — 71045 X-RAY EXAM CHEST 1 VIEW: CPT

## 2025-08-16 PROCEDURE — 85379 FIBRIN DEGRADATION QUANT: CPT | Performed by: EMERGENCY MEDICINE

## 2025-08-16 PROCEDURE — 80050 GENERAL HEALTH PANEL: CPT | Performed by: EMERGENCY MEDICINE

## 2025-08-16 PROCEDURE — 99285 EMERGENCY DEPT VISIT HI MDM: CPT

## 2025-08-16 PROCEDURE — 71275 CT ANGIOGRAPHY CHEST: CPT

## 2025-08-16 PROCEDURE — 93005 ELECTROCARDIOGRAM TRACING: CPT

## 2025-08-16 PROCEDURE — 36415 COLL VENOUS BLD VENIPUNCTURE: CPT

## 2025-08-16 PROCEDURE — 25510000001 IOPAMIDOL PER 1 ML: Performed by: EMERGENCY MEDICINE

## 2025-08-16 PROCEDURE — 93005 ELECTROCARDIOGRAM TRACING: CPT | Performed by: EMERGENCY MEDICINE

## 2025-08-16 PROCEDURE — 83735 ASSAY OF MAGNESIUM: CPT | Performed by: EMERGENCY MEDICINE

## 2025-08-16 RX ORDER — METHYLPREDNISOLONE 4 MG/1
TABLET ORAL
Qty: 21 TABLET | Refills: 0 | Status: SHIPPED | OUTPATIENT
Start: 2025-08-16 | End: 2025-08-22

## 2025-08-16 RX ORDER — AZITHROMYCIN 250 MG/1
TABLET, FILM COATED ORAL
Qty: 6 TABLET | Refills: 0 | Status: SHIPPED | OUTPATIENT
Start: 2025-08-16 | End: 2025-08-22

## 2025-08-16 RX ORDER — SODIUM CHLORIDE 0.9 % (FLUSH) 0.9 %
10 SYRINGE (ML) INJECTION AS NEEDED
Status: DISCONTINUED | OUTPATIENT
Start: 2025-08-16 | End: 2025-08-17 | Stop reason: HOSPADM

## 2025-08-16 RX ORDER — METHYLPREDNISOLONE 4 MG/1
TABLET ORAL
Qty: 21 TABLET | Refills: 0 | Status: SHIPPED | OUTPATIENT
Start: 2025-08-16 | End: 2025-08-16

## 2025-08-16 RX ORDER — AZITHROMYCIN 250 MG/1
TABLET, FILM COATED ORAL
Qty: 6 TABLET | Refills: 0 | Status: SHIPPED | OUTPATIENT
Start: 2025-08-16 | End: 2025-08-16

## 2025-08-16 RX ORDER — IOPAMIDOL 755 MG/ML
100 INJECTION, SOLUTION INTRAVASCULAR
Status: COMPLETED | OUTPATIENT
Start: 2025-08-16 | End: 2025-08-16

## 2025-08-16 RX ADMIN — IOPAMIDOL 100 ML: 755 INJECTION, SOLUTION INTRAVENOUS at 23:30

## 2025-08-17 ENCOUNTER — APPOINTMENT (OUTPATIENT)
Dept: RESPIRATORY THERAPY | Facility: HOSPITAL | Age: 45
End: 2025-08-17
Payer: COMMERCIAL

## 2025-08-17 ENCOUNTER — HOSPITAL ENCOUNTER (OUTPATIENT)
Dept: RESPIRATORY THERAPY | Facility: HOSPITAL | Age: 45
Discharge: HOME | End: 2025-08-17
Payer: COMMERCIAL

## 2025-08-17 VITALS
WEIGHT: 245.15 LBS | HEART RATE: 66 BPM | SYSTOLIC BLOOD PRESSURE: 142 MMHG | BODY MASS INDEX: 39.4 KG/M2 | HEIGHT: 66 IN | DIASTOLIC BLOOD PRESSURE: 77 MMHG | TEMPERATURE: 97.9 F | OXYGEN SATURATION: 98 % | RESPIRATION RATE: 19 BRPM

## 2025-08-17 DIAGNOSIS — R00.2 PALPITATIONS: ICD-10-CM

## 2025-08-17 LAB
QT INTERVAL: 423 MS
QTC INTERVAL: 421 MS

## 2025-08-17 PROCEDURE — 93246 EXT ECG>7D<15D RECORDING: CPT

## 2025-08-18 RX ORDER — SULFADIAZINE 500 MG/1
1500 TABLET ORAL
COMMUNITY

## 2025-08-18 RX ORDER — FUROSEMIDE 40 MG/1
40 TABLET ORAL DAILY
COMMUNITY

## 2025-08-18 RX ORDER — LORATADINE 10 MG/1
TABLET, ORALLY DISINTEGRATING ORAL
COMMUNITY

## 2025-08-22 ENCOUNTER — PREP FOR SURGERY (OUTPATIENT)
Dept: OTHER | Facility: HOSPITAL | Age: 45
End: 2025-08-22
Payer: COMMERCIAL

## 2025-08-22 ENCOUNTER — LAB (OUTPATIENT)
Dept: FAMILY MEDICINE CLINIC | Facility: CLINIC | Age: 45
End: 2025-08-22
Payer: COMMERCIAL

## 2025-08-22 ENCOUNTER — OFFICE VISIT (OUTPATIENT)
Dept: FAMILY MEDICINE CLINIC | Facility: CLINIC | Age: 45
End: 2025-08-22
Payer: COMMERCIAL

## 2025-08-22 VITALS
TEMPERATURE: 98.6 F | SYSTOLIC BLOOD PRESSURE: 109 MMHG | WEIGHT: 238 LBS | BODY MASS INDEX: 38.25 KG/M2 | OXYGEN SATURATION: 95 % | HEIGHT: 66 IN | HEART RATE: 70 BPM | DIASTOLIC BLOOD PRESSURE: 79 MMHG

## 2025-08-22 DIAGNOSIS — Z12.11 COLON CANCER SCREENING: ICD-10-CM

## 2025-08-22 DIAGNOSIS — Z12.11 ENCOUNTER FOR SCREENING COLONOSCOPY: Primary | ICD-10-CM

## 2025-08-22 DIAGNOSIS — E78.2 MIXED HYPERLIPIDEMIA: ICD-10-CM

## 2025-08-22 DIAGNOSIS — E11.65 TYPE 2 DIABETES MELLITUS WITH HYPERGLYCEMIA, WITHOUT LONG-TERM CURRENT USE OF INSULIN: ICD-10-CM

## 2025-08-22 DIAGNOSIS — J45.20 MILD INTERMITTENT ASTHMA WITHOUT COMPLICATION: ICD-10-CM

## 2025-08-22 DIAGNOSIS — I10 HYPERTENSION, UNSPECIFIED TYPE: ICD-10-CM

## 2025-08-22 DIAGNOSIS — E11.65 TYPE 2 DIABETES MELLITUS WITH HYPERGLYCEMIA, WITHOUT LONG-TERM CURRENT USE OF INSULIN: Primary | ICD-10-CM

## 2025-08-22 DIAGNOSIS — Z12.31 ENCOUNTER FOR SCREENING MAMMOGRAM FOR MALIGNANT NEOPLASM OF BREAST: ICD-10-CM

## 2025-08-22 DIAGNOSIS — Z90.3 H/O GASTRIC SLEEVE: ICD-10-CM

## 2025-08-22 DIAGNOSIS — E66.812 OBESITY, CLASS II, BMI 35-39.9: ICD-10-CM

## 2025-08-22 LAB
25(OH)D3 SERPL-MCNC: 50.9 NG/ML (ref 30–100)
ALBUMIN UR-MCNC: <1.2 MG/DL
CREAT UR-MCNC: 72.7 MG/DL
HBA1C MFR BLD: 5.7 % (ref 4.8–5.6)
MICROALBUMIN/CREAT UR: NORMAL MG/G{CREAT}

## 2025-08-22 PROCEDURE — 36415 COLL VENOUS BLD VENIPUNCTURE: CPT

## 2025-08-22 RX ORDER — SODIUM CHLORIDE, SODIUM LACTATE, POTASSIUM CHLORIDE, CALCIUM CHLORIDE 600; 310; 30; 20 MG/100ML; MG/100ML; MG/100ML; MG/100ML
30 INJECTION, SOLUTION INTRAVENOUS CONTINUOUS
OUTPATIENT
Start: 2025-08-22 | End: 2025-08-23

## 2025-08-22 RX ORDER — SODIUM, POTASSIUM,MAG SULFATES 17.5-3.13G
SOLUTION, RECONSTITUTED, ORAL ORAL
Qty: 354 ML | Refills: 0 | Status: SHIPPED | OUTPATIENT
Start: 2025-08-22

## 2025-08-23 LAB
CHOLEST SERPL-MCNC: 173 MG/DL (ref 0–200)
FERRITIN SERPL-MCNC: 234 NG/ML (ref 13–150)
HDLC SERPL-MCNC: 54 MG/DL (ref 40–60)
IRON 24H UR-MRATE: 115 MCG/DL (ref 37–145)
IRON SATN MFR SERPL: 35 % (ref 20–50)
LDLC SERPL CALC-MCNC: 96 MG/DL (ref 0–100)
LDLC/HDLC SERPL: 1.72 {RATIO}
MAGNESIUM SERPL-MCNC: 1.9 MG/DL (ref 1.6–2.6)
PHOSPHATE SERPL-MCNC: 3.9 MG/DL (ref 2.5–4.5)
TIBC SERPL-MCNC: 331 MCG/DL (ref 298–536)
TRANSFERRIN SERPL-MCNC: 222 MG/DL (ref 200–360)
TRIGL SERPL-MCNC: 130 MG/DL (ref 0–150)
VLDLC SERPL-MCNC: 23 MG/DL (ref 5–40)

## 2025-08-28 LAB — METHYLMALONATE SERPL-SCNC: 139 NMOL/L (ref 0–378)

## 2025-08-29 ENCOUNTER — HOSPITAL ENCOUNTER (OUTPATIENT)
Dept: MAMMOGRAPHY | Facility: HOSPITAL | Age: 45
Discharge: HOME | End: 2025-08-29
Admitting: NURSE PRACTITIONER
Payer: COMMERCIAL

## 2025-08-29 DIAGNOSIS — Z12.31 ENCOUNTER FOR SCREENING MAMMOGRAM FOR MALIGNANT NEOPLASM OF BREAST: ICD-10-CM

## 2025-08-29 PROCEDURE — 77063 BREAST TOMOSYNTHESIS BI: CPT

## 2025-08-29 PROCEDURE — 77067 SCR MAMMO BI INCL CAD: CPT

## (undated) DEVICE — SOL IRR NACL 0.9PCT BO 1000ML

## (undated) DEVICE — PK ENDO GI 50

## (undated) DEVICE — PK BARIATRIC 50

## (undated) DEVICE — KT SURG TURNOVER 050

## (undated) DEVICE — SPNG LAP PREWSH SFTPK 18X18IN STRL PK/5

## (undated) DEVICE — GAUZE,SPONGE,FLUFF,6"X6.75",STRL,5/TRAY: Brand: MEDLINE

## (undated) DEVICE — ABDOMINAL BINDER: Brand: DEROYAL

## (undated) DEVICE — CANNULA SEAL

## (undated) DEVICE — ENDOPATH PNEUMONEEDLE INSUFFLATION NEEDLES WITH LUER LOCK CONNECTORS 120MM: Brand: ENDOPATH

## (undated) DEVICE — SEAL

## (undated) DEVICE — PK MINOR LAPAROTOMY 50

## (undated) DEVICE — VISIGI 3D®  CALIBRATION SYSTEM  SIZE 40FR STD W/ BULB: Brand: BOEHRINGER® VISIGI 3D™ SLEEVE GASTRECTOMY CALIBRATION SYSTEM, SIZE 40FR W/BULB

## (undated) DEVICE — GLV SURG SIGNATURE ESSENTIAL PF LTX SZ7.5

## (undated) DEVICE — SYR LUERLOK 50ML

## (undated) DEVICE — GLV SURG BIOGEL SENSR LTX PF SZ6.5

## (undated) DEVICE — GOWN,REINF,POLY,ECL,PP SLV,XL,XLONG: Brand: MEDLINE

## (undated) DEVICE — LAPAROVUE VISIBILITY SYSTEM LAPAROSCOPIC SOLUTIONS: Brand: LAPAROVUE

## (undated) DEVICE — BITEBLOCK ENDO W/STRAP 60F A/ LF DISP

## (undated) DEVICE — ST TBG AIRSEAL BIF FLTR W/ACT/CHARCOAL/FLTR

## (undated) DEVICE — PENCL EVAC ULTRAVAC SMOKE W/BLD

## (undated) DEVICE — SUT VIC 3/0 SH 27IN J416H

## (undated) DEVICE — YANKAUER,BULB TIP,W/O VENT,RIGID,STERILE: Brand: MEDLINE

## (undated) DEVICE — MAT PREVALON MOBL TRANSFR AIR W/PAD REPROC 39X81IN

## (undated) DEVICE — SYRINGE,TOOMEY,IRRIGATION,70CC,STERILE: Brand: MEDLINE

## (undated) DEVICE — ENDOPATH XCEL WITH OPTIVIEW TECHNOLOGY BLADELESS TROCARS WITH STABILITY SLEEVES: Brand: ENDOPATH XCEL OPTIVIEW

## (undated) DEVICE — TROC BLADLES AIRSEAL/OPTI THRD 8X120MM 1P/U

## (undated) DEVICE — UNDERGLV SURG BIOGEL INDICATOR LF PF 7.5

## (undated) DEVICE — DECANTER: Brand: UNBRANDED

## (undated) DEVICE — APPL CHLORAPREP HI/LITE 26ML ORNG

## (undated) DEVICE — PAD, GROUNDING, UNIVERSAL, SPLIT, 9': Brand: MEDLINE

## (undated) DEVICE — SYR TB PRECISIONGLIDE 1CC 25G 5/8IN LF

## (undated) DEVICE — REDUCER: Brand: ENDOWRIST

## (undated) DEVICE — 2, DISPOSABLE SUCTION/IRRIGATOR WITH DISPOSABLE TIP: Brand: STRYKEFLOW

## (undated) DEVICE — ANTIBACTERIAL VIOLET BRAIDED (POLYGLACTIN 910), SYNTHETIC ABSORBABLE SUTURE: Brand: COATED VICRYL

## (undated) DEVICE — CUFF SCD HEMOFORCE SEQ CALF STD MD

## (undated) DEVICE — PASS SUT PRO BARIATRIC XL W/TROC SWABS

## (undated) DEVICE — ARM DRAPE

## (undated) DEVICE — SPNG GZ AVANT 6PLY 4X4IN STRL PK/2

## (undated) DEVICE — NDL HYPO PRECISIONGLIDE REG 25G 1 1/2

## (undated) DEVICE — UNDRGLV SURG BIOGEL PIMICROINDICATOR SYNTH SZ8 LF STRL

## (undated) DEVICE — BLADELESS OBTURATOR: Brand: WECK VISTA

## (undated) DEVICE — PK PROC TURNOVER

## (undated) DEVICE — VESSEL SEALER EXTEND: Brand: ENDOWRIST

## (undated) DEVICE — 40580 - THE PINK PAD - ADVANCED TRENDELENBURG POSITIONING KIT: Brand: 40580 - THE PINK PAD - ADVANCED TRENDELENBURG POSITIONING KIT

## (undated) DEVICE — UNDERGLV SURG BIOGEL INDICAT PF 61/2 GRN

## (undated) DEVICE — DRSNG SURESITE WNDW 4X4.5

## (undated) DEVICE — NEEDLE, QUINCKE, 20GX3.5": Brand: MEDLINE

## (undated) DEVICE — TIP COVER ACCESSORY

## (undated) DEVICE — COLUMN DRAPE

## (undated) DEVICE — SUT VIC 0 UR6 27IN VCP603H

## (undated) DEVICE — SYR BLUNT/NDL 10ML 18G 1 1/2IN

## (undated) DEVICE — SOLUTION,WATER,IRRIGATION,1000ML,STERILE: Brand: MEDLINE

## (undated) DEVICE — THE STERILE LIGHT HANDLE COVER IS USED WITH STERIS SURGICAL LIGHTING AND VISUALIZATION SYSTEMS.

## (undated) DEVICE — BNDG ADHS PLSTC 1X3IN LF

## (undated) DEVICE — SPNG GZ WOVN 4X4IN 12PLY 10/BX STRL

## (undated) DEVICE — PENCL HND ROCKRSWTCH HOLSTR EZ CLEAN TP CRD 10FT

## (undated) DEVICE — THE STERILE CAMERA HANDLE COVER IS FOR USE WITH THE STERIS SURGICAL LIGHTING AND VISUALIZATION SYSTEMS.

## (undated) DEVICE — ORG INST STRIP/TS ADHS 2X10IN YEL STRL

## (undated) DEVICE — GLV SURG BIOGEL LTX PF 7

## (undated) DEVICE — TOWEL,OR,DSP,ST,NATURAL,DLX,4/PK,20PK/CS: Brand: MEDLINE

## (undated) DEVICE — GLOVE,SURG,SENSICARE SLT,LF,PF,7.5: Brand: MEDLINE

## (undated) DEVICE — SUT ETHLN 3/0 PS1 18IN 1663H

## (undated) DEVICE — SOL IRRIG NACL 9PCT 1000ML BTL

## (undated) DEVICE — WET SKIN PREP TRAY: Brand: MEDLINE INDUSTRIES, INC.

## (undated) DEVICE — STAPLER 60: Brand: SUREFORM

## (undated) DEVICE — SOL IRRIG H2O 1000ML STRL

## (undated) DEVICE — SOL IRRIG NACL 1000ML

## (undated) DEVICE — TUBING, SUCTION, 1/4" X 12', STRAIGHT: Brand: MEDLINE

## (undated) DEVICE — 3M™ IOBAN™ 2 ANTIMICROBIAL INCISE DRAPE 6650EZ: Brand: IOBAN™ 2